# Patient Record
Sex: FEMALE | Race: WHITE | NOT HISPANIC OR LATINO | Employment: OTHER | URBAN - NONMETROPOLITAN AREA
[De-identification: names, ages, dates, MRNs, and addresses within clinical notes are randomized per-mention and may not be internally consistent; named-entity substitution may affect disease eponyms.]

---

## 2018-01-26 ENCOUNTER — TRANSCRIBE ORDERS (OUTPATIENT)
Dept: ADMINISTRATIVE | Facility: HOSPITAL | Age: 83
End: 2018-01-26

## 2018-01-26 DIAGNOSIS — Z78.0 POSTMENOPAUSAL STATE: Primary | ICD-10-CM

## 2018-01-26 DIAGNOSIS — Z12.31 ENCOUNTER FOR SCREENING MAMMOGRAM FOR MALIGNANT NEOPLASM OF BREAST: ICD-10-CM

## 2018-02-19 ENCOUNTER — APPOINTMENT (OUTPATIENT)
Dept: LAB | Facility: HOSPITAL | Age: 83
End: 2018-02-19
Attending: INTERNAL MEDICINE

## 2018-02-19 ENCOUNTER — TRANSCRIBE ORDERS (OUTPATIENT)
Dept: ADMINISTRATIVE | Facility: HOSPITAL | Age: 83
End: 2018-02-19

## 2018-02-19 DIAGNOSIS — R50.9 FEVER, UNSPECIFIED FEVER CAUSE: Primary | ICD-10-CM

## 2018-02-19 LAB
FLUAV AG NPH QL: NEGATIVE
FLUBV AG NPH QL IA: POSITIVE

## 2018-02-19 PROCEDURE — 87804 INFLUENZA ASSAY W/OPTIC: CPT | Performed by: INTERNAL MEDICINE

## 2018-02-23 ENCOUNTER — HOSPITAL ENCOUNTER (OUTPATIENT)
Dept: BONE DENSITY | Facility: HOSPITAL | Age: 83
End: 2018-02-23
Attending: INTERNAL MEDICINE

## 2018-02-23 ENCOUNTER — APPOINTMENT (OUTPATIENT)
Dept: MAMMOGRAPHY | Facility: HOSPITAL | Age: 83
End: 2018-02-23
Attending: INTERNAL MEDICINE

## 2018-03-21 ENCOUNTER — HOSPITAL ENCOUNTER (OUTPATIENT)
Dept: MAMMOGRAPHY | Facility: HOSPITAL | Age: 83
Discharge: HOME | End: 2018-03-21
Attending: INTERNAL MEDICINE | Admitting: INTERNAL MEDICINE

## 2018-03-21 ENCOUNTER — HOSPITAL ENCOUNTER (OUTPATIENT)
Dept: BONE DENSITY | Facility: HOSPITAL | Age: 83
Discharge: HOME | End: 2018-03-21
Attending: INTERNAL MEDICINE

## 2018-03-21 DIAGNOSIS — Z78.0 POSTMENOPAUSAL STATE: ICD-10-CM

## 2018-03-21 DIAGNOSIS — Z12.31 ENCOUNTER FOR SCREENING MAMMOGRAM FOR MALIGNANT NEOPLASM OF BREAST: ICD-10-CM

## 2018-03-21 PROCEDURE — 77080 DXA BONE DENSITY AXIAL: CPT

## 2018-03-21 PROCEDURE — 77067 SCR MAMMO BI INCL CAD: CPT

## 2018-03-21 PROCEDURE — 77063 BREAST TOMOSYNTHESIS BI: CPT

## 2019-01-31 ENCOUNTER — TRANSCRIBE ORDERS (OUTPATIENT)
Dept: ADMINISTRATIVE | Facility: HOSPITAL | Age: 84
End: 2019-01-31

## 2019-01-31 DIAGNOSIS — Z12.39 SCREENING BREAST EXAMINATION: Primary | ICD-10-CM

## 2019-03-13 ENCOUNTER — OFFICE VISIT (OUTPATIENT)
Dept: OTOLARYNGOLOGY | Facility: CLINIC | Age: 84
End: 2019-03-13

## 2019-03-13 ENCOUNTER — NURSE TRIAGE (OUTPATIENT)
Dept: CALL CENTER | Facility: HOSPITAL | Age: 84
End: 2019-03-13

## 2019-03-13 VITALS
HEIGHT: 64 IN | WEIGHT: 135 LBS | TEMPERATURE: 98.6 F | DIASTOLIC BLOOD PRESSURE: 78 MMHG | BODY MASS INDEX: 23.05 KG/M2 | SYSTOLIC BLOOD PRESSURE: 146 MMHG | HEART RATE: 70 BPM

## 2019-03-13 DIAGNOSIS — H65.112 ACUTE MUCOID OTITIS MEDIA OF LEFT EAR: Primary | ICD-10-CM

## 2019-03-13 DIAGNOSIS — H72.92 PERFORATION OF LEFT TYMPANIC MEMBRANE: ICD-10-CM

## 2019-03-13 DIAGNOSIS — H92.12 OTORRHEA OF LEFT EAR: ICD-10-CM

## 2019-03-13 PROCEDURE — 99203 OFFICE O/P NEW LOW 30 MIN: CPT | Performed by: NURSE PRACTITIONER

## 2019-03-13 RX ORDER — NEOMYCIN SULFATE, POLYMYXIN B SULFATE, HYDROCORTISONE 3.5; 10000; 1 MG/ML; [USP'U]/ML; MG/ML
SOLUTION/ DROPS AURICULAR (OTIC)
COMMUNITY
Start: 2019-03-12 | End: 2019-03-13 | Stop reason: SDUPTHER

## 2019-03-13 RX ORDER — FLUTICASONE PROPIONATE 50 MCG
2 SPRAY, SUSPENSION (ML) NASAL DAILY
Qty: 1 BOTTLE | Refills: 5 | Status: SHIPPED | OUTPATIENT
Start: 2019-03-13 | End: 2019-03-13 | Stop reason: SDUPTHER

## 2019-03-13 RX ORDER — FLUTICASONE PROPIONATE 50 MCG
2 SPRAY, SUSPENSION (ML) NASAL DAILY
Qty: 1 BOTTLE | Refills: 5 | Status: SHIPPED | OUTPATIENT
Start: 2019-03-13 | End: 2019-04-24

## 2019-03-13 RX ORDER — AMOXICILLIN 500 MG/1
500 CAPSULE ORAL 3 TIMES DAILY
Qty: 30 CAPSULE | Refills: 0 | Status: SHIPPED | OUTPATIENT
Start: 2019-03-13 | End: 2019-03-13 | Stop reason: SDUPTHER

## 2019-03-13 RX ORDER — AMOXICILLIN 500 MG/1
500 CAPSULE ORAL 3 TIMES DAILY
Qty: 30 CAPSULE | Refills: 0 | Status: SHIPPED | OUTPATIENT
Start: 2019-03-13 | End: 2019-03-27

## 2019-03-13 RX ORDER — NEOMYCIN SULFATE, POLYMYXIN B SULFATE, HYDROCORTISONE 3.5; 10000; 1 MG/ML; [USP'U]/ML; MG/ML
3 SOLUTION/ DROPS AURICULAR (OTIC) 2 TIMES DAILY
Qty: 1 BOTTLE | Refills: 1 | Status: SHIPPED | OUTPATIENT
Start: 2019-03-13 | End: 2019-04-24

## 2019-03-13 NOTE — TELEPHONE ENCOUNTER
Reason for Disposition  • Message left on unidentified answering machine.  Answering service notified.    Additional Information  • Negative: Caller is angry or rude (e.g., hangs up, verbally abusive, yelling)  • Negative: Caller hangs up  • Negative: Caller has already spoken with the PCP and has no further questions.  • Negative: Caller has already spoken with another triager and has no further questions.  • Negative: Caller has already spoken with another triager or PCP AND has further questions AND triager able to answer questions.  • Negative: Busy signal.  First attempt to contact caller.  Follow-up call scheduled within 15 minutes.  • Negative: No answer.  First attempt to contact caller.  Follow-up call scheduled within 15 minutes.  • Negative: Message left on identified answering machine.    Protocols used: NO CONTACT OR DUPLICATE CONTACT CALL-[facility]

## 2019-03-13 NOTE — TELEPHONE ENCOUNTER
"Pressure in my head and left side since Monday night. I hurt really bad in my ear and liquid and blood ran out. She saw Landry yesterday and he gave me some drops, Neomycin  he said I had scratched the outer canal.  But I'm still bleeding from my ear. It is spots but I think it is more serious than just scratches my ear canal.  I need to know what to do. She says her head is stopped up. In January she had her annual physical and was seen by Dr Weaver and everything was fine. Care advise per guideline. She will call MD office when they return from lunch.    Reason for Disposition  • Ear pain    Additional Information  • Negative: [1] Bloody discharge AND [2] it followed ear trauma  • Negative: [1] Followed head or face injury AND [2] clear or bloody fluid  • Negative: [1] Unexplained bleeding AND [2] lasts > 10 minutes or large amount (Exception: if a few drops of blood, continue with triage)  • Negative: Fever > 103 F (39.4 C)  • Negative: Patient sounds very sick or weak to the triager  • Negative: Diabetes mellitus or weak immune system (e.g., HIV positive, cancer chemo, splenectomy, organ transplant, chronic steroids)    Answer Assessment - Initial Assessment Questions  1. LOCATION: \"Which ear is involved?\"     left  2. COLOR: \"What is the color of the discharge?\"       Bleeding just a few spots on tissue  3. CONSISTENCY: \"How runny is the discharge? Could it be water?\"         4. ONSET: \"When did you first notice the discharge?\"      yesterday  5. PAIN: \"Is there any earache?\" \"How bad is it?\"  (Scale 1-10; or mild, moderate, severe)      Yes 5/10  6. OBJECTS: \"Any use of q-tips or have you inserted anything else in your ear?\"      finger  7. OTHER SYMPTOMS: \"Do you have any other symptoms?\" (e.g., headache, fever, dizziness, vomiting, runny nose)      Head feels stopped up  8. PREGNANCY: \"Is there any chance you are pregnant?\" \"When was your last menstrual period?\"      no    Protocols used: EAR - " DISCHARGE-ADULT-AH

## 2019-03-13 NOTE — PROGRESS NOTES
YOB: 1935  Location: [facility]  Location Address: [address] [place] [address], [facility], [address] [place] [address]  Location Phone: [phone]    Chief Complaint   Patient presents with   • Ear Problem       History of Present Illness  Linda Spear is a 83 y.o. female.  Linda Spear is here for evaluation of ENT complaints. The patient has had problems with otalgia and otorrhea  The symptoms are localized to the left side. The patient has had moderate symptoms. The symptoms have been present for the last several days The symptoms are aggravated by  no identifiable factors. The symptoms are improved by no identifiable factors.  She began having bloody otorrhea a few days ago when her ear popped.  She was given cortisporin drops.  She reports left aural fullness.       Past Medical History:   Diagnosis Date   • Disease of thyroid gland        Past Surgical History:   Procedure Laterality Date   • CARDIAC CATHETERIZATION         Outpatient Medications Marked as Taking for the 3/13/19 encounter (Office Visit) with Carlene Ambrosio APRN   Medication Sig Dispense Refill   • levothyroxine (SYNTHROID, LEVOTHROID) 125 MCG tablet Take 125 mcg by mouth Daily.     • Loratadine (CLARITIN PO) Take  by mouth.     • Multiple Vitamins-Minerals (CENTRUM SILVER PO) Take  by mouth.     • neomycin-polymyxin-hydrocortisone (CORTISPORIN) 1 % solution otic solution Administer 3 drops into the left ear 2 (Two) Times a Day. 1 bottle 1   • predniSONE (DELTASONE) 5 MG tablet Take 5 mg by mouth Daily.     • [DISCONTINUED] neomycin-polymyxin-hydrocortisone (CORTISPORIN) 1 % solution otic solution          Patient has no known allergies.    Family History   Problem Relation Age of Onset   • Breast cancer Neg Hx        Social History     Socioeconomic History   • Marital status: Single     Spouse name: Not on file   • Number of children: Not on file   • Years of education: Not on file   • Highest education level: Not on file   Social Needs   •  Financial resource strain: Not on file   • Food insecurity - worry: Not on file   • Food insecurity - inability: Not on file   • Transportation needs - medical: Not on file   • Transportation needs - non-medical: Not on file   Occupational History   • Not on file   Tobacco Use   • Smoking status: Never Smoker   • Smokeless tobacco: Never Used   Substance and Sexual Activity   • Alcohol use: No     Frequency: Never   • Drug use: Defer   • Sexual activity: Not on file   Other Topics Concern   • Not on file   Social History Narrative   • Not on file       Review of Systems   Constitutional: Negative.    HENT:        SEE HPI   Eyes: Negative.    Respiratory: Negative.    Cardiovascular: Negative.    Gastrointestinal: Negative.    Endocrine: Negative.    Genitourinary: Negative.    Musculoskeletal: Negative.    Skin: Negative.    Allergic/Immunologic: Negative.    Neurological: Negative.    Hematological: Negative.    Psychiatric/Behavioral: Negative.        Vitals:    03/13/19 1608   BP: 146/78   Pulse: 70   Temp: 98.6 °F (37 °C)       Body mass index is 23.17 kg/m².    Objective     Physical Exam  CONSTITUTIONAL: well nourished, alert, oriented, in no acute distress     COMMUNICATION AND VOICE: able to communicate normally, normal voice quality    HEAD: normocephalic, no lesions, atraumatic, no tenderness, no masses     FACE: appearance normal, no lesions, no tenderness, no deformities, facial motion symmetric    SALIVARY GLANDS: parotid glands with no tenderness, no swelling, no masses, submandibular glands with normal size, nontender    EYES: ocular motility normal, eyelids normal, orbits normal, no proptosis, conjunctiva normal , pupils equal, round     EARS:  Hearing: response to conversational voice normal bilaterally   External Ears: auricles without lesions  Otoscopic: right TM/eac normal, left TM with effusion with blood clot to TM partially removed, Type B normal volume tympanogram left  side.    NOSE:  External Nose: structure normal, no tenderness on palpation, no nasal discharge, no lesions, no evidence of trauma, nostrils patent   Intranasal Exam: nasal mucosa normal, vestibule within normal limits, inferior turbinate normal, nasal septum midline     ORAL:  Lips: upper and lower lips without lesion   Teeth: dentition within normal limits for age   Gums: gingivae healthy   Oral Mucosa: oral mucosa normal, no mucosal lesions   Floor of Mouth: Warthin’s duct patent, mucosa normal  Tongue: lingual mucosa normal without lesions, normal tongue mobility   Palate: soft and hard palates with normal mucosa and structure  Oropharynx: oropharyngeal mucosa normal    NECK: neck appearance normal, no mass,  noted without erythema or tenderness    LYMPH NODES: no lymphadenopathy    CHEST/RESPIRATORY: respiratory effort normal    CARDIOVASCULAR: extremities without cyanosis or edema      NEUROLOGIC/PSYCHIATRIC: oriented to time, place and person, mood normal, affect appropriate, CN II-XII intact grossly    Assessment/Plan   Linda was seen today for ear problem.    Diagnoses and all orders for this visit:    Acute mucoid otitis media of left ear    Perforation of left tympanic membrane possible    Otorrhea of left ear    Other orders  -     Discontinue: amoxicillin (AMOXIL) 500 MG capsule; Take 1 capsule by mouth 3 (Three) Times a Day.  -     Discontinue: fluticasone (FLONASE) 50 MCG/ACT nasal spray; 2 sprays into the nostril(s) as directed by provider Daily. Administer 2 sprays in each nostril for each dose.  -     neomycin-polymyxin-hydrocortisone (CORTISPORIN) 1 % solution otic solution; Administer 3 drops into the left ear 2 (Two) Times a Day.  -     fluticasone (FLONASE) 50 MCG/ACT nasal spray; 2 sprays into the nostril(s) as directed by provider Daily. Administer 2 sprays in each nostril for each dose.  -     amoxicillin (AMOXIL) 500 MG capsule; Take 1 capsule by mouth 3 (Three) Times a Day.      * Surgery  not found *  No orders of the defined types were placed in this encounter.    Return in about 2 weeks (around 3/27/2019).       Patient Instructions   Dry ear precautions    Add oral abx, continue ear drops, add Flonase

## 2019-03-25 ENCOUNTER — HOSPITAL ENCOUNTER (OUTPATIENT)
Dept: MAMMOGRAPHY | Facility: HOSPITAL | Age: 84
Discharge: HOME | End: 2019-03-25
Attending: INTERNAL MEDICINE | Admitting: INTERNAL MEDICINE

## 2019-03-25 DIAGNOSIS — Z12.39 SCREENING BREAST EXAMINATION: ICD-10-CM

## 2019-03-25 PROCEDURE — 77067 SCR MAMMO BI INCL CAD: CPT

## 2019-03-25 PROCEDURE — 77063 BREAST TOMOSYNTHESIS BI: CPT

## 2019-03-27 ENCOUNTER — OFFICE VISIT (OUTPATIENT)
Dept: OTOLARYNGOLOGY | Facility: CLINIC | Age: 84
End: 2019-03-27

## 2019-03-27 VITALS
BODY MASS INDEX: 23.06 KG/M2 | DIASTOLIC BLOOD PRESSURE: 81 MMHG | HEART RATE: 74 BPM | HEIGHT: 65 IN | WEIGHT: 138.38 LBS | TEMPERATURE: 97.1 F | SYSTOLIC BLOOD PRESSURE: 135 MMHG

## 2019-03-27 DIAGNOSIS — H69.82 DYSFUNCTION OF LEFT EUSTACHIAN TUBE: ICD-10-CM

## 2019-03-27 DIAGNOSIS — H65.112 ACUTE MUCOID OTITIS MEDIA OF LEFT EAR: ICD-10-CM

## 2019-03-27 DIAGNOSIS — H90.11 CONDUCTIVE HEARING LOSS OF RIGHT EAR, UNSPECIFIED HEARING STATUS ON CONTRALATERAL SIDE: Primary | ICD-10-CM

## 2019-03-27 PROCEDURE — 99213 OFFICE O/P EST LOW 20 MIN: CPT | Performed by: NURSE PRACTITIONER

## 2019-03-27 RX ORDER — AZELASTINE 1 MG/ML
2 SPRAY, METERED NASAL NIGHTLY
Qty: 30 ML | Refills: 5 | Status: SHIPPED | OUTPATIENT
Start: 2019-03-27 | End: 2019-04-24

## 2019-03-27 NOTE — PROGRESS NOTES
YOB: 1935  Location: [facility]  Location Address: [address] [place] [address], [facility] [address], [address] [place] [address]  Location Phone: [phone]    Chief Complaint   Patient presents with   • Ear Problem       History of Present Illness  Linda Spear is a 83 y.o. female.  Linda Spear is here for follow up of ENT complaints. The patient has had problems with a current ear infection  The symptoms are localized to the left side. The patient has had improving symptoms. The symptoms have been present for the last several weeks The symptoms are aggravated by  no identifiable factors. The symptoms are improved by no identifiable factors.       Past Medical History:   Diagnosis Date   • Disease of thyroid gland    • Perforation of left tympanic membrane        Past Surgical History:   Procedure Laterality Date   • CARDIAC CATHETERIZATION         Outpatient Medications Marked as Taking for the 3/27/19 encounter (Office Visit) with Carlene Ambrosio APRN   Medication Sig Dispense Refill   • fluticasone (FLONASE) 50 MCG/ACT nasal spray 2 sprays into the nostril(s) as directed by provider Daily. Administer 2 sprays in each nostril for each dose. 1 bottle 5   • levothyroxine (SYNTHROID, LEVOTHROID) 125 MCG tablet Take 125 mcg by mouth Daily.     • Loratadine (CLARITIN PO) Take  by mouth.     • Multiple Vitamins-Minerals (CENTRUM SILVER PO) Take  by mouth.     • neomycin-polymyxin-hydrocortisone (CORTISPORIN) 1 % solution otic solution Administer 3 drops into the left ear 2 (Two) Times a Day. 1 bottle 1   • predniSONE (DELTASONE) 5 MG tablet Take 5 mg by mouth Daily.         Patient has no known allergies.    Family History   Problem Relation Age of Onset   • Breast cancer Neg Hx        Social History     Socioeconomic History   • Marital status: Single     Spouse name: Not on file   • Number of children: Not on file   • Years of education: Not on file   • Highest education level: Not on file   Tobacco Use   • Smoking status: Never  Smoker   • Smokeless tobacco: Never Used   Substance and Sexual Activity   • Alcohol use: No     Frequency: Never   • Drug use: Defer       Review of Systems   Constitutional: Negative.    HENT:        SEE HPI   Eyes: Negative.    Respiratory: Negative.    Cardiovascular: Negative.    Gastrointestinal: Negative.    Endocrine: Negative.    Genitourinary: Negative.    Musculoskeletal: Negative.    Skin: Negative.    Allergic/Immunologic: Negative.    Neurological: Negative.    Hematological: Negative.    Psychiatric/Behavioral: Negative.        Vitals:    03/27/19 0931   BP: 135/81   Pulse: 74   Temp: 97.1 °F (36.2 °C)       Body mass index is 23.03 kg/m².    Objective     Physical Exam  CONSTITUTIONAL: well nourished, alert, oriented, in no acute distress     COMMUNICATION AND VOICE: able to communicate normally, normal voice quality    HEAD: normocephalic, no lesions, atraumatic, no tenderness, no masses     FACE: appearance normal, no lesions, no tenderness, no deformities, facial motion symmetric    SALIVARY GLANDS: parotid glands with no tenderness, no swelling, no masses, submandibular glands with normal size, nontender    EYES: ocular motility normal, eyelids normal, orbits normal, no proptosis, conjunctiva normal , pupils equal, round     EARS:  Hearing: response to conversational voice normal bilaterally   External Ears: auricles without lesions  Otoscopic: right TM normal, left TM dull fluid/hemotympanum inferiorly, left TM with scab removed easily, Type B small volume tympanograms left side  Type A right.    NOSE:  External Nose: structure normal, no tenderness on palpation, no nasal discharge, no lesions, no evidence of trauma, nostrils patent   Intranasal Exam: nasal mucosa normal, vestibule within normal limits, inferior turbinate normal, nasal septum midline     ORAL:  Lips: upper and lower lips without lesion   Teeth: dentition within normal limits for age   Gums: gingivae healthy   Oral Mucosa: oral  mucosa normal, no mucosal lesions   Floor of Mouth: Warthin’s duct patent, mucosa normal  Tongue: lingual mucosa normal without lesions, normal tongue mobility   Palate: soft and hard palates with normal mucosa and structure  Oropharynx: oropharyngeal mucosa normal    NECK: neck appearance normal, no mass,  noted without erythema or tenderness    LYMPH NODES: no lymphadenopathy    CHEST/RESPIRATORY: respiratory effort normal    CARDIOVASCULAR: extremities without cyanosis or edema      NEUROLOGIC/PSYCHIATRIC: oriented to time, place and person, mood normal, affect appropriate, CN II-XII intact grossly    Assessment/Plan   Linda was seen today for ear problem.    Diagnoses and all orders for this visit:    Conductive hearing loss of right ear, unspecified hearing status on contralateral side  -     Comprehensive Hearing Test; Future    Dysfunction of left eustachian tube    Acute mucoid otitis media of left ear    Other orders  -     azelastine (ASTELIN) 0.1 % nasal spray; 2 sprays into the nostril(s) as directed by provider Every Night. Use in each nostril as directed      * Surgery not found *  Orders Placed This Encounter   Procedures   • Comprehensive Hearing Test     Standing Status:   Future     Standing Expiration Date:   3/26/2020     Order Specific Question:   Laterality     Answer:   Bilateral     Return in about 4 weeks (around 4/24/2019).       Patient Instructions   Audio on followup    Call for problems or worsening symptoms

## 2019-04-13 ENCOUNTER — HOSPITAL ENCOUNTER (EMERGENCY)
Facility: HOSPITAL | Age: 84
Discharge: HOME | End: 2019-04-13
Admitting: EMERGENCY MEDICINE

## 2019-04-13 VITALS
OXYGEN SATURATION: 98 % | SYSTOLIC BLOOD PRESSURE: 158 MMHG | RESPIRATION RATE: 18 BRPM | BODY MASS INDEX: 25.52 KG/M2 | WEIGHT: 130 LBS | TEMPERATURE: 97.8 F | HEART RATE: 89 BPM | HEIGHT: 60 IN | DIASTOLIC BLOOD PRESSURE: 85 MMHG

## 2019-04-13 DIAGNOSIS — S81.811A LACERATION OF RIGHT LOWER EXTREMITY, INITIAL ENCOUNTER: Primary | ICD-10-CM

## 2019-04-13 PROCEDURE — 99283 EMERGENCY DEPT VISIT LOW MDM: CPT

## 2019-04-13 RX ORDER — CEPHALEXIN 500 MG/1
500 CAPSULE ORAL ONCE
Status: COMPLETED | OUTPATIENT
Start: 2019-04-13 | End: 2019-04-13

## 2019-04-13 RX ORDER — LIDOCAINE HYDROCHLORIDE 10 MG/ML
10 INJECTION, SOLUTION EPIDURAL; INFILTRATION; INTRACAUDAL; PERINEURAL ONCE
Status: COMPLETED | OUTPATIENT
Start: 2019-04-13 | End: 2019-04-13

## 2019-04-13 RX ORDER — CEPHALEXIN 500 MG/1
500 CAPSULE ORAL 3 TIMES DAILY
Qty: 30 CAPSULE | Refills: 0 | Status: SHIPPED | OUTPATIENT
Start: 2019-04-13 | End: 2019-04-24

## 2019-04-13 RX ORDER — IBUPROFEN 200 MG
CAPSULE ORAL ONCE
Status: COMPLETED | OUTPATIENT
Start: 2019-04-13 | End: 2019-04-13

## 2019-04-13 RX ADMIN — LIDOCAINE HYDROCHLORIDE 10 ML: 10 INJECTION, SOLUTION EPIDURAL; INFILTRATION; INTRACAUDAL; PERINEURAL at 17:36

## 2019-04-13 RX ADMIN — CEPHALEXIN 500 MG: 500 CAPSULE ORAL at 19:02

## 2019-04-13 RX ADMIN — BACITRACIN ZINC, NEOMYCIN, POLYMYXIN B: 400; 3.5; 5 OINTMENT TOPICAL at 18:50

## 2019-04-24 ENCOUNTER — OFFICE VISIT (OUTPATIENT)
Dept: OTOLARYNGOLOGY | Facility: CLINIC | Age: 84
End: 2019-04-24

## 2019-04-24 ENCOUNTER — PROCEDURE VISIT (OUTPATIENT)
Dept: OTOLARYNGOLOGY | Facility: CLINIC | Age: 84
End: 2019-04-24

## 2019-04-24 VITALS
HEIGHT: 64 IN | SYSTOLIC BLOOD PRESSURE: 138 MMHG | DIASTOLIC BLOOD PRESSURE: 70 MMHG | WEIGHT: 137.5 LBS | BODY MASS INDEX: 23.47 KG/M2 | TEMPERATURE: 98 F | HEART RATE: 78 BPM

## 2019-04-24 DIAGNOSIS — H69.82 DYSFUNCTION OF LEFT EUSTACHIAN TUBE: Primary | ICD-10-CM

## 2019-04-24 DIAGNOSIS — H90.3 SENSORINEURAL HEARING LOSS (SNHL) OF BOTH EARS: Primary | ICD-10-CM

## 2019-04-24 DIAGNOSIS — H69.82 DYSFUNCTION OF LEFT EUSTACHIAN TUBE: ICD-10-CM

## 2019-04-24 DIAGNOSIS — H65.112 ACUTE MUCOID OTITIS MEDIA OF LEFT EAR: ICD-10-CM

## 2019-04-24 PROCEDURE — 99213 OFFICE O/P EST LOW 20 MIN: CPT | Performed by: NURSE PRACTITIONER

## 2019-04-24 NOTE — PROGRESS NOTES
YOB: 1935  Location: [facility]  Location Address: [address] [place] [address], [facility] [address], [address] [place] [address]  Location Phone: [phone]    Chief Complaint   Patient presents with   • Ear Problem       History of Present Illness  Linda Spear is a 83 y.o. female.  Linda Spear is here for follow up of ENT complaints. The patient has had problems with popping and cracking of the ear and fluid on the ear  The symptoms are localized to the left side. The patient has had improving symptoms. The symptoms have been present for the last several weeks The symptoms are aggravated by  no identifiable factors. The symptoms are improved by nasal sprays.      Procedure visit     2019  [facility]   Armida Ho AUD   Audiology   Sensorineural hearing loss (SNHL) of both ears +1 more   Dx    Progress Notes                     Past Medical History:   Diagnosis Date   • Conductive hearing loss    • Disease of thyroid gland    • Perforation of left tympanic membrane        Past Surgical History:   Procedure Laterality Date   • CARDIAC CATHETERIZATION         Outpatient Medications Marked as Taking for the 19 encounter (Office Visit) with Carlene Ambrosio APRN   Medication Sig Dispense Refill   • levothyroxine (SYNTHROID, LEVOTHROID) 125 MCG tablet Take 125 mcg by mouth Daily.     • Multiple Vitamins-Minerals (CENTRUM SILVER PO) Take  by mouth.     • predniSONE (DELTASONE) 5 MG tablet Take 5 mg by mouth Daily.         Patient has no known allergies.    Family History   Problem Relation Age of Onset   • Breast cancer Neg Hx        Social History     Socioeconomic History   • Marital status:      Spouse name: Not on file   • Number of children: Not on file   • Years of education: Not on file   • Highest education level: Not on file   Tobacco Use   • Smoking status: Never Smoker   • Smokeless tobacco: Never Used   Substance and Sexual Activity   • Alcohol use: No     Frequency:  Never   • Drug use: Defer       Review of Systems   Constitutional: Negative.    HENT:        SEE HPI   Eyes: Negative.    Respiratory: Negative.    Cardiovascular: Negative.    Gastrointestinal: Negative.    Endocrine: Negative.    Genitourinary: Negative.    Musculoskeletal: Negative.    Skin: Negative.    Allergic/Immunologic: Negative.    Neurological: Negative.    Hematological: Negative.    Psychiatric/Behavioral: Negative.        Vitals:    04/24/19 0914   BP: 138/70   Pulse: 78   Temp: 98 °F (36.7 °C)       Body mass index is 23.6 kg/m².    Objective     Physical Exam  CONSTITUTIONAL: well nourished, alert, oriented, in no acute distress     COMMUNICATION AND VOICE: able to communicate normally, normal voice quality    HEAD: normocephalic, no lesions, atraumatic, no tenderness, no masses     FACE: appearance normal, no lesions, no tenderness, no deformities, facial motion symmetric    SALIVARY GLANDS: parotid glands with no tenderness, no swelling, no masses, submandibular glands with normal size, nontender    EYES: ocular motility normal, eyelids normal, orbits normal, no proptosis, conjunctiva normal , pupils equal, round     EARS:  Hearing: response to conversational voice normal bilaterally   External Ears: auricles without lesions  Otoscopic: right TM normal, left TM dull, right eac with scant cerumen removed with curette, left EAC normal    NOSE:  External Nose: structure normal, no tenderness on palpation, no nasal discharge, no lesions, no evidence of trauma, nostrils patent   Intranasal Exam: nasal mucosa normal, vestibule within normal limits, inferior turbinate normal, nasal septum midline     ORAL:  Lips: upper and lower lips without lesion   Teeth: dentition within normal limits for age   Gums: gingivae healthy   Oral Mucosa: oral mucosa normal, no mucosal lesions   Floor of Mouth: Warthin’s duct patent, mucosa normal  Tongue: lingual mucosa normal without lesions, normal tongue mobility    Palate: soft and hard palates with normal mucosa and structure  Oropharynx: oropharyngeal mucosa normal      NECK: neck appearance normal, no mass,  noted without erythema or tenderness    LYMPH NODES: no lymphadenopathy    CHEST/RESPIRATORY: respiratory effort normal     CARDIOVASCULAR:  extremities without cyanosis or edema      NEUROLOGIC/PSYCHIATRIC: oriented to time, place and person, mood normal, affect appropriate, CN II-XII intact grossly    Assessment/Plan   Linda was seen today for ear problem.    Diagnoses and all orders for this visit:    Dysfunction of left eustachian tube    Acute mucoid otitis media of left ear      * Surgery not found *  No orders of the defined types were placed in this encounter.    Return if symptoms worsen or fail to improve.       Patient Instructions   Call for problems or worsening symptoms

## 2019-06-01 ENCOUNTER — OFFICE VISIT (OUTPATIENT)
Dept: RETAIL CLINIC | Facility: CLINIC | Age: 84
End: 2019-06-01

## 2019-06-01 VITALS
HEART RATE: 78 BPM | WEIGHT: 132 LBS | SYSTOLIC BLOOD PRESSURE: 118 MMHG | RESPIRATION RATE: 16 BRPM | TEMPERATURE: 97.8 F | DIASTOLIC BLOOD PRESSURE: 64 MMHG | OXYGEN SATURATION: 97 % | BODY MASS INDEX: 22.53 KG/M2 | HEIGHT: 64 IN

## 2019-06-01 DIAGNOSIS — H10.022 PINK EYE DISEASE OF LEFT EYE: Primary | ICD-10-CM

## 2019-06-01 PROCEDURE — 99213 OFFICE O/P EST LOW 20 MIN: CPT | Performed by: NURSE PRACTITIONER

## 2019-06-01 RX ORDER — POLYMYXIN B SULFATE AND TRIMETHOPRIM 1; 10000 MG/ML; [USP'U]/ML
1 SOLUTION OPHTHALMIC
Qty: 1 EACH | Refills: 0
Start: 2019-06-01 | End: 2019-06-08

## 2019-06-01 NOTE — PROGRESS NOTES
Chief Complaint   Patient presents with   • Conjunctivitis     Subjective   Linda Spear is a 83 y.o. female who presents to the clinic today.  She states she woke up this morning with left eye redness and a small amount of white drainage in the eye.  She was around someone with a red eye yesterday and is concerned about possible pink eye.  She states this morning it was itching/slightly irritated, but isn't now, and she denies any eye pain.     Conjunctivitis    The current episode started today. The onset was sudden. The problem occurs continuously. The problem has been gradually improving. The problem is mild. Nothing relieves the symptoms. Nothing aggravates the symptoms. Associated symptoms include eye itching (slightly when she woke up, improving ), eye discharge (small amount of white drainage when she woke up ) and eye redness. Pertinent negatives include no orthopnea, no fever, no decreased vision, no double vision, no photophobia, no abdominal pain, no constipation, no diarrhea, no nausea, no vomiting, no congestion, no ear discharge, no ear pain, no headaches, no hearing loss, no mouth sores, no rhinorrhea, no sore throat, no stridor, no swollen glands, no muscle aches, no neck pain, no neck stiffness, no cough, no URI, no wheezing, no rash and no eye pain. Severity: no pain. The left eye is affected. The eye pain is not associated with movement. The eyelid exhibits no abnormality.         Current Outpatient Medications:   •  levothyroxine (SYNTHROID, LEVOTHROID) 125 MCG tablet, Take 125 mcg by mouth Daily., Disp: , Rfl:   •  Multiple Vitamins-Minerals (CENTRUM SILVER PO), Take  by mouth., Disp: , Rfl:   •  predniSONE (DELTASONE) 5 MG tablet, Take 5 mg by mouth Daily., Disp: , Rfl:       Allergies:  Patient has no known allergies.    Past Medical History:   Diagnosis Date   • Chronic shoulder pain    • Conductive hearing loss    • Disease of thyroid gland    • Perforation of left tympanic membrane   "    Past Surgical History:   Procedure Laterality Date   • CARDIAC CATHETERIZATION       Family History   Problem Relation Age of Onset   • Breast cancer Neg Hx      Social History     Tobacco Use   • Smoking status: Never Smoker   • Smokeless tobacco: Never Used   Substance Use Topics   • Alcohol use: No     Frequency: Never   • Drug use: Defer       Review of Systems  Review of Systems   Constitutional: Negative for appetite change, chills, diaphoresis, fatigue and fever.   HENT: Negative for congestion, ear discharge, ear pain, hearing loss, mouth sores, rhinorrhea and sore throat.    Eyes: Positive for discharge (small amount of white drainage when she woke up ), redness and itching (slightly when she woke up, improving ). Negative for double vision, photophobia, pain and visual disturbance.   Respiratory: Negative for cough, wheezing and stridor.    Cardiovascular: Negative for chest pain, palpitations and orthopnea.   Gastrointestinal: Negative for abdominal pain, constipation, diarrhea, nausea and vomiting.   Musculoskeletal: Negative for myalgias and neck pain.   Skin: Negative for rash.   Allergic/Immunologic: Positive for environmental allergies. Negative for food allergies and immunocompromised state.   Neurological: Negative for headaches.       Objective   /64   Pulse 78   Temp 97.8 °F (36.6 °C) (Oral)   Resp 16   Ht 162.6 cm (64\")   Wt 59.9 kg (132 lb)   LMP  (LMP Unknown)   SpO2 97%   Breastfeeding? No   BMI 22.66 kg/m²       Physical Exam   Constitutional: She is oriented to person, place, and time. She appears well-developed and well-nourished. She is active and cooperative.  Non-toxic appearance. She does not have a sickly appearance. She does not appear ill. No distress.   HENT:   Head: Normocephalic and atraumatic.   Right Ear: Hearing, tympanic membrane, external ear and ear canal normal.   Left Ear: Hearing, external ear and ear canal normal. Tympanic membrane is not injected, " not scarred, not perforated, not erythematous, not retracted and not bulging. A middle ear effusion (mild, clearish yellow ) is present. No hemotympanum.   Nose: Nose normal. Right sinus exhibits no maxillary sinus tenderness and no frontal sinus tenderness. Left sinus exhibits no maxillary sinus tenderness and no frontal sinus tenderness.   Mouth/Throat: Uvula is midline and mucous membranes are normal. Oropharyngeal exudate (clearish white PND ) and posterior oropharyngeal erythema present. No posterior oropharyngeal edema or tonsillar abscesses. Tonsils are 1+ on the right. Tonsils are 1+ on the left. No tonsillar exudate.   Eyes: EOM and lids are normal. Pupils are equal, round, and reactive to light. Right eye exhibits no chemosis, no discharge, no exudate and no hordeolum. No foreign body present in the right eye. Left eye exhibits no chemosis, no discharge, no exudate and no hordeolum. No foreign body present in the left eye. Right conjunctiva is not injected. Right conjunctiva has no hemorrhage. Left conjunctiva is injected. Left conjunctiva has no hemorrhage. No scleral icterus.   Fundoscopic exam:       The right eye shows red reflex.        The left eye shows red reflex.   Due to pt having implants in both eyes (left eye implant for near reading, right eye implant for distance), unable to get thorough Snellen chart exam.  Both eyes 20/25, Right 20/20, unable to get left eye due to implant.  Pt denies any vision problems.     Cardiovascular: Normal rate, regular rhythm, S1 normal, S2 normal and normal heart sounds.   Pulmonary/Chest: Effort normal and breath sounds normal.   Neurological: She is alert and oriented to person, place, and time.   Skin: Skin is warm, dry and intact. No rash noted. She is not diaphoretic. No pallor.   Psychiatric: She has a normal mood and affect. Her speech is normal and behavior is normal. Thought content normal.   Vitals reviewed.      Assessment/Plan     Linda was seen today  for conjunctivitis.    Diagnoses and all orders for this visit:    Pink eye disease of left eye    Other orders  -     trimethoprim-polymyxin b (POLYTRIM) 90519-0.1 UNIT/ML-% ophthalmic solution; Administer 1 drop into the left eye Every 4 (Four) Hours While Awake for 7 days.    Pt declines AVS today.    Take full course of antibiotic drops.  Change pillowcase daily.  Avoid contact lenses and eye makeup until better.  Avoid touching eyes and use good hand hygiene.  You can apply cool compresses as needed for discomfort.  If no better in 2-3 days, please follow up with your optometrist, sooner if worse.  If any severe symptoms occur, including: vision changes, severe eye pain, severe swelling/redness around eye, fever, malaise, etc. Get medical attention immediately.     Pt voiced understanding of all instructions and in agreement with plan.

## 2019-06-10 ENCOUNTER — OFFICE VISIT (OUTPATIENT)
Dept: RETAIL CLINIC | Facility: CLINIC | Age: 84
End: 2019-06-10

## 2019-06-10 VITALS
HEIGHT: 64 IN | BODY MASS INDEX: 22.53 KG/M2 | HEART RATE: 73 BPM | WEIGHT: 132 LBS | RESPIRATION RATE: 16 BRPM | OXYGEN SATURATION: 98 % | SYSTOLIC BLOOD PRESSURE: 122 MMHG | TEMPERATURE: 97.9 F | DIASTOLIC BLOOD PRESSURE: 58 MMHG

## 2019-06-10 DIAGNOSIS — S81.801D OPEN WOUND OF RIGHT LOWER LEG, SUBSEQUENT ENCOUNTER: ICD-10-CM

## 2019-06-10 DIAGNOSIS — T14.8XXD WOUND HEALING, DELAYED: ICD-10-CM

## 2019-06-10 DIAGNOSIS — H57.89 REDNESS OF LEFT EYE: Primary | ICD-10-CM

## 2019-06-10 PROCEDURE — 99213 OFFICE O/P EST LOW 20 MIN: CPT | Performed by: NURSE PRACTITIONER

## 2019-06-10 NOTE — PROGRESS NOTES
"  Chief Complaint   Patient presents with   • Conjunctivitis   • Wound Check     Subjective   Linda Spear is a 83 y.o. female who presents to the clinic today.  She was seen last week for left eye redness and was prescribed a 7 day course of Polytrim drops.  She has returned to clinic due to the redness not improving.  She finished the full course of Polytrim drops.  Her only complaint regarding the eye is redness, she denies any pain, itching, irritation (except when she applied the eye drops), or purulent discharge.  She does states it is \"watery\" when she wakes up in the morning, but no colored discharge.  She also denies vision changes.  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  She states she has been able to read normally.  She has also used artificial tears drops occasionally.  She is established with Dr. Vazquez (ophthalmogist) and states she saw him in May and was told she does have lid drooping of the left eye; she states she was referred to a specialist in [place] for possible surgery, but has not had the surgery yet.      She also presents for slow wound healing of her right lower leg wound.  In April, she lacerated her right lower leg and did go to the ER for stitches.  She had them removed 1 week later at her PCP's office and states the wound has never really healed.  She states over the past few weeks, it has been draining so she has been covering it with gauze and coban at night.  She denies any pain of the area, numbness/tingling, weakness, fever, malaise, streaking, swelling, etc.            Conjunctivitis    Episode onset: 1 week ago  The onset was gradual. The problem occurs continuously. The problem has been unchanged. The problem is moderate. Nothing relieves the symptoms. Nothing aggravates the symptoms. Associated symptoms include eye discharge (\"watery\" ) and eye redness. Pertinent negatives include no orthopnea, no fever, no " decreased vision, no double vision, no eye itching, no photophobia, no abdominal pain, no constipation, no diarrhea, no nausea, no vomiting, no congestion, no ear discharge, no ear pain, no headaches, no hearing loss, no mouth sores, no rhinorrhea, no sore throat, no stridor, no swollen glands, no muscle aches, no neck pain, no neck stiffness, no cough, no URI, no wheezing, no rash and no eye pain. Severity: no pain. The left eye is affected. The eye pain is not associated with movement. The eyelid exhibits no abnormality.   Wound Check   She was originally treated more than 14 days ago (about 2 months ago ). Previous treatment included laceration repair and wound cleansing or irrigation. Her temperature was unmeasured prior to arrival. Wound drainage status: clearish red  The redness has not changed. The swelling has improved. There is no pain present. She has no difficulty moving the affected extremity or digit.         Current Outpatient Medications:   •  levothyroxine (SYNTHROID, LEVOTHROID) 125 MCG tablet, Take 125 mcg by mouth Daily., Disp: , Rfl:   •  Multiple Vitamins-Minerals (CENTRUM SILVER PO), Take  by mouth., Disp: , Rfl:   •  predniSONE (DELTASONE) 5 MG tablet, Take 5 mg by mouth Daily., Disp: , Rfl:     Allergies:  Patient has no known allergies.    Past Medical History:   Diagnosis Date   • Chronic shoulder pain    • Conductive hearing loss    • Disease of thyroid gland    • Perforation of left tympanic membrane      Past Surgical History:   Procedure Laterality Date   • CARDIAC CATHETERIZATION       Family History   Problem Relation Age of Onset   • Breast cancer Neg Hx      Social History     Tobacco Use   • Smoking status: Never Smoker   • Smokeless tobacco: Never Used   Substance Use Topics   • Alcohol use: No     Frequency: Never   • Drug use: Defer       Review of Systems  Review of Systems   Constitutional: Negative for appetite change, chills, diaphoresis, fatigue and fever.   HENT: Negative  "for congestion, ear discharge, ear pain, facial swelling, hearing loss, mouth sores, rhinorrhea, sinus pressure, sinus pain, sneezing and sore throat.    Eyes: Positive for discharge (\"watery\" ) and redness. Negative for double vision, photophobia, pain, itching and visual disturbance.   Respiratory: Negative for cough, wheezing and stridor.    Cardiovascular: Negative for chest pain, palpitations and orthopnea.   Gastrointestinal: Negative for abdominal pain, constipation, diarrhea, nausea and vomiting.   Musculoskeletal: Negative for myalgias, neck pain and neck stiffness.   Skin: Positive for wound. Negative for color change, pallor and rash.   Allergic/Immunologic: Negative for environmental allergies, food allergies and immunocompromised state.   Neurological: Negative for dizziness, syncope, weakness, light-headedness and headaches.   Hematological: Negative for adenopathy.       Objective   /58   Pulse 73   Temp 97.9 °F (36.6 °C) (Oral)   Resp 16   Ht 162.6 cm (64\")   Wt 59.9 kg (132 lb)   LMP  (LMP Unknown)   SpO2 98%   BMI 22.66 kg/m²       Physical Exam   Constitutional: She is oriented to person, place, and time. She appears well-developed and well-nourished. She is active and cooperative.  Non-toxic appearance. She does not have a sickly appearance. She does not appear ill. No distress.   HENT:   Head: Normocephalic and atraumatic.   Right Ear: Hearing, tympanic membrane, external ear and ear canal normal.   Left Ear: Hearing, external ear and ear canal normal. Tympanic membrane is not injected, not scarred, not perforated, not erythematous, not retracted and not bulging. A middle ear effusion (clearish yellow ) is present. No hemotympanum.   Nose: Nose normal. Right sinus exhibits no maxillary sinus tenderness and no frontal sinus tenderness. Left sinus exhibits no maxillary sinus tenderness and no frontal sinus tenderness.   Mouth/Throat: Uvula is midline, oropharynx is clear and moist " and mucous membranes are normal. Tonsils are 1+ on the right. Tonsils are 1+ on the left. No tonsillar exudate.   Eyes: EOM are normal. Pupils are equal, round, and reactive to light. Right eye exhibits no chemosis, no discharge, no exudate and no hordeolum. No foreign body present in the right eye. Left eye exhibits no chemosis, no discharge, no exudate and no hordeolum. No foreign body present in the left eye. Right conjunctiva is not injected. Right conjunctiva has no hemorrhage. Left conjunctiva is injected. Left conjunctiva has no hemorrhage. No scleral icterus.   Fundoscopic exam:       The right eye shows red reflex.        The left eye shows red reflex.   Neck: Trachea normal, normal range of motion and phonation normal. Neck supple.   Cardiovascular: Normal rate, regular rhythm, S1 normal, S2 normal and normal heart sounds.   Pulses:       Dorsalis pedis pulses are 2+ on the right side.        Posterior tibial pulses are 2+ on the right side.   Pulmonary/Chest: Effort normal and breath sounds normal.   Lymphadenopathy:        Head (right side): No submental, no submandibular, no tonsillar, no preauricular, no posterior auricular and no occipital adenopathy present.        Head (left side): No submental, no submandibular, no tonsillar, no preauricular, no posterior auricular and no occipital adenopathy present.     She has no cervical adenopathy.   Neurological: She is alert and oriented to person, place, and time. She has normal strength.   Skin: Skin is warm and dry. Lesion noted. No abrasion, no bruising, no burn, no ecchymosis, no petechiae and no rash noted. She is not diaphoretic. There is erythema. No pallor.        Psychiatric: She has a normal mood and affect. Her speech is normal and behavior is normal. Thought content normal.   Vitals reviewed.      Assessment/Plan     Linda was seen today for conjunctivitis and wound check.    Diagnoses and all orders for this visit:    Redness of left eye    Wound  healing, delayed    Open wound of right lower leg, subsequent encounter    Due to the continued eye redness after finishing a course of Polytrim drops and the limited resources at this clinic, we have made an appointment for you at your established opthalmology group to be further evaluated today.     Appointment made with Dr. Islas today at 1:40 p.m.    Right lower leg wound gently cleaned with Restore dermal wound cleanser (lot [id number], exp 08/2020) and patted dry.  4 x 4 gauze and coban applied to wound.  Due to the delayed healing of this wound, we are referring you to [facility].      Appointment made with [facility] for tomorrow, 06/11/19, at 1:00 p.m.  Phone number [phone].  Address [address] [place] [address], [place] [address].     Pt voiced understanding of all instructions and in agreement with plan.

## 2019-06-10 NOTE — PATIENT INSTRUCTIONS
Wound Check  If you have a wound, it may take some time to heal. Eventually, a scar will form. The scar will also fade with time. It is important to take care of your wound while it is healing. This helps to protect your wound from infection.  How should I take care of my wound at home?  · Some wounds are allowed to close on their own or are repaired at a later date. There are many different ways to close and cover a wound, including stitches (sutures), skin glue, and adhesive strips. Follow your health care provider's instructions about:  ? Wound care.  ? Bandage (dressing) changes and removal.  ? Wound closure removal.  · Take medicines only as directed by your health care provider.  · Keep all follow-up visits as directed by your health care provider. This is important.  · Do not take baths, swim, or use a hot tub until your health care provider approves. You may shower as directed by your health care provider.  · Keep your wound clean and dry.  What affects scar formation?  Scars affect each person differently. How your body scars depends on:  · The location and size of your wound.  · Traits that you inherited from your parents (genetic predisposition).  · How you take care of your wound. Irritation and inflammation increase the amount of scar formation.  · Sun exposure. This can darken a scar.    When should I call or see my health care provider?  Call or see your health care provider if:  · You have redness, swelling, or pain at your wound site.  · You have fluid, blood, or pus coming from your wound.  · You have muscle aches, chills, or a general ill feeling.  · You notice a bad smell coming from the wound.  · Your wound separates after the sutures, staples, or skin adhesive strips have been removed.  · You have persistent nausea or vomiting.  · You have a fever.  · You are dizzy.    When should I call 911 or go to the emergency room?  Call 911 or go to the emergency room if:  · You faint.  · You have  difficulty breathing.    This information is not intended to replace advice given to you by your health care provider. Make sure you discuss any questions you have with your health care provider.  Document Released: 09/23/2005 Document Revised: 05/31/2017 Document Reviewed: 09/29/2015  [employer]" Interactive Patient Education © 2019 [employer]    Appointment made for eye redness with Dr. Islas today at 1:40 p.m.    Appointment made with [facility] for delayed wound healing of right lower leg wound for tomorrow, 06/11/19, at 1:00 p.m.  Phone number [phone].  Address [address] [place] [address], [address] [place] [address].

## 2019-06-11 ENCOUNTER — OFFICE VISIT (OUTPATIENT)
Dept: WOUND CARE | Facility: HOSPITAL | Age: 84
End: 2019-06-11

## 2019-06-11 PROCEDURE — G0463 HOSPITAL OUTPT CLINIC VISIT: HCPCS

## 2019-06-19 ENCOUNTER — OFFICE VISIT (OUTPATIENT)
Dept: WOUND CARE | Facility: HOSPITAL | Age: 84
End: 2019-06-19

## 2019-06-26 ENCOUNTER — OFFICE VISIT (OUTPATIENT)
Dept: WOUND CARE | Facility: HOSPITAL | Age: 84
End: 2019-06-26

## 2019-07-03 ENCOUNTER — OFFICE VISIT (OUTPATIENT)
Dept: WOUND CARE | Facility: HOSPITAL | Age: 84
End: 2019-07-03

## 2019-07-17 ENCOUNTER — OFFICE VISIT (OUTPATIENT)
Dept: WOUND CARE | Facility: HOSPITAL | Age: 84
End: 2019-07-17

## 2019-07-24 ENCOUNTER — OFFICE VISIT (OUTPATIENT)
Dept: WOUND CARE | Facility: HOSPITAL | Age: 84
End: 2019-07-24

## 2019-07-24 PROCEDURE — G0463 HOSPITAL OUTPT CLINIC VISIT: HCPCS

## 2019-08-19 ENCOUNTER — OFFICE VISIT (OUTPATIENT)
Dept: FAMILY MEDICINE CLINIC | Age: 84
End: 2019-08-19
Payer: MEDICARE

## 2019-08-19 VITALS
RESPIRATION RATE: 20 BRPM | OXYGEN SATURATION: 99 % | HEIGHT: 65 IN | DIASTOLIC BLOOD PRESSURE: 62 MMHG | BODY MASS INDEX: 22.33 KG/M2 | TEMPERATURE: 97.8 F | SYSTOLIC BLOOD PRESSURE: 120 MMHG | WEIGHT: 134 LBS | HEART RATE: 83 BPM

## 2019-08-19 DIAGNOSIS — S81.801A WOUND OF RIGHT LOWER EXTREMITY, INITIAL ENCOUNTER: Primary | ICD-10-CM

## 2019-08-19 PROCEDURE — 1123F ACP DISCUSS/DSCN MKR DOCD: CPT | Performed by: NURSE PRACTITIONER

## 2019-08-19 PROCEDURE — G8400 PT W/DXA NO RESULTS DOC: HCPCS | Performed by: NURSE PRACTITIONER

## 2019-08-19 PROCEDURE — G8427 DOCREV CUR MEDS BY ELIG CLIN: HCPCS | Performed by: NURSE PRACTITIONER

## 2019-08-19 PROCEDURE — 4040F PNEUMOC VAC/ADMIN/RCVD: CPT | Performed by: NURSE PRACTITIONER

## 2019-08-19 PROCEDURE — 1090F PRES/ABSN URINE INCON ASSESS: CPT | Performed by: NURSE PRACTITIONER

## 2019-08-19 PROCEDURE — 99203 OFFICE O/P NEW LOW 30 MIN: CPT | Performed by: NURSE PRACTITIONER

## 2019-08-19 PROCEDURE — G8420 CALC BMI NORM PARAMETERS: HCPCS | Performed by: NURSE PRACTITIONER

## 2019-08-19 PROCEDURE — 1036F TOBACCO NON-USER: CPT | Performed by: NURSE PRACTITIONER

## 2019-08-19 RX ORDER — LEVOTHYROXINE SODIUM 0.12 MG/1
125 TABLET ORAL
COMMUNITY

## 2019-08-19 RX ORDER — CEPHALEXIN 500 MG/1
500 CAPSULE ORAL 3 TIMES DAILY
Qty: 21 CAPSULE | Refills: 0 | Status: SHIPPED | OUTPATIENT
Start: 2019-08-19 | End: 2019-08-26

## 2019-08-19 ASSESSMENT — PATIENT HEALTH QUESTIONNAIRE - PHQ9
SUM OF ALL RESPONSES TO PHQ QUESTIONS 1-9: 0
1. LITTLE INTEREST OR PLEASURE IN DOING THINGS: 0
SUM OF ALL RESPONSES TO PHQ QUESTIONS 1-9: 0
SUM OF ALL RESPONSES TO PHQ9 QUESTIONS 1 & 2: 0
2. FEELING DOWN, DEPRESSED OR HOPELESS: 0

## 2019-08-19 ASSESSMENT — ENCOUNTER SYMPTOMS
COUGH: 0
COLOR CHANGE: 1

## 2019-08-20 ENCOUNTER — OFFICE VISIT (OUTPATIENT)
Dept: WOUND CARE | Facility: HOSPITAL | Age: 84
End: 2019-08-20

## 2019-08-20 PROCEDURE — G0463 HOSPITAL OUTPT CLINIC VISIT: HCPCS

## 2019-08-30 ENCOUNTER — OFFICE VISIT (OUTPATIENT)
Dept: WOUND CARE | Facility: HOSPITAL | Age: 84
End: 2019-08-30

## 2019-09-04 ENCOUNTER — OFFICE VISIT (OUTPATIENT)
Dept: WOUND CARE | Facility: HOSPITAL | Age: 84
End: 2019-09-04

## 2019-09-13 ENCOUNTER — OFFICE VISIT (OUTPATIENT)
Dept: WOUND CARE | Facility: HOSPITAL | Age: 84
End: 2019-09-13

## 2019-09-18 ENCOUNTER — OFFICE VISIT (OUTPATIENT)
Dept: WOUND CARE | Facility: HOSPITAL | Age: 84
End: 2019-09-18

## 2019-09-25 ENCOUNTER — OFFICE VISIT (OUTPATIENT)
Dept: WOUND CARE | Facility: HOSPITAL | Age: 84
End: 2019-09-25

## 2019-10-02 ENCOUNTER — OFFICE VISIT (OUTPATIENT)
Dept: WOUND CARE | Facility: HOSPITAL | Age: 84
End: 2019-10-02

## 2019-10-09 ENCOUNTER — OFFICE VISIT (OUTPATIENT)
Dept: WOUND CARE | Facility: HOSPITAL | Age: 84
End: 2019-10-09

## 2019-10-09 PROCEDURE — G0463 HOSPITAL OUTPT CLINIC VISIT: HCPCS

## 2020-02-03 ENCOUNTER — TRANSCRIBE ORDERS (OUTPATIENT)
Dept: ADMINISTRATIVE | Facility: HOSPITAL | Age: 85
End: 2020-02-03

## 2020-02-03 DIAGNOSIS — Z12.31 ENCOUNTER FOR SCREENING MAMMOGRAM FOR MALIGNANT NEOPLASM OF BREAST: Primary | ICD-10-CM

## 2020-05-18 ENCOUNTER — OFFICE VISIT (OUTPATIENT)
Dept: OTOLARYNGOLOGY | Facility: CLINIC | Age: 85
End: 2020-05-18

## 2020-05-18 ENCOUNTER — PROCEDURE VISIT (OUTPATIENT)
Dept: OTOLARYNGOLOGY | Facility: CLINIC | Age: 85
End: 2020-05-18

## 2020-05-18 VITALS
TEMPERATURE: 97.6 F | WEIGHT: 138.6 LBS | HEART RATE: 61 BPM | DIASTOLIC BLOOD PRESSURE: 74 MMHG | HEIGHT: 64 IN | BODY MASS INDEX: 23.66 KG/M2 | SYSTOLIC BLOOD PRESSURE: 144 MMHG

## 2020-05-18 DIAGNOSIS — H69.82 DYSFUNCTION OF LEFT EUSTACHIAN TUBE: ICD-10-CM

## 2020-05-18 DIAGNOSIS — H90.3 SENSORINEURAL HEARING LOSS (SNHL) OF BOTH EARS: Primary | ICD-10-CM

## 2020-05-18 PROCEDURE — 92567 TYMPANOMETRY: CPT | Performed by: AUDIOLOGIST-HEARING AID FITTER

## 2020-05-18 PROCEDURE — 99213 OFFICE O/P EST LOW 20 MIN: CPT | Performed by: NURSE PRACTITIONER

## 2020-05-18 PROCEDURE — 92557 COMPREHENSIVE HEARING TEST: CPT | Performed by: AUDIOLOGIST-HEARING AID FITTER

## 2020-05-18 NOTE — PROGRESS NOTES
PRIMARY CARE PROVIDER: Richard Weaver MD  REFERRING PROVIDER: No ref. provider found    Chief Complaint   Patient presents with   • Ear Problem     hearing better, ears popping, painful       Subjective   History of Present Illness:  Linda Spear is a 84 y.o. female who is here for follow up. She has had a recent flair up of otalgia, ear fullness, popping and cracking of the ear, fluid on the ear, decreased hearing and muffled hearing. The symptoms are localized to the left ear. The symptoms severity was described as: mild to moderate. The symptoms have been: present for the last several weeks.  However, she feels her symptoms have resolved.  There have been no identified factors that aggravate the symptoms. The symptoms are improved by popping the ears and blowing the nose. She denies otorrhea.    Review of Systems:  See patient intake note    Past History:  Past Medical History:   Diagnosis Date   • Chronic shoulder pain    • Conductive hearing loss    • Disease of thyroid gland    • Perforation of left tympanic membrane      Past Surgical History:   Procedure Laterality Date   • CARDIAC CATHETERIZATION       Family History   Problem Relation Age of Onset   • Breast cancer Neg Hx      Social History     Tobacco Use   • Smoking status: Never Smoker   • Smokeless tobacco: Never Used   Substance Use Topics   • Alcohol use: No     Frequency: Never   • Drug use: Defer     Allergies:  Patient has no known allergies.    Current Outpatient Medications:   •  levothyroxine (SYNTHROID, LEVOTHROID) 125 MCG tablet, Take 125 mcg by mouth Daily., Disp: , Rfl:   •  Multiple Vitamins-Minerals (CENTRUM SILVER PO), Take  by mouth., Disp: , Rfl:   •  predniSONE (DELTASONE) 5 MG tablet, Take 5 mg by mouth Daily., Disp: , Rfl:       Objective     Vital Signs:  Temp:  [97.6 °F (36.4 °C)] 97.6 °F (36.4 °C)  Heart Rate:  [61] 61  BP: (144)/(74) 144/74    Physical Exam:  Physical Exam  CONSTITUTIONAL: well nourished, well-developed,  alert, oriented, in no acute distress   COMMUNICATION AND VOICE: able to communicate normally, normal voice quality  HEAD: normocephalic, no lesions, atraumatic, no tenderness, no masses   FACE: appearance normal, no lesions, no tenderness, no deformities, facial motion symmetric  SALIVARY GLANDS: parotid glands with no tenderness, no swelling, no masses, submandibular glands with normal size, nontender  EYES: ocular motility normal, eyelids normal, orbits normal, no proptosis, conjunctiva normal , pupils equal, round   EARS:  Hearing: response to conversational voice normal bilaterally   External Ears: auricles without lesions  Otoscopic: tympanic membrane appearance normal, no lesions, no perforation, normal mobility, no fluid, mild cerumen bilaterally  NOSE:  External Nose: structure normal, no tenderness on palpation, no nasal discharge, no lesions, no evidence of trauma, nostrils patent   Intranasal Exam: nasal mucosa with mild inflammation, vestibule within normal limits, inferior turbinate normal, nasal septum without overt deviation  ORAL:  Lips: upper and lower lips without lesion   NECK: neck appearance normal, no masses or tenderness  LYMPH NODES: no lymphadenopathy  CHEST/RESPIRATORY: respiratory effort normal, normal breath sounds   CARDIOVASCULAR: rate and rhythm normal, extremities without cyanosis or edema    NEUROLOGIC/PSYCHIATRIC: oriented to time, place and person, mood normal, affect appropriate, CN II-XII intact grossly    Results Review:       Assessment   Assessment:  1. Sensorineural hearing loss (SNHL) of both ears    2. Dysfunction of left eustachian tube        Plan   Plan:  Use hearing protection in loud noise situations.  Obtain a yearly audiogram to follow hearing.  Restart Flonase.  For the best results, use nasal sprays every day. It may take a week to build up in the nasal lining and a few more weeks to improve the eustachian tube function.   Call for any problems worsening  symptoms.    There are no Patient Instructions on file for this visit.  Return in about 6 months (around 11/18/2020), or if symptoms worsen or fail to improve, for Recheck.    My findings and recommendations were discussed and questions were answered.     Thais Lopez, APRN  05/18/20  12:14

## 2020-05-18 NOTE — PROGRESS NOTES
Farhana Ernandez LPN   Patient Intake Note    Review of Systems  Review of Systems   Constitutional: Negative for chills, fatigue and fever.   HENT:        See HPI   Respiratory: Negative for cough, choking and shortness of breath.    Gastrointestinal: Negative for diarrhea, nausea and vomiting.   Skin: Negative for rash.   Neurological: Negative for dizziness, light-headedness and headaches.   Psychiatric/Behavioral: Negative for sleep disturbance.       Tobacco Use: Screening and Cessation Intervention  Social History    Tobacco Use      Smoking status: Never Smoker      Smokeless tobacco: Never Used        Farhana Ernandez LPN  5/18/2020  11:30

## 2020-05-19 RX ORDER — FLUTICASONE PROPIONATE 50 MCG
2 SPRAY, SUSPENSION (ML) NASAL DAILY
Qty: 16 G | Refills: 11 | Status: SHIPPED | OUTPATIENT
Start: 2020-05-19 | End: 2022-03-21

## 2020-07-07 ENCOUNTER — HOSPITAL ENCOUNTER (EMERGENCY)
Facility: HOSPITAL | Age: 85
Discharge: HOME | End: 2020-07-07
Admitting: EMERGENCY MEDICINE

## 2020-07-07 VITALS
BODY MASS INDEX: 21.66 KG/M2 | WEIGHT: 130 LBS | RESPIRATION RATE: 17 BRPM | SYSTOLIC BLOOD PRESSURE: 158 MMHG | HEIGHT: 65 IN | HEART RATE: 87 BPM | DIASTOLIC BLOOD PRESSURE: 84 MMHG | TEMPERATURE: 98 F | OXYGEN SATURATION: 98 %

## 2020-07-07 DIAGNOSIS — S81.812A SKIN TEAR OF LEFT LOWER LEG WITHOUT COMPLICATION, INITIAL ENCOUNTER: Primary | ICD-10-CM

## 2020-07-07 PROCEDURE — 99283 EMERGENCY DEPT VISIT LOW MDM: CPT

## 2020-07-08 NOTE — DISCHARGE INSTRUCTIONS
Nonsutured Laceration Care  A laceration is a cut that may go through all layers of the skin and extend into the tissue that is right under the skin. This type of cut is usually stitched up (sutured) or closed with tape (adhesive strips) or skin glue shortly after the injury happens.  However, if the wound is dirty or if several hours pass before medical treatment is provided, it is likely that germs (bacteria) will enter the wound. Closing a laceration after bacteria have entered it increases the risk of infection. In these cases, your health care provider may leave the laceration open (nonsutured) and cover it with a bandage. This type of treatment helps prevent infection and allows the wound to heal from the deepest layer of tissue damage up to the surface.  An open fracture is a type of injury that may involve nonsutured lacerations. An open fracture is a break in a bone that happens along with lacerations through the skin at the fracture site.  What are the risks?  Caring for a nonsutured laceration is safe. However, problems may occur, including a higher risk for:  · Scarring.  · Infection.  · Slow healing.  Supplies needed:  · Soap.  · Hand .  · Sterile water or irrigation solution.  · Bandages (dressings).  · Clean towel.  · Antibiotic ointment.  How to care for your nonsutured laceration  Follow instructions from your health care provider about how to take care of your wound.  · Keep the wound clean and dry.  · Change any dressings as told by your health care provider. This includes changing the dressing when it starts to smell, or when it gets wet or dirty.  · Clean the wound one time each day, or as often as told by your health care provider. To clean your wound:  ? Wash your hands with soap and water. If soap and water are not available, use hand .  ? Remove any dressing as told by your health care provider.  ? Clean the wound with sterile water or irrigation solution as told by your  health care provider.  ? Pat the wound dry with a clean towel. Do not rub the wound.  ? Apply a thin layer of antibiotic ointment to the wound as told by your health care provider. This will prevent infection and keep the dressing from sticking to the wound.  ? Apply a new dressing as told by your health care provider.  · Check your wound every day for signs of infection. Watch for:  ? Redness, swelling, or pain.  ? Fluid, blood, or pus.  ? Bad smell on the wound or dressing.  ? Warmth.  · Do not take baths, swim, or do anything that puts your wound underwater until your health care provider approves.  · Do not scratch or pick at the wound.  Follow these instructions at home:  · Take or apply over-the-counter and prescription medicines only as told by your health care provider.  · If you were prescribed an antibiotic medicine, take or apply it as told by your health care provider. Do not stop using the antibiotic even if your condition improves.  · Do not inject anything into the wound unless directed by your health care provider.  · Raise (elevate) the injured area above the level of your heart while you are sitting or lying down, if possible.  · If directed, put ice on the affected area:  ? Put ice in a plastic bag.  ? Place a towel between your skin and the bag.  ? Leave the ice on for 20 minutes, 2-3 times a day.  · Keep all follow-up visits as told by your health care provider. This is important.  Contact a health care provider if:  · You received a tetanus shot and you have swelling, severe pain, redness, or bleeding at the injection site.  · You have a fever.  · Your pain is not controlled with medicine.  · You have increased redness, swelling, or pain at the site of your wound.  · You have fluid, blood, or pus coming from your wound.  · You notice a bad smell coming from your wound or your dressing.  · You notice something coming out of the wound, such as wood or glass.  · You notice a change in the color of  your skin near your wound.  · You develop a new rash.  · You need to change the dressing frequently due to fluid, blood, or pus draining from the wound.  · You develop numbness around your wound.  Get help right away if:  · Your pain suddenly increases and is severe.  · You develop severe swelling around the wound.  · The wound is on your hand or foot and you cannot properly move a finger or toe.  · The wound is on your hand or foot, and you notice that your fingers or toes look pale or bluish.  · You have a red streak going away from your wound.  Summary  · A laceration is a cut that may go through all layers of the skin and extend into the tissue that is right under the skin. It is usually closed with stitches, tape, or skin glue shortly after the injury happens.  · If a wound is dirty or if several hours pass before medical treatment is provided, the laceration may be kept open (nonsutured) and covered with a bandage.  · This type of treatment helps prevent infection and allows the wound to heal from the deepest layer of tissue damage up to the surface.  · Follow instructions from your health care provider about how to take care of your wound.  This information is not intended to replace advice given to you by your health care provider. Make sure you discuss any questions you have with your health care provider.  Document Released: 11/15/2007 Document Revised: 04/10/2020 Document Reviewed: 01/07/2019  [employer] Patient Education © 2020 [employer]

## 2020-07-08 NOTE — ED PROVIDER NOTES
Subjective   History of Present Illness    Patient is an 84-year-old female chief complaint of skin laceration to her left lower leg.  The patient describes stepping down to the captain's quarters on her boat when she tripped and impacted her left lower leg.  This caused a laceration.  She denies any bony abnormality.  She denies any other injury.  She is up-to-date with her tetanus vaccination.  She denies any loss of sensation or movement.    Review of Systems   All other systems reviewed and are negative.      Past Medical History:   Diagnosis Date   • Chronic shoulder pain    • Conductive hearing loss    • Disease of thyroid gland    • Perforation of left tympanic membrane        No Known Allergies    Past Surgical History:   Procedure Laterality Date   • CARDIAC CATHETERIZATION         Family History   Problem Relation Age of Onset   • Breast cancer Neg Hx        Social History     Socioeconomic History   • Marital status:      Spouse name: Not on file   • Number of children: Not on file   • Years of education: Not on file   • Highest education level: Not on file   Tobacco Use   • Smoking status: Never Smoker   • Smokeless tobacco: Never Used   Substance and Sexual Activity   • Alcohol use: No     Frequency: Never   • Drug use: Defer   • Sexual activity: Defer       Prior to Admission medications    Medication Sig Start Date End Date Taking? Authorizing Provider   fluticasone (FLONASE) 50 MCG/ACT nasal spray 2 sprays into the nostril(s) as directed by provider Daily. 5/19/20   Thais Lopez APRN   levothyroxine (SYNTHROID, LEVOTHROID) 125 MCG tablet Take 125 mcg by mouth Daily.    Idania Arevalo MD   Multiple Vitamins-Minerals (CENTRUM SILVER PO) Take  by mouth.    Idania Arevalo MD   predniSONE (DELTASONE) 5 MG tablet Take 5 mg by mouth Daily.    Idania Arevalo MD       Medications - No data to display    /84   Pulse 87   Temp 98 °F (36.7 °C)   Resp 17   Ht 165.1 cm  "(65\")   Wt 59 kg (130 lb)   LMP  (LMP Unknown)   SpO2 98%   BMI 21.63 kg/m²       Objective   Physical Exam   Constitutional: She appears well-developed and well-nourished.   Pulmonary/Chest: Effort normal.   Musculoskeletal: She exhibits no tenderness or deformity.        Left lower leg: She exhibits laceration. She exhibits no tenderness, no bony tenderness and no swelling.        Legs:  Patient has a wide superficial skin tear to her left mid tib-fib area.  Her skin is incredibly thin and has withdrawn back on the more proximal area leaving a wound that is about 2 cm in width.     Neurological: She exhibits normal muscle tone. Coordination normal.       Procedures         Lab Results (last 24 hours)     ** No results found for the last 24 hours. **          No results found.    ED Course  ED Course as of Jul 07 2044 Tue Jul 07, 2020 1938 Patient has been educated that she presents with a skin tear.  Her skin is too thin for suture repair and it will not hold.  Will complete irrigation with antibiotic cream and Tegaderm.  This will take several weeks for it to heal and she will scab over with a scar.    [TK]   2044 After irrigation, we used further steristrips and tegaderm.     [TK]      ED Course User Index  [TK] Tara Parkinson PA          MDM    Final diagnoses:   Skin tear of left lower leg without complication, initial encounter          Tara Parkinson PA  07/07/20 1939       Tara Parkinson PA  07/07/20 2044    "

## 2020-07-21 ENCOUNTER — OFFICE VISIT (OUTPATIENT)
Dept: WOUND CARE | Facility: HOSPITAL | Age: 85
End: 2020-07-21

## 2020-07-21 PROCEDURE — G0463 HOSPITAL OUTPT CLINIC VISIT: HCPCS

## 2020-07-21 PROCEDURE — 99214 OFFICE O/P EST MOD 30 MIN: CPT | Performed by: NURSE PRACTITIONER

## 2020-07-21 PROCEDURE — 97597 DBRDMT OPN WND 1ST 20 CM/<: CPT | Performed by: NURSE PRACTITIONER

## 2020-07-29 ENCOUNTER — OFFICE VISIT (OUTPATIENT)
Dept: WOUND CARE | Facility: HOSPITAL | Age: 85
End: 2020-07-29

## 2020-07-29 PROCEDURE — 11042 DBRDMT SUBQ TIS 1ST 20SQCM/<: CPT | Performed by: NURSE PRACTITIONER

## 2020-08-05 ENCOUNTER — OFFICE VISIT (OUTPATIENT)
Dept: WOUND CARE | Facility: HOSPITAL | Age: 85
End: 2020-08-05

## 2020-08-05 PROCEDURE — 97597 DBRDMT OPN WND 1ST 20 CM/<: CPT | Performed by: NURSE PRACTITIONER

## 2020-08-12 ENCOUNTER — OFFICE VISIT (OUTPATIENT)
Dept: WOUND CARE | Facility: HOSPITAL | Age: 85
End: 2020-08-12

## 2020-08-12 PROCEDURE — 97597 DBRDMT OPN WND 1ST 20 CM/<: CPT | Performed by: NURSE PRACTITIONER

## 2020-08-19 ENCOUNTER — OFFICE VISIT (OUTPATIENT)
Dept: WOUND CARE | Facility: HOSPITAL | Age: 85
End: 2020-08-19

## 2020-08-19 PROCEDURE — 97597 DBRDMT OPN WND 1ST 20 CM/<: CPT | Performed by: NURSE PRACTITIONER

## 2020-08-26 ENCOUNTER — OFFICE VISIT (OUTPATIENT)
Dept: WOUND CARE | Facility: HOSPITAL | Age: 85
End: 2020-08-26

## 2020-08-26 PROCEDURE — 11042 DBRDMT SUBQ TIS 1ST 20SQCM/<: CPT | Performed by: NURSE PRACTITIONER

## 2020-09-02 ENCOUNTER — OFFICE VISIT (OUTPATIENT)
Dept: WOUND CARE | Facility: HOSPITAL | Age: 85
End: 2020-09-02

## 2020-09-02 PROCEDURE — G0463 HOSPITAL OUTPT CLINIC VISIT: HCPCS

## 2020-09-02 PROCEDURE — 99213 OFFICE O/P EST LOW 20 MIN: CPT | Performed by: NURSE PRACTITIONER

## 2020-09-21 ENCOUNTER — HOSPITAL ENCOUNTER (EMERGENCY)
Facility: HOSPITAL | Age: 85
Discharge: HOME | End: 2020-09-21
Attending: EMERGENCY MEDICINE | Admitting: EMERGENCY MEDICINE

## 2020-09-21 VITALS
SYSTOLIC BLOOD PRESSURE: 158 MMHG | TEMPERATURE: 98.2 F | HEIGHT: 64 IN | WEIGHT: 138 LBS | RESPIRATION RATE: 16 BRPM | DIASTOLIC BLOOD PRESSURE: 76 MMHG | BODY MASS INDEX: 23.56 KG/M2 | OXYGEN SATURATION: 97 % | HEART RATE: 97 BPM

## 2020-09-21 DIAGNOSIS — S81.811A LACERATION OF RIGHT LOWER EXTREMITY, INITIAL ENCOUNTER: Primary | ICD-10-CM

## 2020-09-21 PROCEDURE — 25010000002 FENTANYL CITRATE (PF) 100 MCG/2ML SOLUTION: Performed by: EMERGENCY MEDICINE

## 2020-09-21 PROCEDURE — 96375 TX/PRO/DX INJ NEW DRUG ADDON: CPT

## 2020-09-21 PROCEDURE — 25010000002 ONDANSETRON PER 1 MG: Performed by: EMERGENCY MEDICINE

## 2020-09-21 PROCEDURE — 25010000003 LIDOCAINE 1 % SOLUTION: Performed by: EMERGENCY MEDICINE

## 2020-09-21 PROCEDURE — 99283 EMERGENCY DEPT VISIT LOW MDM: CPT

## 2020-09-21 PROCEDURE — 25010000002 LORAZEPAM PER 2 MG: Performed by: EMERGENCY MEDICINE

## 2020-09-21 PROCEDURE — 96374 THER/PROPH/DIAG INJ IV PUSH: CPT

## 2020-09-21 RX ORDER — CEPHALEXIN 500 MG/1
500 CAPSULE ORAL 3 TIMES DAILY
Qty: 15 CAPSULE | Refills: 0 | Status: SHIPPED | OUTPATIENT
Start: 2020-09-21 | End: 2020-09-26

## 2020-09-21 RX ORDER — LIDOCAINE HYDROCHLORIDE 10 MG/ML
10 INJECTION, SOLUTION INFILTRATION; PERINEURAL ONCE
Status: COMPLETED | OUTPATIENT
Start: 2020-09-21 | End: 2020-09-21

## 2020-09-21 RX ORDER — LORAZEPAM 2 MG/ML
1 INJECTION INTRAMUSCULAR ONCE
Status: COMPLETED | OUTPATIENT
Start: 2020-09-21 | End: 2020-09-21

## 2020-09-21 RX ORDER — FENTANYL CITRATE 50 UG/ML
25 INJECTION, SOLUTION INTRAMUSCULAR; INTRAVENOUS ONCE
Status: COMPLETED | OUTPATIENT
Start: 2020-09-21 | End: 2020-09-21

## 2020-09-21 RX ORDER — ONDANSETRON 2 MG/ML
4 INJECTION INTRAMUSCULAR; INTRAVENOUS ONCE
Status: COMPLETED | OUTPATIENT
Start: 2020-09-21 | End: 2020-09-21

## 2020-09-21 RX ADMIN — FENTANYL CITRATE 25 MCG: 50 INJECTION, SOLUTION INTRAMUSCULAR; INTRAVENOUS at 15:16

## 2020-09-21 RX ADMIN — ONDANSETRON HYDROCHLORIDE 4 MG: 2 SOLUTION INTRAMUSCULAR; INTRAVENOUS at 15:16

## 2020-09-21 RX ADMIN — LORAZEPAM 1 MG: 2 INJECTION INTRAMUSCULAR; INTRAVENOUS at 14:11

## 2020-09-21 RX ADMIN — LIDOCAINE HYDROCHLORIDE 10 ML: 10 INJECTION, SOLUTION INFILTRATION; PERINEURAL at 16:00

## 2020-09-21 NOTE — ED PROVIDER NOTES
Subjective   Patient 85 years old who was cleaning her yard and sustained a laceration on piece of glass/plastic      Extremity Laceration  Location:  Right lower extremity  Severity:  Moderate  Onset quality:  Sudden  Chronicity:  New  Associated symptoms: no abdominal pain, no congestion, no cough, no diarrhea, no headaches, no loss of consciousness, no myalgias, no shortness of breath, no sore throat and no vomiting        Review of Systems   Constitutional: Negative.    HENT: Negative.  Negative for congestion and sore throat.    Eyes: Negative.    Respiratory: Negative.  Negative for cough and shortness of breath.    Cardiovascular: Negative.    Gastrointestinal: Negative.  Negative for abdominal pain, diarrhea and vomiting.   Musculoskeletal: Negative.  Negative for back pain, myalgias and neck pain.   Skin: Negative.    Neurological: Negative.  Negative for loss of consciousness and headaches.   All other systems reviewed and are negative.      Past Medical History:   Diagnosis Date   • Chronic shoulder pain    • Conductive hearing loss    • Disease of thyroid gland    • Perforation of left tympanic membrane        No Known Allergies    Past Surgical History:   Procedure Laterality Date   • CARDIAC CATHETERIZATION         Family History   Problem Relation Age of Onset   • Breast cancer Neg Hx        Social History     Socioeconomic History   • Marital status:      Spouse name: Not on file   • Number of children: Not on file   • Years of education: Not on file   • Highest education level: Not on file   Tobacco Use   • Smoking status: Never Smoker   • Smokeless tobacco: Never Used   Substance and Sexual Activity   • Alcohol use: No     Frequency: Never   • Drug use: Defer   • Sexual activity: Defer           Objective   Physical Exam  Vitals signs and nursing note reviewed. Exam conducted with a chaperone present.   Constitutional:       General: She is awake.      Appearance: Normal appearance. She is  well-developed. She is not ill-appearing.   HENT:      Head: Normocephalic and atraumatic.   Eyes:      General: Lids are normal.      Conjunctiva/sclera: Conjunctivae normal.      Pupils: Pupils are equal, round, and reactive to light.   Neck:      Musculoskeletal: Full passive range of motion without pain, normal range of motion and neck supple.   Cardiovascular:      Rate and Rhythm: Normal rate and regular rhythm.      Chest Wall: PMI is not displaced.      Pulses: Normal pulses.      Heart sounds: Normal heart sounds.   Pulmonary:      Effort: Pulmonary effort is normal.      Breath sounds: Normal breath sounds. No decreased breath sounds.   Abdominal:      General: Abdomen is flat. Bowel sounds are normal.      Palpations: Abdomen is soft.      Tenderness: There is no abdominal tenderness.   Musculoskeletal: Normal range of motion.      Comments: Right leg above the tibia large laceration does not extend into the bone tendon examination unremarkable dorsiflexion of the foot and the toes are intact.  It is a macerated stellate laceration without to 3 different limbs with overlying skin heavily bruised   Skin:     General: Skin is warm and dry.      Capillary Refill: Capillary refill takes less than 2 seconds.   Neurological:      General: No focal deficit present.      Mental Status: She is alert and oriented to person, place, and time.      Cranial Nerves: Cranial nerves are intact.      Motor: Motor function is intact.      Deep Tendon Reflexes: Reflexes are normal and symmetric.   Psychiatric:         Behavior: Behavior is cooperative.         Laceration Repair    Date/Time: 9/21/2020 4:02 PM  Performed by: Sarabjit Bowen MD  Authorized by: Sarabjit Bowen MD     Consent:     Consent obtained:  Written    Consent given by:  Patient    Risks discussed:  Infection, need for additional repair, nerve damage, poor wound healing, poor cosmetic result, pain, retained foreign body, tendon damage and vascular damage     Alternatives discussed:  Delayed treatment  Anesthesia (see MAR for exact dosages):     Anesthesia method:  Local infiltration    Local anesthetic:  Lidocaine 1% w/o epi  Laceration details:     Location:  Leg    Leg location:  R lower leg    Length (cm):  31  Repair type:     Repair type:  Complex  Pre-procedure details:     Preparation:  Patient was prepped and draped in usual sterile fashion  Exploration:     Limited defect created (wound extended): no      Hemostasis achieved with:  Direct pressure    Wound exploration: wound explored through full range of motion and entire depth of wound probed and visualized      Wound extent: no fascia violation noted, no muscle damage noted, no nerve damage noted, no tendon damage noted and no underlying fracture noted      Contaminated: no    Treatment:     Area cleansed with:  Hibiclens    Amount of cleaning:  Standard    Irrigation solution:  Sterile saline    Irrigation method:  Syringe    Debridement:  None    Undermining:  Minimal    Scar revision: no    Subcutaneous repair:     Suture size:  4-0    Suture material:  Fast-absorbing gut    Suture technique:  Simple interrupted  Skin repair:     Repair method:  Sutures    Suture size:  4-0    Suture material:  Nylon    Suture technique:  Running and horizontal mattress  Approximation:     Approximation:  Loose  Post-procedure details:     Dressing:  Bulky dressing    Patient tolerance of procedure:  Tolerated well, no immediate complications               ED Course  ED Course as of Sep 21 1605   Mon Sep 21, 2020   1600 Patient had a mechanical slip and caught her leg against a sharp object there was no injury to the hip knee head neck or any other axial skeleton is able to ambulate except for this laceration    [TS]      ED Course User Index  [TS] Sarabjit Bowen MD                                           [facility]    Final diagnoses:   Laceration of right lower extremity, initial encounter            Sarabjit Bowen,  MD  09/21/20 [id number]

## 2020-09-24 NOTE — ED NOTES
"ED Call Back Questions    1. How are you doing since leaving the Emergency Department?  Doing very well, there is no swelling or infection    2. Do you have any questions about your discharge instructions? No     3. Have you filled your new prescriptions yet? Yes   a. Do you have any questions about those medications? No     4. Were you able to make a follow-up appointment with the physician? Yes     5. Do you have a primary care physician? Yes   a. If No, would you like for me to set you up with one? No   i. If Yes, “I will have our ED  give you a call right back at this number to work with you on the best time for an appointment.”    6. We are always looking to get better at what we do. Do you have any suggestions for what we can do to be even better? N/A  a. If Yes, \"Thank you for sharing your concerns. I apologize. I will follow up with our manager and patient . Would you like someone to call you back?\" N/A    7. Is there anything else I can do for you? N/A visit was very good, \" DR. Bowen was just wonderful, everyone over there is fantastic. I appreciate the concern and the phone call. \"       Deandre Burch  09/24/20 1330    "

## 2020-10-01 PROCEDURE — U0003 INFECTIOUS AGENT DETECTION BY NUCLEIC ACID (DNA OR RNA); SEVERE ACUTE RESPIRATORY SYNDROME CORONAVIRUS 2 (SARS-COV-2) (CORONAVIRUS DISEASE [COVID-19]), AMPLIFIED PROBE TECHNIQUE, MAKING USE OF HIGH THROUGHPUT TECHNOLOGIES AS DESCRIBED BY CMS-2020-01-R: HCPCS | Performed by: PHYSICIAN ASSISTANT

## 2020-10-03 ENCOUNTER — HOSPITAL ENCOUNTER (EMERGENCY)
Facility: HOSPITAL | Age: 85
Discharge: HOME | End: 2020-10-03
Admitting: EMERGENCY MEDICINE

## 2020-10-03 VITALS
BODY MASS INDEX: 22.2 KG/M2 | RESPIRATION RATE: 16 BRPM | SYSTOLIC BLOOD PRESSURE: 121 MMHG | OXYGEN SATURATION: 97 % | HEIGHT: 64 IN | WEIGHT: 130 LBS | DIASTOLIC BLOOD PRESSURE: 76 MMHG | HEART RATE: 93 BPM | TEMPERATURE: 98 F

## 2020-10-03 DIAGNOSIS — Z51.89 VISIT FOR WOUND CHECK: Primary | ICD-10-CM

## 2020-10-03 PROCEDURE — 99282 EMERGENCY DEPT VISIT SF MDM: CPT

## 2020-10-03 NOTE — DISCHARGE INSTRUCTIONS
Drink plenty of fluid.  Continue wound care as previously instructed. Return to ED Monday for suture removal, sooner if needed. Follow up with Pcp next week.

## 2020-10-03 NOTE — ED PROVIDER NOTES
Subjective   85 yof presents for wound recheck.  She states she fell on her boat Monday 9/21/2020 causing a large laceration to the right lower leg.  She states she was seen here by Dr Bowen and had multiple sutures placed.  She states she was told to return today for a recheck and possible suture removal.  She states the wound is healing but she would prefer to leave the sutures a couple more days.            Review of Systems   Constitutional: Negative for activity change, appetite change, fatigue and fever.   HENT: Negative for congestion, ear pain, facial swelling and sore throat.    Eyes: Negative for discharge and visual disturbance.   Respiratory: Negative for apnea, chest tightness, shortness of breath, wheezing and stridor.    Cardiovascular: Negative for chest pain and palpitations.   Gastrointestinal: Negative for abdominal distention, abdominal pain, diarrhea, nausea and vomiting.   Genitourinary: Negative for difficulty urinating and dysuria.   Musculoskeletal: Negative for arthralgias and myalgias.   Skin: Positive for wound. Negative for rash.   Neurological: Negative for dizziness and seizures.   Psychiatric/Behavioral: Negative for agitation and confusion.       Past Medical History:   Diagnosis Date   • Chronic shoulder pain    • Conductive hearing loss    • Disease of thyroid gland    • Perforation of left tympanic membrane        No Known Allergies    Past Surgical History:   Procedure Laterality Date   • CARDIAC CATHETERIZATION         Family History   Problem Relation Age of Onset   • Breast cancer Neg Hx        Social History     Socioeconomic History   • Marital status:      Spouse name: Not on file   • Number of children: Not on file   • Years of education: Not on file   • Highest education level: Not on file   Tobacco Use   • Smoking status: Never Smoker   • Smokeless tobacco: Never Used   Substance and Sexual Activity   • Alcohol use: No     Frequency: Never   • Drug use: Defer   •  Sexual activity: Defer           Objective   Physical Exam  Vitals signs and nursing note reviewed.   Constitutional:       Appearance: She is well-developed.   HENT:      Head: Normocephalic.   Eyes:      Pupils: Pupils are equal, round, and reactive to light.   Neck:      Musculoskeletal: Normal range of motion and neck supple.   Cardiovascular:      Rate and Rhythm: Normal rate and regular rhythm.      Heart sounds: No murmur.   Pulmonary:      Effort: Pulmonary effort is normal.      Breath sounds: Normal breath sounds.   Abdominal:      General: Bowel sounds are normal.      Palpations: Abdomen is soft.   Musculoskeletal:        Legs:       Comments: Large sutured laceration present to the right lower leg.  The laceration is present from the anterior lower leg around the lateral aspect and to the posterior lower leg.  There is no redness to the wound.  The surrounding tissue is not red.  There is no drainage.  The area is well approximated.   Skin:     General: Skin is warm and dry.   Neurological:      Mental Status: She is alert and oriented to person, place, and time.         Procedures           ED Course                                           MDM  Number of Diagnoses or Management Options  Visit for wound check: minor  Risk of Complications, Morbidity, and/or Mortality  Presenting problems: minimal  Diagnostic procedures: minimal  Management options: minimal    Patient Progress  Patient progress: stable      Final diagnoses:   Visit for wound check            Cooper Llamas, MARGARETTE  10/03/20 [id number]

## 2020-10-04 ENCOUNTER — NURSE TRIAGE (OUTPATIENT)
Dept: CALL CENTER | Facility: HOSPITAL | Age: 85
End: 2020-10-04

## 2020-10-04 NOTE — TELEPHONE ENCOUNTER
Caller was informed she had + covid 10/2 was exposed on 09/30, has no symptoms. Concerned about sutures in leg which need to be removed on Monday 10/4, called the ED ,spoke with CN. Patient will come and go with the precaution. Informed the patient to call before coming in to the ED. Concerned about her wound care appointment, informed to call and let them know about her COVID test. Patient has no symptoms. Will discuss further with     Reason for Disposition  • [1] COVID-19 diagnosed by positive lab test AND [2] mild symptoms (e.g., cough, fever, others) AND [3] no complications or SOB    Additional Information  • Negative: SEVERE difficulty breathing (e.g., struggling for each breath, speaks in single words)  • Negative: Difficult to awaken or acting confused (e.g., disoriented, slurred speech)  • Negative: Bluish (or gray) lips or face now  • Negative: Shock suspected (e.g., cold/pale/clammy skin, too weak to stand, low BP, rapid pulse)  • Negative: Sounds like a life-threatening emergency to the triager  • Negative: [1] COVID-19 exposure AND [2] no symptoms  • Negative: COVID-19 and Breastfeeding, questions about  • Negative: [1] Adult with possible COVID-19 symptoms AND [2] triager concerned about severity of symptoms or other causes  • Negative: SEVERE or constant chest pain or pressure (Exception: mild central chest pain, present only when coughing)  • Negative: MODERATE difficulty breathing (e.g., speaks in phrases, SOB even at rest, pulse 100-120)  • Negative: Patient sounds very sick or weak to the triager  • Negative: MILD difficulty breathing (e.g., minimal/no SOB at rest, SOB with walking, pulse <100)  • Negative: Chest pain or pressure  • Negative: Fever > 103 F (39.4 C)  • Negative: [1] Fever > 101 F (38.3 C) AND [2] age > 60  • Negative: [1] Fever > 100.0 F (37.8 C) AND [2] bedridden (e.g., nursing home patient, CVA, chronic illness, recovering from surgery)  • Negative: HIGH RISK patient  "(e.g., age > 64 years, diabetes, heart or lung disease, weak immune system)  • Negative: Fever present > 3 days (72 hours)  • Negative: [1] Fever returns after gone for over 24 hours AND [2] symptoms worse or not improved  • Negative: [1] Continuous (nonstop) coughing interferes with work or school AND [2] no improvement using cough treatment per protocol  • Negative: [1] COVID-19 infection suspected by caller or triager AND [2] mild symptoms (cough, fever, or others) AND [3] no complications or SOB  • Negative: Cough present > 3 weeks    Answer Assessment - Initial Assessment Questions  1. COVID-19 DIAGNOSIS: \"Who made your Coronavirus (COVID-19) diagnosis?\" \"Was it confirmed by a positive lab test?\" If not diagnosed by a HCP, ask \"Are there lots of cases (community spread) where you live?\" (See public health department website, if unsure)      moderate  2. ONSET: \"When did the COVID-19 symptoms start?\"       none  3. WORST SYMPTOM: \"What is your worst symptom?\" (e.g., cough, fever, shortness of breath, muscle aches)     none  4. COUGH: \"Do you have a cough?\" If so, ask: \"How bad is the cough?\"        none  5. FEVER: \"Do you have a fever?\" If so, ask: \"What is your temperature, how was it measured, and when did it start?\"      none  6. RESPIRATORY STATUS: \"Describe your breathing?\" (e.g., shortness of breath, wheezing, unable to speak)       none  7. BETTER-SAME-WORSE: \"Are you getting better, staying the same or getting worse compared to yesterday?\"  If getting worse, ask, \"In what way?\"      na  8. HIGH RISK DISEASE: \"Do you have any chronic medical problems?\" (e.g., asthma, heart or lung disease, weak immune system, etc.)  unsure  9. PREGNANCY: \"Is there any chance you are pregnant?\" \"When was your last menstrual period?\"      na  10. OTHER SYMPTOMS: \"Do you have any other symptoms?\"  (e.g., chills, fatigue, headache, loss of smell or taste, muscle pain, sore throat)        none    Protocols used: CORONAVIRUS " (COVID-19) DIAGNOSED OR SUSPECTED-ADULT-AH

## 2020-10-05 ENCOUNTER — HOSPITAL ENCOUNTER (EMERGENCY)
Facility: HOSPITAL | Age: 85
Discharge: HOME | End: 2020-10-05
Admitting: EMERGENCY MEDICINE

## 2020-10-05 ENCOUNTER — TELEPHONE (OUTPATIENT)
Dept: URGENT CARE | Facility: CLINIC | Age: 85
End: 2020-10-05

## 2020-10-05 VITALS
SYSTOLIC BLOOD PRESSURE: 141 MMHG | TEMPERATURE: 98.2 F | HEIGHT: 64 IN | BODY MASS INDEX: 22.2 KG/M2 | OXYGEN SATURATION: 98 % | WEIGHT: 130 LBS | DIASTOLIC BLOOD PRESSURE: 67 MMHG | RESPIRATION RATE: 16 BRPM | HEART RATE: 66 BPM

## 2020-10-05 DIAGNOSIS — Z48.02 VISIT FOR SUTURE REMOVAL: Primary | ICD-10-CM

## 2020-10-05 PROCEDURE — 99201: CPT

## 2020-10-05 NOTE — ED PROVIDER NOTES
Subjective   Patient is a 85-year-old white female presents the emergency department for suture removal to the right lower leg.  She had laceration to her right leg repaired about 2 weeks ago.  She is going to be starting wound care next Monday.  She is here for suture removal also patient has tested positive for COVID as well.  She does not have any complications from the COVID.  She is just here to get her sutures removed.  Denies any fever or chills.  No nausea or vomiting.  No other complaints.      History provided by:  Patient   used: No        Review of Systems   Constitutional: Negative.    HENT: Negative.    Eyes: Negative.    Respiratory: Negative.    Cardiovascular: Negative.    Gastrointestinal: Negative.    Endocrine: Negative.    Genitourinary: Negative.    Musculoskeletal: Negative.    Skin:        Patient is a 85-year-old white female presents the emergency department for suture removal to the right lower leg.  She had laceration to her right leg repaired about 2 weeks ago.  She is going to be starting wound care next Monday.  She is here for suture removal also patient has tested positive for COVID as well.  She does not have any complications from the COVID.  She is just here to get her sutures removed.  Denies any fever or chills.  No nausea or vomiting.  No other complaints.     Allergic/Immunologic: Negative.    Neurological: Negative.    Hematological: Negative.    Psychiatric/Behavioral: Negative.    All other systems reviewed and are negative.      Past Medical History:   Diagnosis Date   • Chronic shoulder pain    • Conductive hearing loss    • Disease of thyroid gland    • Perforation of left tympanic membrane        No Known Allergies    Past Surgical History:   Procedure Laterality Date   • CARDIAC CATHETERIZATION         Family History   Problem Relation Age of Onset   • Breast cancer Neg Hx        Social History     Socioeconomic History   • Marital status:   "    Spouse name: Not on file   • Number of children: Not on file   • Years of education: Not on file   • Highest education level: Not on file   Tobacco Use   • Smoking status: Never Smoker   • Smokeless tobacco: Never Used   Substance and Sexual Activity   • Alcohol use: No     Frequency: Never   • Drug use: Defer   • Sexual activity: Defer       Prior to Admission medications    Medication Sig Start Date End Date Taking? Authorizing Provider   fluticasone (FLONASE) 50 MCG/ACT nasal spray 2 sprays into the nostril(s) as directed by provider Daily. 5/19/20   Thais Lopez APRN   levothyroxine (SYNTHROID, LEVOTHROID) 125 MCG tablet Take 125 mcg by mouth Daily.    ProviderIdania MD   Multiple Vitamins-Minerals (CENTRUM SILVER PO) Take  by mouth.    Idania Arevalo MD   predniSONE (DELTASONE) 5 MG tablet Take 5 mg by mouth Daily.    Idania Arevalo MD       /67 (BP Location: Left arm, Patient Position: Sitting)   Pulse 66   Temp 98.2 °F (36.8 °C) (Oral)   Resp 16   Ht 162.6 cm (64\")   Wt 59 kg (130 lb)   LMP  (LMP Unknown)   SpO2 98%   BMI 22.31 kg/m²     Objective   Physical Exam  Vitals signs and nursing note reviewed.   Constitutional:       Appearance: She is well-developed.   HENT:      Head: Normocephalic and atraumatic.   Eyes:      Conjunctiva/sclera: Conjunctivae normal.      Pupils: Pupils are equal, round, and reactive to light.   Neck:      Musculoskeletal: Normal range of motion and neck supple.      Thyroid: No thyromegaly.      Trachea: No tracheal deviation.   Cardiovascular:      Rate and Rhythm: Normal rate and regular rhythm.      Heart sounds: Normal heart sounds.   Pulmonary:      Effort: Pulmonary effort is normal. No respiratory distress.      Breath sounds: Normal breath sounds. No wheezing or rales.   Chest:      Chest wall: No tenderness.   Abdominal:      General: Bowel sounds are normal.      Palpations: Abdomen is soft.   Musculoskeletal: Normal range of " motion.   Skin:     General: Skin is warm and dry.      Comments: Right lower extremity: there is a healing wound noted to right lower leg. There are no signs of infection noted. Sutures removed without diffic   Neurological:      Mental Status: She is alert and oriented to person, place, and time.      Cranial Nerves: No cranial nerve deficit.      Deep Tendon Reflexes: Reflexes are normal and symmetric.   Psychiatric:         Behavior: Behavior normal.         Thought Content: Thought content normal.         Judgment: Judgment normal.         Procedures         Lab Results (last 24 hours)     ** No results found for the last 24 hours. **          No orders to display       ED Course  ED Course as of Oct 05 0855   Mon Oct 05, 2020   0836 Sutures are removed without difficulty.  Dressing is replaced.  Patient starts wound care next week.  PPE was worn throughout her stay here in the emergency department.  Patient be discharged home shortly in stable condition.    [CW]      ED Course User Index  [CW] Fátima Aleman APRN          [facility]  Number of Diagnoses or Management Options  Visit for suture removal: minor  Patient Progress  Patient progress: stable      Final diagnoses:   Visit for suture removal            Fátima Aleman APRN  10/05/20 0866

## 2020-10-06 NOTE — ED NOTES
"ED Call Back Questions    1. How are you doing since leaving the Emergency Department?  So far so good, it looks good    2. Do you have any questions about your discharge instructions? No     3. Have you filled your new prescriptions yet? No   a. Do you have any questions about those medications? N/A    4. Were you able to make a follow-up appointment with the physician? Yes     5. Do you have a primary care physician? Yes   a. If No, would you like for me to set you up with one? N/A  i. If Yes, “I will have our ED  give you a call right back at this number to work with you on the best time for an appointment.”    6. We are always looking to get better at what we do. Do you have any suggestions for what we can do to be even better? N/A  a. If Yes, \"Thank you for sharing your concerns. I apologize. I will follow up with our manager and patient . Would you like someone to call you back?\" N/A    7. Is there anything else I can do for you? No visit was very good, please tell Dr. Bowen I said thank you and I am so proud of the job he did.       Deandre Burch  10/06/20 [id number]    "

## 2020-10-07 ENCOUNTER — APPOINTMENT (OUTPATIENT)
Dept: MAMMOGRAPHY | Facility: HOSPITAL | Age: 85
End: 2020-10-07

## 2020-10-08 ENCOUNTER — NURSE TRIAGE (OUTPATIENT)
Dept: CALL CENTER | Facility: HOSPITAL | Age: 85
End: 2020-10-08

## 2020-10-08 NOTE — TELEPHONE ENCOUNTER
"Caller states had stitches removed from leg wound in ER on 10/05/20. Caller asking if can leave dressing off of wound while sitting at home with leg elevated.States wound feels warm, but believes is due to dressing.  Instructed per AVS. Instructed to call PCP  when office opens after lunch today for evaluation of warmth/instructions. Voiced understanding.    Reason for Disposition  • [1] Caller requesting NON-URGENT health information AND [2] PCP's office is the best resource    Additional Information  • Negative: [1] Caller is not with the adult (patient) AND [2] reporting urgent symptoms  • Negative: Lab result questions  • Negative: Medication questions  • Negative: Caller can't be reached by phone  • Negative: Caller has already spoken to PCP or another triager  • Negative: RN needs further essential information from caller in order to complete triage  • Negative: Requesting regular office appointment    Answer Assessment - Initial Assessment Questions  1. REASON FOR CALL or QUESTION: \"What is your reason for calling today?\" or \"How can I best help you?\" or \"What question do you have that I can help answer?\"      Caller asking if can leave dressing off of leg wound while sitting with leg elevated at home.    Protocols used: INFORMATION ONLY CALL - NO TRIAGE-ADULT-      "

## 2020-10-13 ENCOUNTER — OFFICE VISIT (OUTPATIENT)
Dept: WOUND CARE | Facility: HOSPITAL | Age: 85
End: 2020-10-13

## 2020-10-13 PROCEDURE — 99213 OFFICE O/P EST LOW 20 MIN: CPT | Performed by: NURSE PRACTITIONER

## 2020-10-13 PROCEDURE — 97598 DBRDMT OPN WND ADDL 20CM/<: CPT | Performed by: NURSE PRACTITIONER

## 2020-10-13 PROCEDURE — 97597 DBRDMT OPN WND 1ST 20 CM/<: CPT | Performed by: NURSE PRACTITIONER

## 2020-10-13 PROCEDURE — G0463 HOSPITAL OUTPT CLINIC VISIT: HCPCS

## 2020-10-21 ENCOUNTER — OFFICE VISIT (OUTPATIENT)
Dept: WOUND CARE | Facility: HOSPITAL | Age: 85
End: 2020-10-21

## 2020-10-21 PROCEDURE — 11042 DBRDMT SUBQ TIS 1ST 20SQCM/<: CPT | Performed by: NURSE PRACTITIONER

## 2020-10-21 PROCEDURE — 11045 DBRDMT SUBQ TISS EACH ADDL: CPT | Performed by: NURSE PRACTITIONER

## 2020-10-28 ENCOUNTER — OFFICE VISIT (OUTPATIENT)
Dept: WOUND CARE | Facility: HOSPITAL | Age: 85
End: 2020-10-28

## 2020-10-28 PROCEDURE — 11042 DBRDMT SUBQ TIS 1ST 20SQCM/<: CPT | Performed by: NURSE PRACTITIONER

## 2020-11-04 ENCOUNTER — OFFICE VISIT (OUTPATIENT)
Dept: WOUND CARE | Facility: HOSPITAL | Age: 85
End: 2020-11-04

## 2020-11-04 PROCEDURE — 11042 DBRDMT SUBQ TIS 1ST 20SQCM/<: CPT | Performed by: NURSE PRACTITIONER

## 2020-11-10 ENCOUNTER — HOSPITAL ENCOUNTER (OUTPATIENT)
Dept: MAMMOGRAPHY | Facility: HOSPITAL | Age: 85
Discharge: HOME | End: 2020-11-10
Admitting: INTERNAL MEDICINE

## 2020-11-10 DIAGNOSIS — Z12.31 ENCOUNTER FOR SCREENING MAMMOGRAM FOR MALIGNANT NEOPLASM OF BREAST: ICD-10-CM

## 2020-11-10 PROCEDURE — 77063 BREAST TOMOSYNTHESIS BI: CPT

## 2020-11-10 PROCEDURE — 77067 SCR MAMMO BI INCL CAD: CPT

## 2020-11-12 ENCOUNTER — OFFICE VISIT (OUTPATIENT)
Dept: WOUND CARE | Facility: HOSPITAL | Age: 85
End: 2020-11-12

## 2020-11-12 PROCEDURE — 11042 DBRDMT SUBQ TIS 1ST 20SQCM/<: CPT | Performed by: NURSE PRACTITIONER

## 2020-11-18 ENCOUNTER — OFFICE VISIT (OUTPATIENT)
Dept: WOUND CARE | Facility: HOSPITAL | Age: 85
End: 2020-11-18

## 2020-11-18 PROCEDURE — 11042 DBRDMT SUBQ TIS 1ST 20SQCM/<: CPT | Performed by: NURSE PRACTITIONER

## 2020-11-25 ENCOUNTER — OFFICE VISIT (OUTPATIENT)
Dept: WOUND CARE | Facility: HOSPITAL | Age: 85
End: 2020-11-25

## 2020-11-25 PROCEDURE — 11042 DBRDMT SUBQ TIS 1ST 20SQCM/<: CPT | Performed by: NURSE PRACTITIONER

## 2020-12-02 ENCOUNTER — OFFICE VISIT (OUTPATIENT)
Dept: WOUND CARE | Facility: HOSPITAL | Age: 85
End: 2020-12-02

## 2020-12-02 PROCEDURE — 99212 OFFICE O/P EST SF 10 MIN: CPT | Performed by: NURSE PRACTITIONER

## 2020-12-02 PROCEDURE — G0463 HOSPITAL OUTPT CLINIC VISIT: HCPCS

## 2021-02-08 ENCOUNTER — TRANSCRIBE ORDERS (OUTPATIENT)
Dept: ADMINISTRATIVE | Facility: HOSPITAL | Age: 86
End: 2021-02-08

## 2021-02-08 DIAGNOSIS — Z12.31 ENCOUNTER FOR SCREENING MAMMOGRAM FOR MALIGNANT NEOPLASM OF BREAST: Primary | ICD-10-CM

## 2021-02-08 DIAGNOSIS — Z78.0 POSTMENOPAUSAL STATUS: ICD-10-CM

## 2021-03-10 ENCOUNTER — TELEPHONE (OUTPATIENT)
Dept: GASTROENTEROLOGY | Facility: CLINIC | Age: 86
End: 2021-03-10

## 2021-03-23 ENCOUNTER — IMMUNIZATION (OUTPATIENT)
Dept: PRIMARY CARE CLINIC | Age: 86
End: 2021-03-23
Payer: MEDICARE

## 2021-03-23 PROCEDURE — 91303 COVID-19, J&J VACCINE, PF, 0.5 ML DOSE: CPT | Performed by: FAMILY MEDICINE

## 2021-11-17 ENCOUNTER — HOSPITAL ENCOUNTER (OUTPATIENT)
Dept: BONE DENSITY | Facility: HOSPITAL | Age: 86
Discharge: HOME | End: 2021-11-17

## 2021-11-17 ENCOUNTER — HOSPITAL ENCOUNTER (OUTPATIENT)
Dept: MAMMOGRAPHY | Facility: HOSPITAL | Age: 86
Discharge: HOME | End: 2021-11-17

## 2021-11-17 DIAGNOSIS — Z12.31 ENCOUNTER FOR SCREENING MAMMOGRAM FOR MALIGNANT NEOPLASM OF BREAST: ICD-10-CM

## 2021-11-17 DIAGNOSIS — Z78.0 POSTMENOPAUSAL STATUS: ICD-10-CM

## 2021-11-17 PROCEDURE — 77063 BREAST TOMOSYNTHESIS BI: CPT

## 2021-11-17 PROCEDURE — 77067 SCR MAMMO BI INCL CAD: CPT

## 2021-11-17 PROCEDURE — 77080 DXA BONE DENSITY AXIAL: CPT

## 2022-03-21 ENCOUNTER — OFFICE VISIT (OUTPATIENT)
Dept: OTOLARYNGOLOGY | Facility: CLINIC | Age: 87
End: 2022-03-21

## 2022-03-21 VITALS — SYSTOLIC BLOOD PRESSURE: 153 MMHG | HEART RATE: 64 BPM | TEMPERATURE: 97.4 F | DIASTOLIC BLOOD PRESSURE: 69 MMHG

## 2022-03-21 DIAGNOSIS — H90.3 SENSORINEURAL HEARING LOSS (SNHL) OF BOTH EARS: ICD-10-CM

## 2022-03-21 DIAGNOSIS — H69.82 DYSFUNCTION OF LEFT EUSTACHIAN TUBE: Primary | ICD-10-CM

## 2022-03-21 PROCEDURE — 99213 OFFICE O/P EST LOW 20 MIN: CPT | Performed by: NURSE PRACTITIONER

## 2022-03-21 RX ORDER — FLUTICASONE PROPIONATE 50 MCG
2 SPRAY, SUSPENSION (ML) NASAL DAILY
Qty: 16 G | Refills: 11 | Status: SHIPPED | OUTPATIENT
Start: 2022-03-21 | End: 2022-04-20

## 2022-03-21 RX ORDER — MULTIPLE VITAMINS W/ MINERALS TAB 9MG-400MCG
1 TAB ORAL DAILY
COMMUNITY

## 2022-03-21 RX ORDER — LEVOTHYROXINE SODIUM 0.1 MG/1
TABLET ORAL
COMMUNITY
Start: 2022-02-10

## 2022-03-21 RX ORDER — METOPROLOL SUCCINATE 50 MG/1
50 TABLET, EXTENDED RELEASE ORAL DAILY
COMMUNITY
Start: 2022-03-11

## 2022-03-21 NOTE — PROGRESS NOTES
PRIMARY CARE PROVIDER: Richard Weaver MD  REFERRING PROVIDER: No ref. provider found    Chief Complaint   Patient presents with   • Ear Fullness     Follow up     • Earache     Follow up           Subjective   History of Present Illness:  Linda Spear is a 86 y.o. female who is here for follow up. She has been seen in the past for left eustachian tube dysfunction.  Her last flareup was 2 years ago.  Today, she complains of another recent flair up of otalgia, ear fullness, ear pressure, fluid on the ear, decreased hearing and muffled hearing. The symptoms are localized to the left ear.  Her symptoms have been present for the last 2 weeks.  Her symptoms are temporarily improved with Valsalva maneuver.  She has tried Astelin but was unable to tolerate this because it was too drying.  She denies otorrhea.      Past History:  Past Medical History:   Diagnosis Date   • Chronic shoulder pain    • Conductive hearing loss    • Disease of thyroid gland    • Perforation of left tympanic membrane      Past Surgical History:   Procedure Laterality Date   • CARDIAC CATHETERIZATION       Family History   Problem Relation Age of Onset   • Breast cancer Neg Hx      Social History     Tobacco Use   • Smoking status: Never Smoker   • Smokeless tobacco: Never Used   Substance Use Topics   • Alcohol use: No   • Drug use: Defer     Allergies:  Patient has no known allergies.    Current Outpatient Medications:   •  levothyroxine (SYNTHROID, LEVOTHROID) 100 MCG tablet, , Disp: , Rfl:   •  metoprolol succinate XL (TOPROL-XL) 50 MG 24 hr tablet, Take 50 mg by mouth Daily., Disp: , Rfl:   •  Multiple Vitamins-Minerals (CENTRUM SILVER PO), Take  by mouth., Disp: , Rfl:   •  multivitamin with minerals (CENTRUM ADULTS PO), Take 1 tablet by mouth Daily., Disp: , Rfl:   •  TURMERIC PO, Take  by mouth., Disp: , Rfl:   •  fluticasone (FLONASE) 50 MCG/ACT nasal spray, 2 sprays into the nostril(s) as directed by provider Daily for 30 days., Disp:  16 g, Rfl: 11      Objective     Vital Signs:  Temp:  [97.4 °F (36.3 °C)] 97.4 °F (36.3 °C)  Heart Rate:  [64] 64  BP: (153)/(69) 153/69    Physical Exam:  ENT Physical Exam  Constitutional  Appearance: patient appears well-developed, well-nourished and well-groomed,  Communication/Voice: communication appropriate for developmental age; vocal quality normal;  Head and Face  Appearance: head appears normal and face appears atraumatic;  Ear  Auricles: right auricle normal; left auricle normal;  External Mastoids: right external mastoid normal; left external mastoid normal;  Ear Canals: right ear canal normal; left ear canal normal;  Tympanic Membranes: left tympanic membrane abnormal and with effusion; dull;  Nose  External Nose: nares patent bilaterally; external nose normal;  Internal Nose: nasal mucosa normal;  Oral Cavity/Oropharynx  Lips: normal;  Neck  Neck: neck normal;  Neurovestibular  Mental Status: alert and oriented;  Psychiatric: mood normal; affect is appropriate;        Results Review:       Assessment   Assessment:  1. Dysfunction of left eustachian tube    2. Sensorineural hearing loss (SNHL) of both ears        Plan   Plan:  New Medications Ordered This Visit   Medications   • fluticasone (FLONASE) 50 MCG/ACT nasal spray     Si sprays into the nostril(s) as directed by provider Daily for 30 days.     Dispense:  16 g     Refill:  11     Start Flonase.  If no improvement in symptoms on follow-up, consider myringotomy tube insertion and nasal endoscopy.  We will follow-up with audiogram in 4 to 6 weeks.  She was instructed to call or return should any problems arise prior to next office visit.    There are no Patient Instructions on file for this visit.  Return in about 6 weeks (around 2022) for Recheck, With Audio.    My findings and recommendations were discussed and questions were answered.     Thais Lopez, MARGARETTE  22  10:26 CDT

## 2022-05-20 NOTE — PROGRESS NOTES
FOLLOW-UP AUDIOMETRIC EVALUATION      Name:  Linda Spear  :  1935  Age:  86 y.o.  Date of Evaluation:  2022       History:  Reason for visit:  Ms. Spear is seen today at the request of MARGARETTE Rees for a follow-up hearing evaluation. Patient has a history of bilateral sensorineural hearing loss. Previous audio was on 2020. Patient thinks her hearing in her left ear has declined. She states she has fluid on her left ear that she cannot get rid of. She states she has to constantly pop her ears. Patient does not wear hearing aids, but she has gotten a sound system for her TV to help with understanding. Patient reports left otalgia and left aural fullness. Patient denies tinnitus and dizziness.      EVALUATION:         RESULTS:    Otoscopic Evaluation:  Right: partially occluding cerumen, tympanic membrane visualized  Left: clear canal, tympanic membrane visualized         Tympanometry (226 Hz):  Bilateral: Type A- normal    Pure Tone Audiometry:    Bilateral: mild sloping to severe mixed hearing loss          Speech Audiometry:   Right: Speech Reception Threshold (SRT) was obtained at 45 dBHL  Word Recognition scores- excellent or within normal limits (90 - 100%) using NU-6 List 4A, 25 words  Left: Speech Reception Threshold (SRT) was obtained at 40 dBHL  Word Recognition scores- excellent or within normal limits (90 - 100%) using NU-6 List 4A, 25 words      IMPRESSIONS:  Tympanometry showed normal middle ear pressure and static compliance, for both ears. Pure tone thresholds for both ears show a mild sloping to severe mixed hearing loss, suggesting abnormal outer/middle ear function and abnormal cochlear/retrocochlear function. Slight decrease in lower frequencies, bilaterally. Patient was counseled with regard to the findings.    Amplification needs:  Patient could benefit from hearing aids. Patient's word recognition scores were excellent    Diagnosis:   1. Mixed conductive and sensorineural  hearing loss of both ears    2. Left ear pain        RECOMMENDATIONS/PLAN:  Follow-up recommendations per MARGARETTE Rees    Audiologic follow-up in 1 year  Return for audiologic testing if noticing changes in hearing or concerns arise  Hearing aid evaluation and counseling upon medical clearance and patient motivation  Use communication strategies  Use hearing protection around loud noises     EDUCATION:  Discussed results and recommendations with patient. Questions were addressed and the patient was encouraged to contact our department should concerns arise.      RANDAL Hoffman  Licensed Audiologist

## 2022-05-23 ENCOUNTER — PROCEDURE VISIT (OUTPATIENT)
Dept: OTOLARYNGOLOGY | Facility: CLINIC | Age: 87
End: 2022-05-23

## 2022-05-23 ENCOUNTER — OFFICE VISIT (OUTPATIENT)
Dept: OTOLARYNGOLOGY | Facility: CLINIC | Age: 87
End: 2022-05-23

## 2022-05-23 VITALS — DIASTOLIC BLOOD PRESSURE: 69 MMHG | SYSTOLIC BLOOD PRESSURE: 146 MMHG | TEMPERATURE: 98.1 F | HEART RATE: 69 BPM

## 2022-05-23 DIAGNOSIS — H90.6 MIXED CONDUCTIVE AND SENSORINEURAL HEARING LOSS OF BOTH EARS: Primary | ICD-10-CM

## 2022-05-23 DIAGNOSIS — H92.02 LEFT EAR PAIN: ICD-10-CM

## 2022-05-23 DIAGNOSIS — H69.82 DYSFUNCTION OF LEFT EUSTACHIAN TUBE: ICD-10-CM

## 2022-05-23 PROCEDURE — 92557 COMPREHENSIVE HEARING TEST: CPT

## 2022-05-23 PROCEDURE — 92567 TYMPANOMETRY: CPT

## 2022-05-23 PROCEDURE — 99213 OFFICE O/P EST LOW 20 MIN: CPT | Performed by: NURSE PRACTITIONER

## 2022-05-23 RX ORDER — PREDNISONE 1 MG/1
5 TABLET ORAL DAILY
COMMUNITY

## 2022-05-23 NOTE — PROGRESS NOTES
PRIMARY CARE PROVIDER: Richard Weaver MD  REFERRING PROVIDER: No ref. provider found    Chief Complaint   Patient presents with   • Hearing Loss     Follow up with audio       Subjective   History of Present Illness:  Linda Spear is a 86 y.o. female who is here for follow up. She has been seen in the past for left eustachian tube dysfunction.  Her last flareup was 2 years ago.  She has had complains of another recent flair up of otalgia, ear fullness, ear pressure, fluid on the ear, decreased hearing and muffled hearing. The symptoms are localized to the left ear.  Her symptoms have been present for the last 2-3 months.  Her symptoms are temporarily improved with Valsalva maneuver.  She has tried Flonase and Astelin but was unable to tolerate these medications because they were too drying despite using nasal saline and intranasal ointment.  She reports her symptoms have somewhat improved since her last visit but are still present.  She denies otorrhea.      Past History:  Past Medical History:   Diagnosis Date   • Chronic shoulder pain    • Conductive hearing loss    • Disease of thyroid gland    • Perforation of left tympanic membrane      Past Surgical History:   Procedure Laterality Date   • CARDIAC CATHETERIZATION       Family History   Problem Relation Age of Onset   • Breast cancer Neg Hx      Social History     Tobacco Use   • Smoking status: Never Smoker   • Smokeless tobacco: Never Used   Substance Use Topics   • Alcohol use: No   • Drug use: Defer     Allergies:  Patient has no known allergies.    Current Outpatient Medications:   •  folic acid-vit B6-vit B12 (FOLBEE) 2.5-25-1 MG tablet tablet, Take  by mouth Daily., Disp: , Rfl:   •  levothyroxine (SYNTHROID, LEVOTHROID) 100 MCG tablet, , Disp: , Rfl:   •  metoprolol succinate XL (TOPROL-XL) 50 MG 24 hr tablet, Take 50 mg by mouth Daily., Disp: , Rfl:   •  Multiple Vitamins-Minerals (CENTRUM SILVER PO), Take  by mouth., Disp: , Rfl:   •  multivitamin  with minerals tablet tablet, Take 1 tablet by mouth Daily., Disp: , Rfl:   •  predniSONE (DELTASONE) 5 MG tablet, Take 5 mg by mouth Daily., Disp: , Rfl:   •  TURMERIC PO, Take  by mouth., Disp: , Rfl:   •  fluticasone (FLONASE) 50 MCG/ACT nasal spray, 2 sprays into the nostril(s) as directed by provider Daily for 30 days., Disp: 16 g, Rfl: 11      Objective     Vital Signs:  Temp:  [98.1 °F (36.7 °C)] 98.1 °F (36.7 °C)  Heart Rate:  [69] 69  BP: (146)/(69) 146/69 /69   Pulse 69   Temp 98.1 °F (36.7 °C) (Temporal)   LMP  (LMP Unknown)     Physical Exam:  ENT Physical Exam  Constitutional  Appearance: patient appears well-developed, well-nourished and well-groomed,  Communication/Voice: communication appropriate for developmental age; vocal quality normal;  Head and Face  Appearance: head appears normal and face appears atraumatic;  Ear  Auricles: right auricle normal; left auricle normal;  External Mastoids: right external mastoid normal; left external mastoid normal;  Ear Canals: right ear canal normal; left ear canal normal;  Tympanic Membranes: left tympanic membrane abnormal and with effusion; dull;  Ear comments: Right EAC with mild nonobstructing cerumen  Nose  External Nose: nares patent bilaterally; external nose normal;  Internal Nose: nasal mucosa normal;  Oral Cavity/Oropharynx  Lips: normal;  Neck  Neck: neck normal;  Neurovestibular  Mental Status: alert and oriented;  Psychiatric: mood normal; affect is appropriate;        Results Review:     Name:  Linda Spear  :  1935  Age:  86 y.o.  Date of Evaluation:  2022         History:  Reason for visit:  Ms. Spear is seen today at the request of MARGARETTE Rees for a follow-up hearing evaluation. Patient has a history of bilateral sensorineural hearing loss. Previous audio was on 2020. Patient thinks her hearing in her left ear has declined. She states she has fluid on her left ear that she cannot get rid of. She states she has to  constantly pop her ears. Patient does not wear hearing aids, but she has gotten a sound system for her TV to help with understanding. Patient reports left otalgia and left aural fullness. Patient denies tinnitus and dizziness.        EVALUATION:           RESULTS:     Otoscopic Evaluation:  Right: partially occluding cerumen, tympanic membrane visualized  Left: clear canal, tympanic membrane visualized         Tympanometry (226 Hz):  Bilateral: Type A- normal     Pure Tone Audiometry:    Bilateral: mild sloping to severe mixed hearing loss          Speech Audiometry:   Right: Speech Reception Threshold (SRT) was obtained at 45 dBHL  Word Recognition scores- excellent or within normal limits (90 - 100%) using NU-6 List 4A, 25 words  Left: Speech Reception Threshold (SRT) was obtained at 40 dBHL  Word Recognition scores- excellent or within normal limits (90 - 100%) using NU-6 List 4A, 25 words        IMPRESSIONS:  Tympanometry showed normal middle ear pressure and static compliance, for both ears. Pure tone thresholds for both ears show a mild sloping to severe mixed hearing loss, suggesting abnormal outer/middle ear function and abnormal cochlear/retrocochlear function. Slight decrease in lower frequencies, bilaterally. Patient was counseled with regard to the findings.     Amplification needs:  Patient could benefit from hearing aids. Patient's word recognition scores were excellent     Diagnosis:   1. Mixed conductive and sensorineural hearing loss of both ears    2. Left ear pain          RECOMMENDATIONS/PLAN:  Follow-up recommendations per MARGARETTE Rees    Audiologic follow-up in 1 year  Return for audiologic testing if noticing changes in hearing or concerns arise  Hearing aid evaluation and counseling upon medical clearance and patient motivation  Use communication strategies  Use hearing protection around loud noises      EDUCATION:  Discussed results and recommendations with patient. Questions were  addressed and the patient was encouraged to contact our department should concerns arise.        ARNDAL Hoffman  Licensed Audiologist             Assessment   Assessment:  1. Mixed conductive and sensorineural hearing loss of both ears    2. Dysfunction of left eustachian tube        Plan   Plan:  No orders of the defined types were placed in this encounter.    She has been unable to tolerate nasal sprays.  Audiogram was reviewed and discussed with the patient.  Medical versus surgical options were discussed.  The patient has elected to continue with conservative management for now.  We will continue to monitor.  May try Allegra.  Continue with Valsalva maneuver.  Use hearing protection in loud noise situations.  Consider hearing aid amplification.  Obtain a yearly audiogram to follow hearing.    She was instructed to call or return should any problems arise prior to next office visit.    There are no Patient Instructions on file for this visit.  Return in about 3 months (around 8/23/2022) for Recheck.    My findings and recommendations were discussed and questions were answered.     Thais Lopez, MARGARETTE  05/23/22  18:02 CDT

## 2022-07-06 ENCOUNTER — HOSPITAL ENCOUNTER (EMERGENCY)
Facility: HOSPITAL | Age: 87
Discharge: LEFT BEFORE DISCHARGE | End: 2022-07-06

## 2022-07-06 PROCEDURE — 99211 OFF/OP EST MAY X REQ PHY/QHP: CPT

## 2022-10-10 ENCOUNTER — NURSE TRIAGE (OUTPATIENT)
Dept: CALL CENTER | Facility: HOSPITAL | Age: 87
End: 2022-10-10

## 2022-10-10 NOTE — TELEPHONE ENCOUNTER
"    Reason for Disposition  • Information only question and nurse able to answer    Additional Information  • Negative: Nursing judgment  • Negative: Nursing judgment  • Negative: Nursing judgment  • Negative: Nursing judgment    Answer Assessment - Initial Assessment Questions  1. REASON FOR CALL or QUESTION: \"What is your reason for calling today?\" or \"How can I best help you?\" or \"What question do you have that I can help answer?\"      Needs to know what the letter sent to her portal says    Protocols used: INFORMATION ONLY CALL - NO TRIAGE-ADULT-[place]      "

## 2023-02-16 ENCOUNTER — TRANSCRIBE ORDERS (OUTPATIENT)
Dept: ADMINISTRATIVE | Facility: HOSPITAL | Age: 88
End: 2023-02-16
Payer: MEDICARE

## 2023-02-16 DIAGNOSIS — R06.00 DYSPNEA, UNSPECIFIED TYPE: ICD-10-CM

## 2023-02-16 DIAGNOSIS — R01.1 CARDIAC MURMUR: Primary | ICD-10-CM

## 2023-02-20 ENCOUNTER — TRANSCRIBE ORDERS (OUTPATIENT)
Dept: ADMINISTRATIVE | Facility: HOSPITAL | Age: 88
End: 2023-02-20
Payer: MEDICARE

## 2023-02-20 DIAGNOSIS — R01.1 CARDIAC MURMUR: Primary | ICD-10-CM

## 2023-03-23 ENCOUNTER — HOSPITAL ENCOUNTER (OUTPATIENT)
Dept: CARDIOLOGY | Facility: HOSPITAL | Age: 88
Discharge: HOME | End: 2023-03-23
Admitting: INTERNAL MEDICINE
Payer: MEDICARE

## 2023-03-23 VITALS
HEIGHT: 64 IN | WEIGHT: 130 LBS | SYSTOLIC BLOOD PRESSURE: 153 MMHG | DIASTOLIC BLOOD PRESSURE: 58 MMHG | BODY MASS INDEX: 22.2 KG/M2

## 2023-03-23 DIAGNOSIS — R06.00 DYSPNEA, UNSPECIFIED TYPE: ICD-10-CM

## 2023-03-23 DIAGNOSIS — R01.1 CARDIAC MURMUR: ICD-10-CM

## 2023-03-23 PROCEDURE — 25510000001 PERFLUTREN 6.52 MG/ML SUSPENSION: Performed by: HOSPITALIST

## 2023-03-23 PROCEDURE — 93306 TTE W/DOPPLER COMPLETE: CPT | Performed by: HOSPITALIST

## 2023-03-23 PROCEDURE — 93306 TTE W/DOPPLER COMPLETE: CPT

## 2023-03-23 RX ADMIN — PERFLUTREN 13.04 MG: 6.52 INJECTION, SUSPENSION INTRAVENOUS at 15:55

## 2023-03-24 LAB
BH CV ECHO MEAS - AO MAX PG: 17.1 MMHG
BH CV ECHO MEAS - AO MEAN PG: 8 MMHG
BH CV ECHO MEAS - AO ROOT DIAM: 3.3 CM
BH CV ECHO MEAS - AO V2 MAX: 207 CM/SEC
BH CV ECHO MEAS - AO V2 VTI: 38.2 CM
BH CV ECHO MEAS - AVA(I,D): 2.19 CM2
BH CV ECHO MEAS - EDV(CUBED): 35.7 ML
BH CV ECHO MEAS - EDV(MOD-SP2): 75.1 ML
BH CV ECHO MEAS - EDV(MOD-SP4): 86 ML
BH CV ECHO MEAS - EF(MOD-BP): 59.2 %
BH CV ECHO MEAS - EF(MOD-SP2): 66.8 %
BH CV ECHO MEAS - EF(MOD-SP4): 57.6 %
BH CV ECHO MEAS - ESV(CUBED): 12.2 ML
BH CV ECHO MEAS - ESV(MOD-SP2): 24.9 ML
BH CV ECHO MEAS - ESV(MOD-SP4): 36.5 ML
BH CV ECHO MEAS - FS: 30.2 %
BH CV ECHO MEAS - IVS/LVPW: 0.93 CM
BH CV ECHO MEAS - IVSD: 1.1 CM
BH CV ECHO MEAS - LA DIMENSION: 3.3 CM
BH CV ECHO MEAS - LAT PEAK E' VEL: 8.5 CM/SEC
BH CV ECHO MEAS - LV DIASTOLIC VOL/BSA (35-75): 52.8 CM2
BH CV ECHO MEAS - LV MASS(C)D: 115 GRAMS
BH CV ECHO MEAS - LV MAX PG: 5.1 MMHG
BH CV ECHO MEAS - LV MEAN PG: 3 MMHG
BH CV ECHO MEAS - LV SYSTOLIC VOL/BSA (12-30): 22.4 CM2
BH CV ECHO MEAS - LV V1 MAX: 112.5 CM/SEC
BH CV ECHO MEAS - LV V1 VTI: 24.2 CM
BH CV ECHO MEAS - LVIDD: 3.3 CM
BH CV ECHO MEAS - LVIDS: 2.3 CM
BH CV ECHO MEAS - LVOT AREA: 3.5 CM2
BH CV ECHO MEAS - LVOT DIAM: 2.1 CM
BH CV ECHO MEAS - LVPWD: 1.19 CM
BH CV ECHO MEAS - MED PEAK E' VEL: 7 CM/SEC
BH CV ECHO MEAS - MR MAX PG: 36.1 MMHG
BH CV ECHO MEAS - MR MAX VEL: 288.5 CM/SEC
BH CV ECHO MEAS - MV A MAX VEL: 75 CM/SEC
BH CV ECHO MEAS - MV DEC SLOPE: 336 CM/SEC2
BH CV ECHO MEAS - MV DEC TIME: 0.23 MSEC
BH CV ECHO MEAS - MV E MAX VEL: 84 CM/SEC
BH CV ECHO MEAS - MV E/A: 1.12
BH CV ECHO MEAS - MV P1/2T: 81.6 MSEC
BH CV ECHO MEAS - MVA(P1/2T): 2.7 CM2
BH CV ECHO MEAS - PA V2 MAX: 126.5 CM/SEC
BH CV ECHO MEAS - RAP SYSTOLE: 5 MMHG
BH CV ECHO MEAS - SI(MOD-SP2): 30.8 ML/M2
BH CV ECHO MEAS - SI(MOD-SP4): 30.4 ML/M2
BH CV ECHO MEAS - SV(LVOT): 83.8 ML
BH CV ECHO MEAS - SV(MOD-SP2): 50.2 ML
BH CV ECHO MEAS - SV(MOD-SP4): 49.5 ML
BH CV ECHO MEAS - TAPSE (>1.6): 2.26 CM
BH CV ECHO MEASUREMENTS AVERAGE E/E' RATIO: 10.84
BH CV VAS BP LEFT ARM: NORMAL MMHG
LEFT ATRIUM VOLUME INDEX: 21.2 ML/M2
LEFT ATRIUM VOLUME: 40.4 ML
MAXIMAL PREDICTED HEART RATE: 134 BPM
STRESS TARGET HR: 114 BPM

## 2023-11-08 ENCOUNTER — HOSPITAL ENCOUNTER (OUTPATIENT)
Dept: GENERAL RADIOLOGY | Facility: HOSPITAL | Age: 88
Discharge: HOME | End: 2023-11-08
Admitting: PHYSICIAN ASSISTANT
Payer: MEDICARE

## 2023-11-08 ENCOUNTER — TRANSCRIBE ORDERS (OUTPATIENT)
Dept: GENERAL RADIOLOGY | Facility: HOSPITAL | Age: 88
End: 2023-11-08
Payer: MEDICARE

## 2023-11-08 DIAGNOSIS — R06.09 DYSPNEA ON EXERTION: Primary | ICD-10-CM

## 2023-11-08 DIAGNOSIS — R06.09 DYSPNEA ON EXERTION: ICD-10-CM

## 2023-11-08 PROCEDURE — 71046 X-RAY EXAM CHEST 2 VIEWS: CPT

## 2023-11-13 ENCOUNTER — TRANSCRIBE ORDERS (OUTPATIENT)
Dept: ADMINISTRATIVE | Facility: HOSPITAL | Age: 88
End: 2023-11-13
Payer: MEDICARE

## 2023-11-13 ENCOUNTER — HOSPITAL ENCOUNTER (OUTPATIENT)
Dept: CT IMAGING | Facility: HOSPITAL | Age: 88
Discharge: HOME | End: 2023-11-13
Admitting: PHYSICIAN ASSISTANT
Payer: MEDICARE

## 2023-11-13 DIAGNOSIS — R93.89 ABNORMAL FINDINGS ON DIAGNOSTIC IMAGING OF OTHER SPECIFIED BODY STRUCTURES: ICD-10-CM

## 2023-11-13 DIAGNOSIS — R06.09 DYSPNEA ON EXERTION: ICD-10-CM

## 2023-11-13 PROCEDURE — 71250 CT THORAX DX C-: CPT

## 2023-11-14 ENCOUNTER — TRANSCRIBE ORDERS (OUTPATIENT)
Dept: ADMINISTRATIVE | Facility: HOSPITAL | Age: 88
End: 2023-11-14
Payer: MEDICARE

## 2023-11-14 DIAGNOSIS — R06.09 DYSPNEA ON EXERTION: ICD-10-CM

## 2023-11-14 DIAGNOSIS — R06.00 DYSPNEA, UNSPECIFIED TYPE: Primary | ICD-10-CM

## 2023-11-15 ENCOUNTER — TRANSCRIBE ORDERS (OUTPATIENT)
Dept: ADMINISTRATIVE | Facility: HOSPITAL | Age: 88
End: 2023-11-15
Payer: MEDICARE

## 2023-11-15 ENCOUNTER — HOSPITAL ENCOUNTER (OUTPATIENT)
Dept: CARDIOLOGY | Facility: HOSPITAL | Age: 88
Discharge: HOME | End: 2023-11-15
Admitting: PHYSICIAN ASSISTANT
Payer: MEDICARE

## 2023-11-15 VITALS
DIASTOLIC BLOOD PRESSURE: 62 MMHG | HEART RATE: 74 BPM | BODY MASS INDEX: 23.51 KG/M2 | WEIGHT: 141.09 LBS | SYSTOLIC BLOOD PRESSURE: 153 MMHG | HEIGHT: 65 IN

## 2023-11-15 DIAGNOSIS — R06.09 DYSPNEA ON EXERTION: ICD-10-CM

## 2023-11-15 DIAGNOSIS — R06.09 DYSPNEA ON EXERTION: Primary | ICD-10-CM

## 2023-11-15 LAB
BH CV ECHO MEAS - AO MAX PG: 20.6 MMHG
BH CV ECHO MEAS - AO MEAN PG: 12 MMHG
BH CV ECHO MEAS - AO V2 MAX: 227 CM/SEC
BH CV ECHO MEAS - AO V2 VTI: 56.8 CM
BH CV ECHO MEAS - EDV(MOD-SP4): 75.8 ML
BH CV ECHO MEAS - EF(MOD-SP4): 61.1 %
BH CV ECHO MEAS - ESV(MOD-SP4): 29.5 ML
BH CV ECHO MEAS - SV(MOD-SP4): 46.4 ML
BH CV STRESS BP STAGE 1: NORMAL
BH CV STRESS DURATION MIN STAGE 1: 3
BH CV STRESS DURATION SEC STAGE 1: 0
BH CV STRESS GRADE STAGE 1: 0
BH CV STRESS HR STAGE 1: 121
BH CV STRESS METS STAGE 1: 2.3
BH CV STRESS PROTOCOL 1: NORMAL
BH CV STRESS RECOVERY BP: NORMAL MMHG
BH CV STRESS RECOVERY HR: 70 BPM
BH CV STRESS SPEED STAGE 1: 1.7
BH CV STRESS STAGE 1: 1
MAXIMAL PREDICTED HEART RATE: 133 BPM
PERCENT MAX PREDICTED HR: 90.98 %
STRESS BASELINE BP: NORMAL MMHG
STRESS BASELINE HR: 77 BPM
STRESS PERCENT HR: 107 %
STRESS POST ESTIMATED WORKLOAD: 2.3 METS
STRESS POST EXERCISE DUR MIN: 3 MIN
STRESS POST EXERCISE DUR SEC: 0 SEC
STRESS POST PEAK BP: NORMAL MMHG
STRESS POST PEAK HR: 121 BPM
STRESS TARGET HR: 113 BPM

## 2023-11-15 PROCEDURE — 93017 CV STRESS TEST TRACING ONLY: CPT

## 2023-11-15 PROCEDURE — 93350 STRESS TTE ONLY: CPT

## 2023-11-15 PROCEDURE — 25510000001 PERFLUTREN 6.52 MG/ML SUSPENSION: Performed by: HOSPITALIST

## 2023-11-15 RX ADMIN — PERFLUTREN 8.48 MG: 6.52 INJECTION, SUSPENSION INTRAVENOUS at 11:30

## 2023-11-30 ENCOUNTER — TRANSCRIBE ORDERS (OUTPATIENT)
Dept: ADMINISTRATIVE | Facility: HOSPITAL | Age: 88
End: 2023-11-30
Payer: MEDICARE

## 2023-11-30 DIAGNOSIS — R91.1 SOLITARY LUNG NODULE: Primary | ICD-10-CM

## 2023-12-15 ENCOUNTER — OFFICE VISIT (OUTPATIENT)
Dept: CARDIOLOGY | Facility: CLINIC | Age: 88
End: 2023-12-15
Payer: MEDICARE

## 2023-12-15 VITALS
DIASTOLIC BLOOD PRESSURE: 78 MMHG | HEIGHT: 64 IN | SYSTOLIC BLOOD PRESSURE: 145 MMHG | HEART RATE: 63 BPM | BODY MASS INDEX: 25.61 KG/M2 | WEIGHT: 150 LBS

## 2023-12-15 DIAGNOSIS — H90.3 SENSORINEURAL HEARING LOSS (SNHL) OF BOTH EARS: ICD-10-CM

## 2023-12-15 DIAGNOSIS — R94.39 ABNORMAL STRESS ECHO: Primary | ICD-10-CM

## 2023-12-15 DIAGNOSIS — R07.9 CHEST PAIN IN ADULT: ICD-10-CM

## 2023-12-15 DIAGNOSIS — I10 PRIMARY HYPERTENSION: ICD-10-CM

## 2023-12-15 DIAGNOSIS — R06.09 DOE (DYSPNEA ON EXERTION): ICD-10-CM

## 2023-12-15 DIAGNOSIS — R01.1 HEART MURMUR: ICD-10-CM

## 2023-12-15 DIAGNOSIS — R09.89 LEFT CAROTID BRUIT: ICD-10-CM

## 2023-12-15 PROCEDURE — 99204 OFFICE O/P NEW MOD 45 MIN: CPT | Performed by: INTERNAL MEDICINE

## 2023-12-15 PROCEDURE — 1160F RVW MEDS BY RX/DR IN RCRD: CPT | Performed by: INTERNAL MEDICINE

## 2023-12-15 PROCEDURE — 93000 ELECTROCARDIOGRAM COMPLETE: CPT | Performed by: INTERNAL MEDICINE

## 2023-12-15 PROCEDURE — 1159F MED LIST DOCD IN RCRD: CPT | Performed by: INTERNAL MEDICINE

## 2023-12-15 RX ORDER — AMLODIPINE BESYLATE 5 MG/1
5 TABLET ORAL DAILY
Qty: 90 TABLET | Refills: 3 | Status: SHIPPED | OUTPATIENT
Start: 2023-12-15

## 2023-12-15 RX ORDER — ISOSORBIDE MONONITRATE 30 MG/1
30 TABLET, EXTENDED RELEASE ORAL DAILY
Qty: 90 TABLET | Refills: 3 | Status: SHIPPED | OUTPATIENT
Start: 2023-12-15

## 2023-12-15 RX ORDER — PREDNISONE 5 MG/1
TABLET ORAL 2 TIMES DAILY
COMMUNITY
Start: 2023-11-08

## 2023-12-15 RX ORDER — NITROGLYCERIN 0.4 MG/1
TABLET SUBLINGUAL
Qty: 25 TABLET | Refills: 3 | Status: SHIPPED | OUTPATIENT
Start: 2023-12-15

## 2023-12-15 RX ORDER — LEVOTHYROXINE SODIUM 0.1 MG/1
100 TABLET ORAL DAILY
COMMUNITY

## 2023-12-15 RX ORDER — LANOLIN ALCOHOL/MO/W.PET/CERES
1000 CREAM (GRAM) TOPICAL DAILY
COMMUNITY

## 2023-12-15 NOTE — PROGRESS NOTES
Linda Spear  [id number]  1935  88 y.o.  female    Referring Provider: Richard Weaver MD    Reason for  Visit:  Initial visit       Subjective     Chest pain both with exertion as well as at rest.  Feels episodes of chest pain occur more often with exertion  Moderate substernal,   Pressure like   Chest pain non pleuritic  Chest pain non positional and non gustatory   Lasts less than 5 minutes    Started more than 3 months ago  Occurs once or twice a week on the average but can be variable in frequency  No associated diaphoresis    No associated nausea  No radiation    Relieved with rest or spontaneously  Not positional    No change with intake of food or antacids  No change with breathing  Mild to moderate associated dyspnea    No similar chest pain episodes in the past     Easy fatiguability and increasing tired  Feels energy levels running low      Abnormal cardiac stress test raising suspicion for underlying hemodynamically significant obstructive coronary artery disease .   easy bruisability     History of present illness:  Linda Spear is a 88 y.o. yo female with Acquired hypothyroidism    who presents today for   Chief Complaint   Patient presents with    [facility]     Patient did have her flu shot in sept 2023    Chest Pain     Patient stated that when she has this she just relaxes and it will go away    .    History  Past Medical History:   Diagnosis Date    Chronic shoulder pain     Clotting disorder     Conductive hearing loss     Disease of thyroid gland     Perforation of left tympanic membrane     Primary hypertension 12/15/2023   ,   Past Surgical History:   Procedure Laterality Date    CARDIAC CATHETERIZATION     ,   Family History   Problem Relation Age of Onset    Clotting disorder Father     Heart attack Sister     Breast cancer Neg Hx    ,   Social History     Tobacco Use    Smoking status: Never    Smokeless tobacco: Never   Substance Use Topics    Alcohol use: No    Drug use: Never  "  ,     Medications  Current Outpatient Medications   Medication Sig Dispense Refill    Calcium Carb-Cholecalciferol (CALTRATE 600+D3 PO) Take  by mouth.      levothyroxine (SYNTHROID, LEVOTHROID) 100 MCG tablet Take 1 tablet by mouth Daily.      metoprolol succinate XL (TOPROL-XL) 50 MG 24 hr tablet Take 1 tablet by mouth Daily.      Multiple Vitamins-Minerals (CENTRUM SILVER PO) Take  by mouth.      predniSONE (DELTASONE) 5 MG tablet 2 (Two) Times a Day.      TURMERIC PO Take  by mouth.      vitamin B-12 (CYANOCOBALAMIN) 1000 MCG tablet Take 1 tablet by mouth Daily.      amLODIPine (NORVASC) 5 MG tablet Take 1 tablet by mouth Daily. 90 tablet 3    isosorbide mononitrate (IMDUR) 30 MG 24 hr tablet Take 1 tablet by mouth Daily. 90 tablet 3    nitroglycerin (NITROSTAT) 0.4 MG SL tablet 1 under the tongue as needed for angina, may repeat q5mins for up three doses 25 tablet 3     No current facility-administered medications for this visit.       Allergies:  Codeine, Ibuprofen, and Other    Review of Systems  Review of Systems   Constitutional: Positive for malaise/fatigue.   HENT: Negative.     Eyes: Negative.    Cardiovascular:  Positive for chest pain and dyspnea on exertion. Negative for claudication, cyanosis, irregular heartbeat, leg swelling, near-syncope, orthopnea, palpitations, paroxysmal nocturnal dyspnea and syncope.   Respiratory: Negative.     Endocrine: Negative.    Hematologic/Lymphatic: Negative.    Skin: Negative.    Musculoskeletal:  Positive for back pain and joint pain.   Gastrointestinal:  Negative for anorexia.   Genitourinary: Negative.    Neurological: Negative.    Psychiatric/Behavioral: Negative.         Objective     Physical Exam:  /78   Pulse 63   Ht 162.6 cm (64\")   Wt 68 kg (150 lb)   LMP  (LMP Unknown)   BMI 25.75 kg/m²     Physical Exam  Constitutional:       Appearance: She is well-developed.   HENT:      Head: Normocephalic.   Neck:      Vascular: Normal carotid pulses. " Carotid bruit present. No JVD.      Trachea: No tracheal tenderness or tracheal deviation.   Cardiovascular:      Rate and Rhythm: Regular rhythm.      Pulses: Normal pulses.      Heart sounds: Normal heart sounds.       with a grade of 3/6.   Pulmonary:      Effort: Pulmonary effort is normal.      Breath sounds: No stridor.   Abdominal:      Palpations: Abdomen is soft.   Musculoskeletal:      Cervical back: No edema.   Skin:     General: Skin is warm.   Neurological:      Mental Status: She is alert.      Cranial Nerves: No cranial nerve deficit.      Sensory: No sensory deficit.   Psychiatric:         Speech: Speech normal.         Behavior: Behavior normal.         Results Review:        Results for orders placed during the hospital encounter of 11/15/23    Adult Stress Echo W/ Cont or Stress Agent if Necessary Per Protocol    Interpretation Summary    Intermediate risk study for stress-induced ischemia.    Sensitivity and specificity of the results is reduced by the short exercise/stress duration.    There is lack of appropriate augmentation of the basal and mid lateral segments consistent with possible ischemia. In some views, there is subtle hypokinesis of the apex favored to represent benign diverticulum (normal variant) although subtle ischemia cannot be excluded.    Overall, the patient's exercise capacity was severely impaired, BP demonstrated a hypertensive response to stress, and HR demonstrated an exaggerated response to stress.    Left ventricular systolic function is normal. Left ventricular ejection fraction appears to be 61 - 65%.    Calcified aortic valve with borderline mild stenosis.      Complete Transthoracic Echocardiogram with Complete Doppler, Color Flow and Contrast    Accession Number: [id number]   Date of Study: 3/23/23   Ordering Provider: Richard Weaver MD   Clinical Indications: Murmur or Click, Dyspnea        Interpreting Physicians  Performing Staff   Paul Lieberman MD Tech:  "Lucinda Gomez, [facility]        Patient Hx Of Height, Weight, and Vitals    Height Weight BSA (Calculated - sq m) BMI (Calculated) Retired BMI (kg/m2) Pulse BP   162.6 cm (64\") 59 kg (130 lb) 1.63 sq meters 22.3   153/58     Blood Pressure at Time of Exam     Left Arm   Blood Pressure 153/58 mmHg            [facility] PACS Images     Show images for Adult Transthoracic Echo Complete W/ Cont if Necessary Per Protocol   Clinical Indication    Murmur or Click; Dyspnea   Dx: Cardiac murmur [R01.1 (ICD-10-CM)]; Dyspnea, unspecified type [R06.00 (ICD-10-CM)]     Interpretation Summary         Left ventricular systolic function is normal. Left ventricular ejection fraction appears to be 66 - 70%.    Left ventricular diastolic function was indeterminate.    Normal right ventricular cavity size and systolic function noted.    The left atrial cavity is mildly dilated.    There is moderate calcification of the aortic valve without significant stenosis or regurgitation.     ____________________________________________________________________________________________________________________________________________  Health maintenance and recommendations    Low salt/ HTN/ Heart healthy carbohydrate restricted cardiac diet   The patient is advised to reduce or avoid caffeine or other cardiac stimulants.   Minimize or avoid  NSAID-type medications      Monitor for any signs of bleeding including red or dark stools. Fall precautions.   Advised staying uptodate with immunizations per established standard guidelines.    Offered to give patient  a copy of my notes     Questions were encouraged, asked and answered to the patient's  understanding and satisfaction. Questions if any regarding current medications and side effects, need for refills and importance of compliance to medications stressed.    Reviewed available prior notes, consults, prior visits, laboratory findings, radiology and cardiology relevant reports. Updated chart as applicable. I " have reviewed the patient's medical history in detail and updated the computerized patient record as relevant.      Updated patient regarding any new or relevant abnormalities on review of records or any new findings on physical exam. Mentioned to patient about purpose of visit and desirable health short and long term goals and objectives.    Primary to monitor CBC CMP Lipid panel and TSH as applicable    ___________________________________________________________________________________________________________________________________________     ECG 12 Lead    Date/Time: 12/15/2023 8:29 AM  Performed by: Jose Cotton MD    Authorized by: Jose Cotton MD  Comparison: not compared with previous ECG   Rhythm: sinus rhythm  Rate: normal  Conduction: conduction normal  Q waves: V1 and V2    QRS axis: normal    Clinical impression: abnormal EKG          Assessment & Plan   Diagnoses and all orders for this visit:    1. Abnormal stress echo (Primary)    2. Primary hypertension    3. SALAS (dyspnea on exertion)  -     ECG 12 Lead    4. Chest pain in adult  -     Stress Test With Myocardial Perfusion (1 Day); Future    5. Left carotid bruit  -     US Carotid Bilateral; Future    6. Sensorineural hearing loss (SNHL) of both ears    7. Heart murmur    Other orders  -     amLODIPine (NORVASC) 5 MG tablet; Take 1 tablet by mouth Daily.  Dispense: 90 tablet; Refill: 3  -     nitroglycerin (NITROSTAT) 0.4 MG SL tablet; 1 under the tongue as needed for angina, may repeat q5mins for up three doses  Dispense: 25 tablet; Refill: 3  -     isosorbide mononitrate (IMDUR) 30 MG 24 hr tablet; Take 1 tablet by mouth Daily.  Dispense: 90 tablet; Refill: 3          Plan    Patient expressed understanding  Encouraged and answered all questions   Discussed with the patient and all questioned fully answered. She will call me if any problems arise.   Discussed results of prior testing with patient : echo and stress echo   as well  electrocardiogram from today       Orders Placed This Encounter   Procedures    US Carotid Bilateral     Standing Status:   Future     Standing Expiration Date:   2024     Order Specific Question:   Reason for Exam:     Answer:   carotid bruit     Order Specific Question:   Release to patient     Answer:   Routine Release []    Stress Test With Myocardial Perfusion (1 Day)     Standing Status:   Future     Standing Expiration Date:   2024     Order Specific Question:   What stress agent will be used?     Answer:   Regadenoson (Lexiscan)     Order Specific Question:   Difficulty walking criteria?     Answer:   Musculoskeletal (hips, knees, feet, back, amputee)     Order Specific Question:   Reason for exam?     Answer:   Chest Pain     Order Specific Question:   Release to patient     Answer:   Routine Release []    ECG 12 Lead     This order was created via procedure documentation     Order Specific Question:   Release to patient     Answer:   Routine Release []      She says cannot take any blood thinners including Aspirin as has hemorrhages   Will not do a heart cath but treat her medically     Requested Prescriptions     Signed Prescriptions Disp Refills    amLODIPine (NORVASC) 5 MG tablet 90 tablet 3     Sig: Take 1 tablet by mouth Daily.    nitroglycerin (NITROSTAT) 0.4 MG SL tablet 25 tablet 3     Si under the tongue as needed for angina, may repeat q5mins for up three doses    isosorbide mononitrate (IMDUR) 30 MG 24 hr tablet 90 tablet 3     Sig: Take 1 tablet by mouth Daily.          Check BP and heart rates twice daily initially till blood pressures and heart rates under good control and then at least 3x / week,   If blood pressures continue to be well-controlled then can check week a month  at home and bring a recording for review next visit  If BP >130/85 or < 100/60 persistently over 3 reading 30 mins apart or if heart rates persistently above 100 bpm or less  than 55 bpm call sooner for evaluation and advise       S/L NTG PRN for chest pain, call me or go to ER if has to use S/L nitroglycerin               Return in about 6 weeks (around 1/26/2024).

## 2023-12-18 ENCOUNTER — TELEPHONE (OUTPATIENT)
Dept: CARDIOLOGY | Facility: CLINIC | Age: 88
End: 2023-12-18
Payer: MEDICARE

## 2023-12-18 NOTE — TELEPHONE ENCOUNTER
Patient called and stated that she has problems starting new medications because some symptoms she has taking them.    Since she had 2 new ones she is supposed to take she wanted to know how to start them giving her problems in the past I told her she can start them one at a time.    She is going to start the Imdur 30 mg first taking her blood pressure before she takes it and she is going to take it 30 min's - 1 hours after breakfast to take her med's.      After 3 - 4 days she is going to add the Norvasc and is going to keep a log of her blood pressures.

## 2023-12-23 ENCOUNTER — NURSE TRIAGE (OUTPATIENT)
Dept: CALL CENTER | Facility: HOSPITAL | Age: 88
End: 2023-12-23
Payer: MEDICARE

## 2023-12-23 NOTE — TELEPHONE ENCOUNTER
Recently started isosorbide. Elevated HR and severe headache after taking. HR 83 at rest,  after talking about issue. O2 97%. Denies chest pain or difficulty breathing. Only took one dose of isosorbide. Nuclear stress test pending per Dr. Cotton. Patient feeling anxious. Takes thyroid medication. Reviewed protocol with patient. Advised to call Dr. Weaver's office Tuesday morning when open to schedule appointment. Advised to go to ER if experiences chest pain, shortness of breath, or HR > 140. Patient verbalizes understanding and agreement with plan.    Reason for Disposition   History of hyperthyroidism or taking thyroid medication    Additional Information   Negative: Passed out (i.e., lost consciousness, collapsed and was not responding)   Negative: Shock suspected (e.g., cold/pale/clammy skin, too weak to stand, low BP, rapid pulse)   Negative: Difficult to awaken or acting confused (e.g., disoriented, slurred speech)   Negative: Visible sweat on face or sweat dripping down face   Negative: Unable to walk, or can only walk with assistance (e.g., requires support)   Negative: [1] Received SHOCK from implantable cardiac defibrillator AND [2] persisting symptoms (i.e., palpitations, lightheadedness)   Negative: [1] Dizziness, lightheadedness, or weakness AND [2] heart beating very rapidly (e.g., > 140 / minute)   Negative: [1] Dizziness, lightheadedness, or weakness AND [2] heart beating very slowly (e.g., < 50 / minute)   Negative: Sounds like a life-threatening emergency to the triager   Negative: Chest pain   Negative: Implantable Cardiac Defibrillator (ICD) or a pacemaker symptoms or questions   Negative: Difficulty breathing   Negative: Dizziness, lightheadedness, or weakness   Negative: [1] Heart beating very rapidly (e.g., > 140 / minute) AND [2] present now  (Exception: During exercise.)   Negative: Heart beating very slowly (e.g., < 50 / minute)  (Exception: Athlete and heart rate normal for caller.)    "Negative: New or worsened shortness of breath with activity (dyspnea on exertion)   Negative: Patient sounds very sick or weak to the triager   Negative: [1] Heart beating very rapidly (e.g., > 140 / minute) AND [2] not present now  (Exception: During exercise.)   Negative: [1] Skipped or extra beat(s) AND [2] increases with exercise or exertion   Negative: [1] Skipped or extra beat(s) AND [2] occurs 4 or more times per minute   Negative: New or worsened ankle swelling   Negative: History of heart disease (i.e., heart attack, bypass surgery, angina, angioplasty, CHF)  (Exception: Brief heartbeat symptoms that went away and now feels well.)   Negative: Age > 60 years  (Exception: Brief heartbeat symptoms that went away and now feels well.)   Negative: Taking water pill (i.e., diuretic) or heart medication (e.g., digoxin)   Negative: Wearing a \"Holter monitor\" or \"cardiac event monitor\"   Negative: [1] Received SHOCK from implantable cardiac defibrillator AND [2] now feels well   Negative: Heart rhythm alert (e.g., \"you have irregular heartbeat\") from personal wearable device (e.g., Apple Watch)    Answer Assessment - Initial Assessment Questions  1. DESCRIPTION: \"Please describe your heart rate or heartbeat that you are having\" (e.g., fast/slow, regular/irregular, skipped or extra beats, \"palpitations\")      Fast   2. ONSET: \"When did it start?\" (Minutes, hours or days)       Days   3. DURATION: \"How long does it last\" (e.g., seconds, minutes, hours)      Days   4. PATTERN \"Does it come and go, or has it been constant since it started?\"  \"Does it get worse with exertion?\"   \"Are you feeling it now?\"      Constant   5. TAP: \"Using your hand, can you tap out what you are feeling on a chair or table in front of you, so that I can hear?\" (Note: not all patients can do this)        NA  6. HEART RATE: \"Can you tell me your heart rate?\" \"How many beats in 15 seconds?\"  (Note: not all patients can do this)        120  7. " "RECURRENT SYMPTOM: \"Have you ever had this before?\" If Yes, ask: \"When was the last time?\" and \"What happened that time?\"       No   8. CAUSE: \"What do you think is causing the palpitations?\"      Isosorbide  9. CARDIAC HISTORY: \"Do you have any history of heart disease?\" (e.g., heart attack, angina, bypass surgery, angioplasty, arrhythmia)       Yes   10. OTHER SYMPTOMS: \"Do you have any other symptoms?\" (e.g., dizziness, chest pain, sweating, difficulty breathing)        No   11. PREGNANCY: \"Is there any chance you are pregnant?\" \"When was your last menstrual period?\"        No    Protocols used: Heart Rate and Heartbeat Questions-ADULT-    "

## 2023-12-26 ENCOUNTER — NURSE TRIAGE (OUTPATIENT)
Dept: CALL CENTER | Facility: HOSPITAL | Age: 88
End: 2023-12-26
Payer: MEDICARE

## 2023-12-26 NOTE — TELEPHONE ENCOUNTER
"Reason for Disposition  • Requesting regular office appointment    Additional Information  • Negative: [1] Caller is not with the adult (patient) AND [2] reporting urgent symptoms  • Negative: Lab result questions  • Negative: Medication questions  • Negative: Caller can't be reached by phone  • Negative: Caller has already spoken to PCP or another triager  • Negative: RN needs further essential information from caller in order to complete triage    Answer Assessment - Initial Assessment Questions  1. REASON FOR CALL or QUESTION: \"What is your reason for calling today?\" or \"How can I best help you?\" or \"What question do you have that I can help answer?\"      appt    Protocols used: Information Only Call-ADULT-    "

## 2024-01-24 ENCOUNTER — HOSPITAL ENCOUNTER (OUTPATIENT)
Dept: ULTRASOUND IMAGING | Facility: HOSPITAL | Age: 89
Discharge: HOME | End: 2024-01-24
Admitting: INTERNAL MEDICINE
Payer: MEDICARE

## 2024-01-24 ENCOUNTER — HOSPITAL ENCOUNTER (OUTPATIENT)
Dept: CARDIOLOGY | Facility: HOSPITAL | Age: 89
Discharge: HOME | End: 2024-01-24
Payer: MEDICARE

## 2024-01-24 DIAGNOSIS — R07.9 CHEST PAIN IN ADULT: ICD-10-CM

## 2024-01-24 DIAGNOSIS — R09.89 LEFT CAROTID BRUIT: ICD-10-CM

## 2024-01-24 LAB
BH CV STRESS BP STAGE 1: NORMAL
BH CV STRESS COMMENTS STAGE 1: NORMAL
BH CV STRESS DOSE REGADENOSON STAGE 1: 0.4
BH CV STRESS DURATION MIN STAGE 1: 0
BH CV STRESS DURATION SEC STAGE 1: 10
BH CV STRESS HR STAGE 1: 107
BH CV STRESS PROTOCOL 1: NORMAL
BH CV STRESS RECOVERY BP: NORMAL MMHG
BH CV STRESS RECOVERY HR: 63 BPM
BH CV STRESS STAGE 1: 1
LV EF NUC BP: 65 %
MAXIMAL PREDICTED HEART RATE: 133 BPM
PERCENT MAX PREDICTED HR: 80.45 %
STRESS BASELINE BP: NORMAL MMHG
STRESS BASELINE HR: 68 BPM
STRESS PERCENT HR: 95 %
STRESS POST PEAK BP: NORMAL MMHG
STRESS POST PEAK HR: 107 BPM
STRESS TARGET HR: 113 BPM

## 2024-01-24 PROCEDURE — 25010000002 REGADENOSON 0.4 MG/5ML SOLUTION: Performed by: INTERNAL MEDICINE

## 2024-01-24 PROCEDURE — 93880 EXTRACRANIAL BILAT STUDY: CPT

## 2024-01-24 PROCEDURE — 78452 HT MUSCLE IMAGE SPECT MULT: CPT

## 2024-01-24 PROCEDURE — 0 TECHNETIUM TETROFOSMIN KIT: Performed by: INTERNAL MEDICINE

## 2024-01-24 PROCEDURE — A9502 TC99M TETROFOSMIN: HCPCS | Performed by: INTERNAL MEDICINE

## 2024-01-24 PROCEDURE — 93017 CV STRESS TEST TRACING ONLY: CPT

## 2024-01-24 RX ORDER — REGADENOSON 0.08 MG/ML
0.4 INJECTION, SOLUTION INTRAVENOUS ONCE
Status: COMPLETED | OUTPATIENT
Start: 2024-01-24 | End: 2024-01-24

## 2024-01-24 RX ADMIN — TETROFOSMIN 1 DOSE: 1.38 INJECTION, POWDER, LYOPHILIZED, FOR SOLUTION INTRAVENOUS at 09:00

## 2024-01-24 RX ADMIN — REGADENOSON 0.4 MG: 0.08 INJECTION, SOLUTION INTRAVENOUS at 10:02

## 2024-01-24 RX ADMIN — TETROFOSMIN 1 DOSE: 1.38 INJECTION, POWDER, LYOPHILIZED, FOR SOLUTION INTRAVENOUS at 11:29

## 2024-01-26 ENCOUNTER — OFFICE VISIT (OUTPATIENT)
Dept: CARDIOLOGY | Facility: CLINIC | Age: 89
End: 2024-01-26
Payer: MEDICARE

## 2024-01-26 VITALS
BODY MASS INDEX: 25.61 KG/M2 | HEART RATE: 64 BPM | SYSTOLIC BLOOD PRESSURE: 139 MMHG | WEIGHT: 150 LBS | HEIGHT: 64 IN | DIASTOLIC BLOOD PRESSURE: 84 MMHG

## 2024-01-26 DIAGNOSIS — R07.9 CHEST PAIN IN ADULT: ICD-10-CM

## 2024-01-26 DIAGNOSIS — I10 PRIMARY HYPERTENSION: Primary | ICD-10-CM

## 2024-01-26 DIAGNOSIS — R09.89 LEFT CAROTID BRUIT: ICD-10-CM

## 2024-01-26 DIAGNOSIS — R01.1 HEART MURMUR: ICD-10-CM

## 2024-01-26 DIAGNOSIS — R94.39 ABNORMAL STRESS ECHO: ICD-10-CM

## 2024-01-26 DIAGNOSIS — H90.3 SENSORINEURAL HEARING LOSS (SNHL) OF BOTH EARS: ICD-10-CM

## 2024-01-26 DIAGNOSIS — R06.09 DOE (DYSPNEA ON EXERTION): ICD-10-CM

## 2024-01-26 PROCEDURE — 1159F MED LIST DOCD IN RCRD: CPT | Performed by: INTERNAL MEDICINE

## 2024-01-26 PROCEDURE — 99214 OFFICE O/P EST MOD 30 MIN: CPT | Performed by: INTERNAL MEDICINE

## 2024-01-26 PROCEDURE — 1160F RVW MEDS BY RX/DR IN RCRD: CPT | Performed by: INTERNAL MEDICINE

## 2024-01-26 RX ORDER — HYDRALAZINE HYDROCHLORIDE 25 MG/1
25 TABLET, FILM COATED ORAL 3 TIMES DAILY
Qty: 90 TABLET | Refills: 11 | Status: SHIPPED | OUTPATIENT
Start: 2024-01-26

## 2024-01-26 RX ORDER — HYDRALAZINE HYDROCHLORIDE 25 MG/1
25 TABLET, FILM COATED ORAL 3 TIMES DAILY
Qty: 90 TABLET | Refills: 11 | Status: SHIPPED | OUTPATIENT
Start: 2024-01-26 | End: 2024-01-26

## 2024-01-26 NOTE — PROGRESS NOTES
Linda Spear  [id number]  1935  88 y.o.  female    Referring Provider: Richard Weaver MD    Reason for  Visit: Here for routine follow up     Subjective     Chest pain at random and not necessarily related to exertion  Overall feels better and chest pain now improved   Now says happens 4/ year less than 3 mins     Chest pain non pleuritic  Chest pain non positional and non gustatory   Cardiac workup test results as below: echo, stress echo and myocardial perfusion scan    No associated diaphoresis    No associated nausea  No radiation    Relieved with rest or spontaneously  Not positional    No change with intake of food or antacids  No change with breathing  Mild to moderate associated dyspnea    No similar chest pain episodes in the past     Easy fatiguability and increasing tired  Feels energy levels running low      BP elevated at home   BP in clinic as below        History of present illness:  Linda Spear is a 88 y.o. yo female with Acquired hypothyroidism    who presents today for   Chief Complaint   Patient presents with    Follow-up     6 week follow up - results patient hs not been taking the Norvasc     Shortness of Breath    Dizziness     She does state that she does get light headed and very fatigue all she would like to do is lay in her chair and that is usual for her    .    History  Past Medical History:   Diagnosis Date    Chronic shoulder pain     Clotting disorder     Conductive hearing loss     Disease of thyroid gland     Perforation of left tympanic membrane     Primary hypertension 12/15/2023   ,   Past Surgical History:   Procedure Laterality Date    CARDIAC CATHETERIZATION     ,   Family History   Problem Relation Age of Onset    Clotting disorder Father     Heart attack Sister     Breast cancer Neg Hx    ,   Social History     Tobacco Use    Smoking status: Never    Smokeless tobacco: Never   Substance Use Topics    Alcohol use: No    Drug use: Never   ,     Medications  Current  Outpatient Medications   Medication Sig Dispense Refill    Calcium Carb-Cholecalciferol (CALTRATE 600+D3 PO) Take  by mouth.      isosorbide mononitrate (IMDUR) 30 MG 24 hr tablet Take 1 tablet by mouth Daily. 90 tablet 3    levothyroxine (SYNTHROID, LEVOTHROID) 100 MCG tablet Take 1 tablet by mouth Daily.      metoprolol succinate XL (TOPROL-XL) 50 MG 24 hr tablet Take 1 tablet by mouth Daily.      Multiple Vitamins-Minerals (CENTRUM SILVER PO) Take  by mouth.      nitroglycerin (NITROSTAT) 0.4 MG SL tablet 1 under the tongue as needed for angina, may repeat q5mins for up three doses 25 tablet 3    predniSONE (DELTASONE) 5 MG tablet 2 (Two) Times a Day.      TURMERIC PO Take  by mouth.      vitamin B-12 (CYANOCOBALAMIN) 1000 MCG tablet Take 1 tablet by mouth Daily.      amLODIPine (NORVASC) 5 MG tablet Take 1 tablet by mouth Daily. (Patient not taking: Reported on 1/26/2024) 90 tablet 3    hydrALAZINE (APRESOLINE) 25 MG tablet Take 1 tablet by mouth 3 (Three) Times a Day. 90 tablet 11     No current facility-administered medications for this visit.       Allergies:  Amlodipine, Codeine, Ibuprofen, Lisinopril, and Other    Review of Systems  Review of Systems   Constitutional: Positive for malaise/fatigue.   HENT: Negative.     Eyes: Negative.    Cardiovascular:  Positive for chest pain and dyspnea on exertion. Negative for claudication, cyanosis, irregular heartbeat, leg swelling, near-syncope, orthopnea, palpitations, paroxysmal nocturnal dyspnea and syncope.   Respiratory: Negative.     Endocrine: Negative.    Hematologic/Lymphatic: Negative.    Skin: Negative.    Musculoskeletal:  Positive for back pain and joint pain.   Gastrointestinal:  Negative for anorexia.   Genitourinary: Negative.    Neurological: Negative.    Psychiatric/Behavioral: Negative.         Objective     Physical Exam:    Vitals:    01/26/24 0917 01/26/24 0923   BP: 173/83 139/84   Pulse: 73 64   Weight: 68 kg (150 lb)    Height: 162.6 cm  "(64\")      /84   Pulse 64   Ht 162.6 cm (64\")   Wt 68 kg (150 lb)   LMP  (LMP Unknown)   BMI 25.75 kg/m²     Physical Exam  Constitutional:       Appearance: She is well-developed.   HENT:      Head: Normocephalic.   Neck:      Vascular: Normal carotid pulses. Carotid bruit present. No JVD.      Trachea: No tracheal tenderness or tracheal deviation.   Cardiovascular:      Rate and Rhythm: Regular rhythm.      Pulses: Normal pulses.      Heart sounds: Normal heart sounds.       with a grade of 3/6.   Pulmonary:      Effort: Pulmonary effort is normal.      Breath sounds: No stridor.   Abdominal:      Palpations: Abdomen is soft.   Musculoskeletal:      Cervical back: No edema.   Skin:     General: Skin is warm.   Neurological:      Mental Status: She is alert.      Cranial Nerves: No cranial nerve deficit.      Sensory: No sensory deficit.   Psychiatric:         Speech: Speech normal.         Behavior: Behavior normal.         Results Review:    IMPRESSION:  1. Less than 50% stenosis of the right ICA.  2. Less than 50% stenosis of the left ICA.  3. Antegrade flow is demonstrated within the vertebral arteries  bilaterally.     This report was signed and finalized on 1/24/2024 12:46 PM by Dr. Nba Richards MD.      Clinical Indication    Murmur or Click; Dyspnea   Dx: Cardiac murmur [R01.1 (ICD-10-CM)]; Dyspnea, unspecified type [R06.00 (ICD-10-CM)]     Interpretation Summary         Left ventricular systolic function is normal. Left ventricular ejection fraction appears to be 66 - 70%.    Left ventricular diastolic function was indeterminate.    Normal right ventricular cavity size and systolic function noted.    The left atrial cavity is mildly dilated.    There is moderate calcification of the aortic valve without significant stenosis or regurgitation.       Regadenoson Stress Test with Myocardial Perfusion SPECT (Multi Study)    Accession Number: [id number]   Date of Study: 1/24/24   Ordering " "Provider: Jose Cotton MD   Clinical Indications: Chest Pain        Interpreting Physicians  Performing Staff   Jose Cotton MD Tech: Alison Mathur   Support Staff: Fani López RN        Interpretation Summary         Myocardial perfusion imaging indicates a normal myocardial perfusion study with no evidence of ischemia. Impressions are consistent with a low risk study.    Left ventricular ejection fraction is normal (Calculated EF = 65%).    Breast attenuation artifact is present.             Results for orders placed during the hospital encounter of 11/15/23    Adult Stress Echo W/ Cont or Stress Agent if Necessary Per Protocol    Interpretation Summary    Intermediate risk study for stress-induced ischemia.    Sensitivity and specificity of the results is reduced by the short exercise/stress duration.    There is lack of appropriate augmentation of the basal and mid lateral segments consistent with possible ischemia. In some views, there is subtle hypokinesis of the apex favored to represent benign diverticulum (normal variant) although subtle ischemia cannot be excluded.    Overall, the patient's exercise capacity was severely impaired, BP demonstrated a hypertensive response to stress, and HR demonstrated an exaggerated response to stress.    Left ventricular systolic function is normal. Left ventricular ejection fraction appears to be 61 - 65%.    Calcified aortic valve with borderline mild stenosis.      Complete Transthoracic Echocardiogram with Complete Doppler, Color Flow and Contrast    Accession Number: [id number]   Date of Study: 3/23/23   Ordering Provider: Richard Weaver MD   Clinical Indications: Murmur or Click, Dyspnea        Interpreting Physicians  Performing Staff   Paul Lieberman MD Tech: Lucinda Gomez RDCS        Patient Hx Of Height, Weight, and Vitals    Height Weight BSA (Calculated - sq m) BMI (Calculated) Retired BMI (kg/m2) Pulse BP   162.6 cm (64\") 59 kg (130 lb) 1.63 sq " meters 22.3   153/58     Blood Pressure at Time of Exam     Left Arm   Blood Pressure 153/58 mmHg            [facility] PACS Images     Show images for Adult Transthoracic Echo Complete W/ Cont if Necessary Per Protocol   Clinical Indication    Murmur or Click; Dyspnea   Dx: Cardiac murmur [R01.1 (ICD-10-CM)]; Dyspnea, unspecified type [R06.00 (ICD-10-CM)]     Interpretation Summary         Left ventricular systolic function is normal. Left ventricular ejection fraction appears to be 66 - 70%.    Left ventricular diastolic function was indeterminate.    Normal right ventricular cavity size and systolic function noted.    The left atrial cavity is mildly dilated.    There is moderate calcification of the aortic valve without significant stenosis or regurgitation.     ____________________________________________________________________________________________________________________________________________  Health maintenance and recommendations    Low salt/ HTN/ Heart healthy carbohydrate restricted cardiac diet   The patient is advised to reduce or avoid caffeine or other cardiac stimulants.   Minimize or avoid  NSAID-type medications      Monitor for any signs of bleeding including red or dark stools. Fall precautions.   Advised staying uptodate with immunizations per established standard guidelines.    Offered to give patient  a copy of my notes     Questions were encouraged, asked and answered to the patient's  understanding and satisfaction. Questions if any regarding current medications and side effects, need for refills and importance of compliance to medications stressed.    Reviewed available prior notes, consults, prior visits, laboratory findings, radiology and cardiology relevant reports. Updated chart as applicable. I have reviewed the patient's medical history in detail and updated the computerized patient record as relevant.      Updated patient regarding any new or relevant abnormalities on review of  records or any new findings on physical exam. Mentioned to patient about purpose of visit and desirable health short and long term goals and objectives.    Primary to monitor CBC CMP Lipid panel and TSH as applicable    ___________________________________________________________________________________________________________________________________________   Procedures    Assessment & Plan   Diagnoses and all orders for this visit:    1. Primary hypertension (Primary)    2. Heart murmur    3. Left carotid bruit    4. Abnormal stress echo    5. Chest pain in adult    6. SALAS (dyspnea on exertion)    7. Sensorineural hearing loss (SNHL) of both ears    Other orders  -     Discontinue: hydrALAZINE (APRESOLINE) 25 MG tablet; Take 1 tablet by mouth 3 (Three) Times a Day.  Dispense: 90 tablet; Refill: 11  -     hydrALAZINE (APRESOLINE) 25 MG tablet; Take 1 tablet by mouth 3 (Three) Times a Day.  Dispense: 90 tablet; Refill: 11          Plan    Patient expressed understanding  Encouraged and answered all questions   Discussed with the patient and all questioned fully answered. She will call me if any problems arise.   Discussed results of prior testing with patient : echo, stress echo, myocardial perfusion scan  and carotid USG   as well electrocardiogram from 12/15/2023       She says cannot take any blood thinners including Aspirin as has hemorrhages   Will not do a heart cath but treat her medically     Needs better BP control   Will start Hydralazine  She stopped Imdur as heart rates go up      Requested Prescriptions     Signed Prescriptions Disp Refills    hydrALAZINE (APRESOLINE) 25 MG tablet 90 tablet 11     Sig: Take 1 tablet by mouth 3 (Three) Times a Day.        Check BP and heart rates twice daily initially till blood pressures and heart rates under good control and then at least 3x / week,   If blood pressures continue to be well-controlled then can check week a month  at home and bring a recording for review  next visit  If BP >130/85 or < 100/60 persistently over 3 reading 30 mins apart or if heart rates persistently above 100 bpm or less than 55 bpm call sooner for evaluation and advise       S/L NTG PRN for chest pain, call me or go to ER if has to use S/L nitroglycerin               Return in about 6 weeks (around 3/8/2024).

## 2024-01-29 ENCOUNTER — TELEPHONE (OUTPATIENT)
Dept: CARDIOLOGY | Facility: CLINIC | Age: 89
End: 2024-01-29
Payer: MEDICARE

## 2024-01-29 NOTE — TELEPHONE ENCOUNTER
PT CALLED TO REPORT BP LOG SINCE STARTING HYDRALAZINE TID    SATURDAY 1/27    @ 11AM 141/76  HR 78    TOOK 2ND DOSE    @ 5PM 156/70   HR 78    TOOK 3RD DOSE    SUNDAY 1/28    @ 11AM 169/85  HR 67    TOOK 2ND DOSE    @ 4:30 /72  HR 78    TOOK 3RD DOSE    @ 6:30 PM  PT REPORTS FAST PALPITATIONS WITH LIGHT CHEST CRAMPING.   ON PULSE OX    MONDAY 1/29    @ 11AM 115/68  HR 76  PT REPORTS DIZZY / LIGHTHEADEDNESS & STATES THAT SHE HAS DIARRHEA.    SHE WOULD LIKE TO KNOW IF SHE SHOULD TAKE HER 2ND DOSE TODAY / CONTINUE 3 TIMES DAILY

## 2024-01-29 NOTE — TELEPHONE ENCOUNTER
MESSAGE RECEIVED FROM PT STATING THAT SHE IS GOING TO TAKE IT AS PRESCRIBED SINCE SHE HASN'T HEARD BACK.     CALL PLACED TO PT. SHE STATES THAT HER BP BEGAN TO GET HIGH AGAIN, SO SHE WILL TAKE HER EVENING DOSE &  WILL SKIP A DOSE IF BP GETS TOO LOW

## 2024-01-30 NOTE — TELEPHONE ENCOUNTER
Patient called and stated that when she is taking the hydralazine and goes to lay down her feet are on fire. So she had to walk in the cold floor.     She stated that she knows its this medication. Per  she can been seen sooner made her an appointment with Karissa 01/31/24

## 2024-01-31 ENCOUNTER — OFFICE VISIT (OUTPATIENT)
Dept: CARDIOLOGY | Facility: CLINIC | Age: 89
End: 2024-01-31
Payer: MEDICARE

## 2024-01-31 VITALS
HEART RATE: 73 BPM | SYSTOLIC BLOOD PRESSURE: 140 MMHG | DIASTOLIC BLOOD PRESSURE: 60 MMHG | WEIGHT: 150 LBS | OXYGEN SATURATION: 98 % | HEIGHT: 64 IN | BODY MASS INDEX: 25.61 KG/M2

## 2024-01-31 DIAGNOSIS — I10 PRIMARY HYPERTENSION: Primary | ICD-10-CM

## 2024-01-31 DIAGNOSIS — R00.2 PALPITATIONS: ICD-10-CM

## 2024-01-31 DIAGNOSIS — I35.0 NONRHEUMATIC AORTIC VALVE STENOSIS: Chronic | ICD-10-CM

## 2024-01-31 PROBLEM — R09.89 LEFT CAROTID BRUIT: Status: RESOLVED | Noted: 2023-12-15 | Resolved: 2024-01-31

## 2024-01-31 PROBLEM — R06.09 DOE (DYSPNEA ON EXERTION): Status: RESOLVED | Noted: 2023-12-15 | Resolved: 2024-01-31

## 2024-01-31 PROBLEM — E03.9 HYPOTHYROIDISM (ACQUIRED): Status: ACTIVE | Noted: 2024-01-31

## 2024-01-31 PROBLEM — R01.1 HEART MURMUR: Status: RESOLVED | Noted: 2023-12-15 | Resolved: 2024-01-31

## 2024-01-31 PROBLEM — N18.30 CKD (CHRONIC KIDNEY DISEASE) STAGE 3, GFR 30-59 ML/MIN: Chronic | Status: ACTIVE | Noted: 2024-01-31

## 2024-01-31 PROBLEM — E03.9 HYPOTHYROIDISM (ACQUIRED): Chronic | Status: ACTIVE | Noted: 2024-01-31

## 2024-01-31 PROBLEM — N18.30 CKD (CHRONIC KIDNEY DISEASE) STAGE 3, GFR 30-59 ML/MIN: Status: ACTIVE | Noted: 2024-01-31

## 2024-01-31 PROBLEM — R07.9 CHEST PAIN IN ADULT: Status: RESOLVED | Noted: 2023-12-15 | Resolved: 2024-01-31

## 2024-01-31 PROBLEM — R94.39 ABNORMAL STRESS ECHO: Status: RESOLVED | Noted: 2023-12-15 | Resolved: 2024-01-31

## 2024-01-31 PROCEDURE — 1159F MED LIST DOCD IN RCRD: CPT | Performed by: NURSE PRACTITIONER

## 2024-01-31 PROCEDURE — 1160F RVW MEDS BY RX/DR IN RCRD: CPT | Performed by: NURSE PRACTITIONER

## 2024-01-31 PROCEDURE — 99214 OFFICE O/P EST MOD 30 MIN: CPT | Performed by: NURSE PRACTITIONER

## 2024-01-31 RX ORDER — LOSARTAN POTASSIUM 25 MG/1
25 TABLET ORAL DAILY
Qty: 90 TABLET | Refills: 3 | Status: SHIPPED | OUTPATIENT
Start: 2024-01-31

## 2024-01-31 NOTE — PROGRESS NOTES
Subjective:     Encounter Date:01/31/2024      Patient ID: Linda Spear is a 88 y.o. female with known hypertension, aortic valve stenosis, stage III CKD, and hypothyroidism who presents the office today for follow-up after recent intolerance to new medications.    Chief Complaint: Medication intolerance  History of Present Illness    Ms. Spear presents to the office today for follow-up.  She was initially referred to Dr. Cotton due to complaints of chest pain and intermediate risk stress testing.  She was seen and evaluated and started on new medications for improvement of blood pressure control.  In addition a subsequent stress test was ordered and a different modality to further evaluate.  She had a low risk myocardial perfusion study.  Her medications that were added to improve her symptoms and improve her blood pressure she stopped taking as she had intolerance to them.  She reported changes in her heart rate with use of isosorbide as well as headaches.  She reported lower extremity swelling as a result of amlodipine.  She presented back to follow-up with Dr. Cotton who then placed her on hydralazine for blood pressure control in place of the other medications which she reported she could not tolerate.  She has reported a previous intolerance to lisinopril with complaints of diarrhea.    She called our office to ask since that visit with complaints related to hydralazine.  She reports burning in her feet and legs that is severe causing her to take Tylenol for management of that discomfort.    She also reports recently that she has been experiencing elevations in her heart rate.  Even since stopping the isosorbide mononitrate she has had sudden increases in her heart rate.  She was notified yesterday on her smart watch that her heart rates were in the 130s.  She was with friends at the time 1 being a physician.  He sat with her and within a short period of time her heart rate came back down less than 100.    She is  very active.  She is concerned about not having her blood pressure controlled but reports symptoms with multiple medication she has tried recently.  She is asking about the possibility of trial with losartan.    The following portions of the patient's history were reviewed and updated as appropriate: allergies, current medications, past family history, past medical history, past social history, and past surgical history.    Allergies   Allergen Reactions    Amlodipine Swelling     With feet and legs     Imdur [Isosorbide Nitrate] Palpitations     Patient stated that her heart rate got high and stayed like that     Lisinopril Diarrhea    Codeine Other (See Comments)    Ibuprofen Other (See Comments)    Other Other (See Comments)       Current Outpatient Medications:     Calcium Carb-Cholecalciferol (CALTRATE 600+D3 PO), Take  by mouth., Disp: , Rfl:     levothyroxine (SYNTHROID, LEVOTHROID) 100 MCG tablet, Take 1 tablet by mouth Daily., Disp: , Rfl:     metoprolol succinate XL (TOPROL-XL) 50 MG 24 hr tablet, Take 1 tablet by mouth Daily. Patient takes it at bed time, Disp: , Rfl:     Multiple Vitamins-Minerals (CENTRUM SILVER PO), Take  by mouth., Disp: , Rfl:     nitroglycerin (NITROSTAT) 0.4 MG SL tablet, 1 under the tongue as needed for angina, may repeat q5mins for up three doses, Disp: 25 tablet, Rfl: 3    predniSONE (DELTASONE) 5 MG tablet, 2 (Two) Times a Day., Disp: , Rfl:     TURMERIC PO, Take  by mouth., Disp: , Rfl:     vitamin B-12 (CYANOCOBALAMIN) 1000 MCG tablet, Take 1 tablet by mouth Daily., Disp: , Rfl:     losartan (Cozaar) 25 MG tablet, Take 1 tablet by mouth Daily., Disp: 90 tablet, Rfl: 3    Review of Systems   Constitutional: Negative for malaise/fatigue.   Cardiovascular:  Negative for chest pain, dyspnea on exertion, leg swelling, near-syncope, orthopnea, palpitations, paroxysmal nocturnal dyspnea and syncope.   Respiratory:  Negative for cough, shortness of breath and wheezing.   "  Hematologic/Lymphatic: Negative for bleeding problem.   Gastrointestinal:  Negative for bloating, abdominal pain, nausea and vomiting.   Neurological:  Positive for paresthesias. Negative for excessive daytime sleepiness, headaches, light-headedness, loss of balance, numbness and weakness.   Psychiatric/Behavioral:  Negative for altered mental status.        Procedures    /60   Pulse 73   Ht 162.6 cm (64.02\")   Wt 68 kg (150 lb)   LMP  (LMP Unknown)   SpO2 98%   BMI 25.73 kg/m²        Objective:     Vitals reviewed.   Constitutional:       General: Awake.      Appearance: Normal appearance. Well-developed, well-groomed, overweight and not in distress.   HENT:      Head: Normocephalic and atraumatic.   Pulmonary:      Effort: Pulmonary effort is normal.      Breath sounds: Normal breath sounds. No wheezing. No rhonchi. No rales.   Cardiovascular:      Normal rate. Regular rhythm.      Murmurs: There is a systolic murmur.   Edema:     Peripheral edema absent.   Musculoskeletal:      Cervical back: Normal range of motion and neck supple. Skin:     General: Skin is warm and dry.   Neurological:      Mental Status: Alert, oriented to person, place, and time and oriented to person, place and time.   Psychiatric:         Attention and Perception: Attention normal.         Mood and Affect: Mood normal.         Speech: Speech normal.         Behavior: Behavior normal. Behavior is cooperative.         Thought Content: Thought content normal.         Cognition and Memory: Cognition and memory normal.         Judgment: Judgment normal.       Lab Review:   Interpretation Summary  Myocardial Perfusion 01/24/2024       Myocardial perfusion imaging indicates a normal myocardial perfusion study with no evidence of ischemia. Impressions are consistent with a low risk study.    Left ventricular ejection fraction is normal (Calculated EF = 65%).    Breast attenuation artifact is present.       Results for orders placed " during the hospital encounter of 11/15/23    Adult Stress Echo W/ Cont or Stress Agent if Necessary Per Protocol    Interpretation Summary    Intermediate risk study for stress-induced ischemia.    Sensitivity and specificity of the results is reduced by the short exercise/stress duration.    There is lack of appropriate augmentation of the basal and mid lateral segments consistent with possible ischemia. In some views, there is subtle hypokinesis of the apex favored to represent benign diverticulum (normal variant) although subtle ischemia cannot be excluded.    Overall, the patient's exercise capacity was severely impaired, BP demonstrated a hypertensive response to stress, and HR demonstrated an exaggerated response to stress.    Left ventricular systolic function is normal. Left ventricular ejection fraction appears to be 61 - 65%.    Calcified aortic valve with borderline mild stenosis.          Assessment:          Diagnosis Plan   1. Primary hypertension        2. Palpitations  Holter Monitor - 72 Hour Up To 15 Days      3. Nonrheumatic aortic valve stenosis               Plan:       -Hypertension: Recently has been on multiple medications for management.  She has had intolerances to several things.  She is willing to try other medications.  We will discontinue hydralazine as she has had peripheral neuropathy and paresthesias related to this.  Previously intolerant to lisinopril with diarrhea.  Reports rapid heart rate with isosorbide.  Had lower leg swelling with amlodipine.  She has tolerated beta-blocker therapy well.  She continues to take metoprolol 50 mg daily.  She inquired about losartan.  Given her CKD I think this is reasonable.  Will start her on low-dose losartan 25 mg daily.  She is to continue metoprolol therapy.  She can discontinue hydralazine due to reported side effects.  She will monitor blood pressure and if tolerated and remained uncontrolled we will increase dose.    -Palpitations: She  reports recent elevated heart rates noted on her smart watch.  She was not necessarily symptomatic to them as she was notified by an alarm on her watch.  She states this is happened a few different times recently with heart rates up into the 140s.  She has had no prolonged episodes.  Will place Holter monitor for surveillance.    -Aortic stenosis: Most recent echo is referenced above noting moderate aortic valve stenosis.  Her primary office has repeat echocardiogram ordered for ongoing surveillance.  She denies any symptoms, such as shortness of breath, or CHF symptoms, at this time.      Losartan 25 mg daily.  Stop Hydralazine due to side effects  Holter monitor.   Follow-up will be determined by the results of Holter monitor.

## 2024-02-06 ENCOUNTER — TELEPHONE (OUTPATIENT)
Dept: CARDIOLOGY | Facility: CLINIC | Age: 89
End: 2024-02-06
Payer: MEDICARE

## 2024-02-06 NOTE — TELEPHONE ENCOUNTER
PT STATES THAT SINCE STOPPING HYDRALAZINE AT LAST OFFICE VISIT, THE BURNING IN HER FEET HAS NOT GONE AWAY. SHE HAS BEEN WEARING HER SUPPORT HOSE & SAYS THEY PROVIDE MINIMAL RELIEF. SHE ALSO STATES THAT SHE HAS BEEN ICING HER FEET AT NIGHT SO SHE CAN SLEEP.    SINCE STARTING LOSARTAN, SHE SAYS SHE IS TOLERATING IT WELL EXCEPT FOR LOOSE STOOL IN THE MORNING.     SHE HAS BEEN RECORDING HER BP & HR:    2/3 - 155/93  HR 64  2/4 - 131/68  HR 76  2/5 - 150/78  HR 69           156/80 HR 61      PLEASE ADVISE

## 2024-02-13 ENCOUNTER — TRANSCRIBE ORDERS (OUTPATIENT)
Dept: ADMINISTRATIVE | Facility: HOSPITAL | Age: 89
End: 2024-02-13
Payer: MEDICARE

## 2024-02-13 DIAGNOSIS — N18.32 STAGE 3B CHRONIC KIDNEY DISEASE: Primary | ICD-10-CM

## 2024-02-21 ENCOUNTER — HOSPITAL ENCOUNTER (OUTPATIENT)
Dept: ULTRASOUND IMAGING | Facility: HOSPITAL | Age: 89
Discharge: HOME | End: 2024-02-21
Admitting: INTERNAL MEDICINE
Payer: MEDICARE

## 2024-02-21 DIAGNOSIS — N18.32 STAGE 3B CHRONIC KIDNEY DISEASE: ICD-10-CM

## 2024-02-21 PROCEDURE — 76775 US EXAM ABDO BACK WALL LIM: CPT

## 2024-02-26 ENCOUNTER — TELEPHONE (OUTPATIENT)
Dept: CARDIOLOGY | Facility: CLINIC | Age: 89
End: 2024-02-26
Payer: MEDICARE

## 2024-02-26 NOTE — TELEPHONE ENCOUNTER
----- Message from MARGARETTE Marvin sent at 2/26/2024 12:45 PM CST -----  Farhana may have already addressed this, but her holter did not show any significant rhythm issues. It looks like her follow up was cancelled. She needs at least a one year follow up.

## 2024-02-28 ENCOUNTER — NURSE TRIAGE (OUTPATIENT)
Dept: CALL CENTER | Facility: HOSPITAL | Age: 89
End: 2024-02-28
Payer: MEDICARE

## 2024-02-29 NOTE — TELEPHONE ENCOUNTER
"Believes her Metropolol is causing her diarrhea. Last 6 meds have done the same. Immodium AD picked up. Will take as directed. Scheduled with her MD at GI office. Will have her first loose stool at 0830 AM tomorrow, will take the Imodium, and hopes to be able to attend her bridge game if it is effective     Reason for Disposition   Health Information question, no triage required and triager able to answer question    Additional Information   Negative: [1] Caller is not with the adult (patient) AND [2] reporting urgent symptoms   Negative: Lab result questions   Negative: Medication questions   Negative: Caller can't be reached by phone   Negative: Caller has already spoken to PCP or another triager   Negative: RN needs further essential information from caller in order to complete triage   Negative: Requesting regular office appointment   Negative: [1] Caller requesting NON-URGENT health information AND [2] PCP's office is the best resource    Answer Assessment - Initial Assessment Questions  1. REASON FOR CALL or QUESTION: \"What is your reason for calling today?\" or \"How can I best help you?\" or \"What question do you have that I can help answer?\"      Believes her Metropolol is causing her diarrhea. Last 6 meds have done the same. Immodium AD picked up. Will take as directed. Scheduled with her MD at GI office.    Protocols used: Information Only Call - No Triage-ADULT-    "

## 2024-03-13 ENCOUNTER — OFFICE VISIT (OUTPATIENT)
Dept: GASTROENTEROLOGY | Facility: CLINIC | Age: 89
End: 2024-03-13
Payer: MEDICARE

## 2024-03-13 VITALS
HEART RATE: 87 BPM | SYSTOLIC BLOOD PRESSURE: 144 MMHG | OXYGEN SATURATION: 98 % | WEIGHT: 144.6 LBS | TEMPERATURE: 96.6 F | DIASTOLIC BLOOD PRESSURE: 70 MMHG | HEIGHT: 64 IN | BODY MASS INDEX: 24.69 KG/M2

## 2024-03-13 DIAGNOSIS — Z78.9 NONSMOKER: ICD-10-CM

## 2024-03-13 DIAGNOSIS — R19.7 DIARRHEA, UNSPECIFIED TYPE: Primary | ICD-10-CM

## 2024-03-13 DIAGNOSIS — I35.0 NONRHEUMATIC AORTIC VALVE STENOSIS: Chronic | ICD-10-CM

## 2024-03-13 NOTE — PROGRESS NOTES
Linda Spear  1935    3/13/2024  Chief Complaint   Patient presents with    Diarrhea     Subjective   HPI  Linda Spear is a 88 y.o. female who presents with a complaint of diarrhea. She says that she was put on Lisinopril and developed severe diarrhea 3-4 years ago. She was then put on Metoprolol and this was better. She has a murmur and was put on Amlodipine again and did not start due to the diarrhea she also had with this before. She has had multiple medication related reactions she says. Her diarrhea is persistent ongoing for months. It is intermittent but on most days. No brbpr. No melena. No wt los. No fever chills or sweats.     Per Cardiology note on 1/31/24:  -Hypertension: Recently has been on multiple medications for management.  She has had intolerances to several things.  She is willing to try other medications.  We will discontinue hydralazine as she has had peripheral neuropathy and paresthesias related to this.  Previously intolerant to lisinopril with diarrhea.  Reports rapid heart rate with isosorbide.  Had lower leg swelling with amlodipine.  She has tolerated beta-blocker therapy well.  She continues to take metoprolol 50 mg daily.  She inquired about losartan.  Given her CKD I think this is reasonable.  Will start her on low-dose losartan 25 mg daily.  She is to continue metoprolol therapy.  She can discontinue hydralazine due to reported side effects.  She will monitor blood pressure and if tolerated and remained uncontrolled we will increase dose.     -Palpitations: She reports recent elevated heart rates noted on her smart watch.  She was not necessarily symptomatic to them as she was notified by an alarm on her watch.  She states this is happened a few different times recently with heart rates up into the 140s.  She has had no prolonged episodes.  Will place Holter monitor for surveillance.     -Aortic stenosis: Most recent echo is referenced above noting moderate aortic valve stenosis.   Her primary office has repeat echocardiogram ordered for ongoing surveillance.  She denies any symptoms, such as shortness of breath, or CHF symptoms, at this time.        Past Medical History:   Diagnosis Date    Chronic shoulder pain     Clotting disorder     Conductive hearing loss     Disease of thyroid gland     Perforation of left tympanic membrane     Primary hypertension 12/15/2023     Past Surgical History:   Procedure Laterality Date    CARDIAC CATHETERIZATION      COLONOSCOPY  02/03/2011    diverticulosis in the sigmoid colon    COLONOSCOPY  01/04/2005    normal exam       Outpatient Medications Marked as Taking for the 3/13/24 encounter (Office Visit) with Jazmine Hoang APRN   Medication Sig Dispense Refill    Calcium Carb-Cholecalciferol (CALTRATE 600+D3 PO) Take  by mouth.      levothyroxine sodium (TIROSINT) 112 MCG capsule Take 1 capsule by mouth Daily.      loperamide (IMODIUM) 1 MG/5ML solution Take 5 mL by mouth 4 (Four) Times a Day As Needed for Diarrhea. prn      metoprolol succinate XL (TOPROL-XL) 50 MG 24 hr tablet Take 1 tablet by mouth Daily. Patient takes it at bed time  and 1/2 in the morning      Multiple Vitamins-Minerals (CENTRUM SILVER PO) Take  by mouth.      nitroglycerin (NITROSTAT) 0.4 MG SL tablet 1 under the tongue as needed for angina, may repeat q5mins for up three doses 25 tablet 3    predniSONE (DELTASONE) 5 MG tablet 2 (Two) Times a Day.      TURMERIC PO Take  by mouth.      vitamin B-12 (CYANOCOBALAMIN) 1000 MCG tablet Take 1 tablet by mouth Daily.       Allergies   Allergen Reactions    Amlodipine Swelling     With feet and legs     Imdur [Isosorbide Nitrate] Palpitations     Patient stated that her heart rate got high and stayed like that     Lisinopril Diarrhea    Codeine Other (See Comments)    Ibuprofen Other (See Comments)    Other Other (See Comments)     Social History     Socioeconomic History    Marital status:    Tobacco Use    Smoking status:  Never     Passive exposure: Never    Smokeless tobacco: Never   Vaping Use    Vaping status: Never Used   Substance and Sexual Activity    Alcohol use: Yes     Comment: occ    Drug use: Never    Sexual activity: Not Currently     Partners: Male     Birth control/protection: Tubal ligation     Family History   Problem Relation Age of Onset    Clotting disorder Father     Heart attack Sister     Breast cancer Neg Hx     Cirrhosis Neg Hx     Colon cancer Neg Hx      Health Maintenance   Topic Date Due    RSV Vaccine - Adults (1 - 1-dose 60+ series) Never done    Pneumococcal Vaccine 65+ (1 of 1 - PCV) Never done    ANNUAL WELLNESS VISIT  02/03/2021    COVID-19 Vaccine (2 - 2023-24 season) 09/01/2023    DXA SCAN  11/17/2023    TDAP/TD VACCINES (2 - Td or Tdap) 02/14/2028    INFLUENZA VACCINE  Completed    ZOSTER VACCINE  Completed     Review of Systems   Constitutional:  Negative for activity change, appetite change, chills, diaphoresis, fatigue, fever and unexpected weight change.   HENT:  Negative for ear pain, hearing loss, mouth sores, sore throat, trouble swallowing and voice change.    Eyes: Negative.    Respiratory:  Negative for cough, choking, shortness of breath and wheezing.    Cardiovascular:  Negative for chest pain and palpitations.   Gastrointestinal:  Negative for abdominal pain, blood in stool, constipation, diarrhea, nausea and vomiting.   Endocrine: Negative for cold intolerance and heat intolerance.   Genitourinary:  Negative for decreased urine volume, dysuria, frequency, hematuria and urgency.   Musculoskeletal:  Negative for back pain, gait problem and myalgias.   Skin:  Negative for color change, pallor and rash.   Allergic/Immunologic: Negative for food allergies and immunocompromised state.   Neurological:  Negative for dizziness, tremors, seizures, syncope, weakness, light-headedness, numbness and headaches.   Hematological:  Negative for adenopathy. Does not bruise/bleed easily.  "  Psychiatric/Behavioral:  Negative for agitation and confusion. The patient is not nervous/anxious.    All other systems reviewed and are negative.    Objective   Vitals:    03/13/24 1430   BP: 144/70   BP Location: Left arm   Pulse: 87   Temp: 96.6 °F (35.9 °C)   TempSrc: Temporal   SpO2: 98%   Weight: 65.6 kg (144 lb 9.6 oz)   Height: 162.6 cm (64.02\")     Body mass index is 24.81 kg/m².  Physical Exam  Constitutional:       Appearance: She is well-developed.   HENT:      Head: Normocephalic and atraumatic.   Eyes:      Pupils: Pupils are equal, round, and reactive to light.   Neck:      Trachea: No tracheal deviation.   Cardiovascular:      Rate and Rhythm: Normal rate and regular rhythm.      Heart sounds: Normal heart sounds. No murmur heard.     No friction rub. No gallop.   Pulmonary:      Effort: Pulmonary effort is normal. No respiratory distress.      Breath sounds: Normal breath sounds. No wheezing or rales.   Chest:      Chest wall: No tenderness.   Abdominal:      General: Bowel sounds are normal. There is no distension.      Palpations: Abdomen is soft. Abdomen is not rigid.      Tenderness: There is no abdominal tenderness. There is no guarding or rebound.   Musculoskeletal:         General: No tenderness or deformity. Normal range of motion.      Cervical back: Normal range of motion and neck supple.   Skin:     General: Skin is warm and dry.      Coloration: Skin is not pale.      Findings: No rash.   Neurological:      Mental Status: She is alert and oriented to person, place, and time.      Deep Tendon Reflexes: Reflexes are normal and symmetric.   Psychiatric:         Behavior: Behavior normal.         Thought Content: Thought content normal.         Judgment: Judgment normal.       Assessment & Plan   Diagnoses and all orders for this visit:    1. Diarrhea, unspecified type (Primary)  -     Clostridioides difficile Toxin, PCR - Stool, Per Rectum  -     Fecal Lactoferrin Qual. - Stool, Per " Rectum; Future  -     Stool Culture (Reference Lab) - Stool, Per Rectum; Future    2. Nonsmoker    3. Nonrheumatic aortic valve stenosis    Long discussion today regarding her symptoms. I suggested we do stool cultures to rule out infectious before we take Imodium on a regular basis. She agrees. Otherwise it could be medication related.     Part of this note may be an electronic transcription/translation of spoken language to printed text using the Dragon Dictation System.  Body mass index is 24.81 kg/m².  No follow-ups on file.    BMI is within normal parameters. No other follow-up for BMI required.      All risks, benefits, alternatives, and indications of colonoscopy and/or Endoscopy procedure have been discussed with the patient. Risks to include perforation of the colon requiring possible surgery or colostomy, risk of bleeding from biopsies or removal of colon tissue, possibility of missing a colon polyp or cancer, or adverse drug reaction.  Benefits to include the diagnosis and management of disease of the colon and rectum. Alternatives to include barium enema, radiographic evaluation, lab testing or no intervention. Pt verbalizes understanding and agrees.     Jazmine Hoang, APRN  3/13/2024  15:11 CDT          If you smoke or use tobacco, 4 minutes reading provided  Steps to Quit Smoking  Smoking tobacco can be harmful to your health and can affect almost every organ in your body. Smoking puts you, and those around you, at risk for developing many serious chronic diseases. Quitting smoking is difficult, but it is one of the best things that you can do for your health. It is never too late to quit.  What are the benefits of quitting smoking?  When you quit smoking, you lower your risk of developing serious diseases and conditions, such as:  Lung cancer or lung disease, such as COPD.  Heart disease.  Stroke.  Heart attack.  Infertility.  Osteoporosis and bone fractures.  Additionally, symptoms such as  coughing, wheezing, and shortness of breath may get better when you quit. You may also find that you get sick less often because your body is stronger at fighting off colds and infections. If you are pregnant, quitting smoking can help to reduce your chances of having a baby of low birth weight.  How do I get ready to quit?  When you decide to quit smoking, create a plan to make sure that you are successful. Before you quit:  Pick a date to quit. Set a date within the next two weeks to give you time to prepare.  Write down the reasons why you are quitting. Keep this list in places where you will see it often, such as on your bathroom mirror or in your car or wallet.  Identify the people, places, things, and activities that make you want to smoke (triggers) and avoid them. Make sure to take these actions:  Throw away all cigarettes at home, at work, and in your car.  Throw away smoking accessories, such as ashtrays and lighters.  Clean your car and make sure to empty the ashtray.  Clean your home, including curtains and carpets.  Tell your family, friends, and coworkers that you are quitting. Support from your loved ones can make quitting easier.  Talk with your health care provider about your options for quitting smoking.  Find out what treatment options are covered by your health insurance.  What strategies can I use to quit smoking?  Talk with your healthcare provider about different strategies to quit smoking. Some strategies include:  Quitting smoking altogether instead of gradually lessening how much you smoke over a period of time. Research shows that quitting “cold turkey” is more successful than gradually quitting.  Attending in-person counseling to help you build problem-solving skills. You are more likely to have success in quitting if you attend several counseling sessions. Even short sessions of 10 minutes can be effective.  Finding resources and support systems that can help you to quit smoking and  remain smoke-free after you quit. These resources are most helpful when you use them often. They can include:  Online chats with a counselor.  Telephone quitlines.  Printed self-help materials.  Support groups or group counseling.  Text messaging programs.  Mobile phone applications.  Taking medicines to help you quit smoking. (If you are pregnant or breastfeeding, talk with your health care provider first.) Some medicines contain nicotine and some do not. Both types of medicines help with cravings, but the medicines that include nicotine help to relieve withdrawal symptoms. Your health care provider may recommend:  Nicotine patches, gum, or lozenges.  Nicotine inhalers or sprays.  Non-nicotine medicine that is taken by mouth.  Talk with your health care provider about combining strategies, such as taking medicines while you are also receiving in-person counseling. Using these two strategies together makes you more likely to succeed in quitting than if you used either strategy on its own.  If you are pregnant or breastfeeding, talk with your health care provider about finding counseling or other support strategies to quit smoking. Do not take medicine to help you quit smoking unless told to do so by your health care provider.  What things can I do to make it easier to quit?  Quitting smoking might feel overwhelming at first, but there is a lot that you can do to make it easier. Take these important actions:  Reach out to your family and friends and ask that they support and encourage you during this time. Call telephone quitlines, reach out to support groups, or work with a counselor for support.  Ask people who smoke to avoid smoking around you.  Avoid places that trigger you to smoke, such as bars, parties, or smoke-break areas at work.  Spend time around people who do not smoke.  Lessen stress in your life, because stress can be a smoking trigger for some people. To lessen stress, try:  Exercising  regularly.  Deep-breathing exercises.  Yoga.  Meditating.  Performing a body scan. This involves closing your eyes, scanning your body from head to toe, and noticing which parts of your body are particularly tense. Purposefully relax the muscles in those areas.  Download or purchase mobile phone or tablet apps (applications) that can help you stick to your quit plan by providing reminders, tips, and encouragement. There are many free apps, such as QuitGuide from the CDC (Centers for Disease Control and Prevention). You can find other support for quitting smoking (smoking cessation) through smokefree.gov and other websites.  How will I feel when I quit smoking?  Within the first 24 hours of quitting smoking, you may start to feel some withdrawal symptoms. These symptoms are usually most noticeable 2-3 days after quitting, but they usually do not last beyond 2-3 weeks. Changes or symptoms that you might experience include:  Mood swings.  Restlessness, anxiety, or irritation.  Difficulty concentrating.  Dizziness.  Strong cravings for sugary foods in addition to nicotine.  Mild weight gain.  Constipation.  Nausea.  Coughing or a sore throat.  Changes in how your medicines work in your body.  A depressed mood.  Difficulty sleeping (insomnia).  After the first 2-3 weeks of quitting, you may start to notice more positive results, such as:  Improved sense of smell and taste.  Decreased coughing and sore throat.  Slower heart rate.  Lower blood pressure.  Clearer skin.  The ability to breathe more easily.  Fewer sick days.  Quitting smoking is very challenging for most people. Do not get discouraged if you are not successful the first time. Some people need to make many attempts to quit before they achieve long-term success. Do your best to stick to your quit plan, and talk with your health care provider if you have any questions or concerns.  This information is not intended to replace advice given to you by your health  care provider. Make sure you discuss any questions you have with your health care provider.  Document Released: 12/12/2002 Document Revised: 08/15/2017 Document Reviewed: 05/03/2016  [employer] Interactive Patient Education © 2017 [employer]

## 2024-03-15 ENCOUNTER — LAB (OUTPATIENT)
Dept: LAB | Facility: HOSPITAL | Age: 89
End: 2024-03-15
Payer: MEDICARE

## 2024-03-15 DIAGNOSIS — R19.7 DIARRHEA, UNSPECIFIED TYPE: ICD-10-CM

## 2024-03-15 LAB
C DIFF TOX GENS STL QL NAA+PROBE: NEGATIVE
LACTOFERRIN STL QL LA: NEGATIVE

## 2024-03-15 PROCEDURE — 87427 SHIGA-LIKE TOXIN AG IA: CPT

## 2024-03-15 PROCEDURE — 87493 C DIFF AMPLIFIED PROBE: CPT | Performed by: CLINICAL NURSE SPECIALIST

## 2024-03-15 PROCEDURE — 87045 FECES CULTURE AEROBIC BACT: CPT

## 2024-03-15 PROCEDURE — 87046 STOOL CULTR AEROBIC BACT EA: CPT

## 2024-03-15 PROCEDURE — 83630 LACTOFERRIN FECAL (QUAL): CPT

## 2024-03-19 LAB
BACTERIA SPEC CULT: NORMAL
BACTERIA SPEC CULT: NORMAL
CAMPYLOBACTER STL CULT: NORMAL
E COLI SXT STL QL IA: NEGATIVE
SALM + SHIG STL CULT: NORMAL

## 2024-03-27 ENCOUNTER — OFFICE VISIT (OUTPATIENT)
Dept: GASTROENTEROLOGY | Facility: CLINIC | Age: 89
End: 2024-03-27
Payer: MEDICARE

## 2024-03-27 VITALS
BODY MASS INDEX: 24.24 KG/M2 | HEART RATE: 72 BPM | TEMPERATURE: 97.8 F | DIASTOLIC BLOOD PRESSURE: 78 MMHG | OXYGEN SATURATION: 97 % | HEIGHT: 64 IN | SYSTOLIC BLOOD PRESSURE: 126 MMHG | WEIGHT: 142 LBS

## 2024-03-27 DIAGNOSIS — Z78.9 NONSMOKER: ICD-10-CM

## 2024-03-27 DIAGNOSIS — I35.0 NONRHEUMATIC AORTIC VALVE STENOSIS: ICD-10-CM

## 2024-03-27 DIAGNOSIS — R19.7 DIARRHEA, UNSPECIFIED TYPE: Primary | ICD-10-CM

## 2024-03-27 PROCEDURE — 99213 OFFICE O/P EST LOW 20 MIN: CPT | Performed by: CLINICAL NURSE SPECIALIST

## 2024-03-27 NOTE — PROGRESS NOTES
Linda Spear  1935      3/27/2024  Chief Complaint   Patient presents with    GI Problem     2 week fu-diarrhea         HPI    Linda Spear is a  88 y.o. female here for a follow up visit for diarrhea felt possibly medication related but I ordered stool cultures to rule out infectious. These were negative. Today she says that her diarrhea is better with fiber. It seems to have bulked her stools.     Note from 3/13/24 Linda Spear is a 88 y.o. female who presents with a complaint of diarrhea. She says that she was put on Lisinopril and developed severe diarrhea 3-4 years ago. She was then put on Metoprolol and this was better. She has a murmur and was put on Amlodipine again and did not start due to the diarrhea she also had with this before. She has had multiple medication related reactions she says. Her diarrhea is persistent ongoing for months. It is intermittent but on most days. No brbpr. No melena. No wt los. No fever chills or sweats.   Past Medical History:   Diagnosis Date    Chronic shoulder pain     Clotting disorder     Conductive hearing loss     Disease of thyroid gland     Perforation of left tympanic membrane     Primary hypertension 12/15/2023     Past Surgical History:   Procedure Laterality Date    CARDIAC CATHETERIZATION      COLONOSCOPY  02/03/2011    diverticulosis in the sigmoid colon    COLONOSCOPY  01/04/2005    normal exam       Outpatient Medications Marked as Taking for the 3/27/24 encounter (Office Visit) with Jazmine Hoagn APRN   Medication Sig Dispense Refill    Calcium Carb-Cholecalciferol (CALTRATE 600+D3 PO) Take  by mouth.      levothyroxine sodium (TIROSINT) 112 MCG capsule Take 1 capsule by mouth Daily.      loperamide (IMODIUM) 1 MG/5ML solution Take 5 mL by mouth 4 (Four) Times a Day As Needed for Diarrhea. As needed      metoprolol succinate XL (TOPROL-XL) 50 MG 24 hr tablet Take 1 tablet by mouth Daily. Patient takes it at bed time  and 1/2 in the morning      Multiple  Vitamins-Minerals (CENTRUM SILVER PO) Take  by mouth.      nitroglycerin (NITROSTAT) 0.4 MG SL tablet 1 under the tongue as needed for angina, may repeat q5mins for up three doses 25 tablet 3    predniSONE (DELTASONE) 5 MG tablet 2 (Two) Times a Day.      TURMERIC PO Take  by mouth.      vitamin B-12 (CYANOCOBALAMIN) 1000 MCG tablet Take 1 tablet by mouth Daily.      [DISCONTINUED] loperamide (IMODIUM) 1 MG/5ML solution Take 5 mL by mouth 4 (Four) Times a Day As Needed for Diarrhea. prn         Allergies   Allergen Reactions    Amlodipine Swelling     With feet and legs     Imdur [Isosorbide Nitrate] Palpitations     Patient stated that her heart rate got high and stayed like that     Lisinopril Diarrhea    Codeine Other (See Comments)    Ibuprofen Other (See Comments)    Other Other (See Comments)       Social History     Socioeconomic History    Marital status:    Tobacco Use    Smoking status: Never     Passive exposure: Never    Smokeless tobacco: Never   Vaping Use    Vaping status: Never Used   Substance and Sexual Activity    Alcohol use: Yes     Comment: occ    Drug use: Never    Sexual activity: Not Currently     Partners: Male     Birth control/protection: Tubal ligation       Family History   Problem Relation Age of Onset    Clotting disorder Father     Heart attack Sister     Breast cancer Neg Hx     Cirrhosis Neg Hx     Colon cancer Neg Hx        Review of Systems   Constitutional:  Negative for activity change, appetite change, chills, diaphoresis, fatigue, fever and unexpected weight change.   HENT:  Negative for ear pain, hearing loss, mouth sores, sore throat, trouble swallowing and voice change.    Eyes: Negative.    Respiratory:  Negative for cough, choking, shortness of breath and wheezing.    Cardiovascular:  Negative for chest pain and palpitations.   Gastrointestinal:  Negative for abdominal pain, blood in stool, constipation, diarrhea, nausea and vomiting.   Endocrine: Negative for  "cold intolerance and heat intolerance.   Genitourinary:  Negative for decreased urine volume, dysuria, frequency, hematuria and urgency.   Musculoskeletal:  Negative for back pain, gait problem and myalgias.   Skin:  Negative for color change, pallor and rash.   Allergic/Immunologic: Negative for food allergies and immunocompromised state.   Neurological:  Negative for dizziness, tremors, seizures, syncope, weakness, light-headedness, numbness and headaches.   Hematological:  Negative for adenopathy. Does not bruise/bleed easily.   Psychiatric/Behavioral:  Negative for agitation and confusion. The patient is not nervous/anxious.    All other systems reviewed and are negative.      /78 (BP Location: Left arm)   Pulse 72   Temp 97.8 °F (36.6 °C) (Temporal)   Ht 162.6 cm (64.02\")   Wt 64.4 kg (142 lb)   LMP  (LMP Unknown)   SpO2 97%   Breastfeeding No   BMI 24.36 kg/m²   Body mass index is 24.36 kg/m².    Physical Exam  Constitutional:       Appearance: She is well-developed.   HENT:      Head: Normocephalic and atraumatic.   Eyes:      Pupils: Pupils are equal, round, and reactive to light.   Neck:      Trachea: No tracheal deviation.   Cardiovascular:      Rate and Rhythm: Normal rate and regular rhythm.      Heart sounds: Normal heart sounds. No murmur heard.     No friction rub. No gallop.   Pulmonary:      Effort: Pulmonary effort is normal. No respiratory distress.      Breath sounds: Normal breath sounds. No wheezing or rales.   Chest:      Chest wall: No tenderness.   Abdominal:      General: Bowel sounds are normal. There is no distension.      Palpations: Abdomen is soft. Abdomen is not rigid.      Tenderness: There is no abdominal tenderness. There is no guarding or rebound.   Musculoskeletal:         General: No tenderness or deformity. Normal range of motion.      Cervical back: Normal range of motion and neck supple.   Skin:     General: Skin is warm and dry.      Coloration: Skin is not " pale.      Findings: No rash.   Neurological:      Mental Status: She is alert and oriented to person, place, and time.      Deep Tendon Reflexes: Reflexes are normal and symmetric.   Psychiatric:         Behavior: Behavior normal.         Thought Content: Thought content normal.         Judgment: Judgment normal.         ASSESSMENT AND PLAN    BMI is within normal parameters. No other follow-up for BMI required.    Diagnoses and all orders for this visit:    1. Diarrhea, unspecified type (Primary)    2. Nonsmoker    3. Nonrheumatic aortic valve stenosis    Improved with fiber and I suggested that she continue this   Follow up as needed.      There are no Patient Instructions on file for this visit.  Jazmine Jessi Hoang, APRN  12:44 CDT  3/27/2024    Obesity, Adult  Obesity is the condition of having too much total body fat. Being overweight or obese means that your weight is greater than what is considered healthy for your body size. Obesity is determined by a measurement called BMI. BMI is an estimate of body fat and is calculated from height and weight. For adults, a BMI of 30 or higher is considered obese.  Obesity can eventually lead to other health concerns and major illnesses, including:  Stroke.  Coronary artery disease (CAD).  Type 2 diabetes.  Some types of cancer, including cancers of the colon, breast, uterus, and gallbladder.  Osteoarthritis.  High blood pressure (hypertension).  High cholesterol.  Sleep apnea.  Gallbladder stones.  Infertility problems.  What are the causes?  The main cause of obesity is taking in (consuming) more calories than your body uses for energy. Other factors that contribute to this condition may include:  Being born with genes that make you more likely to become obese.  Having a medical condition that causes obesity. These conditions include:  Hypothyroidism.  Polycystic ovarian syndrome (PCOS).  Binge-eating disorder.  Cushing syndrome.  Taking certain medicines, such as  steroids, antidepressants, and seizure medicines.  Not being physically active (sedentary lifestyle).  Living where there are limited places to exercise safely or buy healthy foods.  Not getting enough sleep.  What increases the risk?  The following factors may increase your risk of this condition:  Having a family history of obesity.  Being a woman of -American descent.  Being a man of  descent.  What are the signs or symptoms?  Having excessive body fat is the main symptom of this condition.  How is this diagnosed?  This condition may be diagnosed based on:  Your symptoms.  Your medical history.  A physical exam. Your health care provider may measure:  Your BMI. If you are an adult with a BMI between 25 and less than 30, you are considered overweight. If you are an adult with a BMI of 30 or higher, you are considered obese.  The distances around your hips and your waist (circumferences). These may be compared to each other to help diagnose your condition.  Your skinfold thickness. Your health care provider may gently pinch a fold of your skin and measure it.  How is this treated?  Treatment for this condition often includes changing your lifestyle. Treatment may include some or all of the following:  Dietary changes. Work with your health care provider and a dietitian to set a weight-loss goal that is healthy and reasonable for you. Dietary changes may include eating:  Smaller portions. A portion size is the amount of a particular food that is healthy for you to eat at one time. This varies from person to person.  Low-calorie or low-fat options.  More whole grains, fruits, and vegetables.  Regular physical activity. This may include aerobic activity (cardio) and strength training.  Medicine to help you lose weight. Your health care provider may prescribe medicine if you are unable to lose 1 pound a week after 6 weeks of eating more healthily and doing more physical activity.  Surgery. Surgical  options may include gastric banding and gastric bypass. Surgery may be done if:  Other treatments have not helped to improve your condition.  You have a BMI of 40 or higher.  You have life-threatening health problems related to obesity.  Follow these instructions at home:     Eating and drinking     Follow recommendations from your health care provider about what you eat and drink. Your health care provider may advise you to:  Limit fast foods, sweets, and processed snack foods.  Choose low-fat options, such as low-fat milk instead of whole milk.  Eat 5 or more servings of fruits or vegetables every day.  Eat at home more often. This gives you more control over what you eat.  Choose healthy foods when you eat out.  Learn what a healthy portion size is.  Keep low-fat snacks on hand.  Avoid sugary drinks, such as soda, fruit juice, iced tea sweetened with sugar, and flavored milk.  Eat a healthy breakfast.  Drink enough water to keep your urine clear or pale yellow.  Do not go without eating for long periods of time (do not fast) or follow a fad diet. Fasting and fad diets can be unhealthy and even dangerous.  Physical Activity   Exercise regularly, as told by your health care provider. Ask your health care provider what types of exercise are safe for you and how often you should exercise.  Warm up and stretch before being active.  Cool down and stretch after being active.  Rest between periods of activity.  Lifestyle   Limit the time that you spend in front of your TV, computer, or video game system.  Find ways to reward yourself that do not involve food.  Limit alcohol intake to no more than 1 drink a day for nonpregnant women and 2 drinks a day for men. One drink equals 12 oz of beer, 5 oz of wine, or 1½ oz of hard liquor.  General instructions   Keep a weight loss journal to keep track of the food you eat and how much you exercise you get.  Take over-the-counter and prescription medicines only as told by your  health care provider.  Take vitamins and supplements only as told by your health care provider.  Consider joining a support group. Your health care provider may be able to recommend a support group.  Keep all follow-up visits as told by your health care provider. This is important.  Contact a health care provider if:  You are unable to meet your weight loss goal after 6 weeks of dietary and lifestyle changes.  This information is not intended to replace advice given to you by your health care provider. Make sure you discuss any questions you have with your health care provider.  Document Released: 01/25/2006 Document Revised: 05/22/2017 Document Reviewed: 10/05/2016  [employer] Interactive Patient Education © 2017 [employer]      IF YOU SMOKE OR USE TOBACCO PLEASE READ THE FOLLOWING:    Why is smoking bad for me?  Smoking increases the risk of heart disease, lung disease, vascular disease, stroke, and cancer.     If you smoke, STOP!    If you would like more information on quitting smoking, please visit the [employer] website: [email or web address]   This link will provide additional resources including the QUIT line and the [employer] support groups.     For more information:    Quit Now [place]  1-800-QUIT-NOW  [email or web address]

## 2024-03-30 ENCOUNTER — HOSPITAL ENCOUNTER (EMERGENCY)
Facility: HOSPITAL | Age: 89
Discharge: HOME | End: 2024-03-30
Attending: EMERGENCY MEDICINE
Payer: MEDICARE

## 2024-03-30 VITALS
HEIGHT: 64 IN | TEMPERATURE: 97.8 F | RESPIRATION RATE: 18 BRPM | DIASTOLIC BLOOD PRESSURE: 85 MMHG | SYSTOLIC BLOOD PRESSURE: 177 MMHG | OXYGEN SATURATION: 97 % | BODY MASS INDEX: 24.07 KG/M2 | WEIGHT: 141 LBS | HEART RATE: 97 BPM

## 2024-03-30 DIAGNOSIS — S81.811A SKIN TEAR OF RIGHT LOWER LEG WITHOUT COMPLICATION, INITIAL ENCOUNTER: Primary | ICD-10-CM

## 2024-03-30 PROCEDURE — 99283 EMERGENCY DEPT VISIT LOW MDM: CPT

## 2024-03-30 NOTE — ED PROVIDER NOTES
Subjective   History of Present Illness  Patient is a 8-year-old female presents emerged department complaining of a skin tear to the right leg.  Patient reports that she had a leaf band that fell over hitting her on the back of her right leg, tearing the skin open.  Patient reports that she attempted to clean this with wet wipes and then laid the skin back down as best she could.  Patient reports she had a tetanus shot 2 years ago.  Patient declines any suture repair stating that the skin is too thin and she thinks it was just tear if there is an attempted suture repair and therefore request alternative repair.  Patient denies other injury.        Review of Systems   Constitutional:  Negative for appetite change, chills and fever.   HENT:  Negative for congestion, ear pain and sore throat.    Eyes:  Negative for discharge and redness.   Respiratory:  Negative for cough and shortness of breath.    Cardiovascular:  Negative for chest pain.   Gastrointestinal:  Negative for abdominal pain, diarrhea, nausea and vomiting.   Genitourinary:  Negative for difficulty urinating, dysuria, flank pain and urgency.   Musculoskeletal:  Negative for arthralgias, myalgias and neck stiffness.   Skin:  Negative for rash.        Skin tear right leg immediately inferior to mid calf   Neurological:  Negative for dizziness, weakness and headaches.   Psychiatric/Behavioral:  Negative for behavioral problems.        Past Medical History:   Diagnosis Date    Chronic shoulder pain     Clotting disorder     Conductive hearing loss     Disease of thyroid gland     Perforation of left tympanic membrane     Primary hypertension 12/15/2023       Allergies   Allergen Reactions    Amlodipine Swelling     With feet and legs     Imdur [Isosorbide Nitrate] Palpitations     Patient stated that her heart rate got high and stayed like that     Lisinopril Diarrhea    Codeine Other (See Comments)    Ibuprofen Other (See Comments)    Other Other (See  Comments)       Past Surgical History:   Procedure Laterality Date    CARDIAC CATHETERIZATION      COLONOSCOPY  02/03/2011    diverticulosis in the sigmoid colon    COLONOSCOPY  01/04/2005    normal exam       Family History   Problem Relation Age of Onset    Clotting disorder Father     Heart attack Sister     Breast cancer Neg Hx     Cirrhosis Neg Hx     Colon cancer Neg Hx        Social History     Socioeconomic History    Marital status:    Tobacco Use    Smoking status: Never     Passive exposure: Never    Smokeless tobacco: Never   Vaping Use    Vaping status: Never Used   Substance and Sexual Activity    Alcohol use: Yes     Comment: occ    Drug use: Never    Sexual activity: Not Currently     Partners: Male     Birth control/protection: Tubal ligation           Objective   Physical Exam  Vitals and nursing note reviewed.   Constitutional:       General: She is not in acute distress.     Appearance: She is well-developed and normal weight. She is not ill-appearing, toxic-appearing or diaphoretic.   HENT:      Head: Normocephalic and atraumatic.      Right Ear: External ear normal.      Nose: No congestion or rhinorrhea.      Mouth/Throat:      Mouth: Mucous membranes are moist.      Pharynx: Oropharynx is clear. No pharyngeal swelling, oropharyngeal exudate or posterior oropharyngeal erythema.   Eyes:      General: No scleral icterus.        Right eye: No discharge.         Left eye: No discharge.      Extraocular Movements: Extraocular movements intact.      Pupils: Pupils are equal, round, and reactive to light.   Neck:      Thyroid: No thyromegaly.      Vascular: No hepatojugular reflux or JVD.   Cardiovascular:      Rate and Rhythm: Normal rate and regular rhythm.      Pulses: No decreased pulses.      Heart sounds: No murmur heard.     No friction rub. No gallop.   Pulmonary:      Effort: Pulmonary effort is normal. No tachypnea, accessory muscle usage or respiratory distress.      Breath sounds:  "Normal breath sounds. No decreased breath sounds, wheezing, rhonchi or rales.   Chest:      Chest wall: No deformity or tenderness.   Abdominal:      General: Bowel sounds are normal.      Palpations: Abdomen is soft. There is no hepatomegaly, splenomegaly or mass.      Tenderness: There is no abdominal tenderness. There is no guarding or rebound.   Musculoskeletal:         General: Normal range of motion.      Cervical back: Normal range of motion and neck supple.      Right lower leg: No tenderness. No edema.      Left lower leg: No tenderness. No edema.   Skin:     General: Skin is warm and dry.      Capillary Refill: Capillary refill takes less than 2 seconds.      Coloration: Skin is not cyanotic or pale.      Findings: No erythema or rash.      Comments: Superficial skin tear with \"V\" shape measuring a total of 10 cm in length on the posterior aspect of the right leg immediately distal to mid calf with no active bleeding.  Achilles tendon intact and nerves vessels and tendons intact distal to area of injury.   Neurological:      General: No focal deficit present.      Mental Status: She is alert and oriented to person, place, and time.      Cranial Nerves: No cranial nerve deficit.      Motor: No weakness.         Procedures           ED Course                                             Medical Decision Making  Patient is a 8-year-old female who presents to the emergency department complaining of a skin tear to the right leg that was secondary to a plastic leaf been falling over and striking the back of her right leg in the calf area.  This occurred immediately prior to arrival.  Patient reports last tetanus was 2 years ago.  Patient declined any suture repair stating that the skin is too thin in order to apply suture closure and request alternative closure.  Physical exam significant for skin tear measuring approximately 10 cm immediately distal to the right mid calf region on the posterior aspect of the " right leg with right lower extremity being neurovascularly intact distal to area of injury.  Steri-Strips applied to close skin tear and dressing applied over this.  Patient discharged to home with instructions for what to do with the Steri-Strips.    Problems Addressed:  Skin tear of right lower leg without complication, initial encounter: acute illness or injury        Final diagnoses:   None       ED Disposition  ED Disposition       None            No follow-up provider specified.       Medication List      No changes were made to your prescriptions during this visit.

## 2024-03-30 NOTE — DISCHARGE INSTRUCTIONS
Follow-up with primary care in 48 to 72 hours and return immediately to emergency department for worsening symptoms or for any other reason.

## 2024-04-18 ENCOUNTER — NURSE TRIAGE (OUTPATIENT)
Dept: CALL CENTER | Facility: HOSPITAL | Age: 89
End: 2024-04-18
Payer: MEDICARE

## 2024-04-18 NOTE — TELEPHONE ENCOUNTER
"  Caller states that she is having constant rib area bilateral pain of 6/10. Does not matter if she is moving or not. States came on suddenly while she was playing bridge today. Instructed to go to the ED, will not go unless it gets worse. Will call PCP in the am for appt.  Reason for Disposition  • [1] Chest pain lasts > 5 minutes AND [2] occurred in past 3 days (72 hours) (Exception: Feels exactly the same as previously diagnosed heartburn and has accompanying sour taste in mouth.)    Additional Information  • Negative: SEVERE difficulty breathing (e.g., struggling for each breath, speaks in single words)  • Negative: Difficult to awaken or acting confused (e.g., disoriented, slurred speech)  • Negative: Shock suspected (e.g., cold/pale/clammy skin, too weak to stand, low BP, rapid pulse)  • Negative: Passed out (i.e., lost consciousness, collapsed and was not responding)  • Negative: [1] Chest pain lasts > 5 minutes AND [2] age > 44  • Negative: [1] Chest pain lasts > 5 minutes AND [2] age > 30 AND [3] one or more cardiac risk factors (e.g., diabetes, high blood pressure, high cholesterol, smoker, or strong family history of heart disease)  • Negative: [1] Chest pain lasts > 5 minutes AND [2] history of heart disease (i.e., angina, heart attack, heart failure, bypass surgery, takes nitroglycerin)  • Negative: [1] Chest pain lasts > 5 minutes AND [2] described as crushing, pressure-like, or heavy  • Negative: Heart beating < 50 beats per minute OR > 140 beats per minute  • Negative: Visible sweat on face or sweat dripping down face  • Negative: Sounds like a life-threatening emergency to the triager  • Negative: Followed a chest injury  • Negative: SEVERE chest pain  • Negative: [1] Chest pain (or \"angina\") comes and goes AND [2] is happening more often (increasing in frequency) or getting worse (increasing in severity)  (Exception: Chest pains that last only a few seconds.)  • Negative: Pain also in shoulder(s) " "or arm(s) or jaw  (Exception: Pain is clearly made worse by movement.)  • Negative: Difficulty breathing  • Negative: Dizziness or lightheadedness  • Negative: Coughing up blood  • Negative: Cocaine use within last 3 days  • Negative: Major surgery in past month  • Negative: Hip or leg fracture (broken bone) in past month (or had cast on leg or ankle in past month)  • Negative: Illness requiring prolonged bedrest in past month (e.g., immobilization, long hospital stay)  • Negative: Long-distance travel in past month (e.g., car, bus, train, plane; with trip lasting 6 or more hours)  • Negative: History of prior \"blood clot\" in leg or lungs (i.e., deep vein thrombosis, pulmonary embolism)  • Negative: History of inherited increased risk of blood clots (e.g., Factor 5 Leiden, Anti-thrombin 3, Protein C or Protein S deficiency, Prothrombin mutation)  • Negative: Cancer treatment in past six months (or has cancer now)    Answer Assessment - Initial Assessment Questions  1. LOCATION: \"Where does it hurt?\"        Around lower ribs all the way around  2. RADIATION: \"Does the pain go anywhere else?\" (e.g., into neck, jaw, arms, back)      no  3. ONSET: \"When did the chest pain begin?\" (Minutes, hours or days)       Suddenly this afternoon  4. PATTERN: \"Does the pain come and go, or has it been constant since it started?\"  \"Does it get worse with exertion?\"       constant  5. DURATION: \"How long does it last\" (e.g., seconds, minutes, hours)      constant  6. SEVERITY: \"How bad is the pain?\"  (e.g., Scale 1-10; mild, moderate, or severe)     - MILD (1-3): doesn't interfere with normal activities      - MODERATE (4-7): interferes with normal activities or awakens from sleep     - SEVERE (8-10): excruciating pain, unable to do any normal activities        mod  7. CARDIAC RISK FACTORS: \"Do you have any history of heart problems or risk factors for heart disease?\" (e.g., angina, prior heart attack; diabetes, high blood pressure, " "high cholesterol, smoker, or strong family history of heart disease)      yes  8. PULMONARY RISK FACTORS: \"Do you have any history of lung disease?\"  (e.g., blood clots in lung, asthma, emphysema, birth control pills)      no  9. CAUSE: \"What do you think is causing the chest pain?\"      Not sure  10. OTHER SYMPTOMS: \"Do you have any other symptoms?\" (e.g., dizziness, nausea, vomiting, sweating, fever, difficulty breathing, cough)        no  11. PREGNANCY: \"Is there any chance you are pregnant?\" \"When was your last menstrual period?\"        na    Protocols used: Chest Pain-ADULT-AH    "

## 2024-04-19 ENCOUNTER — LAB REQUISITION (OUTPATIENT)
Dept: LAB | Facility: HOSPITAL | Age: 89
End: 2024-04-19
Payer: MEDICARE

## 2024-04-19 ENCOUNTER — OFFICE VISIT (OUTPATIENT)
Dept: WOUND CARE | Facility: HOSPITAL | Age: 89
End: 2024-04-19
Payer: MEDICARE

## 2024-04-19 DIAGNOSIS — L97.812 NON-PRESSURE CHRONIC ULCER OF OTHER PART OF RIGHT LOWER LEG WITH FAT LAYER EXPOSED: ICD-10-CM

## 2024-04-19 PROCEDURE — 87176 TISSUE HOMOGENIZATION CULTR: CPT

## 2024-04-19 PROCEDURE — 87077 CULTURE AEROBIC IDENTIFY: CPT

## 2024-04-19 PROCEDURE — 87205 SMEAR GRAM STAIN: CPT

## 2024-04-19 PROCEDURE — 87070 CULTURE OTHR SPECIMN AEROBIC: CPT

## 2024-04-19 PROCEDURE — 87075 CULTR BACTERIA EXCEPT BLOOD: CPT

## 2024-04-19 PROCEDURE — G0463 HOSPITAL OUTPT CLINIC VISIT: HCPCS

## 2024-04-19 PROCEDURE — 87186 SC STD MICRODIL/AGAR DIL: CPT

## 2024-04-22 LAB
BACTERIA SPEC AEROBE CULT: ABNORMAL
GRAM STN SPEC: ABNORMAL
GRAM STN SPEC: ABNORMAL

## 2024-04-24 LAB — BACTERIA SPEC ANAEROBE CULT: NORMAL

## 2024-04-29 ENCOUNTER — OFFICE VISIT (OUTPATIENT)
Dept: WOUND CARE | Facility: HOSPITAL | Age: 89
End: 2024-04-29
Payer: MEDICARE

## 2024-05-06 ENCOUNTER — OFFICE VISIT (OUTPATIENT)
Dept: WOUND CARE | Facility: HOSPITAL | Age: 89
End: 2024-05-06
Payer: MEDICARE

## 2024-05-13 ENCOUNTER — OFFICE VISIT (OUTPATIENT)
Dept: WOUND CARE | Facility: HOSPITAL | Age: 89
End: 2024-05-13
Payer: MEDICARE

## 2024-05-13 DIAGNOSIS — L97.812 NON-PRESSURE CHRONIC ULCER OF OTHER PART OF RIGHT LOWER LEG WITH FAT LAYER EXPOSED: Primary | ICD-10-CM

## 2024-05-13 DIAGNOSIS — M79.661 PAIN IN RIGHT LOWER LEG: ICD-10-CM

## 2024-05-20 ENCOUNTER — OFFICE VISIT (OUTPATIENT)
Dept: WOUND CARE | Facility: HOSPITAL | Age: 89
End: 2024-05-20
Payer: MEDICARE

## 2024-05-20 DIAGNOSIS — L97.812 NON-PRESSURE CHRONIC ULCER OF OTHER PART OF RIGHT LOWER LEG WITH FAT LAYER EXPOSED: Primary | ICD-10-CM

## 2024-05-20 DIAGNOSIS — M79.661 PAIN IN RIGHT LOWER LEG: ICD-10-CM

## 2024-05-20 DIAGNOSIS — T36.8X5D: ICD-10-CM

## 2024-05-20 PROCEDURE — G0463 HOSPITAL OUTPT CLINIC VISIT: HCPCS

## 2024-06-03 ENCOUNTER — HOSPITAL ENCOUNTER (OUTPATIENT)
Dept: CT IMAGING | Facility: HOSPITAL | Age: 89
Discharge: HOME | End: 2024-06-03
Admitting: PHYSICIAN ASSISTANT
Payer: MEDICARE

## 2024-06-03 DIAGNOSIS — R91.1 SOLITARY LUNG NODULE: ICD-10-CM

## 2024-06-03 PROCEDURE — 71250 CT THORAX DX C-: CPT

## 2024-07-27 ENCOUNTER — NURSE TRIAGE (OUTPATIENT)
Dept: CALL CENTER | Facility: HOSPITAL | Age: 89
End: 2024-07-27
Payer: MEDICARE

## 2024-07-27 NOTE — TELEPHONE ENCOUNTER
Patient called reporting new rash. Red welts on inside of arms, wrists, waist, abdomen, chest. Reports itching and burning. Denies any other symptoms, has not started any new medications. Does state she moved bags of mulch day before yesterday. Advised on home care. Patient states if symptoms do not improve, she will go to [facility] later today.           Reason for Disposition   Mild widespread rash  (Exception: Heat rash lasting 3 days or less.)    Additional Information   Negative: [1] Life-threatening reaction (anaphylaxis) in the past to similar substance (e.g., food, insect bite/sting, chemical, etc.) AND [2] < 2 hours since exposure   Negative: [1] Sudden onset of rash (within last 2 hours) AND [2] difficulty breathing or swallowing   Negative: Shock suspected (e.g., cold/pale/clammy skin, too weak to stand, low BP, rapid pulse)   Negative: Difficult to awaken or acting confused (e.g., disoriented, slurred speech)   Negative: [1] Purple or blood-colored spots or dots AND [2] fever   Negative: Sounds like a life-threatening emergency to the triager   Negative: [1] Drug rash suspected AND [2] started taking new medicine within last 2 weeks  (Exception: Antihistamine, eye drops, ear drops, decongestant or other OTC cough/cold medicines.)   Negative: [1] Chickenpox suspected AND [2] known exposure to chickenpox in past 3 weeks   Negative: Hives suspected   Negative: Insect bites suspected   Negative: [1] Measles suspected AND [2] known exposure to measles in past 3 weeks   Negative: [1] Mpox suspected (e.g., direct skin contact such as sex, recent travel to [place] or [place]) AND [2] symptoms of Mpox (e.g., rash, fever, muscle aches, or swollen lymph nodes)   Negative: [1] At risk for Mpox (men-who-have-sex-with-men) AND [2] possible exposure (e.g., multiple sex partners in past 21 days) AND [3] symptoms of Mpox (e.g., rash, fever, muscle aches, or swollen lymph nodes)   Negative: Swimmer's Itch suspected    "Negative: Sunburn suspected   Negative: [1] Bright red, sunburn-like rash AND [2] current tampon use or nasal packing   Negative: [1] Bright red, sunburn-like rash AND [3] wound infection or recent surgery   Negative: [1] Bright red skin AND [2] peels off in sheets   Negative: Stiff neck (can't touch chin to chest)   Negative: Fever   Negative: Joint pain or swelling   Negative: Patient sounds very sick or weak to the triager   Negative: [1] Purple or blood-colored rash (spots or dots) AND [2] no fever AND [3] sounds well to triager   Negative: Large or small blisters on skin (i.e., fluid filled bubbles or sacs)   Negative: Bloody crusts on lips or sores in mouth   Negative: Face becomes swollen   Negative: [1] Headache AND [2] no fever   Negative: SEVERE itching (i.e., interferes with sleep, normal activities or school)   Negative: Sore throat   Negative: Ring-like appearance of rash (or ask: does it look like a  \"target\" or \"bulls- eye\")   Negative: Pregnant    Answer Assessment - Initial Assessment Questions  1. APPEARANCE of RASH: \"Describe the rash.\" (e.g., spots, blisters, raised areas, skin peeling, scaly)      Raised welts  2. SIZE: \"How big are the spots?\" (e.g., tip of pen, eraser, coin; inches, centimeters)      As large as a silver dollar   3. LOCATION: \"Where is the rash located?\"      Elbow, wrist, left arm , right arm wrist,   4. COLOR: \"What color is the rash?\" (Note: It is difficult to assess rash color in people with darker-colored skin. When this situation occurs, simply ask the caller to describe what they see.)      Dark red   5. ONSET: \"When did the rash begin?\"      Today   6. FEVER: \"Do you have a fever?\" If Yes, ask: \"What is your temperature, how was it measured, and when did it start?\"      Denies   7. ITCHING: \"Does the rash itch?\" If Yes, ask: \"How bad is the itch?\" (Scale 1-10; or mild, moderate, severe)      severe  8. CAUSE: \"What do you think is causing the rash?\"      Unknown - was " "moving [facility] earlier today   9. MEDICINE FACTORS: \"Have you started any new medicines within the last 2 weeks?\" (e.g., antibiotics)       no  10. OTHER SYMPTOMS: \"Do you have any other symptoms?\" (e.g., dizziness, headache, sore throat, joint pain)        No   11. PREGNANCY: \"Is there any chance you are pregnant?\" \"When was your last menstrual period?\"        N/a    Protocols used: Rash or Redness - Widespread-ADULT-AH    "

## 2024-08-07 ENCOUNTER — APPOINTMENT (OUTPATIENT)
Dept: GENERAL RADIOLOGY | Facility: HOSPITAL | Age: 89
End: 2024-08-07
Payer: MEDICARE

## 2024-08-07 PROCEDURE — 73080 X-RAY EXAM OF ELBOW: CPT

## 2025-02-10 ENCOUNTER — OFFICE VISIT (OUTPATIENT)
Dept: OTOLARYNGOLOGY | Facility: CLINIC | Age: 89
End: 2025-02-10
Payer: MEDICARE

## 2025-02-10 VITALS
RESPIRATION RATE: 18 BRPM | HEIGHT: 63 IN | DIASTOLIC BLOOD PRESSURE: 72 MMHG | WEIGHT: 148 LBS | BODY MASS INDEX: 26.22 KG/M2 | HEART RATE: 85 BPM | SYSTOLIC BLOOD PRESSURE: 142 MMHG | TEMPERATURE: 96.9 F

## 2025-02-10 DIAGNOSIS — H90.3 SENSORINEURAL HEARING LOSS (SNHL), BILATERAL: Primary | ICD-10-CM

## 2025-02-10 PROCEDURE — 1160F RVW MEDS BY RX/DR IN RCRD: CPT | Performed by: EMERGENCY MEDICINE

## 2025-02-10 PROCEDURE — 1159F MED LIST DOCD IN RCRD: CPT | Performed by: EMERGENCY MEDICINE

## 2025-02-10 PROCEDURE — 92567 TYMPANOMETRY: CPT | Performed by: EMERGENCY MEDICINE

## 2025-02-10 PROCEDURE — 99213 OFFICE O/P EST LOW 20 MIN: CPT | Performed by: EMERGENCY MEDICINE

## 2025-02-10 NOTE — PROGRESS NOTES
MARGARETTE Dutta ENT [facility] EAR NOSE & THROAT  [address], [address]  [place] [address]  Fax [phone]  Phone [phone]      Visit Type: FOLLOW UP   Chief Complaint   Patient presents with    Follow-up     Patient presents today with having hearing loss. Pt. States she has fluid in both ears and worse in Right ear.            HPI      History of Present Illness  The patient is an 89-year-old female who presents for follow-up with ear complaints.    She has undergone a series of hearing tests, which revealed the presence of fluid in her ears. She was informed that a potential treatment option could involve the insertion of tubes into her eardrums, a procedure she finds unappealing. She experiences a clicking sound in her ears when lying down at night and turning to one side. Her hearing is compromised when she lies on her right side, making it difficult for her to hear the television. She also struggles to hear conversations at the bridge table. She has never utilized hearing aids. She occasionally attempts to alleviate the pressure in her ears by holding her nose and trying to pop her ears. While she can sometimes pop her left ear, the right ear remains unresponsive due to a sensation of fullness. On rare occasions, both ears will pop, causing her discomfort. She has expressed a willingness to consider the use of hearing aids in both ears but has specific preferences regarding their design. She does not want anything that can be adjusted on iPhone or anything that hooks over the ear.    She attempted to manage her symptoms with Flonase, but this resulted in nosebleeds and dryness in her eyes, necessitating the application of tears. Consequently, she has discontinued the use of Flonase.    MEDICATIONS  Discontinued: Flonase    Results  Testing  Hearing test shows a significant slope in 2020 compared to 2019, indicating difficulty in  hearing high frequencies. Further decline in hearing was observed in 2022.    Past Medical History:   Diagnosis Date    Chronic shoulder pain     Clotting disorder     Conductive hearing loss     Disease of thyroid gland     History of tonsillectomy 1965    Perforation of left tympanic membrane     Primary hypertension 12/15/2023       Past Surgical History:   Procedure Laterality Date    CARDIAC CATHETERIZATION      COLONOSCOPY  02/03/2011    diverticulosis in the sigmoid colon    COLONOSCOPY  01/04/2005    normal exam       Family History: Her family history includes Clotting disorder in her father; Heart attack in her sister.     Social History: She  reports that she has never smoked. She has never been exposed to tobacco smoke. She has never used smokeless tobacco. She reports that she does not currently use alcohol. She reports that she does not use drugs.    Home Medications:  Calcium Citrate-Vitamin D3, Diclofenac Sodium, Turmeric, levothyroxine sodium, metoprolol succinate XL, multivitamin with minerals, nitroglycerin, predniSONE, and vitamin B-12    Allergies:  She is allergic to amlodipine, imdur [isosorbide nitrate], lisinopril, aleve [naproxen], codeine, hydralazine, ibuprofen, levofloxacin, losartan, and other.       Vital Signs:   Temp:  [96.9 °F (36.1 °C)] 96.9 °F (36.1 °C)  Heart Rate:  [85] 85  Resp:  [18] 18  BP: (142)/(72) 142/72  ENT Physical Exam  Ear  Hearing: impaired to conversational voice;  Auricles: right auricle normal; left auricle normal;  External Mastoids: right external mastoid normal; left external mastoid normal;  Ear Canals: right ear canal normal; left ear canal normal;  Tympanic Membranes: right tympanic membrane normal; left tympanic membrane normal;       Physical Exam      Tympanometry    Date/Time: 2/10/2025 2:22 PM    Performed by: Jazmine Post APRN  Authorized by: Jazmine Post APRN    CPT code: 30354 Tympanometry (impedance testing)      LEFT:  Shape:  Type A (normal)      RIGHT:  Shape: Type A (normal)                Assessment & Plan  Sensorineural hearing loss (SNHL), bilateral       Assessment & Plan  1. Hearing impairment.  Her auditory function is within the normal range for an adult, with a slight deviation on the right side. The initial audiometric evaluation conducted in 2019 revealed normal hearing, however, a subsequent test in 2020 indicated a significant decline in her ability to perceive high-frequency sounds, which could potentially hinder her communication in crowded environments. A further decrease in her hearing capacity was observed in 2022. Despite these challenges, her comprehension scores remain commendable, suggesting that the use of hearing aids could significantly enhance her auditory experience. It was explained that our bodies sometimes perceive hearing loss as pressure or fluid in the ears. She was advised against the use of tubes due to the potential risk of exacerbating her hearing loss. She was cautioned against exerting excessive pressure on her ears to avoid inducing swelling. A list of recommended hearing aid providers was provided for her reference.    2. Nosebleeds and dry eyes.  She reported experiencing nosebleeds and dry eyes while using Flonase, leading to discontinuation of the medication.            Consider hearing aid amplification.  No follow-ups on file.        Electronically signed by MARGARETTE Dutta 02/10/25 2:22 PM CST.     Patient or patient representative verbalized consent for the use of Ambient Listening during the visit with  MARGARETTE Dutta for chart documentation. 2/10/2025  14:22 CST

## 2025-04-13 ENCOUNTER — APPOINTMENT (OUTPATIENT)
Dept: CT IMAGING | Facility: HOSPITAL | Age: 89
End: 2025-04-13
Payer: MEDICARE

## 2025-04-13 ENCOUNTER — NURSE TRIAGE (OUTPATIENT)
Dept: CALL CENTER | Facility: HOSPITAL | Age: 89
End: 2025-04-13
Payer: MEDICARE

## 2025-04-13 ENCOUNTER — HOSPITAL ENCOUNTER (EMERGENCY)
Facility: HOSPITAL | Age: 89
Discharge: HOME | End: 2025-04-14
Admitting: EMERGENCY MEDICINE
Payer: MEDICARE

## 2025-04-13 DIAGNOSIS — M54.42 ACUTE LEFT-SIDED LOW BACK PAIN WITH LEFT-SIDED SCIATICA: Primary | ICD-10-CM

## 2025-04-13 DIAGNOSIS — M51.369 DEGENERATION OF INTERVERTEBRAL DISC OF LUMBAR REGION, UNSPECIFIED WHETHER PAIN PRESENT: ICD-10-CM

## 2025-04-13 PROCEDURE — 99284 EMERGENCY DEPT VISIT MOD MDM: CPT

## 2025-04-14 ENCOUNTER — APPOINTMENT (OUTPATIENT)
Dept: CT IMAGING | Facility: HOSPITAL | Age: 89
End: 2025-04-14
Payer: MEDICARE

## 2025-04-14 VITALS
SYSTOLIC BLOOD PRESSURE: 151 MMHG | TEMPERATURE: 97.6 F | RESPIRATION RATE: 18 BRPM | WEIGHT: 152 LBS | DIASTOLIC BLOOD PRESSURE: 66 MMHG | HEIGHT: 63 IN | OXYGEN SATURATION: 96 % | BODY MASS INDEX: 26.93 KG/M2 | HEART RATE: 75 BPM

## 2025-04-14 PROCEDURE — 72131 CT LUMBAR SPINE W/O DYE: CPT

## 2025-04-14 RX ORDER — LIDOCAINE 50 MG/G
1 PATCH TOPICAL EVERY 24 HOURS
Qty: 30 EACH | Refills: 0 | Status: SHIPPED | OUTPATIENT
Start: 2025-04-14

## 2025-04-14 NOTE — TELEPHONE ENCOUNTER
C/o back injury, unable to bear weight on left leg. Injury occurred 2 days ago while lifting something heavy. Has been treating with ice. Reviewed protocol with patient. Advised to go to ER. Patient states she may go to the ER or may call Dr. Solitario's office in the morning.    Reason for Disposition   Weakness of a leg or foot (e.g., unable to bear weight, dragging foot)    Additional Information   Negative: Dangerous mechanism of injury (e.g., MVA, contact sports, trampoline, diving, fall > 10 feet or 3 meters)  (Exception: Back pain began > 1 hour after injury.)   Negative: [1] Weakness (i.e., paralysis, loss of muscle strength) of the leg(s) or foot AND [2] sudden onset after back injury   Negative: [1] Numbness (i.e., loss of sensation) of the leg(s) or foot AND [2] sudden onset after back injury   Negative: [1] Major bleeding (e.g., actively dripping or spurting) AND [2] can't be stopped   Negative: Bullet wound, knife wound, or other penetrating object   Negative: Shock suspected (e.g., cold/pale/clammy skin, too weak to stand, low BP, rapid pulse)   Negative: Sounds like a life-threatening emergency to the triager   Negative: [1] Injuries at more than 1 site AND [2] unsure which guideline to use   Negative: Injury to the neck   Negative: Injury to the tailbone   Negative: Back pain from overuse (work, exercise, gardening) OR from twisting, lifting, or bending injury   Negative: Back pain not from an injury   Negative: [1] SEVERE PAIN in kidney area (flank) AND [2] follows direct blow to that site   Negative: Blood in urine (red, pink, or tea-colored)   Negative: [1] Unable to urinate (or only a few drops) > 4 hours AND [2] bladder feels very full (e.g., palpable bladder or strong urge to urinate)   Negative: [1] Loss of bladder or bowel control (urine or bowel incontinence; wetting self, leaking stool) AND [2] new-onset   Negative: Numbness (loss of sensation) in groin or rectal area   Negative: Skin is split  "open or gaping  (or length > 1/2 inch or 12 mm)   Negative: Puncture wound of back   Negative: [1] Bleeding AND [2] won't stop after 10 minutes of direct pressure (using correct technique)   Negative: Sounds like a serious injury to the triager    Answer Assessment - Initial Assessment Questions  1. MECHANISM: \"How did the injury happen?\" (Consider the possibility of domestic violence or elder abuse)      Picked up something too heavy  2. ONSET: \"When did the injury happen?\" (Minutes or hours ago)      2 nights ago  3. LOCATION: \"What part of the back is injured?\"      Lower back  4. SEVERITY: \"Can you move the back normally?\"      Can't put weight on left leg  5. PAIN: \"Is there any pain?\" If Yes, ask: \"How bad is the pain?\"   (Scale 1-10; or mild, moderate, severe)      7-8/10  6. CORD SYMPTOMS: Any weakness or numbness of the arms or legs?\"      Unable to bear weight on left leg  7. SIZE: For cuts, bruises, or swelling, ask: \"How large is it?\" (e.g., inches or centimeters)      None   8. TETANUS: For any breaks in the skin, ask: \"When was the last tetanus booster?\"      N/A  9. OTHER SYMPTOMS: \"Do you have any other symptoms?\" (e.g., abdomen pain, blood in urine)      No   10. PREGNANCY: \"Is there any chance you are pregnant?\" \"When was your last menstrual period?\"        N/A    Protocols used: Back Injury-ADULT-    "

## 2025-04-14 NOTE — ED PROVIDER NOTES
"Subjective   History of Present Illness    Patient is an 89-year-old female presenting to ED with lower back pain.  PMH significant for history of lumbar bulging disc, history lumbar fusion, hypertension, CKD, hypothyroidism.  Patient states as a young child she had a history of bulging disks for which ultimately in her 70s she had surgical repair and since that time has been doing well with no complications.  Patient states over the past week they have been cleaning out third digit as well as other outdoor work for which 5 days ago she had her most physically intense today.  2 days ago patient was lifting an approximately 18 pound case of protein shakes when she picked it up and turned towards the right feeling a sudden pain in her left lower lumbar back radiating towards her left hip.  Patient states that she feels some weakness localized to that area describing \"it is like I want to make my leg moved but it will not go forward.\"  Patient denies any numbness including saddle anesthesia, any problems with the right hip or right lower extremity.  Patient denies any radiation of the pain into her thoracic spine, upper extremity abnormalities, fevers, saddle anesthesia, incontinence of bowel or bladder.  Patient states that she has had no falls.  Yesterday patient sat for prolonged period of time playing bridge and thought maybe this had worsened it however today when the pain persisted she became concerned I did not feel she could wait to see her neurosurgeon, Dr. Solitaroi, tomorrow in the clinic and presents at this time for further evaluation.  Patient states that she has not had to use any Tylenol today however has used it intermittently over the past week.    Records reviewed show patient was last seen in the outpatient setting at the PCP office on 2/26/2025 for polymyalgia rheumatica, osteoarthritis, hypertensive renal disease, lumbar radiculopathy, CKD, hypothyroidism.    Patient last seen in the ED on 3/30/2024 " for skin tear of right lower leg    Review of Systems   Constitutional:  Negative for fever.   HENT: Negative.     Eyes: Negative.    Respiratory: Negative.     Cardiovascular: Negative.    Gastrointestinal: Negative.    Genitourinary: Negative.  Negative for difficulty urinating and flank pain.   Musculoskeletal:  Positive for back pain (left lower). Negative for gait problem and neck pain.   Skin: Negative.  Negative for rash and wound.   Neurological:  Positive for weakness (Left hip). Negative for numbness.        Denies saddle anesthesia  Denies incontinence of bowel or bladder   Psychiatric/Behavioral: Negative.     All other systems reviewed and are negative.      Past Medical History:   Diagnosis Date    Chronic shoulder pain     Clotting disorder     Conductive hearing loss     Disease of thyroid gland     History of tonsillectomy 1965    Perforation of left tympanic membrane     Primary hypertension 12/15/2023       Allergies   Allergen Reactions    Amlodipine Swelling     With feet and legs     Imdur [Isosorbide Nitrate] Palpitations     Patient stated that her heart rate got high and stayed like that     Lisinopril Diarrhea    Aleve [Naproxen] Itching    Codeine Other (See Comments)    Hydralazine Other (See Comments)     Feet burn    Ibuprofen Itching    Levofloxacin Tinnitus    Losartan Itching    Other Other (See Comments)       Past Surgical History:   Procedure Laterality Date    CARDIAC CATHETERIZATION      COLONOSCOPY  02/03/2011    diverticulosis in the sigmoid colon    COLONOSCOPY  01/04/2005    normal exam       Family History   Problem Relation Age of Onset    Clotting disorder Father     Heart attack Sister     Breast cancer Neg Hx     Cirrhosis Neg Hx     Colon cancer Neg Hx        Social History     Socioeconomic History    Marital status:    Tobacco Use    Smoking status: Never     Passive exposure: Never    Smokeless tobacco: Never   Vaping Use    Vaping status: Never Used    Substance and Sexual Activity    Alcohol use: Yes     Comment: occ    Drug use: Never    Sexual activity: Not Currently     Partners: Male     Birth control/protection: Tubal ligation           Objective   Physical Exam  Vitals and nursing note reviewed.   Constitutional:       General: She is not in acute distress.     Appearance: Normal appearance. She is obese. She is not ill-appearing, toxic-appearing or diaphoretic.   HENT:      Head: Normocephalic.      Mouth/Throat:      Mouth: Mucous membranes are moist.      Pharynx: Oropharynx is clear.   Eyes:      Conjunctiva/sclera: Conjunctivae normal.      Pupils: Pupils are equal, round, and reactive to light.   Cardiovascular:      Rate and Rhythm: Normal rate.      Pulses: Normal pulses.      Comments: No calf tenderness bilaterally  Pulmonary:      Effort: Pulmonary effort is normal.      Breath sounds: Normal breath sounds.   Abdominal:      Tenderness: There is no right CVA tenderness or left CVA tenderness.   Musculoskeletal:         General: Tenderness present. Normal range of motion.      Cervical back: Neck supple.      Right lower leg: No edema.      Left lower leg: No edema.      Comments: Tenderness to left lower lumbar paraspinal muscular region extending towards the left buttocks with no midline or right-sided abnormalities.  Previously well-healed surgical scar to the lumbar region with no dehiscence or overlying evidence of infection.  Normal inspection of the thoracic and cervical region.  All 4 extremities are neurovascular intact distally with no acute abnormalities.   Skin:     General: Skin is warm and dry.      Findings: No signs of injury, rash or wound.   Neurological:      Mental Status: She is alert and oriented to person, place, and time.      Sensory: No sensory deficit.      Motor: No weakness (5/5 symmetric strneght to bilateral LE).   Psychiatric:         Mood and Affect: Mood normal.         Behavior: Behavior normal.          Procedures           ED Course                                                       Medical Decision Making  Amount and/or Complexity of Data Reviewed  External Data Reviewed: labs, radiology and notes.  Radiology: ordered. Decision-making details documented in ED Course.      Patient is an 89-year-old female presenting to ED with lower back pain.  PMH significant for history of lumbar bulging disc, history lumbar fusion, hypertension, CKD, hypothyroidism.  Upon initial evaluation patient resting comfortably in the bed in no acute distress.  Nontoxic-appearing, non-ill-appearing, nondiaphoretic.  Patient with hypertensive systolic pressures in the 200s and otherwise unremarkable vital signs.  Examination finds tenderness to left lower lumbar paraspinal muscular region extending towards the left buttocks with no midline or right-sided abnormalities.  Previously well-healed surgical scar to the lumbar region with no dehiscence or overlying evidence of infection.  Normal inspection of the thoracic and cervical region.  All 4 extremities are neurovascular intact distally with no acute abnormalities.  No dermatological abnormalities including rashes, wounds, evidence of trauma.  No further examination findings.  Discussed with patient need for CT imaging.  Patient declines need for medications for pain or discomfort and is otherwise amenable to treatment plan with no further questions, concerns, or needs at this time.    Differential diagnosis: Sciatica, lumbar radiculopathy, vertebral fracture, bulging/herniated disc, other    CT of the lumbar spine showed: No fracture or subluxation, mild multilevel degenerative changes.  Throughout evaluation patient declined any medications for pain or discomfort.  Patient was able to stand and ambulate at her baseline.  Patient was able to urinate without difficulty.  Discussed symptomatic treatment to include lidocaine patches, topical Biofreeze or anti-inflammatories.   Discussed heat followed by gentle range of motion stretching as well as need for oral anti-inflammatories.  Discussed need for follow-up with her neurosurgeon or primary care provider within the next 48 hours for close reevaluation however should she develop any new or worsening symptoms need for immediate return to the ED.  Patient remained hemodynamically stable throughout evaluation and was appreciative with no further questions, concerns, needs at this time and is stable for discharge.    Final diagnoses:   Acute left-sided low back pain with left-sided sciatica   Degeneration of intervertebral disc of lumbar region, unspecified whether pain present       ED Disposition  ED Disposition       ED Disposition   Discharge    Condition   Stable    Comment   --               Richard Weaver MD  [address] [facility] [address]  [place] [address]  [phone]    Schedule an appointment as soon as possible for a visit in 2 days      [facility] EMERGENCY DEPARTMENT  [address] [place] [address] [phone]    As needed    Viktor Solitario MD  [address] [facility] [address]  [place] [id number]  [phone]    Schedule an appointment as soon as possible for a visit in 2 days           Medication List        New Prescriptions      lidocaine 5 %  Commonly known as: LIDODERM  Place 1 patch on the skin as directed by provider Daily. Remove & Discard patch within 12 hours or as directed by MD            Changed      * Voltaren 1 % gel gel  Generic drug: Diclofenac Sodium  What changed: Another medication with the same name was added. Make sure you understand how and when to take each.     * Diclofenac Sodium 1 % gel gel  Commonly known as: VOLTAREN  Apply 4 g topically to the appropriate area as directed 4 (Four) Times a Day As Needed (pain).  What changed: You were already taking a medication with the same name, and this prescription was added. Make sure you understand how and when to take each.            * This list has 2 medication(s) that are the same as other medications prescribed for you. Read the directions carefully, and ask your doctor or other care provider to review them with you.                   Where to Get Your Medications        These medications were sent to [facility] - [place] [facility] - [address] - [phone]  - [phone]   [address] [place] [address]      Phone: [phone]   Diclofenac Sodium 1 % gel gel  lidocaine 5 %            Vivek Matos PA-C  04/14/25 0025

## 2025-04-14 NOTE — DISCHARGE INSTRUCTIONS
Please call your primary care provider or neurosurgeon to follow-up within the next 48 hours.  In the meantime you may use topically lidocaine patches, Biofreeze, or the Voltaren gel.  Please continue to use Tylenol as needed orally.  Please apply heat to your back and hip followed by gentle range of motion stretching.  Should you develop any new or worsening symptoms return to the ER for further evaluation.

## 2025-04-18 ENCOUNTER — OFFICE VISIT (OUTPATIENT)
Dept: NEUROSURGERY | Facility: CLINIC | Age: 89
End: 2025-04-18
Payer: MEDICARE

## 2025-04-18 ENCOUNTER — HOSPITAL ENCOUNTER (OUTPATIENT)
Dept: GENERAL RADIOLOGY | Facility: HOSPITAL | Age: 89
Discharge: HOME | End: 2025-04-18
Payer: MEDICARE

## 2025-04-18 ENCOUNTER — TELEPHONE (OUTPATIENT)
Dept: NEUROSURGERY | Facility: CLINIC | Age: 89
End: 2025-04-18

## 2025-04-18 VITALS — HEIGHT: 63 IN | BODY MASS INDEX: 26.58 KG/M2 | WEIGHT: 150 LBS

## 2025-04-18 DIAGNOSIS — M51.372 DEGENERATION OF INTERVERTEBRAL DISC OF LUMBOSACRAL REGION WITH DISCOGENIC BACK PAIN AND LOWER EXTREMITY PAIN: ICD-10-CM

## 2025-04-18 DIAGNOSIS — M54.42 ACUTE BILATERAL LOW BACK PAIN WITH LEFT-SIDED SCIATICA: ICD-10-CM

## 2025-04-18 DIAGNOSIS — M41.55 OTHER SECONDARY SCOLIOSIS, THORACOLUMBAR REGION: ICD-10-CM

## 2025-04-18 DIAGNOSIS — E66.3 OVERWEIGHT WITH BODY MASS INDEX (BMI) OF 26 TO 26.9 IN ADULT: ICD-10-CM

## 2025-04-18 DIAGNOSIS — M51.372 DEGENERATION OF INTERVERTEBRAL DISC OF LUMBOSACRAL REGION WITH DISCOGENIC BACK PAIN AND LOWER EXTREMITY PAIN: Primary | ICD-10-CM

## 2025-04-18 PROCEDURE — 72082 X-RAY EXAM ENTIRE SPI 2/3 VW: CPT

## 2025-04-18 PROCEDURE — 72114 X-RAY EXAM L-S SPINE BENDING: CPT

## 2025-04-18 RX ORDER — SULFACETAMIDE SODIUM 100 MG/ML
SOLUTION/ DROPS OPHTHALMIC
COMMUNITY

## 2025-04-18 RX ORDER — TERBINAFINE HYDROCHLORIDE 250 MG/1
250 TABLET ORAL NIGHTLY
COMMUNITY
Start: 2025-03-31

## 2025-04-18 NOTE — PATIENT INSTRUCTIONS
"DASH Eating Plan  DASH stands for Dietary Approaches to Stop Hypertension. The DASH eating plan is a healthy eating plan that has been shown to:  Lower high blood pressure (hypertension).  Reduce your risk for type 2 diabetes, heart disease, and stroke.  Help with weight loss.  What are tips for following this plan?  Reading food labels  Check food labels for the amount of salt (sodium) per serving. Choose foods with less than 5 percent of the Daily Value (DV) of sodium. In general, foods with less than 300 milligrams (mg) of sodium per serving fit into this eating plan.  To find whole grains, look for the word \"whole\" as the first word in the ingredient list.  Shopping  Buy products labeled as \"low-sodium\" or \"no salt added.\"  Buy fresh foods. Avoid canned foods and pre-made or frozen meals.  Cooking  Try not to add salt when you cook. Use salt-free seasonings or herbs instead of table salt or sea salt. Check with your health care provider or pharmacist before using salt substitutes.  Do not coelho foods. Cook foods in healthy ways, such as baking, boiling, grilling, roasting, or broiling.  Cook using oils that are good for your heart. These include olive, canola, avocado, soybean, and sunflower oil.  Meal planning    Eat a balanced diet. This should include:  4 or more servings of fruits and 4 or more servings of vegetables each day. Try to fill half of your plate with fruits and vegetables.  6-8 servings of whole grains each day.  6 or less servings of lean meat, poultry, or fish each day. 1 oz is 1 serving. A 3 oz (85 g) serving of meat is about the same size as the palm of your hand. One egg is 1 oz (28 g).  2-3 servings of low-fat dairy each day. One serving is 1 cup (237 mL).  1 serving of nuts, seeds, or beans 5 times each week.  2-3 servings of heart-healthy fats. Healthy fats called omega-3 fatty acids are found in foods such as walnuts, flaxseeds, fortified milks, and eggs. These fats are also found in " cold-water fish, such as sardines, salmon, and mackerel.  Limit how much you eat of:  Canned or prepackaged foods.  Food that is high in trans fat, such as fried foods.  Food that is high in saturated fat, such as fatty meat.  Desserts and other sweets, sugary drinks, and other foods with added sugar.  Full-fat dairy products.  Do not salt foods before eating.  Do not eat more than 4 egg yolks a week.  Try to eat at least 2 vegetarian meals a week.  Eat more home-cooked food and less restaurant, buffet, and fast food.  Lifestyle  When eating at a restaurant, ask if your food can be made with less salt or no salt.  If you drink alcohol:  Limit how much you have to:  0-1 drink a day if you are female.  0-2 drinks a day if you are male.  Know how much alcohol is in your drink. In the [place] one drink is one 12 oz bottle of beer (355 mL), one 5 oz glass of wine (148 mL), or one 1½ oz glass of hard liquor (44 mL).  General information  Avoid eating more than 2,300 mg of salt a day. If you have hypertension, you may need to reduce your sodium intake to 1,500 mg a day.  Work with your provider to stay at a healthy body weight or lose weight. Ask what the best weight range is for you.  On most days of the week, get at least 30 minutes of exercise that causes your heart to beat faster. This may include walking, swimming, or biking.  Work with your provider or dietitian to adjust your eating plan to meet your specific calorie needs.  What foods should I eat?  Fruits  All fresh, dried, or frozen fruit. Canned fruits that are in their natural juice and do not have sugar added to them.  Vegetables  Fresh or frozen vegetables that are raw, steamed, roasted, or grilled. Low-sodium or reduced-sodium tomato and vegetable juice. Low-sodium or reduced-sodium tomato sauce and tomato paste. Low-sodium or reduced-sodium canned vegetables.  Grains  Whole-grain or whole-wheat bread. Whole-grain or whole-wheat pasta. Brown rice. Oatmeal.  Quinoa. Bulgur. Whole-grain and low-sodium cereals. Skylar bread. Low-fat, low-sodium crackers. Whole-wheat flour tortillas.  Meats and other proteins  Skinless chicken or turkey. Ground chicken or turkey. Pork with fat trimmed off. Fish and seafood. Egg whites. Dried beans, peas, or lentils. Unsalted nuts, nut butters, and seeds. Unsalted canned beans. Lean cuts of beef with fat trimmed off. Low-sodium, lean precooked or cured meat, such as sausages or meat loaves.  Dairy  Low-fat (1%) or fat-free (skim) milk. Reduced-fat, low-fat, or fat-free cheeses. Nonfat, low-sodium ricotta or cottage cheese. Low-fat or nonfat yogurt. Low-fat, low-sodium cheese.  Fats and oils  Soft margarine without trans fats. Vegetable oil. Reduced-fat, low-fat, or light mayonnaise and salad dressings (reduced-sodium). Canola, safflower, olive, avocado, soybean, and sunflower oils. Avocado.  Seasonings and condiments  Herbs. Spices. Seasoning mixes without salt.  Other foods  Unsalted popcorn and pretzels. Fat-free sweets.  The items listed above may not be all the foods and drinks you can have. Talk to a dietitian to learn more.  What foods should I avoid?  Fruits  Canned fruit in a light or heavy syrup. Fried fruit. Fruit in cream or butter sauce.  Vegetables  Creamed or fried vegetables. Vegetables in a cheese sauce. Regular canned vegetables that are not marked as low-sodium or reduced-sodium. Regular canned tomato sauce and paste that are not marked as low-sodium or reduced-sodium. Regular tomato and vegetable juices that are not marked as low-sodium or reduced-sodium. Pickles. Olives.  Grains  Baked goods made with fat, such as croissants, muffins, or some breads. Dry pasta or rice meal packs.  Meats and other proteins  Fatty cuts of meat. Ribs. Fried meat. [place]galde. Bologna, salami, and other precooked or cured meats, such as sausages or meat loaves, that are not lean and low in sodium. Fat from the back of a pig (fatback). [place].  Salted nuts and seeds. Canned beans with added salt. Canned or smoked fish. Whole eggs or egg yolks. Chicken or turkey with skin.  Dairy  Whole or 2% milk, cream, and half-and-half. Whole or full-fat cream cheese. Whole-fat or sweetened yogurt. Full-fat cheese. Nondairy creamers. Whipped toppings. Processed cheese and cheese spreads.  Fats and oils  Butter. Stick margarine. Lard. Shortening. Ghee. [place]galde fat. Tropical oils, such as coconut, palm kernel, or palm oil.  Seasonings and condiments  Onion salt, garlic salt, seasoned salt, table salt, and sea salt. Worcestershire sauce. Tartar sauce. Barbecue sauce. Teriyaki sauce. Soy sauce, including reduced-sodium soy sauce. Steak sauce. Canned and packaged gravies. Fish sauce. Oyster sauce. Cocktail sauce. Store-bought horseradish. Ketchup. Mustard. Meat flavorings and tenderizers. Bouillon cubes. Hot sauces. Pre-made or packaged marinades. Pre-made or packaged taco seasonings. Relishes. Regular salad dressings.  Other foods  Salted popcorn and pretzels.  The items listed above may not be all the foods and drinks you should avoid. Talk to a dietitian to learn more.  Where to find more information  National Heart, Lung, and Blood [place] (NHLBI): nhlbi.nih.gov  American Heart Association (AHA): heart.org  Academy of Nutrition and Dietetics: eatright.org  National Kidney Foundation (NKF): kidney.org  This information is not intended to replace advice given to you by your health care provider. Make sure you discuss any questions you have with your health care provider.  Document Revised: 01/04/2024 Document Reviewed: 01/04/2024  Elsevier Patient Education © 2024 [employer]BMI for Adults  Body mass index (BMI) is a number found using a person's weight and height. BMI can help tell how much of a person's weight is made up of fat. BMI does not measure body fat directly. It is used instead of tests that directly measure body fat, which can be difficult and  "expensive.  What are BMI measurements used for?  BMI is useful to:  Find out if your weight puts you at higher risk for medical problems.  Help recommend changes, such as in diet and exercise. This can help you reach a healthy weight. BMI screening can be done again to see if these changes are working.  How is BMI calculated?  Your height and weight are measured. The BMI is found from those numbers. This can be done with U[place] or metric measurements. Note that charts and online BMI calculators are available to help you find your BMI quickly and easily without doing these calculations.  To calculate your BMI in U[place] measurements:  Measure your weight in pounds (lb).  Multiply the number of pounds by 703.  So, for an adult who weighs 150 lb, multiply that number by 703: 150 x 703, which equals 105,450.  Measure your height in inches. Then multiply that number by itself to get a measurement called \"inches squared.\"  So, for an adult who is 70 inches tall, the \"inches squared\" measurement is 70 inches x 70 inches, which equals 4,900 inches squared.  Divide the total from step 2 (number of lb x 703) by the total from step 3 (inches squared): 105,450 ÷ 4,900 = 21.5. This is your BMI.  To calculate your BMI in metric measurements:    Measure your weight in kilograms (kg).  For this example, the weight is 70 kg.  Measure your height in meters (m). Then multiply that number by itself to get a measurement called \"meters squared.\"  So, for an adult who is 1.75 m tall, the \"meters squared\" measurement is 1.75 m x 1.75 m, which equals 3.1 meters squared.  Divide the number of kilograms (your weight) by the meters squared number. In this example: 70 ÷ 3.1 = 22.6. This is your BMI.  What do the results mean?  BMI charts are used to see if you are underweight, normal weight, overweight, or obese. The following guidelines will be used:  Underweight: BMI less than 18.5.  Normal weight: BMI between 18.5 and 24.9.  Overweight: BMI " between 25 and 29.9.  Obese: BMI of 30 or above.  BMI is a tool and cannot diagnose a condition. Talk with your health care provider about what your BMI means for you. Keep these notes in mind:  Weight includes fat and muscle. Someone with a muscular build, such as an athlete, may have a BMI that is higher than 24.9. In cases like these, BMI is not a correct measure of body fat.  If you have a BMI of 25 or higher, your provider may need to do more testing to find out if excess body fat is the cause.  BMI is measured the same way for males and females. Females usually have more body fat than males of the same height and weight.  Where to find more information  For more information about BMI, including tools to quickly find your BMI, go to:  Centers for Disease Control and Prevention: cdc.gov  American Heart Association: heart.org  National Heart, Lung, and Blood [place]: nhlbi.nih.gov  This information is not intended to replace advice given to you by your health care provider. Make sure you discuss any questions you have with your health care provider.  Document Revised: 09/07/2023 Document Reviewed: 08/31/2023  Elsevier Patient Education © 2024 Elsevier Inc.

## 2025-04-18 NOTE — TELEPHONE ENCOUNTER
Caller: VANE    Relationship: SELF    Best call back number: [phone]      What orders are you requesting (i.e. lab or imaging): RADHA Price  Walker Folding with Wheels (04/18/2025 11:32)       Where will you receive your lab/imaging services:  THE PATIENT WOULD LIKE THIS ORDER SENT TO:  [facility] -

## 2025-04-18 NOTE — PROGRESS NOTES
Chief complaint:   Chief Complaint   Patient presents with    Back Pain     Pt is here for constant lbp with bilateral leg numbness and tingling. Pt states she has not had any physical therapy, pain mgmt or seen chiropractor. Pt is in wheel chair today.        Subjective     HPI: This is a 89-year-old female patient who was referred to us by [facility] emergency room.  The patient states that on April 9, 2025 that she was lifting a case of protein shakes that she got from [facility] and started having onset of back pain.  The patient says that she did have a back surgery by Dr. Abdi in 2003 for back pain and right leg pain and did well from that surgery.  She said that she was doing well up until this lifting episode.  Currently pain in her back is constant but is worse with walking and standing and 80% better with laying or sitting down.  She has pain that radiates into her left buttocks that is constant as well with the same modifying factors.  She has not done any recent physical therapy or chiropractic care pain management injections.  She is right-hand dominant.  She is retired.  She is .  Denies any tobacco, alcohol, or illicit drug use.    Review of Systems   Constitutional:  Positive for activity change and appetite change.   HENT:  Positive for hearing loss.    Cardiovascular:  Positive for chest pain.   Musculoskeletal:  Positive for back pain.   Allergic/Immunologic: Positive for immunocompromised state.   Hematological:  Bruises/bleeds easily.   All other systems reviewed and are negative.       Past Medical History:   Diagnosis Date    Chronic shoulder pain     Clotting disorder     Conductive hearing loss     Disease of thyroid gland 1986    History of tonsillectomy 1965    Low back pain     Perforation of left tympanic membrane     Primary hypertension 12/15/2023     Past Surgical History:   Procedure Laterality Date    ADENOIDECTOMY  1965    CARDIAC CATHETERIZATION      COLONOSCOPY   "2011    diverticulosis in the sigmoid colon    COLONOSCOPY  2005    normal exam    TONSILLECTOMY  1965     Family History   Problem Relation Age of Onset    Clotting disorder Father     Heart attack Sister     Cancer Sister              Breast cancer Neg Hx     Cirrhosis Neg Hx     Colon cancer Neg Hx      Social History     Tobacco Use    Smoking status: Never     Passive exposure: Never    Smokeless tobacco: Never   Vaping Use    Vaping status: Never Used   Substance Use Topics    Alcohol use: Yes     Comment: occ    Drug use: Never     (Not in a hospital admission)    Allergies:  Amlodipine, Imdur [isosorbide nitrate], Lisinopril, Aleve [naproxen], Codeine, Hydralazine, Ibuprofen, Levofloxacin, Losartan, and Other    Objective      Vital Signs  Ht 160 cm (62.99\")   Wt 68 kg (150 lb)   LMP  (LMP Unknown)   BMI 26.58 kg/m²     Physical Exam  Constitutional:       General: She is awake.      Appearance: Normal appearance. She is well-developed.   HENT:      Head: Normocephalic.   Eyes:      General: Lids are normal.      Extraocular Movements: Extraocular movements intact.      Conjunctiva/sclera: Conjunctivae normal.      Pupils: Pupils are equal, round, and reactive to light.   Pulmonary:      Effort: Pulmonary effort is normal.      Breath sounds: Normal breath sounds.   Musculoskeletal:         General: Normal range of motion.      Cervical back: Normal range of motion.   Skin:     General: Skin is warm.   Neurological:      Mental Status: She is alert and oriented to person, place, and time.      GCS: GCS eye subscore is 4. GCS verbal subscore is 5. GCS motor subscore is 6.      Cranial Nerves: No cranial nerve deficit.      Sensory: No sensory deficit.      Motor: Motor strength is normal.     Deep Tendon Reflexes: Reflexes are normal and symmetric. Reflexes normal.   Psychiatric:         Speech: Speech normal.         Behavior: Behavior normal.         Thought Content: Thought content " normal.         Neurological Exam  Mental Status  Awake and alert. Oriented to person, place and time. Oriented to person, place, and time. Speech is normal. Language is fluent with no aphasia. Attention and concentration are normal.    Cranial Nerves  CN I: Sense of smell is normal.  CN II: Right normal visual field. Left normal visual field.  CN III, IV, VI: Extraocular movements intact bilaterally. Normal lids and orbits bilaterally. Pupils equal round and reactive to light bilaterally.  CN V: Facial sensation is normal.  CN VII:  Right: There is no facial weakness.  Left: There is no facial weakness.  CN XI: Shoulder shrug strength is normal.  CN XII: Tongue midline without atrophy or fasciculations.    Motor  Normal muscle bulk throughout. Normal muscle tone. Strength is 5/5 throughout all four extremities.    Sensory  Sensation is intact to light touch, pinprick, vibration and proprioception in all four extremities.    Reflexes  Deep tendon reflexes are 2+ and symmetric in all four extremities.    Gait  Normal casual, toe, heel and tandem gait.      Imaging review: CT scan of the lumbar spine that was done on April 14, 2025 shows degenerative scoliosis.  Severe disc degeneration is noted of L4-5.  Spinal stenosis is noted of L2-3 3-4 L4-5.  There is also a previous defect on the right at L4-5.        Assessment/Plan: The patient is complaining of acute onset of back pain in the lower back that radiates over to her left buttocks after doing lifting.  I am going to have her go for x-rays of the lumbar spine include standing flexion extension and scoliosis x-rays.  I will also set her up to do an MRI of the lumbar spine.  I will give her an order for walker to help with ambulation and to help her with her ADLs.  We will see her back after imaging is completed and make further recommendation at that time.      Patient is a nonsmoker  The patient's Body mass index is 26.58 kg/m².. BMI is above normal parameters.  Recommendations include: educational material and nutrition counseling  Advance Care Planning   ACP discussion was held with the patient during this visit. Patient has an advance directive in EMR which is still valid.   STEADI Fall Risk Assessment was completed, and patient is at MODERATE risk for falls. Assessment completed on:4/18/2025       Diagnoses and all orders for this visit:    1. 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 Other secondary scoliosis, thoracolumbar region  -     XR Spine Lumbar Complete With Flex & Ext  -     XR Spine Scoliosis 2 or 3 Views; Future  -     MRI Lumbar Spine Without Contrast; Future  -     Walker  Walker Folding with Wheels    3. Acute bilateral low back pain with left-sided sciatica  -     XR Spine Lumbar Complete With Flex & Ext  -     XR Spine Scoliosis 2 or 3 Views; Future  -     MRI Lumbar Spine Without Contrast; Future  -     Walker  Walker Folding with Wheels    4. Overweight with body mass index (BMI) of 26 to 26.9 in adult          I discussed the patients findings and my recommendations with patient    Kendall Pena, APRN  04/18/25  11:34 CDT

## 2025-04-21 ENCOUNTER — HOSPITAL ENCOUNTER (OUTPATIENT)
Dept: MRI IMAGING | Facility: HOSPITAL | Age: 89
Discharge: HOME | End: 2025-04-21
Admitting: NURSE PRACTITIONER
Payer: MEDICARE

## 2025-04-21 DIAGNOSIS — M41.55 OTHER SECONDARY SCOLIOSIS, THORACOLUMBAR REGION: ICD-10-CM

## 2025-04-21 DIAGNOSIS — M54.42 ACUTE BILATERAL LOW BACK PAIN WITH LEFT-SIDED SCIATICA: ICD-10-CM

## 2025-04-21 DIAGNOSIS — M51.372 DEGENERATION OF INTERVERTEBRAL DISC OF LUMBOSACRAL REGION WITH DISCOGENIC BACK PAIN AND LOWER EXTREMITY PAIN: ICD-10-CM

## 2025-04-21 PROCEDURE — 72148 MRI LUMBAR SPINE W/O DYE: CPT

## 2025-04-23 ENCOUNTER — TELEPHONE (OUTPATIENT)
Dept: NEUROSURGERY | Facility: CLINIC | Age: 89
End: 2025-04-23

## 2025-04-23 NOTE — TELEPHONE ENCOUNTER
Caller: Linda Spear    Relationship: Self    Best call back number: [phone]    What is the best time to reach you: ANYTIME    Who are you requesting to speak with (clinical staff, provider,  specific staff member): CLINICAL     Do you know the name of the person who called: NA    What was the call regarding: PATIENT CALLED AND STATES THAT SHE HAS COMPLETED ALL OF HER IMAGING-PATIENT ADVISED THE IMAGING IS AVAILABLE FOR [facility]-PATIENT IS WANTING TO SEE ABOUT COMING IN SOONER DUE TO HER PAIN-PATIENT STATES SHE NEEDS AFTERNOON APPTS. PATIENT ADVISED THE GEL AND PATCHES ARE NOT HELPING HER PAIN-PATIENT ASKING FOR A CALL BACK TO [facility] THANK YOU SENDING TO OFFICE TO [facility] OF CALL     Is it okay if the provider responds through MyChart:

## 2025-04-29 ENCOUNTER — PREP FOR SURGERY (OUTPATIENT)
Dept: OTHER | Facility: HOSPITAL | Age: 89
End: 2025-04-29
Payer: MEDICARE

## 2025-04-29 ENCOUNTER — ANESTHESIA EVENT (OUTPATIENT)
Dept: PERIOP | Facility: HOSPITAL | Age: 89
End: 2025-04-29
Payer: MEDICARE

## 2025-04-29 ENCOUNTER — OFFICE VISIT (OUTPATIENT)
Dept: NEUROSURGERY | Facility: CLINIC | Age: 89
End: 2025-04-29
Payer: MEDICARE

## 2025-04-29 ENCOUNTER — HOSPITAL ENCOUNTER (INPATIENT)
Facility: HOSPITAL | Age: 89
LOS: 2 days | Discharge: HOME | End: 2025-05-01
Attending: NEUROLOGICAL SURGERY | Admitting: NEUROLOGICAL SURGERY
Payer: MEDICARE

## 2025-04-29 VITALS — BODY MASS INDEX: 26.58 KG/M2 | WEIGHT: 150 LBS | HEIGHT: 63 IN

## 2025-04-29 DIAGNOSIS — S32.10XA CLOSED FRACTURE OF SACRUM, UNSPECIFIED PORTION OF SACRUM, INITIAL ENCOUNTER: Primary | ICD-10-CM

## 2025-04-29 DIAGNOSIS — E66.3 OVERWEIGHT WITH BODY MASS INDEX (BMI) OF 26 TO 26.9 IN ADULT: ICD-10-CM

## 2025-04-29 DIAGNOSIS — M54.42 ACUTE BILATERAL LOW BACK PAIN WITH LEFT-SIDED SCIATICA: ICD-10-CM

## 2025-04-29 DIAGNOSIS — M51.372 DEGENERATION OF INTERVERTEBRAL DISC OF LUMBOSACRAL REGION WITH DISCOGENIC BACK PAIN AND LOWER EXTREMITY PAIN: ICD-10-CM

## 2025-04-29 DIAGNOSIS — M41.55 OTHER SECONDARY SCOLIOSIS, THORACOLUMBAR REGION: ICD-10-CM

## 2025-04-29 DIAGNOSIS — S32.10XD CLOSED FRACTURE OF SACRUM WITH ROUTINE HEALING, UNSPECIFIED PORTION OF SACRUM, SUBSEQUENT ENCOUNTER: ICD-10-CM

## 2025-04-29 LAB
ALBUMIN SERPL-MCNC: 3.8 G/DL (ref 3.5–5.2)
ALBUMIN/GLOB SERPL: 1.6 G/DL
ALP SERPL-CCNC: 98 U/L (ref 39–117)
ALT SERPL W P-5'-P-CCNC: 14 U/L (ref 1–33)
ANION GAP SERPL CALCULATED.3IONS-SCNC: 13 MMOL/L (ref 5–15)
APTT PPP: 24.7 SECONDS (ref 24.5–36)
AST SERPL-CCNC: 17 U/L (ref 1–32)
BASOPHILS # BLD AUTO: 0.09 10*3/MM3 (ref 0–0.2)
BASOPHILS NFR BLD AUTO: 0.8 % (ref 0–1.5)
BILIRUB SERPL-MCNC: 0.3 MG/DL (ref 0–1.2)
BILIRUB UR QL STRIP: NEGATIVE
BUN SERPL-MCNC: 10 MG/DL (ref 8–23)
BUN/CREAT SERPL: 12 (ref 7–25)
CALCIUM SPEC-SCNC: 8.9 MG/DL (ref 8.6–10.5)
CHLORIDE SERPL-SCNC: 94 MMOL/L (ref 98–107)
CLARITY UR: CLEAR
CO2 SERPL-SCNC: 26 MMOL/L (ref 22–29)
COLOR UR: YELLOW
CREAT SERPL-MCNC: 0.83 MG/DL (ref 0.57–1)
DEPRECATED RDW RBC AUTO: 45.9 FL (ref 37–54)
EGFRCR SERPLBLD CKD-EPI 2021: 67.9 ML/MIN/1.73
EOSINOPHIL # BLD AUTO: 0.25 10*3/MM3 (ref 0–0.4)
EOSINOPHIL NFR BLD AUTO: 2.2 % (ref 0.3–6.2)
ERYTHROCYTE [DISTWIDTH] IN BLOOD BY AUTOMATED COUNT: 15.1 % (ref 12.3–15.4)
GLOBULIN UR ELPH-MCNC: 2.4 GM/DL
GLUCOSE SERPL-MCNC: 119 MG/DL (ref 65–99)
GLUCOSE UR STRIP-MCNC: NEGATIVE MG/DL
HCT VFR BLD AUTO: 41.5 % (ref 34–46.6)
HGB BLD-MCNC: 13.1 G/DL (ref 12–15.9)
HGB UR QL STRIP.AUTO: NEGATIVE
IMM GRANULOCYTES # BLD AUTO: 0.12 10*3/MM3 (ref 0–0.05)
IMM GRANULOCYTES NFR BLD AUTO: 1 % (ref 0–0.5)
INR PPP: 0.99 (ref 0.91–1.09)
KETONES UR QL STRIP: NEGATIVE
LEUKOCYTE ESTERASE UR QL STRIP.AUTO: NEGATIVE
LYMPHOCYTES # BLD AUTO: 2.49 10*3/MM3 (ref 0.7–3.1)
LYMPHOCYTES NFR BLD AUTO: 21.5 % (ref 19.6–45.3)
MCH RBC QN AUTO: 26.6 PG (ref 26.6–33)
MCHC RBC AUTO-ENTMCNC: 31.6 G/DL (ref 31.5–35.7)
MCV RBC AUTO: 84.2 FL (ref 79–97)
MONOCYTES # BLD AUTO: 1.31 10*3/MM3 (ref 0.1–0.9)
MONOCYTES NFR BLD AUTO: 11.3 % (ref 5–12)
NEUTROPHILS NFR BLD AUTO: 63.2 % (ref 42.7–76)
NEUTROPHILS NFR BLD AUTO: 7.34 10*3/MM3 (ref 1.7–7)
NITRITE UR QL STRIP: NEGATIVE
NRBC BLD AUTO-RTO: 0 /100 WBC (ref 0–0.2)
PH UR STRIP.AUTO: 7 [PH] (ref 5–8)
PLATELET # BLD AUTO: 296 10*3/MM3 (ref 140–450)
PMV BLD AUTO: 9 FL (ref 6–12)
POTASSIUM SERPL-SCNC: 3.7 MMOL/L (ref 3.5–5.2)
PROT SERPL-MCNC: 6.2 G/DL (ref 6–8.5)
PROT UR QL STRIP: NEGATIVE
PROTHROMBIN TIME: 13.5 SECONDS (ref 11.8–14.8)
RBC # BLD AUTO: 4.93 10*6/MM3 (ref 3.77–5.28)
SODIUM SERPL-SCNC: 133 MMOL/L (ref 136–145)
SP GR UR STRIP: <=1.005 (ref 1–1.03)
UROBILINOGEN UR QL STRIP: NORMAL
WBC NRBC COR # BLD AUTO: 11.6 10*3/MM3 (ref 3.4–10.8)

## 2025-04-29 PROCEDURE — 85610 PROTHROMBIN TIME: CPT | Performed by: NURSE PRACTITIONER

## 2025-04-29 PROCEDURE — 99222 1ST HOSP IP/OBS MODERATE 55: CPT | Performed by: NURSE PRACTITIONER

## 2025-04-29 PROCEDURE — 81003 URINALYSIS AUTO W/O SCOPE: CPT | Performed by: NURSE PRACTITIONER

## 2025-04-29 PROCEDURE — 25810000003 SODIUM CHLORIDE 0.9 % SOLUTION: Performed by: NURSE PRACTITIONER

## 2025-04-29 PROCEDURE — 85730 THROMBOPLASTIN TIME PARTIAL: CPT | Performed by: NURSE PRACTITIONER

## 2025-04-29 PROCEDURE — 80053 COMPREHEN METABOLIC PANEL: CPT | Performed by: NURSE PRACTITIONER

## 2025-04-29 PROCEDURE — 85025 COMPLETE CBC W/AUTO DIFF WBC: CPT | Performed by: NURSE PRACTITIONER

## 2025-04-29 RX ORDER — BISACODYL 5 MG/1
5 TABLET, DELAYED RELEASE ORAL DAILY PRN
Status: DISCONTINUED | OUTPATIENT
Start: 2025-04-29 | End: 2025-04-30

## 2025-04-29 RX ORDER — POLYETHYLENE GLYCOL 3350 17 G/17G
17 POWDER, FOR SOLUTION ORAL DAILY PRN
Status: DISCONTINUED | OUTPATIENT
Start: 2025-04-29 | End: 2025-04-30

## 2025-04-29 RX ORDER — LEVOTHYROXINE SODIUM 112 UG/1
112 TABLET ORAL
COMMUNITY

## 2025-04-29 RX ORDER — SODIUM CHLORIDE 9 MG/ML
50 INJECTION, SOLUTION INTRAVENOUS CONTINUOUS
Status: DISCONTINUED | OUTPATIENT
Start: 2025-04-29 | End: 2025-04-30

## 2025-04-29 RX ORDER — METOPROLOL SUCCINATE 25 MG/1
25 TABLET, EXTENDED RELEASE ORAL
Status: DISCONTINUED | OUTPATIENT
Start: 2025-04-29 | End: 2025-04-30

## 2025-04-29 RX ORDER — BISACODYL 10 MG
10 SUPPOSITORY, RECTAL RECTAL DAILY PRN
Status: DISCONTINUED | OUTPATIENT
Start: 2025-04-29 | End: 2025-04-30

## 2025-04-29 RX ORDER — NITROGLYCERIN 0.4 MG/1
0.4 TABLET SUBLINGUAL
COMMUNITY

## 2025-04-29 RX ORDER — ONDANSETRON 2 MG/ML
4 INJECTION INTRAMUSCULAR; INTRAVENOUS EVERY 6 HOURS PRN
Status: DISCONTINUED | OUTPATIENT
Start: 2025-04-29 | End: 2025-04-30

## 2025-04-29 RX ORDER — ACETAMINOPHEN 650 MG/1
650 SUPPOSITORY RECTAL EVERY 4 HOURS PRN
Status: DISCONTINUED | OUTPATIENT
Start: 2025-04-29 | End: 2025-04-30

## 2025-04-29 RX ORDER — ACETAMINOPHEN 160 MG/5ML
650 SOLUTION ORAL EVERY 4 HOURS PRN
Status: DISCONTINUED | OUTPATIENT
Start: 2025-04-29 | End: 2025-04-30

## 2025-04-29 RX ORDER — METOPROLOL SUCCINATE 25 MG/1
50 TABLET, EXTENDED RELEASE ORAL EVERY EVENING
COMMUNITY

## 2025-04-29 RX ORDER — LEVOTHYROXINE SODIUM 112 UG/1
112 TABLET ORAL
Status: DISCONTINUED | OUTPATIENT
Start: 2025-04-30 | End: 2025-04-30

## 2025-04-29 RX ORDER — ACETAMINOPHEN 325 MG/1
650 TABLET ORAL EVERY 4 HOURS PRN
Status: DISCONTINUED | OUTPATIENT
Start: 2025-04-29 | End: 2025-04-30

## 2025-04-29 RX ORDER — AMOXICILLIN 250 MG
2 CAPSULE ORAL 2 TIMES DAILY PRN
Status: DISCONTINUED | OUTPATIENT
Start: 2025-04-29 | End: 2025-04-30

## 2025-04-29 RX ORDER — METOPROLOL SUCCINATE 25 MG/1
25 TABLET, EXTENDED RELEASE ORAL EVERY MORNING
COMMUNITY

## 2025-04-29 RX ORDER — TRAMADOL HYDROCHLORIDE 50 MG/1
50 TABLET ORAL EVERY 6 HOURS PRN
Status: DISCONTINUED | OUTPATIENT
Start: 2025-04-29 | End: 2025-04-30

## 2025-04-29 RX ORDER — SODIUM CHLORIDE 0.9 % (FLUSH) 0.9 %
10 SYRINGE (ML) INJECTION AS NEEDED
Status: DISCONTINUED | OUTPATIENT
Start: 2025-04-29 | End: 2025-04-30

## 2025-04-29 RX ORDER — METOPROLOL SUCCINATE 50 MG/1
50 TABLET, EXTENDED RELEASE ORAL NIGHTLY
Status: DISCONTINUED | OUTPATIENT
Start: 2025-04-29 | End: 2025-04-30

## 2025-04-29 RX ORDER — SODIUM CHLORIDE 0.9 % (FLUSH) 0.9 %
10 SYRINGE (ML) INJECTION EVERY 12 HOURS SCHEDULED
Status: DISCONTINUED | OUTPATIENT
Start: 2025-04-29 | End: 2025-04-30

## 2025-04-29 RX ORDER — SODIUM CHLORIDE 9 MG/ML
40 INJECTION, SOLUTION INTRAVENOUS AS NEEDED
Status: DISCONTINUED | OUTPATIENT
Start: 2025-04-29 | End: 2025-04-30

## 2025-04-29 RX ORDER — CALCIUM CARBONATE 500 MG/1
2 TABLET, CHEWABLE ORAL 2 TIMES DAILY PRN
Status: DISCONTINUED | OUTPATIENT
Start: 2025-04-29 | End: 2025-04-30

## 2025-04-29 RX ADMIN — SODIUM CHLORIDE 50 ML/HR: 9 INJECTION, SOLUTION INTRAVENOUS at 14:51

## 2025-04-29 RX ADMIN — ACETAMINOPHEN 650 MG: 325 TABLET, FILM COATED ORAL at 19:14

## 2025-04-29 RX ADMIN — Medication 10 ML: at 21:08

## 2025-04-29 RX ADMIN — METOPROLOL SUCCINATE 50 MG: 50 TABLET, FILM COATED, EXTENDED RELEASE ORAL at 21:07

## 2025-04-29 NOTE — PROGRESS NOTES
Chief complaint:   Chief Complaint   Patient presents with    Back Pain     Pt is here for f/u back and left buttocks states her pain is terrible       Subjective     HPI: This is a 89-year-old female patient who was referred to us by [facility] emergency room.  The patient states that on April 9, 2025 that she was lifting a case of protein shakes that she got from [facility] and started having onset of back pain.  The patient says that she did have a back surgery by Dr. Abdi in 2003 for back pain and right leg pain and did well from that surgery.  She said that she was doing well up until this lifting episode.  Currently pain in her back is constant but is worse with walking and standing and 80% better with laying or sitting down.  She has pain that radiates into her left buttocks that is constant as well with the same modifying factors.  She has not done any recent physical therapy or chiropractic care pain management injections.  She is right-hand dominant.  She is retired.  She is .  Denies any tobacco, alcohol, or illicit drug use.  Rates her pain on a scale 0-10 out of 10.    We did send the patient for x-rays of the lumbar spine as well as an MRI of the lumbar spine.  She is here in follow-up today.  The visit since she was here last she has not made any improvement.  She continues to complain of pain in her left buttocks region.  She can get some improvement with over-the-counter Tylenol.    Review of Systems   Constitutional:  Positive for activity change and appetite change.   HENT:  Positive for hearing loss.    Cardiovascular:  Positive for chest pain.   Musculoskeletal:  Positive for back pain.   Allergic/Immunologic: Positive for immunocompromised state.   Hematological:  Bruises/bleeds easily.   All other systems reviewed and are negative.       Past Medical History:   Diagnosis Date    Chronic shoulder pain     Clotting disorder     Conductive hearing loss     Disease of thyroid gland 1986  "   History of tonsillectomy 1965    Low back pain     Perforation of left tympanic membrane     Primary hypertension 12/15/2023     Past Surgical History:   Procedure Laterality Date    ADENOIDECTOMY  1965    CARDIAC CATHETERIZATION      COLONOSCOPY  2011    diverticulosis in the sigmoid colon    COLONOSCOPY  2005    normal exam    TONSILLECTOMY  1965     Family History   Problem Relation Age of Onset    Clotting disorder Father     Heart attack Sister     Cancer Sister              Breast cancer Neg Hx     Cirrhosis Neg Hx     Colon cancer Neg Hx      Social History     Tobacco Use    Smoking status: Never     Passive exposure: Never    Smokeless tobacco: Never   Vaping Use    Vaping status: Never Used   Substance Use Topics    Alcohol use: Yes     Comment: occ    Drug use: Never     (Not in a hospital admission)    Allergies:  Amlodipine, Imdur [isosorbide nitrate], Lisinopril, Aleve [naproxen], Codeine, Hydralazine, Ibuprofen, Levofloxacin, Losartan, and Other    Objective      Vital Signs  Ht 160 cm (62.99\")   Wt 68 kg (150 lb)   LMP  (LMP Unknown)   BMI 26.58 kg/m²     Physical Exam  Constitutional:       General: She is awake.      Appearance: Normal appearance. She is well-developed.   HENT:      Head: Normocephalic.   Eyes:      General: Lids are normal.      Extraocular Movements: Extraocular movements intact.      Conjunctiva/sclera: Conjunctivae normal.      Pupils: Pupils are equal, round, and reactive to light.   Pulmonary:      Effort: Pulmonary effort is normal.      Breath sounds: Normal breath sounds.   Musculoskeletal:         General: Normal range of motion.      Cervical back: Normal range of motion.   Skin:     General: Skin is warm.   Neurological:      Mental Status: She is alert and oriented to person, place, and time.      GCS: GCS eye subscore is 4. GCS verbal subscore is 5. GCS motor subscore is 6.      Cranial Nerves: No cranial nerve deficit.      Sensory: No " sensory deficit.      Motor: Motor strength is normal.     Deep Tendon Reflexes: Reflexes are normal and symmetric. Reflexes normal.   Psychiatric:         Speech: Speech normal.         Behavior: Behavior normal.         Thought Content: Thought content normal.         Neurological Exam  Mental Status  Awake and alert. Oriented to person, place and time. Oriented to person, place, and time. Speech is normal. Language is fluent with no aphasia. Attention and concentration are normal.    Cranial Nerves  CN I: Sense of smell is normal.  CN II: Right normal visual field. Left normal visual field.  CN III, IV, VI: Extraocular movements intact bilaterally. Normal lids and orbits bilaterally. Pupils equal round and reactive to light bilaterally.  CN V: Facial sensation is normal.  CN VII:  Right: There is no facial weakness.  Left: There is no facial weakness.  CN XI: Shoulder shrug strength is normal.  CN XII: Tongue midline without atrophy or fasciculations.    Motor  Normal muscle bulk throughout. Normal muscle tone. Strength is 5/5 throughout all four extremities.    Sensory  Sensation is intact to light touch, pinprick, vibration and proprioception in all four extremities.    Reflexes  Deep tendon reflexes are 2+ and symmetric in all four extremities.    Gait  Normal casual, toe, heel and tandem gait.      Imaging review: X-ray of the lumbar spine does show disc degeneration and a degenerative scoliosis deformity.  No acute fractures noted in the lumbar spine.        MRI of the lumbar spine that was done on April 21, 2025 shows of the left sacral insufficiency fracture.  At L5-S1 moderate bilateral foraminal narrowing is noted.  Disc degeneration is noted of L4-5.  At L3-4 moderate lumbar stenosis with right sided foraminal narrowing.  At L2-3 left-sided foraminal narrowing.  Scoliosis deformity is noted.        DEXA scan that was done in February 2021 shows osteopenia    Assessment/Plan: Dr. Solitario has reviewed the  imaging and did come in and discussed with the patient.  The patient does have a left sacral insufficiency fracture.  This is responsible for the pain that she is experiencing.  The patient is in excruciating amount of pain.  I did discuss this patient with Dr. Solitario and he did come in and discussed this with the patient.  This felt the patient would need to be directly admitted to the hospital for oral and IV pain medication to help good control of her pain issues while we continue to work on trying to mobilize the patient.  Her questions and concerns were addressed.  Patient is a nonsmoker  The patient's Body mass index is 26.58 kg/m².. BMI is above normal parameters. Recommendations include: educational material and nutrition counseling  Advance Care Planning   ACP discussion was held with the patient during this visit. Patient has an advance directive in EMR which is still valid.   STEADI Fall Risk Assessment was completed, and patient is at MODERATE risk for falls. Assessment completed on:4/29/2025       Diagnoses and all orders for this visit:    1. Closed fracture of sacrum, unspecified portion of sacrum, initial encounter (Primary)    2. 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 Other secondary scoliosis, thoracolumbar region    4. Acute bilateral low back pain with left-sided sciatica    5. Overweight with body mass index (BMI) of 26 to 26.9 in adult          I discussed the patients findings and my recommendations with patient    Kendall Pena, MARGARETTE  04/29/25  12:21 CDT

## 2025-04-29 NOTE — PLAN OF CARE
Goal Outcome Evaluation: Patient is alert and oriented, VICTOR, c/o Left buttock, SCD placed, On RA VSS. Admitted from Dr. Gonzales office. NPO after MN for planned surgery.                                             no

## 2025-04-29 NOTE — H&P
Chief complaint:        Chief Complaint   Patient presents with    Back Pain       Pt is here for f/u back and left buttocks states her pain is terrible         Subjective  HPI: This is a 89-year-old female patient who was referred to us by [facility] emergency room.  The patient states that on April 9, 2025 that she was lifting a case of protein shakes that she got from [facility] and started having onset of back pain.  The patient says that she did have a back surgery by Dr. Abdi in 2003 for back pain and right leg pain and did well from that surgery.  She said that she was doing well up until this lifting episode.  Currently pain in her back is constant but is worse with walking and standing and 80% better with laying or sitting down.  She has pain that radiates into her left buttocks that is constant as well with the same modifying factors.  She has not done any recent physical therapy or chiropractic care pain management injections.  She is right-hand dominant.  She is retired.  She is .  Denies any tobacco, alcohol, or illicit drug use.  Rates her pain on a scale 0-10 out of 10.     We did send the patient for x-rays of the lumbar spine as well as an MRI of the lumbar spine.  She is here in follow-up today.  The visit since she was here last she has not made any improvement.  She continues to complain of pain in her left buttocks region.  She can get some improvement with over-the-counter Tylenol.     Review of Systems   Constitutional:  Positive for activity change and appetite change.   HENT:  Positive for hearing loss.    Cardiovascular:  Positive for chest pain.   Musculoskeletal:  Positive for back pain.   Allergic/Immunologic: Positive for immunocompromised state.   Hematological:  Bruises/bleeds easily.   All other systems reviewed and are negative.        Medical History        Past Medical History:   Diagnosis Date    Chronic shoulder pain      Clotting disorder      Conductive hearing loss    "   Disease of thyroid gland 1986    History of tonsillectomy 1965    Low back pain      Perforation of left tympanic membrane      Primary hypertension 12/15/2023         Surgical History         Past Surgical History:   Procedure Laterality Date    ADENOIDECTOMY   1965    CARDIAC CATHETERIZATION        COLONOSCOPY   2011     diverticulosis in the sigmoid colon    COLONOSCOPY   2005     normal exam    TONSILLECTOMY   1965               Family History   Problem Relation Age of Onset    Clotting disorder Father      Heart attack Sister      Cancer Sister                Breast cancer Neg Hx      Cirrhosis Neg Hx      Colon cancer Neg Hx        Social History   Social History            Tobacco Use    Smoking status: Never       Passive exposure: Never    Smokeless tobacco: Never   Vaping Use    Vaping status: Never Used   Substance Use Topics    Alcohol use: Yes       Comment: occ    Drug use: Never           Prescriptions Prior to Admission   (Not in a hospital admission)      Allergies:  Amlodipine, Imdur [isosorbide nitrate], Lisinopril, Aleve [naproxen], Codeine, Hydralazine, Ibuprofen, Levofloxacin, Losartan, and Other     Objective  Vital Signs  Ht 160 cm (62.99\")   Wt 68 kg (150 lb)   LMP  (LMP Unknown)   BMI 26.58 kg/m²      Physical Exam  Constitutional:       General: She is awake.      Appearance: Normal appearance. She is well-developed.   HENT:      Head: Normocephalic.   Eyes:      General: Lids are normal.      Extraocular Movements: Extraocular movements intact.      Conjunctiva/sclera: Conjunctivae normal.      Pupils: Pupils are equal, round, and reactive to light.   Pulmonary:      Effort: Pulmonary effort is normal.      Breath sounds: Normal breath sounds.   Musculoskeletal:         General: Normal range of motion.      Cervical back: Normal range of motion.   Skin:     General: Skin is warm.   Neurological:      Mental Status: She is alert and oriented to person, place, and " time.      GCS: GCS eye subscore is 4. GCS verbal subscore is 5. GCS motor subscore is 6.      Cranial Nerves: No cranial nerve deficit.      Sensory: No sensory deficit.      Motor: Motor strength is normal.     Deep Tendon Reflexes: Reflexes are normal and symmetric. Reflexes normal.   Psychiatric:         Speech: Speech normal.         Behavior: Behavior normal.         Thought Content: Thought content normal.            Neurological Exam  Mental Status  Awake and alert. Oriented to person, place and time. Oriented to person, place, and time. Speech is normal. Language is fluent with no aphasia. Attention and concentration are normal.     Cranial Nerves  CN I: Sense of smell is normal.  CN II: Right normal visual field. Left normal visual field.  CN III, IV, VI: Extraocular movements intact bilaterally. Normal lids and orbits bilaterally. Pupils equal round and reactive to light bilaterally.  CN V: Facial sensation is normal.  CN VII:  Right: There is no facial weakness.  Left: There is no facial weakness.  CN XI: Shoulder shrug strength is normal.  CN XII: Tongue midline without atrophy or fasciculations.     Motor  Normal muscle bulk throughout. Normal muscle tone. Strength is 5/5 throughout all four extremities.     Sensory  Sensation is intact to light touch, pinprick, vibration and proprioception in all four extremities.     Reflexes  Deep tendon reflexes are 2+ and symmetric in all four extremities.     Gait  Normal casual, toe, heel and tandem gait.        Imaging review: X-ray of the lumbar spine does show disc degeneration and a degenerative scoliosis deformity.  No acute fractures noted in the lumbar spine.          MRI of the lumbar spine that was done on April 21, 2025 shows of the left sacral insufficiency fracture.  At L5-S1 moderate bilateral foraminal narrowing is noted.  Disc degeneration is noted of L4-5.  At L3-4 moderate lumbar stenosis with right sided foraminal narrowing.  At L2-3 left-sided  foraminal narrowing.  Scoliosis deformity is noted.          DEXA scan that was done in February 2021 shows osteopenia     Assessment/Plan: Dr. Solitario has reviewed the imaging and did come in and discussed with the patient.  The patient does have a left sacral insufficiency fracture.  This is responsible for the pain that she is experiencing.  The patient is in excruciating amount of pain.  I did discuss this patient with Dr. Solitario and he did come in and discussed this with the patient.  This felt the patient would need to be directly admitted to the hospital for oral and IV pain medication to help good control of her pain issues while we continue to work on trying to mobilize the patient.  Will plan to take the patient to the operating room tomorrow for a left sacroplasty to try and help with her pain and get her symptoms under better control.  The patient will be evaluated by physical and Occupational Therapy as well.  Her questions and concerns were addressed.  Dr. Solitario has gone over risk and benefits of the procedure with the patient.  The patient's Body mass index is 26.58 kg/m².. BMI is above normal parameters. Recommendations include: educational material and nutrition counseling  Advance Care Planning  ACP discussion was held with the patient during this visit. Patient has an advance directive in EMR which is still valid.   STEADI Fall Risk Assessment was completed, and patient is at MODERATE risk for falls. Assessment completed on:4/29/2025         Diagnoses and all orders for this visit:     1. Closed fracture of sacrum, unspecified portion of sacrum, initial encounter (Primary)     2. 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 Other secondary scoliosis, thoracolumbar region     4. Acute bilateral low back pain with left-sided sciatica     5. Overweight with body mass index (BMI) of 26 to 26.9 in adult

## 2025-04-29 NOTE — PATIENT INSTRUCTIONS
"DASH Eating Plan  DASH stands for Dietary Approaches to Stop Hypertension. The DASH eating plan is a healthy eating plan that has been shown to:  Lower high blood pressure (hypertension).  Reduce your risk for type 2 diabetes, heart disease, and stroke.  Help with weight loss.  What are tips for following this plan?  Reading food labels  Check food labels for the amount of salt (sodium) per serving. Choose foods with less than 5 percent of the Daily Value (DV) of sodium. In general, foods with less than 300 milligrams (mg) of sodium per serving fit into this eating plan.  To find whole grains, look for the word \"whole\" as the first word in the ingredient list.  Shopping  Buy products labeled as \"low-sodium\" or \"no salt added.\"  Buy fresh foods. Avoid canned foods and pre-made or frozen meals.  Cooking  Try not to add salt when you cook. Use salt-free seasonings or herbs instead of table salt or sea salt. Check with your health care provider or pharmacist before using salt substitutes.  Do not coelho foods. Cook foods in healthy ways, such as baking, boiling, grilling, roasting, or broiling.  Cook using oils that are good for your heart. These include olive, canola, avocado, soybean, and sunflower oil.  Meal planning    Eat a balanced diet. This should include:  4 or more servings of fruits and 4 or more servings of vegetables each day. Try to fill half of your plate with fruits and vegetables.  6-8 servings of whole grains each day.  6 or less servings of lean meat, poultry, or fish each day. 1 oz is 1 serving. A 3 oz (85 g) serving of meat is about the same size as the palm of your hand. One egg is 1 oz (28 g).  2-3 servings of low-fat dairy each day. One serving is 1 cup (237 mL).  1 serving of nuts, seeds, or beans 5 times each week.  2-3 servings of heart-healthy fats. Healthy fats called omega-3 fatty acids are found in foods such as walnuts, flaxseeds, fortified milks, and eggs. These fats are also found in " cold-water fish, such as sardines, salmon, and mackerel.  Limit how much you eat of:  Canned or prepackaged foods.  Food that is high in trans fat, such as fried foods.  Food that is high in saturated fat, such as fatty meat.  Desserts and other sweets, sugary drinks, and other foods with added sugar.  Full-fat dairy products.  Do not salt foods before eating.  Do not eat more than 4 egg yolks a week.  Try to eat at least 2 vegetarian meals a week.  Eat more home-cooked food and less restaurant, buffet, and fast food.  Lifestyle  When eating at a restaurant, ask if your food can be made with less salt or no salt.  If you drink alcohol:  Limit how much you have to:  0-1 drink a day if you are female.  0-2 drinks a day if you are male.  Know how much alcohol is in your drink. In the [place] one drink is one 12 oz bottle of beer (355 mL), one 5 oz glass of wine (148 mL), or one 1½ oz glass of hard liquor (44 mL).  General information  Avoid eating more than 2,300 mg of salt a day. If you have hypertension, you may need to reduce your sodium intake to 1,500 mg a day.  Work with your provider to stay at a healthy body weight or lose weight. Ask what the best weight range is for you.  On most days of the week, get at least 30 minutes of exercise that causes your heart to beat faster. This may include walking, swimming, or biking.  Work with your provider or dietitian to adjust your eating plan to meet your specific calorie needs.  What foods should I eat?  Fruits  All fresh, dried, or frozen fruit. Canned fruits that are in their natural juice and do not have sugar added to them.  Vegetables  Fresh or frozen vegetables that are raw, steamed, roasted, or grilled. Low-sodium or reduced-sodium tomato and vegetable juice. Low-sodium or reduced-sodium tomato sauce and tomato paste. Low-sodium or reduced-sodium canned vegetables.  Grains  Whole-grain or whole-wheat bread. Whole-grain or whole-wheat pasta. Brown rice. Oatmeal.  Quinoa. Bulgur. Whole-grain and low-sodium cereals. Skylar bread. Low-fat, low-sodium crackers. Whole-wheat flour tortillas.  Meats and other proteins  Skinless chicken or turkey. Ground chicken or turkey. Pork with fat trimmed off. Fish and seafood. Egg whites. Dried beans, peas, or lentils. Unsalted nuts, nut butters, and seeds. Unsalted canned beans. Lean cuts of beef with fat trimmed off. Low-sodium, lean precooked or cured meat, such as sausages or meat loaves.  Dairy  Low-fat (1%) or fat-free (skim) milk. Reduced-fat, low-fat, or fat-free cheeses. Nonfat, low-sodium ricotta or cottage cheese. Low-fat or nonfat yogurt. Low-fat, low-sodium cheese.  Fats and oils  Soft margarine without trans fats. Vegetable oil. Reduced-fat, low-fat, or light mayonnaise and salad dressings (reduced-sodium). Canola, safflower, olive, avocado, soybean, and sunflower oils. Avocado.  Seasonings and condiments  Herbs. Spices. Seasoning mixes without salt.  Other foods  Unsalted popcorn and pretzels. Fat-free sweets.  The items listed above may not be all the foods and drinks you can have. Talk to a dietitian to learn more.  What foods should I avoid?  Fruits  Canned fruit in a light or heavy syrup. Fried fruit. Fruit in cream or butter sauce.  Vegetables  Creamed or fried vegetables. Vegetables in a cheese sauce. Regular canned vegetables that are not marked as low-sodium or reduced-sodium. Regular canned tomato sauce and paste that are not marked as low-sodium or reduced-sodium. Regular tomato and vegetable juices that are not marked as low-sodium or reduced-sodium. Pickles. Olives.  Grains  Baked goods made with fat, such as croissants, muffins, or some breads. Dry pasta or rice meal packs.  Meats and other proteins  Fatty cuts of meat. Ribs. Fried meat. [place]galde. Bologna, salami, and other precooked or cured meats, such as sausages or meat loaves, that are not lean and low in sodium. Fat from the back of a pig (fatback). [place].  Salted nuts and seeds. Canned beans with added salt. Canned or smoked fish. Whole eggs or egg yolks. Chicken or turkey with skin.  Dairy  Whole or 2% milk, cream, and half-and-half. Whole or full-fat cream cheese. Whole-fat or sweetened yogurt. Full-fat cheese. Nondairy creamers. Whipped toppings. Processed cheese and cheese spreads.  Fats and oils  Butter. Stick margarine. Lard. Shortening. Ghee. Ugalde fat. Tropical oils, such as coconut, palm kernel, or palm oil.  Seasonings and condiments  Onion salt, garlic salt, seasoned salt, table salt, and sea salt. Worcestershire sauce. Tartar sauce. Barbecue sauce. Teriyaki sauce. Soy sauce, including reduced-sodium soy sauce. Steak sauce. Canned and packaged gravies. Fish sauce. Oyster sauce. Cocktail sauce. Store-bought horseradish. Ketchup. Mustard. Meat flavorings and tenderizers. Bouillon cubes. Hot sauces. Pre-made or packaged marinades. Pre-made or packaged taco seasonings. Relishes. Regular salad dressings.  Other foods  Salted popcorn and pretzels.  The items listed above may not be all the foods and drinks you should avoid. Talk to a dietitian to learn more.  Where to find more information  National Heart, Lung, and Blood [place] (NHLBI): nhlbi.nih.gov  American Heart Association (AHA): heart.org  Academy of Nutrition and Dietetics: eatright.org  National Kidney Foundation (NKF): kidney.org  This information is not intended to replace advice given to you by your health care provider. Make sure you discuss any questions you have with your health care provider.  Document Revised: 01/04/2024 Document Reviewed: 01/04/2024  Elsevier Patient Education © 2024 [employer]BMI for Adults  Body mass index (BMI) is a number found using a person's weight and height. BMI can help tell how much of a person's weight is made up of fat. BMI does not measure body fat directly. It is used instead of tests that directly measure body fat, which can be difficult and  "expensive.  What are BMI measurements used for?  BMI is useful to:  Find out if your weight puts you at higher risk for medical problems.  Help recommend changes, such as in diet and exercise. This can help you reach a healthy weight. BMI screening can be done again to see if these changes are working.  How is BMI calculated?  Your height and weight are measured. The BMI is found from those numbers. This can be done with [place] or metric measurements. Note that charts and online BMI calculators are available to help you find your BMI quickly and easily without doing these calculations.  To calculate your BMI in [place] measurements:  Measure your weight in pounds (lb).  Multiply the number of pounds by 703.  So, for an adult who weighs 150 lb, multiply that number by 703: 150 x 703, which equals 105,450.  Measure your height in inches. Then multiply that number by itself to get a measurement called \"inches squared.\"  So, for an adult who is 70 inches tall, the \"inches squared\" measurement is 70 inches x 70 inches, which equals 4,900 inches squared.  Divide the total from step 2 (number of lb x 703) by the total from step 3 (inches squared): 105,450 ÷ 4,900 = 21.5. This is your BMI.  To calculate your BMI in metric measurements:    Measure your weight in kilograms (kg).  For this example, the weight is 70 kg.  Measure your height in meters (m). Then multiply that number by itself to get a measurement called \"meters squared.\"  So, for an adult who is 1.75 m tall, the \"meters squared\" measurement is 1.75 m x 1.75 m, which equals 3.1 meters squared.  Divide the number of kilograms (your weight) by the meters squared number. In this example: 70 ÷ 3.1 = 22.6. This is your BMI.  What do the results mean?  BMI charts are used to see if you are underweight, normal weight, overweight, or obese. The following guidelines will be used:  Underweight: BMI less than 18.5.  Normal weight: BMI between 18.5 and 24.9.  Overweight: BMI " between 25 and 29.9.  Obese: BMI of 30 or above.  BMI is a tool and cannot diagnose a condition. Talk with your health care provider about what your BMI means for you. Keep these notes in mind:  Weight includes fat and muscle. Someone with a muscular build, such as an athlete, may have a BMI that is higher than 24.9. In cases like these, BMI is not a correct measure of body fat.  If you have a BMI of 25 or higher, your provider may need to do more testing to find out if excess body fat is the cause.  BMI is measured the same way for males and females. Females usually have more body fat than males of the same height and weight.  Where to find more information  For more information about BMI, including tools to quickly find your BMI, go to:  Centers for Disease Control and Prevention: cdc.gov  American Heart Association: heart.org  National Heart, Lung, and Blood [place]: nhlbi.nih.gov  This information is not intended to replace advice given to you by your health care provider. Make sure you discuss any questions you have with your health care provider.  Document Revised: 09/07/2023 Document Reviewed: 08/31/2023  Elsevier Patient Education © 2024 Elsevier Inc.

## 2025-04-30 ENCOUNTER — APPOINTMENT (OUTPATIENT)
Dept: GENERAL RADIOLOGY | Facility: HOSPITAL | Age: 89
End: 2025-04-30
Payer: MEDICARE

## 2025-04-30 ENCOUNTER — ANESTHESIA (OUTPATIENT)
Dept: PERIOP | Facility: HOSPITAL | Age: 89
End: 2025-04-30
Payer: MEDICARE

## 2025-04-30 PROCEDURE — 8E0WXBZ COMPUTER ASSISTED PROCEDURE OF TRUNK REGION: ICD-10-PCS | Performed by: NEUROLOGICAL SURGERY

## 2025-04-30 PROCEDURE — 25510000001 IOPAMIDOL 61 % SOLUTION: Performed by: NEUROLOGICAL SURGERY

## 2025-04-30 PROCEDURE — 25010000002 CEFAZOLIN PER 500 MG: Performed by: NURSE PRACTITIONER

## 2025-04-30 PROCEDURE — 72220 X-RAY EXAM SACRUM TAILBONE: CPT

## 2025-04-30 PROCEDURE — 25010000002 FENTANYL CITRATE (PF) 100 MCG/2ML SOLUTION

## 2025-04-30 PROCEDURE — 25010000002 DEXAMETHASONE PER 1 MG

## 2025-04-30 PROCEDURE — 0QB13ZX EXCISION OF SACRUM, PERCUTANEOUS APPROACH, DIAGNOSTIC: ICD-10-PCS | Performed by: NEUROLOGICAL SURGERY

## 2025-04-30 PROCEDURE — 25010000002 ONDANSETRON PER 1 MG

## 2025-04-30 PROCEDURE — 99024 POSTOP FOLLOW-UP VISIT: CPT | Performed by: NURSE PRACTITIONER

## 2025-04-30 PROCEDURE — 25010000002 LIDOCAINE PF 2% 2 % SOLUTION

## 2025-04-30 PROCEDURE — 0QS13ZZ REPOSITION SACRUM, PERCUTANEOUS APPROACH: ICD-10-PCS | Performed by: NEUROLOGICAL SURGERY

## 2025-04-30 PROCEDURE — 76380 CAT SCAN FOLLOW-UP STUDY: CPT

## 2025-04-30 PROCEDURE — 0QU13JZ SUPPLEMENT SACRUM WITH SYNTHETIC SUBSTITUTE, PERCUTANEOUS APPROACH: ICD-10-PCS | Performed by: NEUROLOGICAL SURGERY

## 2025-04-30 PROCEDURE — 25010000002 PROPOFOL 10 MG/ML EMULSION

## 2025-04-30 PROCEDURE — 25810000003 SODIUM CHLORIDE 0.9 % SOLUTION: Performed by: NURSE PRACTITIONER

## 2025-04-30 PROCEDURE — 76000 FLUOROSCOPY <1 HR PHYS/QHP: CPT

## 2025-04-30 PROCEDURE — 25810000003 LACTATED RINGERS PER 1000 ML: Performed by: ANESTHESIOLOGY

## 2025-04-30 PROCEDURE — C1713 ANCHOR/SCREW BN/BN,TIS/BN: HCPCS | Performed by: NEUROLOGICAL SURGERY

## 2025-04-30 PROCEDURE — 0200T PERQ SACRAL AUGMT UNILAT INJ: CPT | Performed by: NEUROLOGICAL SURGERY

## 2025-04-30 DEVICE — BONE CEMENT C01B HV-R WITH MIXER  US
Type: IMPLANTABLE DEVICE | Site: SACRUM | Status: FUNCTIONAL
Brand: KYPHON® HV-R® BONE CEMENT AND KYPHON® MIXER PACK

## 2025-04-30 RX ORDER — AMOXICILLIN 250 MG
2 CAPSULE ORAL 2 TIMES DAILY
Status: DISCONTINUED | OUTPATIENT
Start: 2025-04-30 | End: 2025-05-01 | Stop reason: HOSPADM

## 2025-04-30 RX ORDER — CYCLOBENZAPRINE HCL 10 MG
10 TABLET ORAL 3 TIMES DAILY PRN
Status: DISCONTINUED | OUTPATIENT
Start: 2025-04-30 | End: 2025-05-01 | Stop reason: HOSPADM

## 2025-04-30 RX ORDER — BUPIVACAINE HYDROCHLORIDE AND EPINEPHRINE 2.5; 5 MG/ML; UG/ML
INJECTION, SOLUTION INFILTRATION; PERINEURAL AS NEEDED
Status: DISCONTINUED | OUTPATIENT
Start: 2025-04-30 | End: 2025-04-30 | Stop reason: HOSPADM

## 2025-04-30 RX ORDER — FENTANYL CITRATE 50 UG/ML
INJECTION, SOLUTION INTRAMUSCULAR; INTRAVENOUS AS NEEDED
Status: DISCONTINUED | OUTPATIENT
Start: 2025-04-30 | End: 2025-04-30 | Stop reason: SURG

## 2025-04-30 RX ORDER — TRAMADOL HYDROCHLORIDE 50 MG/1
50 TABLET ORAL EVERY 4 HOURS PRN
Status: DISCONTINUED | OUTPATIENT
Start: 2025-04-30 | End: 2025-05-01 | Stop reason: HOSPADM

## 2025-04-30 RX ORDER — LABETALOL HYDROCHLORIDE 5 MG/ML
5 INJECTION, SOLUTION INTRAVENOUS
Status: DISCONTINUED | OUTPATIENT
Start: 2025-04-30 | End: 2025-04-30 | Stop reason: HOSPADM

## 2025-04-30 RX ORDER — SODIUM CHLORIDE 9 MG/ML
40 INJECTION, SOLUTION INTRAVENOUS AS NEEDED
Status: DISCONTINUED | OUTPATIENT
Start: 2025-04-30 | End: 2025-04-30 | Stop reason: HOSPADM

## 2025-04-30 RX ORDER — SODIUM CHLORIDE 0.9 % (FLUSH) 0.9 %
3 SYRINGE (ML) INJECTION EVERY 12 HOURS SCHEDULED
Status: DISCONTINUED | OUTPATIENT
Start: 2025-04-30 | End: 2025-04-30 | Stop reason: HOSPADM

## 2025-04-30 RX ORDER — SODIUM CHLORIDE, SODIUM LACTATE, POTASSIUM CHLORIDE, CALCIUM CHLORIDE 600; 310; 30; 20 MG/100ML; MG/100ML; MG/100ML; MG/100ML
100 INJECTION, SOLUTION INTRAVENOUS CONTINUOUS
Status: DISCONTINUED | OUTPATIENT
Start: 2025-04-30 | End: 2025-04-30

## 2025-04-30 RX ORDER — ACETAMINOPHEN 650 MG/1
650 SUPPOSITORY RECTAL EVERY 4 HOURS PRN
Status: DISCONTINUED | OUTPATIENT
Start: 2025-04-30 | End: 2025-05-01 | Stop reason: HOSPADM

## 2025-04-30 RX ORDER — EPHEDRINE SULFATE 50 MG/ML
INJECTION, SOLUTION INTRAVENOUS AS NEEDED
Status: DISCONTINUED | OUTPATIENT
Start: 2025-04-30 | End: 2025-04-30 | Stop reason: SURG

## 2025-04-30 RX ORDER — SODIUM CHLORIDE 9 MG/ML
40 INJECTION, SOLUTION INTRAVENOUS AS NEEDED
Status: DISCONTINUED | OUTPATIENT
Start: 2025-04-30 | End: 2025-05-01 | Stop reason: HOSPADM

## 2025-04-30 RX ORDER — HYDROCODONE BITARTRATE AND ACETAMINOPHEN 5; 325 MG/1; MG/1
1 TABLET ORAL EVERY 4 HOURS PRN
Status: DISCONTINUED | OUTPATIENT
Start: 2025-04-30 | End: 2025-04-30 | Stop reason: HOSPADM

## 2025-04-30 RX ORDER — SODIUM CHLORIDE 0.9 % (FLUSH) 0.9 %
3-10 SYRINGE (ML) INJECTION AS NEEDED
Status: DISCONTINUED | OUTPATIENT
Start: 2025-04-30 | End: 2025-04-30 | Stop reason: HOSPADM

## 2025-04-30 RX ORDER — METOPROLOL SUCCINATE 50 MG/1
50 TABLET, EXTENDED RELEASE ORAL EVERY EVENING
Status: DISCONTINUED | OUTPATIENT
Start: 2025-05-01 | End: 2025-05-01 | Stop reason: HOSPADM

## 2025-04-30 RX ORDER — SODIUM CHLORIDE 9 MG/ML
75 INJECTION, SOLUTION INTRAVENOUS CONTINUOUS
Status: DISCONTINUED | OUTPATIENT
Start: 2025-04-30 | End: 2025-05-01 | Stop reason: HOSPADM

## 2025-04-30 RX ORDER — PROPOFOL 10 MG/ML
VIAL (ML) INTRAVENOUS AS NEEDED
Status: DISCONTINUED | OUTPATIENT
Start: 2025-04-30 | End: 2025-04-30 | Stop reason: SURG

## 2025-04-30 RX ORDER — SODIUM CHLORIDE 0.9 % (FLUSH) 0.9 %
10 SYRINGE (ML) INJECTION AS NEEDED
Status: DISCONTINUED | OUTPATIENT
Start: 2025-04-30 | End: 2025-05-01 | Stop reason: HOSPADM

## 2025-04-30 RX ORDER — METOPROLOL SUCCINATE 25 MG/1
25 TABLET, EXTENDED RELEASE ORAL EVERY MORNING
Status: DISCONTINUED | OUTPATIENT
Start: 2025-05-01 | End: 2025-05-01 | Stop reason: HOSPADM

## 2025-04-30 RX ORDER — ONDANSETRON 2 MG/ML
INJECTION INTRAMUSCULAR; INTRAVENOUS AS NEEDED
Status: DISCONTINUED | OUTPATIENT
Start: 2025-04-30 | End: 2025-04-30 | Stop reason: SURG

## 2025-04-30 RX ORDER — SODIUM CHLORIDE 0.9 % (FLUSH) 0.9 %
3 SYRINGE (ML) INJECTION EVERY 12 HOURS SCHEDULED
Status: DISCONTINUED | OUTPATIENT
Start: 2025-04-30 | End: 2025-05-01 | Stop reason: HOSPADM

## 2025-04-30 RX ORDER — ACETAMINOPHEN 325 MG/1
650 TABLET ORAL EVERY 4 HOURS PRN
Status: DISCONTINUED | OUTPATIENT
Start: 2025-04-30 | End: 2025-05-01 | Stop reason: HOSPADM

## 2025-04-30 RX ORDER — MIDAZOLAM HYDROCHLORIDE 2 MG/2ML
0.5 INJECTION, SOLUTION INTRAMUSCULAR; INTRAVENOUS
Status: DISCONTINUED | OUTPATIENT
Start: 2025-04-30 | End: 2025-04-30 | Stop reason: HOSPADM

## 2025-04-30 RX ORDER — NALOXONE HCL 0.4 MG/ML
0.4 VIAL (ML) INJECTION AS NEEDED
Status: DISCONTINUED | OUTPATIENT
Start: 2025-04-30 | End: 2025-04-30 | Stop reason: HOSPADM

## 2025-04-30 RX ORDER — LEVOTHYROXINE SODIUM 112 UG/1
112 TABLET ORAL
Status: DISCONTINUED | OUTPATIENT
Start: 2025-05-01 | End: 2025-05-01 | Stop reason: HOSPADM

## 2025-04-30 RX ORDER — ONDANSETRON 2 MG/ML
4 INJECTION INTRAMUSCULAR; INTRAVENOUS EVERY 6 HOURS PRN
Status: DISCONTINUED | OUTPATIENT
Start: 2025-04-30 | End: 2025-05-01 | Stop reason: HOSPADM

## 2025-04-30 RX ORDER — BUPIVACAINE HCL/0.9 % NACL/PF 0.125 %
PLASTIC BAG, INJECTION (ML) EPIDURAL AS NEEDED
Status: DISCONTINUED | OUTPATIENT
Start: 2025-04-30 | End: 2025-04-30 | Stop reason: SURG

## 2025-04-30 RX ORDER — IOPAMIDOL 612 MG/ML
INJECTION, SOLUTION INTRATHECAL AS NEEDED
Status: DISCONTINUED | OUTPATIENT
Start: 2025-04-30 | End: 2025-04-30 | Stop reason: HOSPADM

## 2025-04-30 RX ORDER — FENTANYL CITRATE 50 UG/ML
50 INJECTION, SOLUTION INTRAMUSCULAR; INTRAVENOUS
Status: DISCONTINUED | OUTPATIENT
Start: 2025-04-30 | End: 2025-04-30 | Stop reason: HOSPADM

## 2025-04-30 RX ORDER — ACETAMINOPHEN 500 MG
1000 TABLET ORAL ONCE
Status: COMPLETED | OUTPATIENT
Start: 2025-04-30 | End: 2025-04-30

## 2025-04-30 RX ORDER — POLYETHYLENE GLYCOL 3350 17 G/17G
17 POWDER, FOR SOLUTION ORAL DAILY
Status: DISCONTINUED | OUTPATIENT
Start: 2025-04-30 | End: 2025-05-01 | Stop reason: HOSPADM

## 2025-04-30 RX ORDER — BISACODYL 5 MG/1
5 TABLET, DELAYED RELEASE ORAL DAILY PRN
Status: DISCONTINUED | OUTPATIENT
Start: 2025-04-30 | End: 2025-05-01 | Stop reason: HOSPADM

## 2025-04-30 RX ORDER — ROCURONIUM BROMIDE 10 MG/ML
INJECTION, SOLUTION INTRAVENOUS AS NEEDED
Status: DISCONTINUED | OUTPATIENT
Start: 2025-04-30 | End: 2025-04-30 | Stop reason: SURG

## 2025-04-30 RX ORDER — LIDOCAINE HYDROCHLORIDE 20 MG/ML
INJECTION, SOLUTION EPIDURAL; INFILTRATION; INTRACAUDAL; PERINEURAL AS NEEDED
Status: DISCONTINUED | OUTPATIENT
Start: 2025-04-30 | End: 2025-04-30 | Stop reason: SURG

## 2025-04-30 RX ORDER — FLUMAZENIL 0.1 MG/ML
0.2 INJECTION INTRAVENOUS AS NEEDED
Status: DISCONTINUED | OUTPATIENT
Start: 2025-04-30 | End: 2025-04-30 | Stop reason: HOSPADM

## 2025-04-30 RX ORDER — BISACODYL 10 MG
10 SUPPOSITORY, RECTAL RECTAL DAILY PRN
Status: DISCONTINUED | OUTPATIENT
Start: 2025-04-30 | End: 2025-05-01 | Stop reason: HOSPADM

## 2025-04-30 RX ORDER — DEXAMETHASONE SODIUM PHOSPHATE 4 MG/ML
INJECTION, SOLUTION INTRA-ARTICULAR; INTRALESIONAL; INTRAMUSCULAR; INTRAVENOUS; SOFT TISSUE AS NEEDED
Status: DISCONTINUED | OUTPATIENT
Start: 2025-04-30 | End: 2025-04-30 | Stop reason: SURG

## 2025-04-30 RX ORDER — HYDROCODONE BITARTRATE AND ACETAMINOPHEN 10; 325 MG/1; MG/1
1 TABLET ORAL EVERY 4 HOURS PRN
Status: DISCONTINUED | OUTPATIENT
Start: 2025-04-30 | End: 2025-04-30 | Stop reason: HOSPADM

## 2025-04-30 RX ORDER — ONDANSETRON 2 MG/ML
4 INJECTION INTRAMUSCULAR; INTRAVENOUS ONCE AS NEEDED
Status: DISCONTINUED | OUTPATIENT
Start: 2025-04-30 | End: 2025-04-30 | Stop reason: HOSPADM

## 2025-04-30 RX ORDER — ACETAMINOPHEN 160 MG/5ML
650 SOLUTION ORAL EVERY 4 HOURS PRN
Status: DISCONTINUED | OUTPATIENT
Start: 2025-04-30 | End: 2025-05-01 | Stop reason: HOSPADM

## 2025-04-30 RX ADMIN — METOPROLOL SUCCINATE 25 MG: 25 TABLET, EXTENDED RELEASE ORAL at 09:08

## 2025-04-30 RX ADMIN — LIDOCAINE HYDROCHLORIDE 100 MG: 20 INJECTION, SOLUTION EPIDURAL; INFILTRATION; INTRACAUDAL; PERINEURAL at 12:31

## 2025-04-30 RX ADMIN — EPHEDRINE SULFATE 15 MG: 50 INJECTION, SOLUTION INTRAVENOUS at 12:42

## 2025-04-30 RX ADMIN — ONDANSETRON 4 MG: 2 INJECTION INTRAMUSCULAR; INTRAVENOUS at 13:28

## 2025-04-30 RX ADMIN — POLYETHYLENE GLYCOL 3350 17 G: 17 POWDER, FOR SOLUTION ORAL at 16:16

## 2025-04-30 RX ADMIN — CEFAZOLIN 2000 MG: 2 INJECTION, POWDER, FOR SOLUTION INTRAMUSCULAR; INTRAVENOUS at 20:16

## 2025-04-30 RX ADMIN — SODIUM CHLORIDE 75 ML/HR: 9 INJECTION, SOLUTION INTRAVENOUS at 16:15

## 2025-04-30 RX ADMIN — ACETAMINOPHEN 650 MG: 325 TABLET, FILM COATED ORAL at 20:17

## 2025-04-30 RX ADMIN — Medication 100 MCG: at 12:59

## 2025-04-30 RX ADMIN — ACETAMINOPHEN 1000 MG: 500 TABLET, FILM COATED ORAL at 12:18

## 2025-04-30 RX ADMIN — FENTANYL CITRATE 50 MCG: 50 INJECTION, SOLUTION INTRAMUSCULAR; INTRAVENOUS at 12:31

## 2025-04-30 RX ADMIN — PROPOFOL 100 MG: 10 INJECTION, EMULSION INTRAVENOUS at 12:31

## 2025-04-30 RX ADMIN — Medication 3 ML: at 20:18

## 2025-04-30 RX ADMIN — Medication 10 ML: at 09:09

## 2025-04-30 RX ADMIN — SODIUM CHLORIDE, POTASSIUM CHLORIDE, SODIUM LACTATE AND CALCIUM CHLORIDE 100 ML/HR: 600; 310; 30; 20 INJECTION, SOLUTION INTRAVENOUS at 12:07

## 2025-04-30 RX ADMIN — SENNOSIDES, DOCUSATE SODIUM 2 TABLET: 50; 8.6 TABLET, FILM COATED ORAL at 20:17

## 2025-04-30 RX ADMIN — ROCURONIUM BROMIDE 50 MG: 10 INJECTION, SOLUTION INTRAVENOUS at 12:31

## 2025-04-30 RX ADMIN — DEXAMETHASONE SODIUM PHOSPHATE 4 MG: 4 INJECTION, SOLUTION INTRAMUSCULAR; INTRAVENOUS at 12:47

## 2025-04-30 RX ADMIN — FENTANYL CITRATE 50 MCG: 50 INJECTION, SOLUTION INTRAMUSCULAR; INTRAVENOUS at 12:50

## 2025-04-30 RX ADMIN — CEFAZOLIN 2000 MG: 2 INJECTION, POWDER, FOR SOLUTION INTRAMUSCULAR; INTRAVENOUS at 12:38

## 2025-04-30 NOTE — PLAN OF CARE
Goal Outcome Evaluation:                 Pt A&Ox4. Up x1 assist. Purewick. PPP. No n/t noted. VSS. No complaints at this time. Family at bedside. Dressing to spine CDI. Call light within reach, safety maintained.

## 2025-04-30 NOTE — ANESTHESIA PREPROCEDURE EVALUATION
Anesthesia Evaluation     no history of anesthetic complications:   NPO Solid Status: > 8 hours  NPO Liquid Status: > 8 hours           Airway   Mallampati: I  No difficulty expected  Dental      Pulmonary    (-) COPD, sleep apnea  Cardiovascular   Exercise tolerance: good (4-7 METS)    (+) hypertension, valvular problems/murmurs murmur  (-) CAD    ROS comment: Echo 2023   Left ventricular systolic function is normal. Left ventricular ejection fraction appears to be 66 - 70%.  ·  Left ventricular diastolic function was indeterminate.  ·  Normal right ventricular cavity size and systolic function noted.  ·  The left atrial cavity is mildly dilated.  ·  There is moderate calcification of the aortic valve without significant stenosis or regurgitation.     Low risk stress test 2024    Neuro/Psych  (-) seizures, TIA, CVA  GI/Hepatic/Renal/Endo    (+) renal disease- CRI, thyroid problem hypothyroidism  (-) diabetes    Musculoskeletal     Abdominal    Substance History      OB/GYN          Other                          Anesthesia Plan    ASA 2 - emergent     general     intravenous induction     Anesthetic plan, risks, benefits, and alternatives have been provided, discussed and informed consent has been obtained with: patient.        CODE STATUS:    Code Status (Patient has no pulse and is not breathing): CPR (Attempt to Resuscitate)  Medical Interventions (Patient has pulse or is breathing): Full Support  Level Of Support Discussed With: Patient

## 2025-04-30 NOTE — PROGRESS NOTES
"Linda Spear  89 y.o.      Chief complaint:   Sacral fracture    Subjective  No events overnight, pain still prominent with pain medication    Temp:  [97.5 °F (36.4 °C)-98.4 °F (36.9 °C)] 97.5 °F (36.4 °C)  Heart Rate:  [61-76] 76  Resp:  [16] 16  BP: (142-186)/(68-88) 160/88      Objective:  General Appearance:  Comfortable, well-appearing, in no acute distress and in pain.    Vital signs: (most recent): Blood pressure 160/88, pulse 76, temperature 97.5 °F (36.4 °C), temperature source Oral, resp. rate 16, height 160 cm (62.99\"), weight 71.5 kg (157 lb 9.6 oz), SpO2 94%, not currently breastfeeding.  Vital signs are normal.  No fever.    Output: Producing urine.    HEENT: Normal HEENT exam.    Lungs:  Normal effort and normal respiratory rate.  She is not in respiratory distress.    Chest: Symmetric chest wall expansion.   Extremities: Normal range of motion.    Skin:  Warm and dry.    Pulses: Distal pulses are intact.        Neurological Exam  Mental Status  Awake. Oriented to person, place and time. Speech is normal. Language is fluent with no aphasia. Attention and concentration are normal.    Cranial Nerves  CN I: Sense of smell is normal.  CN II: Right normal visual field. Left normal visual field.  CN III, IV, VI: Extraocular movements intact bilaterally. Pupils equal round and reactive to light bilaterally.  CN V: Facial sensation is normal.  CN VII:  Right: There is no facial weakness.  Left: There is no facial weakness.  CN XI: Shoulder shrug strength is normal.  CN XII: Tongue midline without atrophy or fasciculations.    Motor  Normal muscle bulk throughout. Normal muscle tone. Strength is 5/5 throughout all four extremities.    Sensory  Sensation is intact to light touch, pinprick, vibration and proprioception in all four extremities.      Lab Results (last 24 hours)       Procedure Component Value Units Date/Time    Urinalysis With Microscopic If Indicated (No Culture) - Urine, Clean Catch [[id number]]  " (Normal) Collected: 04/29/25 1752    Specimen: Urine, Clean Catch Updated: 04/29/25 1834     Color, UA Yellow     Appearance, UA Clear     pH, UA 7.0     Specific Gravity, UA <=1.005     Glucose, UA Negative     Ketones, UA Negative     Bilirubin, UA Negative     Blood, UA Negative     Protein, UA Negative     Leuk Esterase, UA Negative     Nitrite, UA Negative     Urobilinogen, UA 0.2 E.U./dL    Narrative:      Urine microscopic not indicated.    aPTT [[id number]]  (Normal) Collected: 04/29/25 1426    Specimen: Blood Updated: 04/29/25 1525     PTT 24.7 seconds     Narrative:      PTT = The equivalent PTT values for the therapeutic range of heparin levels at 0.3 to 0.7 U/ml are 77 - 99 seconds.    Protime-INR [[id number]]  (Normal) Collected: 04/29/25 1426    Specimen: Blood Updated: 04/29/25 1525     Protime 13.5 Seconds      INR 0.99    Comprehensive Metabolic Panel [[id number]]  (Abnormal) Collected: 04/29/25 1426    Specimen: Blood Updated: 04/29/25 1507     Glucose 119 mg/dL      BUN 10 mg/dL      Creatinine 0.83 mg/dL      Sodium 133 mmol/L      Potassium 3.7 mmol/L      Chloride 94 mmol/L      CO2 26.0 mmol/L      Calcium 8.9 mg/dL      Total Protein 6.2 g/dL      Albumin 3.8 g/dL      ALT (SGPT) 14 U/L      AST (SGOT) 17 U/L      Alkaline Phosphatase 98 U/L      Total Bilirubin 0.3 mg/dL      Globulin 2.4 gm/dL      A/G Ratio 1.6 g/dL      BUN/Creatinine Ratio 12.0     Anion Gap 13.0 mmol/L      eGFR 67.9 mL/min/1.73     Narrative:      GFR Categories in Chronic Kidney Disease (CKD)      GFR Category          GFR (mL/min/1.73)    Interpretation  G1                     90 or greater         Normal or high (1)  G2                      60-89                Mild decrease (1)  G3a                   45-59                Mild to moderate decrease  G3b                   30-44                Moderate to severe decrease  G4                    15-29                Severe decrease  G5                    14 or less            Kidney failure          (1)In the absence of evidence of kidney disease, neither GFR category G1 or G2 fulfill the criteria for CKD.    eGFR calculation 2021 CKD-EPI creatinine equation, which does not include race as a factor    CBC Auto Differential [[id number]]  (Abnormal) Collected: 04/29/25 1426    Specimen: Blood Updated: 04/29/25 1445     WBC 11.60 10*3/mm3      RBC 4.93 10*6/mm3      Hemoglobin 13.1 g/dL      Hematocrit 41.5 %      MCV 84.2 fL      MCH 26.6 pg      MCHC 31.6 g/dL      RDW 15.1 %      RDW-SD 45.9 fl      MPV 9.0 fL      Platelets 296 10*3/mm3      Neutrophil % 63.2 %      Lymphocyte % 21.5 %      Monocyte % 11.3 %      Eosinophil % 2.2 %      Basophil % 0.8 %      Immature Grans % 1.0 %      Neutrophils, Absolute 7.34 10*3/mm3      Lymphocytes, Absolute 2.49 10*3/mm3      Monocytes, Absolute 1.31 10*3/mm3      Eosinophils, Absolute 0.25 10*3/mm3      Basophils, Absolute 0.09 10*3/mm3      Immature Grans, Absolute 0.12 10*3/mm3      nRBC 0.0 /100 WBC                 Plan:   Patient still with significant pain in left buttocks. Will proceed with left sacralplasty today. Questions and concerns were addressed      Sacral fracture, closed        Kendall Pena, APRN

## 2025-04-30 NOTE — PLAN OF CARE
Goal Outcome Evaluation:  Plan of Care Reviewed With: patient        Progress: improving  Outcome Evaluation: . A&Ox4. RA. Some HTN noted at beginning of shift. Pt stated that she takes BP meds at night. MD notified and home dose started with good results. Pt to go to surgery today. Consent signed and CHG completed. Up SBA with cane to BR. Minimal c/o pain this shift. Call light within reach, safety maintained.

## 2025-04-30 NOTE — H&P (VIEW-ONLY)
"Linda Spear  89 y.o.      Chief complaint:   Sacral fracture    Subjective  No events overnight, pain still prominent with pain medication    Temp:  [97.5 °F (36.4 °C)-98.4 °F (36.9 °C)] 97.5 °F (36.4 °C)  Heart Rate:  [61-76] 76  Resp:  [16] 16  BP: (142-186)/(68-88) 160/88      Objective:  General Appearance:  Comfortable, well-appearing, in no acute distress and in pain.    Vital signs: (most recent): Blood pressure 160/88, pulse 76, temperature 97.5 °F (36.4 °C), temperature source Oral, resp. rate 16, height 160 cm (62.99\"), weight 71.5 kg (157 lb 9.6 oz), SpO2 94%, not currently breastfeeding.  Vital signs are normal.  No fever.    Output: Producing urine.    HEENT: Normal HEENT exam.    Lungs:  Normal effort and normal respiratory rate.  She is not in respiratory distress.    Chest: Symmetric chest wall expansion.   Extremities: Normal range of motion.    Skin:  Warm and dry.    Pulses: Distal pulses are intact.        Neurological Exam  Mental Status  Awake. Oriented to person, place and time. Speech is normal. Language is fluent with no aphasia. Attention and concentration are normal.    Cranial Nerves  CN I: Sense of smell is normal.  CN II: Right normal visual field. Left normal visual field.  CN III, IV, VI: Extraocular movements intact bilaterally. Pupils equal round and reactive to light bilaterally.  CN V: Facial sensation is normal.  CN VII:  Right: There is no facial weakness.  Left: There is no facial weakness.  CN XI: Shoulder shrug strength is normal.  CN XII: Tongue midline without atrophy or fasciculations.    Motor  Normal muscle bulk throughout. Normal muscle tone. Strength is 5/5 throughout all four extremities.    Sensory  Sensation is intact to light touch, pinprick, vibration and proprioception in all four extremities.      Lab Results (last 24 hours)       Procedure Component Value Units Date/Time    Urinalysis With Microscopic If Indicated (No Culture) - Urine, Clean Catch [[id number]]  " (Normal) Collected: 04/29/25 1752    Specimen: Urine, Clean Catch Updated: 04/29/25 1834     Color, UA Yellow     Appearance, UA Clear     pH, UA 7.0     Specific Gravity, UA <=1.005     Glucose, UA Negative     Ketones, UA Negative     Bilirubin, UA Negative     Blood, UA Negative     Protein, UA Negative     Leuk Esterase, UA Negative     Nitrite, UA Negative     Urobilinogen, UA 0.2 E.U./dL    Narrative:      Urine microscopic not indicated.    aPTT [[id number]]  (Normal) Collected: 04/29/25 1426    Specimen: Blood Updated: 04/29/25 1525     PTT 24.7 seconds     Narrative:      PTT = The equivalent PTT values for the therapeutic range of heparin levels at 0.3 to 0.7 U/ml are 77 - 99 seconds.    Protime-INR [[id number]]  (Normal) Collected: 04/29/25 1426    Specimen: Blood Updated: 04/29/25 1525     Protime 13.5 Seconds      INR 0.99    Comprehensive Metabolic Panel [[id number]]  (Abnormal) Collected: 04/29/25 1426    Specimen: Blood Updated: 04/29/25 1507     Glucose 119 mg/dL      BUN 10 mg/dL      Creatinine 0.83 mg/dL      Sodium 133 mmol/L      Potassium 3.7 mmol/L      Chloride 94 mmol/L      CO2 26.0 mmol/L      Calcium 8.9 mg/dL      Total Protein 6.2 g/dL      Albumin 3.8 g/dL      ALT (SGPT) 14 U/L      AST (SGOT) 17 U/L      Alkaline Phosphatase 98 U/L      Total Bilirubin 0.3 mg/dL      Globulin 2.4 gm/dL      A/G Ratio 1.6 g/dL      BUN/Creatinine Ratio 12.0     Anion Gap 13.0 mmol/L      eGFR 67.9 mL/min/1.73     Narrative:      GFR Categories in Chronic Kidney Disease (CKD)      GFR Category          GFR (mL/min/1.73)    Interpretation  G1                     90 or greater         Normal or high (1)  G2                      60-89                Mild decrease (1)  G3a                   45-59                Mild to moderate decrease  G3b                   30-44                Moderate to severe decrease  G4                    15-29                Severe decrease  G5                    14 or less            Kidney failure          (1)In the absence of evidence of kidney disease, neither GFR category G1 or G2 fulfill the criteria for CKD.    eGFR calculation 2021 CKD-EPI creatinine equation, which does not include race as a factor    CBC Auto Differential [[id number]]  (Abnormal) Collected: 04/29/25 1426    Specimen: Blood Updated: 04/29/25 1445     WBC 11.60 10*3/mm3      RBC 4.93 10*6/mm3      Hemoglobin 13.1 g/dL      Hematocrit 41.5 %      MCV 84.2 fL      MCH 26.6 pg      MCHC 31.6 g/dL      RDW 15.1 %      RDW-SD 45.9 fl      MPV 9.0 fL      Platelets 296 10*3/mm3      Neutrophil % 63.2 %      Lymphocyte % 21.5 %      Monocyte % 11.3 %      Eosinophil % 2.2 %      Basophil % 0.8 %      Immature Grans % 1.0 %      Neutrophils, Absolute 7.34 10*3/mm3      Lymphocytes, Absolute 2.49 10*3/mm3      Monocytes, Absolute 1.31 10*3/mm3      Eosinophils, Absolute 0.25 10*3/mm3      Basophils, Absolute 0.09 10*3/mm3      Immature Grans, Absolute 0.12 10*3/mm3      nRBC 0.0 /100 WBC                 Plan:   Patient still with significant pain in left buttocks. Will proceed with left sacralplasty today. Questions and concerns were addressed      Sacral fracture, closed        Kendall Pena, APRN

## 2025-04-30 NOTE — ANESTHESIA POSTPROCEDURE EVALUATION
"Patient: Linda Spear    Procedure Summary       Date: 04/30/25 Room / Location:  [facility] OR  /  [facility] OR    Anesthesia Start: 1226 Anesthesia Stop: 1345    Procedure: KYPHOPLASTY WITH BIOPSY LEFT SACRALPLASTY with O-ARM (Left: Spine Lumbar) Diagnosis:       Closed fracture of sacrum, unspecified portion of sacrum, initial encounter      (Closed fracture of sacrum, unspecified portion of sacrum, initial encounter [S32.10XA])    Surgeons: Viktor Solitario MD Provider: Johan Terrazas CRNA    Anesthesia Type: general ASA Status: 2 - Emergent            Anesthesia Type: general    Vitals  Vitals Value Taken Time   /71 04/30/25 14:05   Temp 98.6 °F (37 °C) 04/30/25 14:10   Pulse 72 04/30/25 14:13   Resp 15 04/30/25 14:10   SpO2 96 % 04/30/25 14:13   Vitals shown include unfiled device data.        Post Anesthesia Care and Evaluation    Patient location during evaluation: PACU  Patient participation: complete - patient participated  Level of consciousness: awake and awake and alert  Pain score: 0  Pain management: adequate    Airway patency: patent  Anesthetic complications: No anesthetic complications  PONV Status: none  Cardiovascular status: acceptable  Respiratory status: acceptable  Hydration status: acceptable    Comments: Patient discharged according to acceptable Efrain score per RN assessment. See nursing records for further information.     Blood pressure 160/71, pulse 74, temperature 98.6 °F (37 °C), temperature source Temporal, resp. rate 15, height 160 cm (62.99\"), weight 71.5 kg (157 lb 9.6 oz), SpO2 94%, not currently breastfeeding.      "

## 2025-04-30 NOTE — OP NOTE
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  The patient now presents for sacroplasty and vertebral biopsy after discussing therapeutic alternatives.          Surgeon: Viktor Solitario MD     Assistants: None    Anesthesia: General endotracheal anesthesia    ASA Class: 3    Procedure Details   After obtaining informed consent, having the risks and benefits of the procedure explained including but not limited to infection, bleeding, paralysis, spinal fluid leak, bowel bladder incontinence, extravasation of kyphoplasty cement resulting in neurocompression, pulmonary embolism, stroke, coma, and death, the patient was brought to the operating room.  The patient was given general anesthesia via an endotracheal tube. The patient was flipped prone onto a Alejandro axis table.  Portable fluoroscopy was used to localize level of sacrum . Preplanned incision along the  midline was then marked with an indelible marker.  The patient was then prepped and draped in a standard sterile fashion.  The preplanned incision was infiltrated with Marcaine and epinephrine.  A 10 blade scalpel was used to make an incision at the dermis and epidermis.  Jamshidi needle was then advanced in the medial to lateral orientation through the S1 pedicle out to the left sacral ala using image guided stereotactic navigation and under direct fluoroscopic guidance on the left and right.  The inner cannula was removed. . Hand drill was then used to drill channel through the fractured sacral ala  under direct fluoroscopic guidance.  Kyphoplasty  balloons were then inserted  under direct fluoroscopic guidance into the vertebral bodies.  The balloons were inflated and deflated and then removed.  And then several syringes of kyphoplasty cement were injected into the fractured sacral ala under direct fluoroscopic guidance. The cement was allowed to harden.  The Jamshidi needles were removed.  The wounds were then irrigated with an antibiotic solution and closed with inverted interrupted Monocryl, Mastisol and Steri-Strips.  All sponge, needle and instrument counts were correct at the end of the procedure.  The patient was extubated in stable condition and returned to recovery room with about 11 mL of blood loss.       Findings:  Left sacral insufficiency fracture    Estimated Blood Loss:   11           Drains: None           Total IV Fluids: ml           Specimens: None           Implants:   Implant Name Type Inv. Item Serial No.  Lot No. LRB No. Used Action   KT MIXER KYPHON W/CMT BONE KYPHX HV R - [id number] Implant KT MIXER KYPHON W/CMT BONE KYPHX HV R  [employer] [id number] Left 1 Implanted              Complications:  none           Disposition: PACU - hemodynamically stable.           Condition: stable        Viktor Solitario MD

## 2025-04-30 NOTE — CASE MANAGEMENT/SOCIAL WORK
"Continued Stay Note  [facility]     Patient Name: Linda Spear  MRN: [id number]  Today's Date: 4/30/2025    Admit Date: 4/29/2025        Discharge Plan       Row Name 04/30/25 1012       Plan    Plan Comments SW received consult \"Pt currently has DME\". Pt is scheduled for Kypho later today. NILO will follow up with pt/family post op and after therapy evaluates to help determine appropriate dc plan.                   Discharge Codes    No documentation.                       GOPI Ortiz    "

## 2025-04-30 NOTE — ANESTHESIA PROCEDURE NOTES
Airway  Reason: elective    Date/Time: 4/30/2025 12:34 PM  Airway not difficult    General Information and Staff    Patient location during procedure: OR  CRNA/CAA: Farhana Nuñez CRNA    Indications and Patient Condition  Indications for airway management: airway protection    Preoxygenated: yes    Mask difficulty assessment: 1 - vent by mask    Final Airway Details    Final airway type: endotracheal airway      Successful airway: ETT  Cuffed: yes   Successful intubation technique: video laryngoscopy  Adjuncts used in placement: intubating stylet  Endotracheal tube insertion site: oral  Blade: Velasquez  Blade size: 3  ETT size (mm): 7.5  Cormack-Lehane Classification: grade I - full view of glottis  Cuff volume (mL): 7  Measured from: gums  ETT/EBT to gums (cm): 20  Number of attempts at approach: 1  Assessment: lips, teeth, and gum same as pre-op and atraumatic intubation

## 2025-05-01 ENCOUNTER — READMISSION MANAGEMENT (OUTPATIENT)
Dept: CALL CENTER | Facility: HOSPITAL | Age: 89
End: 2025-05-01
Payer: MEDICARE

## 2025-05-01 ENCOUNTER — TELEPHONE (OUTPATIENT)
Dept: NEUROSURGERY | Facility: CLINIC | Age: 89
End: 2025-05-01
Payer: MEDICARE

## 2025-05-01 VITALS
RESPIRATION RATE: 18 BRPM | OXYGEN SATURATION: 97 % | SYSTOLIC BLOOD PRESSURE: 142 MMHG | TEMPERATURE: 97.9 F | HEART RATE: 73 BPM | BODY MASS INDEX: 27.93 KG/M2 | HEIGHT: 63 IN | WEIGHT: 157.6 LBS | DIASTOLIC BLOOD PRESSURE: 80 MMHG

## 2025-05-01 LAB
ANION GAP SERPL CALCULATED.3IONS-SCNC: 8 MMOL/L (ref 5–15)
BASOPHILS # BLD AUTO: 0.04 10*3/MM3 (ref 0–0.2)
BASOPHILS NFR BLD AUTO: 0.4 % (ref 0–1.5)
BUN SERPL-MCNC: 8 MG/DL (ref 8–23)
BUN/CREAT SERPL: 9.8 (ref 7–25)
CALCIUM SPEC-SCNC: 8.2 MG/DL (ref 8.6–10.5)
CHLORIDE SERPL-SCNC: 101 MMOL/L (ref 98–107)
CO2 SERPL-SCNC: 27 MMOL/L (ref 22–29)
CREAT SERPL-MCNC: 0.82 MG/DL (ref 0.57–1)
DEPRECATED RDW RBC AUTO: 46.8 FL (ref 37–54)
EGFRCR SERPLBLD CKD-EPI 2021: 68.9 ML/MIN/1.73
EOSINOPHIL # BLD AUTO: 0.09 10*3/MM3 (ref 0–0.4)
EOSINOPHIL NFR BLD AUTO: 0.9 % (ref 0.3–6.2)
ERYTHROCYTE [DISTWIDTH] IN BLOOD BY AUTOMATED COUNT: 14.9 % (ref 12.3–15.4)
GLUCOSE SERPL-MCNC: 84 MG/DL (ref 65–99)
HCT VFR BLD AUTO: 38.4 % (ref 34–46.6)
HGB BLD-MCNC: 11.9 G/DL (ref 12–15.9)
IMM GRANULOCYTES # BLD AUTO: 0.07 10*3/MM3 (ref 0–0.05)
IMM GRANULOCYTES NFR BLD AUTO: 0.7 % (ref 0–0.5)
LYMPHOCYTES # BLD AUTO: 1.33 10*3/MM3 (ref 0.7–3.1)
LYMPHOCYTES NFR BLD AUTO: 13.3 % (ref 19.6–45.3)
MCH RBC QN AUTO: 26.6 PG (ref 26.6–33)
MCHC RBC AUTO-ENTMCNC: 31 G/DL (ref 31.5–35.7)
MCV RBC AUTO: 85.9 FL (ref 79–97)
MONOCYTES # BLD AUTO: 1.16 10*3/MM3 (ref 0.1–0.9)
MONOCYTES NFR BLD AUTO: 11.6 % (ref 5–12)
NEUTROPHILS NFR BLD AUTO: 7.32 10*3/MM3 (ref 1.7–7)
NEUTROPHILS NFR BLD AUTO: 73.1 % (ref 42.7–76)
NRBC BLD AUTO-RTO: 0 /100 WBC (ref 0–0.2)
PLATELET # BLD AUTO: 266 10*3/MM3 (ref 140–450)
PMV BLD AUTO: 9 FL (ref 6–12)
POTASSIUM SERPL-SCNC: 4 MMOL/L (ref 3.5–5.2)
RBC # BLD AUTO: 4.47 10*6/MM3 (ref 3.77–5.28)
SODIUM SERPL-SCNC: 136 MMOL/L (ref 136–145)
WBC NRBC COR # BLD AUTO: 10.01 10*3/MM3 (ref 3.4–10.8)

## 2025-05-01 PROCEDURE — 80048 BASIC METABOLIC PNL TOTAL CA: CPT | Performed by: NURSE PRACTITIONER

## 2025-05-01 PROCEDURE — 25010000002 CEFAZOLIN PER 500 MG: Performed by: NURSE PRACTITIONER

## 2025-05-01 PROCEDURE — 97161 PT EVAL LOW COMPLEX 20 MIN: CPT | Performed by: PHYSICAL THERAPIST

## 2025-05-01 PROCEDURE — 99024 POSTOP FOLLOW-UP VISIT: CPT | Performed by: NURSE PRACTITIONER

## 2025-05-01 PROCEDURE — 85025 COMPLETE CBC W/AUTO DIFF WBC: CPT | Performed by: NURSE PRACTITIONER

## 2025-05-01 PROCEDURE — 97165 OT EVAL LOW COMPLEX 30 MIN: CPT | Performed by: OCCUPATIONAL THERAPIST

## 2025-05-01 RX ORDER — TRAMADOL HYDROCHLORIDE 50 MG/1
50 TABLET ORAL EVERY 8 HOURS PRN
Qty: 12 TABLET | Refills: 0 | Status: SHIPPED | OUTPATIENT
Start: 2025-05-01 | End: 2025-05-05

## 2025-05-01 RX ADMIN — POLYETHYLENE GLYCOL 3350 17 G: 17 POWDER, FOR SOLUTION ORAL at 08:02

## 2025-05-01 RX ADMIN — ACETAMINOPHEN 650 MG: 325 TABLET, FILM COATED ORAL at 01:02

## 2025-05-01 RX ADMIN — CEFAZOLIN 2000 MG: 2 INJECTION, POWDER, FOR SOLUTION INTRAMUSCULAR; INTRAVENOUS at 05:05

## 2025-05-01 RX ADMIN — LEVOTHYROXINE SODIUM 112 MCG: 0.11 TABLET ORAL at 05:05

## 2025-05-01 RX ADMIN — METOPROLOL SUCCINATE 25 MG: 25 TABLET, EXTENDED RELEASE ORAL at 08:02

## 2025-05-01 RX ADMIN — ACETAMINOPHEN 650 MG: 325 TABLET, FILM COATED ORAL at 08:02

## 2025-05-01 RX ADMIN — SENNOSIDES, DOCUSATE SODIUM 2 TABLET: 50; 8.6 TABLET, FILM COATED ORAL at 08:02

## 2025-05-01 NOTE — TELEPHONE ENCOUNTER
PATIENT CALLED WANTING TO SPEAK WITH VANE ASKED ABOUT USING HEAT ON HER INCISION STATED THAT HER AFTER SURGERY INSTRUCTIONS STATED TO USE ICE. PATIENT STATED THE HEAT AS HELPED.     PER VALDEZ IT IS OKAY FOR THE PATIENT TO USE HEAT ON HER INCISION

## 2025-05-01 NOTE — PLAN OF CARE
Goal Outcome Evaluation:  Plan of Care Reviewed With: patient        Progress: improving  Outcome Evaluation: . A&Ox4. VSS on RA.  Up SBA to BR. Dressing to back CDI. Minimal c/o pain with tylenol given x2. IV abx administered. Call light within reach, family at bedside, safety maintained.

## 2025-05-01 NOTE — DISCHARGE SUMMARY
Date of Discharge:  5/1/2025    Discharge Diagnosis: Sacral fracture, closed [S32.10XA]    Presenting Problem/History of Present Illness  Sacral fracture, closed [S32.10XA]       Hospital Course  Patient is a 89 y.o. female presented with Sacral fracture, closed [S32.10XA].  The patient has been through all manner of conservative care without any meaningful improvement. The patient went to the operating room on April 30, 2025 for a left sacral plasty.  The patient tolerated procedure well.  Currently the patient is ambulating.  The patient is tolerating p.o.  The patient is voiding spontaneously.  It is felt that at this time the patient is stable and suitable to be discharged home.  See orders for discharge instructions and restrictions.  The patient can take the dressing off in 72 hours and can get the wound wet at that time.  The patient is to clean the wound daily with soap and water.  They are to call the office with any drainage from the incision or worsening pain.  I will follow-up with her in the office in 3 weeks.  Will schedule her for an outpatient bone density scan    Procedures Performed  Procedure(s):  KYPHOPLASTY WITH BIOPSY LEFT SACRALPLASTY with O-ARM       Consults:   Consults       No orders found from 3/31/2025 to 4/30/2025.              Condition on Discharge: Stable    Vital Signs  Temp:  [97.7 °F (36.5 °C)-98.6 °F (37 °C)] 97.9 °F (36.6 °C)  Heart Rate:  [71-77] 73  Resp:  [15-18] 18  BP: (122-162)/(66-86) 142/80    Physical Exam:   Physical Exam  Constitutional:       General: She is awake.      Appearance: She is well-developed.   HENT:      Head: Normocephalic.   Eyes:      Extraocular Movements: Extraocular movements intact.      Pupils: Pupils are equal, round, and reactive to light.   Pulmonary:      Effort: Pulmonary effort is normal.   Musculoskeletal:         General: Normal range of motion.      Cervical back: Normal range of motion.   Skin:     General: Skin is warm.    Neurological:      Mental Status: She is alert and oriented to person, place, and time.      GCS: GCS eye subscore is 4. GCS verbal subscore is 5. GCS motor subscore is 6.      Cranial Nerves: No cranial nerve deficit.      Sensory: No sensory deficit.      Motor: Motor strength is normal.     Gait: Gait normal.      Deep Tendon Reflexes: Reflexes are normal and symmetric.   Psychiatric:         Speech: Speech normal.         Behavior: Behavior normal.         Thought Content: Thought content normal.          Neurological Exam  Mental Status  Awake and alert. Oriented to person, place and time. Oriented to person, place, and time. Speech is normal. Language is fluent with no aphasia. Attention and concentration are normal.    Cranial Nerves  CN I: Sense of smell is normal.  CN II: Right normal visual field. Left normal visual field.  CN III, IV, VI: Extraocular movements intact bilaterally. Pupils equal round and reactive to light bilaterally.  CN V: Facial sensation is normal.  CN VII:  Right: There is no facial weakness.  Left: There is no facial weakness.  CN XI: Shoulder shrug strength is normal.  CN XII: Tongue midline without atrophy or fasciculations.    Motor  Normal muscle bulk throughout. Normal muscle tone. Strength is 5/5 throughout all four extremities.    Sensory  Sensation is intact to light touch, pinprick, vibration and proprioception in all four extremities.    Reflexes  Deep tendon reflexes are 2+ and symmetric in all four extremities.    Gait  Normal casual, toe, heel and tandem gait. Normal gait.         Discharge Disposition  Home or Self Care    Discharge Medications     Discharge Medications        New Medications        Instructions Start Date   traMADol 50 MG tablet  Commonly known as: ULTRAM   50 mg, Oral, Every 8 Hours PRN             Continue These Medications        Instructions Start Date   Calcium Citrate-Vitamin D3 315-6.25 MG-MCG tablet tablet  Commonly known as: CITRACAL   1  tablet, Oral, Daily      levothyroxine 112 MCG tablet  Commonly known as: SYNTHROID, LEVOTHROID   112 mcg, Every Early Morning      metoprolol succinate XL 25 MG 24 hr tablet  Commonly known as: TOPROL-XL   25 mg, Every Morning      metoprolol succinate XL 25 MG 24 hr tablet  Commonly known as: TOPROL-XL   50 mg, Every Evening      multivitamin with minerals tablet tablet   1 tablet, Oral, Daily      nitroglycerin 0.4 MG SL tablet  Commonly known as: NITROSTAT   0.4 mg, Sublingual, Every 5 Minutes PRN, Take no more than 3 doses in 15 minutes.      predniSONE 5 MG tablet  Commonly known as: DELTASONE   5 mg, 2 Times Daily      terbinafine 250 MG tablet  Commonly known as: lamiSIL   250 mg, Nightly      TURMERIC PO   1 tablet, Oral, Daily      vitamin B-12 1000 MCG tablet  Commonly known as: CYANOCOBALAMIN   1,000 mcg, Daily               Discharge Diet:   Diet Instructions       Diet: Regular/House Diet; Regular Texture (IDDSI 7); Thin (IDDSI 0)      Discharge Diet: Regular/House Diet    Texture: Regular Texture (IDDSI 7)    Fluid Consistency: Thin (IDDSI 0)            Activity at Discharge:   Activity Instructions       Other Instructions (Specify)      Activity Instructions: no strenuous activity.            Follow-up Appointments  No future appointments.  Additional Instructions for the Follow-ups that You Need to Schedule       Call MD With Problems / Concerns   As directed      Call with worsening back or leg pain, drainage from wound, fever, or difficulty walking    Order Comments: Call with worsening back or leg pain, drainage from wound, fever, or difficulty walking         Discharge Follow-up with Specialty: suma pena np; 3 Weeks   As directed      Specialty: suma pena np   Follow Up: 3 Weeks                Test Results Pending at Discharge       Suma Pena, MARGARETTE  05/01/25  08:24 CDT    Time: Discharge 20 min

## 2025-05-01 NOTE — THERAPY DISCHARGE NOTE
Acute Care - Occupational Therapy Discharge  [facility]    Patient Name: Linda Spear  : 1935    MRN: [id number]                              Today's Date: 2025       Admit Date: 2025    Visit Dx:     ICD-10-CM ICD-9-CM   1. Closed fracture of sacrum, unspecified portion of sacrum, initial encounter  S32.10XA 805.6   2. Closed fracture of sacrum with routine healing, unspecified portion of sacrum, subsequent encounter  S32.10XD V54.17     806.60     Patient Active Problem List   Diagnosis    Sensorineural hearing loss (SNHL) of both ears    Primary hypertension    Hypothyroidism (acquired)    Nonrheumatic aortic valve stenosis    CKD (chronic kidney disease) stage 3, GFR 30-59 ml/min    Sacral fracture, closed     Past Medical History:   Diagnosis Date    Chronic shoulder pain     Clotting disorder     Conductive hearing loss     Disease of thyroid gland     History of tonsillectomy 1965    Low back pain     Perforation of left tympanic membrane     Primary hypertension 12/15/2023     Past Surgical History:   Procedure Laterality Date    ADENOIDECTOMY  1965    CARDIAC CATHETERIZATION      COLONOSCOPY  2011    diverticulosis in the sigmoid colon    COLONOSCOPY  2005    normal exam    KYPHOPLASTY Left 2025    Procedure: KYPHOPLASTY WITH BIOPSY LEFT SACRALPLASTY with O-ARM;  Surgeon: Viktor Solitario MD;  Location: [facility];  Service: Neurosurgery;  Laterality: Left;    TONSILLECTOMY  1965      General Information       Row Name 25 7250          OT Time and Intention    Document Type evaluation  -CH     Mode of Treatment occupational therapy  -CH     Patient Effort excellent  -CH       Row Name 25 9245          General Information    Patient Profile Reviewed yes  -CH     Prior Level of Function independent:;ADL's;all household mobility  -CH     Existing Precautions/Restrictions spinal  -CH       Row Name 25 4335          Living Environment    Current Living  Arrangements home  -     People in Home spouse  -       Row Name 05/01/25 0850          Home Main Entrance    Number of Stairs, Main Entrance none  -       Row Name 05/01/25 0850          Stairs Within Home, Primary    Number of Stairs, Within Home, Primary none  -       Row Name 05/01/25 0850          Cognition    Orientation Status (Cognition) oriented x 4  -       Row Name 05/01/25 0850          Safety Issues/Impairments Affecting Functional Mobility    Impairments Affecting Function (Mobility) pain  -               User Key  (r) = Recorded By, (t) = Taken By, (c) = Cosigned By      Initials Name Provider Type     Giselle Gonzalez, OTR/L Occupational Therapist                   Mobility/ADL's       Row Name 05/01/25 0850          Bed Mobility    Bed Mobility sit-supine  -     Sit-Supine [place] (Bed Mobility) minimum assist (75% patient effort);verbal cues;nonverbal cues (demo/gesture)  -     Comment, (Bed Mobility) pt assisted by son  -       Row Name 05/01/25 0850          Transfers    Transfers sit-stand transfer;stand-sit transfer;toilet transfer  -       Row Name 05/01/25 0850          Sit-Stand Transfer    Sit-Stand [place] (Transfers) independent  -       Row Name 05/01/25 0850          Stand-Sit Transfer    Stand-Sit [place] (Transfers) independent  -       Row Name 05/01/25 0850          Toilet Transfer    Type (Toilet Transfer) sit-stand;stand-sit;stand pivot/stand step  -     [place] Level (Toilet Transfer) independent  -       Row Name 05/01/25 0850          Functional Mobility    Functional Mobility- Ind. Level supervision required  -     Functional Mobility-Distance (Feet) 100  -     Functional Mobility- Comment in room hallway  -     Patient was able to Ambulate yes  -       Row Name 05/01/25 0850          Activities of Daily Living    BADL Assessment/Intervention upper body dressing;lower body dressing  -       Row Name 05/01/25 0850           Upper Body Dressing Assessment/Training    [place] Level (Upper Body Dressing) independent;don;doff;front opening garment  -     Position (Upper Body Dressing) unsupported sitting;unsupported standing  -       Row Name 05/01/25 0850          Lower Body Dressing Assessment/Training    [place] Level (Lower Body Dressing) independent;don;pants/bottoms  -     Position (Lower Body Dressing) unsupported sitting;unsupported standing  -               User Key  (r) = Recorded By, (t) = Taken By, (c) = Cosigned By      Initials Name Provider Type     Giselle Gonzalez, OTR/L Occupational Therapist                   Obj/Interventions       Row Name 05/01/25 0850          Sensory Assessment (Somatosensory)    Sensory Assessment (Somatosensory) UE sensation intact  -[facility] Name 05/01/25 0850          Vision Assessment/Intervention    Visual Impairment/Limitations WFL  -[facility] Name 05/01/25 0850          Range of Motion Comprehensive    General Range of Motion bilateral upper extremity ROM [facility]  -[facility] Name 05/01/25 0850          Strength Comprehensive (MMT)    General Manual Muscle Testing (MMT) Assessment no strength deficits identified  -[facility] Name 05/01/25 0850          Balance    Balance Assessment sitting static balance;sitting dynamic balance;sit to stand dynamic balance;standing static balance;standing dynamic balance  -     Static Sitting Balance independent  -     Dynamic Sitting Balance independent  -     Position, Sitting Balance unsupported;sitting edge of bed  -     Sit to Stand Dynamic Balance independent  -     Static Standing Balance independent  -     Dynamic Standing Balance independent  -     Position/Device Used, Standing Balance unsupported  -               User Key  (r) = Recorded By, (t) = Taken By, (c) = Cosigned By      Initials Name Provider Type    Giselle Corona, OTR/L Occupational Therapist                   Goals/Plan    No  documentation.                  Clinical Impression       Row Name 05/01/25 0850          Pain Assessment    Pretreatment Pain Rating 3/10  -     Posttreatment Pain Rating 5/10  -     Pain Side/Orientation left  -       Row Name 05/01/25 0850          Plan of Care Review    Plan of Care Reviewed With patient  -     Progress improving  -     Outcome Evaluation Ms. Spear is AxO x 4 & extremely pleasant.  She reports mild/moderate pain but is able to ambulate & care for herself without assist.  Pt. plans to DC home.  Rec assist from family for IADLs.  No further OT tx needed at this time.  -       Row Name 05/01/25 0850          Therapy Assessment/Plan (OT)    Criteria for Skilled Therapeutic Interventions Met (OT) no;skilled treatment is necessary  -     Therapy Frequency (OT) evaluation only  -       Row Name 05/01/25 0850          Therapy Plan Review/Discharge Plan (OT)    Anticipated Discharge Disposition (OT) home with assist  -       Row Name 05/01/25 0850          Positioning and Restraints    Pre-Treatment Position in bed  -     Post Treatment Position bed  -     In Bed fowlers;call light within reach;encouraged to call for assist;side rails up x2;with family/caregiver  -               User Key  (r) = Recorded By, (t) = Taken By, (c) = Cosigned By      Initials Name Provider Type     Giselle Gonzalez, OTR/L Occupational Therapist                   Outcome Measures       Row Name 05/01/25 0850          How much help from another is currently needed...    Putting on and taking off regular lower body clothing? 4  -CH     Bathing (including washing, rinsing, and drying) 4  -CH     Toileting (which includes using toilet bed pan or urinal) 4  -CH     Putting on and taking off regular upper body clothing 4  -CH     Taking care of personal grooming (such as brushing teeth) 4  -CH     Eating meals 4  -CH     AM-PAC 6 Clicks Score (OT) 24  -       Row Name 05/01/25 0830 05/01/25 0764       How  much help from another person do you currently need...    Turning from your back to your side while in flat bed without using bedrails? 4  -MS 4  -TM    Moving from lying on back to sitting on the side of a flat bed without bedrails? 4  -MS 3  -TM    Moving to and from a bed to a chair (including a wheelchair)? 4  -MS 3  -TM    Standing up from a chair using your arms (e.g., wheelchair, bedside chair)? 4  -MS 3  -TM    Climbing 3-5 steps with a railing? 4  -MS 3  -TM    To walk in hospital room? 4  -MS 3  -TM    AM-PAC 6 Clicks Score (PT) 24  -MS 19  -TM    Highest Level of Mobility Goal 8 --> Walked 250 feet or more  -MS 6 --> Walk 10 steps or more  -TM      Row Name 05/01/25 0850 05/01/25 0830       Functional Assessment    Outcome Measure Options AM-PAC 6 Clicks Daily Activity (OT)  - AM-PAC 6 Clicks Basic Mobility (PT)  -MS              User Key  (r) = Recorded By, (t) = Taken By, (c) = Cosigned By      Initials Name Provider Type    CH Giselle Gonzalez, OTR/L Occupational Therapist    MS Karissa Jerry, PT, DPT, NCS Physical Therapist    TM Marine Keita, RN Registered Nurse                    OT Recommendation and Plan  Therapy Frequency (OT): evaluation only  Plan of Care Review  Plan of Care Reviewed With: patient  Progress: improving  Outcome Evaluation: Ms. Spear is AxO x 4 & extremely pleasant.  She reports mild/moderate pain but is able to ambulate & care for herself without assist.  Pt. plans to DC home.  Rec assist from family for IADLs.  No further OT tx needed at this time.  Plan of Care Reviewed With: patient  Outcome Evaluation: Ms. Spear is AxO x 4 & extremely pleasant.  She reports mild/moderate pain but is able to ambulate & care for herself without assist.  Pt. plans to DC home.  Rec assist from family for IADLs.  No further OT tx needed at this time.     Time Calculation:         Time Calculation- OT       Row Name 05/01/25 0837             Time Calculation- OT    OT Start Time 0837  add 10  for CR  -CH      OT Stop Time 0850  -      OT Time Calculation (min) 13 min  -CH      OT Received On 05/01/25  -CH         Untimed Charges    OT Eval/Re-eval Minutes 23  -CH         Total Minutes    Untimed Charges Total Minutes 23  -CH       Total Minutes 23  -CH                User Key  (r) = Recorded By, (t) = Taken By, (c) = Cosigned By      Initials Name Provider Type     Giselle Gonzalez OTR/L Occupational Therapist                  Therapy Charges for Today       Code Description Service Date Service Provider Modifiers Qty    [id number]  OT EVAL LOW COMPLEXITY 2 5/1/2025 Giselle Gonzalez OTR/L GO 1               OT Discharge Summary  Anticipated Discharge Disposition (OT): home with assist  Reason for Discharge: Maximum functional potential achieved  Outcomes Achieved: Refer to plan of care for updates on goals achieved  Discharge Destination: Home with assist    ЕКАТЕРИНА Aparicio/ABDIRAHMAN  5/1/2025

## 2025-05-01 NOTE — PLAN OF CARE
Goal Outcome Evaluation:home with family, AVS reviewed and questions answered.

## 2025-05-01 NOTE — PLAN OF CARE
Goal Outcome Evaluation:  Plan of Care Reviewed With: patient, child, durable power of         Progress: improving  Outcome Evaluation: The patient presents alert and oriented x4 standing in the room with children in the room. The patient is ready to go home and is attempting to get dressed. She demonstrates no focal neurological deficits in strength, sensation, or coordination. She was able to safely ambulate in the hallway and has no concerns about going home. She has no further needs for PT at this time. Recommend discharge home with assist.    Anticipated Discharge Disposition (PT): home with assist

## 2025-05-01 NOTE — PLAN OF CARE
Goal Outcome Evaluation:  Plan of Care Reviewed With: patient        Progress: improving  Outcome Evaluation: Ms. Spear is AxO x 4 & extremely pleasant.  She reports mild/moderate pain but is able to ambulate & care for herself without assist.  Pt. plans to DC home.  Rec assist from family for IADLs.  No further OT tx needed at this time.    Anticipated Discharge Disposition (OT): home with assist

## 2025-05-01 NOTE — THERAPY DISCHARGE NOTE
Patient Name: Linda Spear  : 1935    MRN: [id number]                              Today's Date: 2025       Admit Date: 2025    Visit Dx:     ICD-10-CM ICD-9-CM   1. Closed fracture of sacrum, unspecified portion of sacrum, initial encounter  S32.10XA 805.6   2. Closed fracture of sacrum with routine healing, unspecified portion of sacrum, subsequent encounter  S32.10XD V54.17     806.60     Patient Active Problem List   Diagnosis    Sensorineural hearing loss (SNHL) of both ears    Primary hypertension    Hypothyroidism (acquired)    Nonrheumatic aortic valve stenosis    CKD (chronic kidney disease) stage 3, GFR 30-59 ml/min    Sacral fracture, closed     Past Medical History:   Diagnosis Date    Chronic shoulder pain     Clotting disorder     Conductive hearing loss     Disease of thyroid gland 1986    History of tonsillectomy     Low back pain     Perforation of left tympanic membrane     Primary hypertension 12/15/2023     Past Surgical History:   Procedure Laterality Date    ADENOIDECTOMY  1965    CARDIAC CATHETERIZATION      COLONOSCOPY  2011    diverticulosis in the sigmoid colon    COLONOSCOPY  2005    normal exam    KYPHOPLASTY Left 2025    Procedure: KYPHOPLASTY WITH BIOPSY LEFT SACRALPLASTY with O-ARM;  Surgeon: Viktor Solitario MD;  Location: [facility];  Service: Neurosurgery;  Laterality: Left;    TONSILLECTOMY        General Information       Row Name 25 0830          Physical Therapy Time and Intention    Document Type evaluation  s/p sacroplasty L, due to closed fx of sacrum after lifting a box of protein shakes at [facility]  -MS     Mode of Treatment physical therapy;co-treatment  -MS       Row Name 25 5484          General Information    Patient Profile Reviewed yes  -MS     Prior Level of Function independent:;all household mobility;community mobility;ADL's  -MS     Existing Precautions/Restrictions spinal  -MS     Barriers to Rehab none identified   -MS       Row Name 05/01/25 0830          Living Environment    Current Living Arrangements home  -MS     People in Home spouse  -MS       Row Name 05/01/25 0830          Home Main Entrance    Number of Stairs, Main Entrance none  -MS       Row Name 05/01/25 0830          Stairs Within Home, Primary    Number of Stairs, Within Home, Primary none  -MS       Row Name 05/01/25 0830          Cognition    Orientation Status (Cognition) oriented x 4  -MS               User Key  (r) = Recorded By, (t) = Taken By, (c) = Cosigned By      Initials Name Provider Type    Karissa Wilson, PT, DPT, NCS Physical Therapist                   Mobility       Row Name 05/01/25 0830          Bed Mobility    Bed Mobility sit-supine  -MS     Sit-Supine Girard (Bed Mobility) minimum assist (75% patient effort);verbal cues;nonverbal cues (demo/gesture)  assist for LEs  -MS       Row Name 05/01/25 0830          Sit-Stand Transfer    Sit-Stand Girard (Transfers) independent  -MS       Row Name 05/01/25 0830          Gait/Stairs (Locomotion)    Girard Level (Gait) independent  -MS     Patient was able to Ambulate yes  -MS     Distance in Feet (Gait) 100  -MS               User Key  (r) = Recorded By, (t) = Taken By, (c) = Cosigned By      Initials Name Provider Type    Karissa Wilson, PT, DPT, NCS Physical Therapist                   Obj/Interventions       Row Name 05/01/25 0830          Range of Motion Comprehensive    General Range of Motion bilateral upper extremity ROM WFL;bilateral lower extremity ROM WFL  -MS       Row Name 05/01/25 0830          Strength Comprehensive (MMT)    General Manual Muscle Testing (MMT) Assessment no strength deficits identified  -MS       Row Name 05/01/25 0830          Balance    Balance Assessment sitting static balance;sitting dynamic balance;standing static balance;standing dynamic balance  -MS     Static Sitting Balance independent  -MS     Dynamic Sitting Balance independent   -MS     Position, Sitting Balance unsupported;sitting edge of bed  -MS     Static Standing Balance independent  -MS     Dynamic Standing Balance independent  -MS       Row Name 05/01/25 0830          Sensory Assessment (Somatosensory)    Sensory Assessment (Somatosensory) sensation intact  -MS               User Key  (r) = Recorded By, (t) = Taken By, (c) = Cosigned By      Initials Name Provider Type    Karissa Wilson R, PT, DPT, NCS Physical Therapist                   Goals/Plan    No documentation.                  Clinical Impression       Row Name 05/01/25 0830          Pain    Pretreatment Pain Rating 3/10  -MS     Posttreatment Pain Rating 5/10  -MS     Pain Location buttock  -MS     Pain Side/Orientation left  -MS     Pain Management Interventions activity modification encouraged;exercise or physical activity utilized  -MS     Response to Pain Interventions no change per patient report  -MS       Row Name 05/01/25 0830          Plan of Care Review    Plan of Care Reviewed With patient;child;durable power of   -MS     Progress improving  -MS     Outcome Evaluation The patient presents alert and oriented x4 standing in the room with children in the room. The patient is ready to go home and is attempting to get dressed. She demonstrates no focal neurological deficits in strength, sensation, or coordination. She was able to safely ambulate in the hallway and has no concerns about going home. She has no further needs for PT at this time. Recommend discharge home with assist.  -MS       Row Name 05/01/25 0830          Therapy Assessment/Plan (PT)    Criteria for Skilled Interventions Met (PT) no;no problems identified which require skilled intervention;does not meet criteria for skilled intervention  -MS     Therapy Frequency (PT) evaluation only  -MS       Row Name 05/01/25 0830          Positioning and Restraints    Post Treatment Position bed  -MS     In Bed call light within reach;encouraged to  call for assist;fowlers;with family/caregiver;side rails up x2  -MS               User Key  (r) = Recorded By, (t) = Taken By, (c) = Cosigned By      Initials Name Provider Type    Karissa Wilson JI, PT, DPT, NCS Physical Therapist                   Outcome Measures       Row Name 05/01/25 0830 05/01/25 0737       How much help from another person do you currently need...    Turning from your back to your side while in flat bed without using bedrails? 4  -MS 4  -TM    Moving from lying on back to sitting on the side of a flat bed without bedrails? 4  -MS 3  -TM    Moving to and from a bed to a chair (including a wheelchair)? 4  -MS 3  -TM    Standing up from a chair using your arms (e.g., wheelchair, bedside chair)? 4  -MS 3  -TM    Climbing 3-5 steps with a railing? 4  -MS 3  -TM    To walk in hospital room? 4  -MS 3  -TM    AM-PAC 6 Clicks Score (PT) 24  -MS 19  -TM    Highest Level of Mobility Goal 8 --> Walked 250 feet or more  -MS 6 --> Walk 10 steps or more  -TM      Row Name 05/01/25 0830          Functional Assessment    Outcome Measure Options AM-PAC 6 Clicks Basic Mobility (PT)  -MS               User Key  (r) = Recorded By, (t) = Taken By, (c) = Cosigned By      Initials Name Provider Type    Karissa Wilson JI, PT, DPT, NCS Physical Therapist    TM Marine Keita, RN Registered Nurse                    PT Recommendation and Plan     Progress: improving  Outcome Evaluation: The patient presents alert and oriented x4 standing in the room with children in the room. The patient is ready to go home and is attempting to get dressed. She demonstrates no focal neurological deficits in strength, sensation, or coordination. She was able to safely ambulate in the hallway and has no concerns about going home. She has no further needs for PT at this time. Recommend discharge home with assist.     Time Calculation:         PT Charges       Row Name 05/01/25 0830             Time Calculation    Start Time 0830  -MS       Stop Time 0859  -MS      Time Calculation (min) 29 min  -MS      PT Received On 05/01/25  -MS         Untimed Charges    PT Eval/Re-eval Minutes 29  -MS         Total Minutes    Untimed Charges Total Minutes 29  -MS       Total Minutes 29  -MS                User Key  (r) = Recorded By, (t) = Taken By, (c) = Cosigned By      Initials Name Provider Type    Karissa Wilson, PT, DPT, NCS Physical Therapist                      PT G-Codes  Outcome Measure Options: AM-PAC 6 Clicks Basic Mobility (PT)  AM-PAC 6 Clicks Score (PT): 24    PT Discharge Summary  Anticipated Discharge Disposition (PT): home with assist    Karissa Jerry, PT, DPT, NCS  5/1/2025

## 2025-05-02 NOTE — OUTREACH NOTE
Prep Survey      Flowsheet Row Responses   [facility] facility patient discharged from? [facility]   Is LACE score < 7 ? No   Eligibility Readm Mgmt   Discharge diagnosis Back Pain   Does the patient have one of the following disease processes/diagnoses(primary or secondary)? Other   Does the patient have Home health ordered? No   Is there a DME ordered? No   Prep survey completed? Yes            YORDAN GALDAMEZ - Registered Nurse

## 2025-05-05 ENCOUNTER — READMISSION MANAGEMENT (OUTPATIENT)
Dept: CALL CENTER | Facility: HOSPITAL | Age: 89
End: 2025-05-05
Payer: MEDICARE

## 2025-05-05 DIAGNOSIS — M48.58XA COLLAPSED VERTEBRA, NOT ELSEWHERE CLASSIFIED, SACRAL AND SACROCOCCYGEAL REGION, INITIAL ENCOUNTER FOR FRACTURE: Primary | ICD-10-CM

## 2025-05-05 NOTE — OUTREACH NOTE
Medical Week 1 Survey      Flowsheet Row Responses   [facility] patient discharged from? [facility]   Does the patient have one of the following disease processes/diagnoses(primary or secondary)? Other   Week 1 attempt successful? Yes   Call start time 1937   Call end time 1942   Discharge diagnosis Back Pain   Meds reviewed with patient/caregiver? Yes   Is the patient having any side effects they believe may be caused by any medication additions or changes? No   Does the patient have all medications ordered at discharge? Yes   Is the patient taking all medications as directed (includes completed medication regime)? Yes   Does the patient have a primary care provider?  Yes   Does the patient have an appointment with their PCP within 7 days of discharge? Yes   Has the patient kept scheduled appointments due by today? Yes   Psychosocial issues? No   Did the patient receive a copy of their discharge instructions? Yes   Nursing interventions Reviewed instructions with patient   What is the patient's perception of their health status since discharge? Improving   Is the patient/caregiver able to teach back signs and symptoms related to disease process for when to call PCP? Yes   Is the patient/caregiver able to teach back signs and symptoms related to disease process for when to call 911? Yes   Is the patient/caregiver able to teach back the hierarchy of who to call/visit for symptoms/problems? PCP, Specialist, Home health nurse, Urgent Care, ED, 911 Yes   If the patient is a current smoker, are they able to teach back resources for cessation? Not a smoker   Week 1 call completed? Yes   Graduated Yes   Did the patient feel the follow up calls were helpful during their recovery period? Yes   Was the number of calls appropriate? Yes   Would this patient benefit from a Referral to Amb Social Work? No   Is the patient interested in additional calls from an ambulatory ? No   Wrap up additional comments pt doing  well.   Call end time 1942            YORDAN GALDAMEZ - Registered Nurse

## 2025-05-06 ENCOUNTER — HOSPITAL ENCOUNTER (OUTPATIENT)
Dept: BONE DENSITY | Facility: HOSPITAL | Age: 89
Discharge: HOME | End: 2025-05-06
Admitting: NURSE PRACTITIONER
Payer: MEDICARE

## 2025-05-06 DIAGNOSIS — S32.10XD CLOSED FRACTURE OF SACRUM WITH ROUTINE HEALING, UNSPECIFIED PORTION OF SACRUM, SUBSEQUENT ENCOUNTER: ICD-10-CM

## 2025-05-06 PROCEDURE — 77080 DXA BONE DENSITY AXIAL: CPT

## 2025-05-13 ENCOUNTER — HOSPITAL ENCOUNTER (OUTPATIENT)
Dept: GENERAL RADIOLOGY | Facility: HOSPITAL | Age: 89
Discharge: HOME | End: 2025-05-13
Payer: MEDICARE

## 2025-05-13 ENCOUNTER — OFFICE VISIT (OUTPATIENT)
Dept: NEUROSURGERY | Facility: CLINIC | Age: 89
End: 2025-05-13
Payer: MEDICARE

## 2025-05-13 VITALS — BODY MASS INDEX: 27.82 KG/M2 | WEIGHT: 157 LBS | HEIGHT: 63 IN

## 2025-05-13 DIAGNOSIS — M48.57XS: ICD-10-CM

## 2025-05-13 DIAGNOSIS — M25.551 RIGHT HIP PAIN: ICD-10-CM

## 2025-05-13 DIAGNOSIS — M48.57XS: Primary | ICD-10-CM

## 2025-05-13 PROCEDURE — 72100 X-RAY EXAM L-S SPINE 2/3 VWS: CPT

## 2025-05-13 PROCEDURE — 99213 OFFICE O/P EST LOW 20 MIN: CPT | Performed by: NURSE PRACTITIONER

## 2025-05-13 PROCEDURE — 73502 X-RAY EXAM HIP UNI 2-3 VIEWS: CPT

## 2025-05-13 PROCEDURE — 1160F RVW MEDS BY RX/DR IN RCRD: CPT | Performed by: NURSE PRACTITIONER

## 2025-05-13 PROCEDURE — 1159F MED LIST DOCD IN RCRD: CPT | Performed by: NURSE PRACTITIONER

## 2025-05-13 RX ORDER — HYDROCORTISONE 25 MG/G
CREAM TOPICAL
COMMUNITY
Start: 2025-05-05

## 2025-05-13 NOTE — PROGRESS NOTES
"  Chief complaint:   Chief Complaint   Patient presents with    R Hip Pain     Pt is here for 2wk p/o f/u new on set symptoms in R hip. Pt is in wheelchair today.         Subjective     HPI: This is a 89 y.o. female patient who went to the operating room on 4/30/2025 for a KYPHOPLASTY WITH BIOPSY LEFT SACRALPLASTY with O-ARM - Left. The patient is here in follow up today for postoperative visit.  The patient says that the pain that she was having over on the left side has improved significantly.  She says on May 8, 2025 she started having pain off on the right side that is seeming to radiate down into her groin area.  There is no accident or injury associated with this.  Denies any pain radiating down into her leg.  She is having difficulty with mobilizing again.        Review of Systems   Musculoskeletal:  Positive for arthralgias and back pain.         Objective      Vital Signs  Ht 160 cm (62.99\")   Wt 71.2 kg (157 lb)   LMP  (LMP Unknown)   BMI 27.82 kg/m²     Physical Exam  Constitutional:       General: She is awake.      Appearance: She is well-developed.   HENT:      Head: Normocephalic.   Eyes:      Extraocular Movements: Extraocular movements intact.      Pupils: Pupils are equal, round, and reactive to light.   Pulmonary:      Effort: Pulmonary effort is normal.   Musculoskeletal:         General: Normal range of motion.      Cervical back: Normal range of motion.   Skin:     General: Skin is warm.   Neurological:      Mental Status: She is alert and oriented to person, place, and time.      GCS: GCS eye subscore is 4. GCS verbal subscore is 5. GCS motor subscore is 6.      Cranial Nerves: No cranial nerve deficit.      Sensory: No sensory deficit.      Motor: Motor strength is normal.     Gait: Gait normal.      Deep Tendon Reflexes: Reflexes are normal and symmetric.   Psychiatric:         Speech: Speech normal.         Behavior: Behavior normal.         Thought Content: Thought content normal. "       Incisions clean dry and intact    Neurological Exam  Mental Status  Awake and alert. Oriented to person, place and time. Oriented to person, place, and time. Speech is normal. Language is fluent with no aphasia. Attention and concentration are normal.    Cranial Nerves  CN I: Sense of smell is normal.  CN II: Right normal visual field. Left normal visual field.  CN III, IV, VI: Extraocular movements intact bilaterally. Pupils equal round and reactive to light bilaterally.  CN V: Facial sensation is normal.  CN VII:  Right: There is no facial weakness.  Left: There is no facial weakness.  CN XI: Shoulder shrug strength is normal.  CN XII: Tongue midline without atrophy or fasciculations.    Motor  Normal muscle bulk throughout. Normal muscle tone. Strength is 5/5 throughout all four extremities.    Sensory  Sensation is intact to light touch, pinprick, vibration and proprioception in all four extremities.    Reflexes  Deep tendon reflexes are 2+ and symmetric in all four extremities.    Gait  Normal casual, toe, heel and tandem gait. Normal gait.      Imaging review: Bone density scan that was done on May 6, 2025 shows concern for osteopenia              Assessment/Plan: Patient is having worsening pain on the right side.  I am concerned about a fracture.  We can have her go for x-rays of the lumbar spine as well as her right hip.  Will set her up to do an MRI of the lumbar spine stat.  I will follow-up with her after imaging is completed and make further recommendation at that time.  She was told to call us for any further problems or concerns.    Patient is a nonsmoker  The patient's Body mass index is 27.82 kg/m².. BMI is above normal parameters. Recommendations include: educational material and nutrition counseling    Diagnoses and all orders for this visit:    1. Collapsed vertebra, not elsewhere classified, lumbosacral region, sequela of fracture (Primary)  -     XR Spine Lumbar AP & Lateral; Future  -      MRI Lumbar Spine Without Contrast; Future    2. Right hip pain  -     XR Hip With or Without Pelvis 2 - 3 View Right; Future        I discussed the patients findings and my recommendations with patient  Kendall Pena, APRN  05/13/25  14:40 CDT

## 2025-05-13 NOTE — PATIENT INSTRUCTIONS
"DASH Eating Plan  DASH stands for Dietary Approaches to Stop Hypertension. The DASH eating plan is a healthy eating plan that has been shown to:  Lower high blood pressure (hypertension).  Reduce your risk for type 2 diabetes, heart disease, and stroke.  Help with weight loss.  What are tips for following this plan?  Reading food labels  Check food labels for the amount of salt (sodium) per serving. Choose foods with less than 5 percent of the Daily Value (DV) of sodium. In general, foods with less than 300 milligrams (mg) of sodium per serving fit into this eating plan.  To find whole grains, look for the word \"whole\" as the first word in the ingredient list.  Shopping  Buy products labeled as \"low-sodium\" or \"no salt added.\"  Buy fresh foods. Avoid canned foods and pre-made or frozen meals.  Cooking  Try not to add salt when you cook. Use salt-free seasonings or herbs instead of table salt or sea salt. Check with your health care provider or pharmacist before using salt substitutes.  Do not coelho foods. Cook foods in healthy ways, such as baking, boiling, grilling, roasting, or broiling.  Cook using oils that are good for your heart. These include olive, canola, avocado, soybean, and sunflower oil.  Meal planning    Eat a balanced diet. This should include:  4 or more servings of fruits and 4 or more servings of vegetables each day. Try to fill half of your plate with fruits and vegetables.  6-8 servings of whole grains each day.  6 or less servings of lean meat, poultry, or fish each day. 1 oz is 1 serving. A 3 oz (85 g) serving of meat is about the same size as the palm of your hand. One egg is 1 oz (28 g).  2-3 servings of low-fat dairy each day. One serving is 1 cup (237 mL).  1 serving of nuts, seeds, or beans 5 times each week.  2-3 servings of heart-healthy fats. Healthy fats called omega-3 fatty acids are found in foods such as walnuts, flaxseeds, fortified milks, and eggs. These fats are also found in " cold-water fish, such as sardines, salmon, and mackerel.  Limit how much you eat of:  Canned or prepackaged foods.  Food that is high in trans fat, such as fried foods.  Food that is high in saturated fat, such as fatty meat.  Desserts and other sweets, sugary drinks, and other foods with added sugar.  Full-fat dairy products.  Do not salt foods before eating.  Do not eat more than 4 egg yolks a week.  Try to eat at least 2 vegetarian meals a week.  Eat more home-cooked food and less restaurant, buffet, and fast food.  Lifestyle  When eating at a restaurant, ask if your food can be made with less salt or no salt.  If you drink alcohol:  Limit how much you have to:  0-1 drink a day if you are female.  0-2 drinks a day if you are male.  Know how much alcohol is in your drink. In the [place] one drink is one 12 oz bottle of beer (355 mL), one 5 oz glass of wine (148 mL), or one 1½ oz glass of hard liquor (44 mL).  General information  Avoid eating more than 2,300 mg of salt a day. If you have hypertension, you may need to reduce your sodium intake to 1,500 mg a day.  Work with your provider to stay at a healthy body weight or lose weight. Ask what the best weight range is for you.  On most days of the week, get at least 30 minutes of exercise that causes your heart to beat faster. This may include walking, swimming, or biking.  Work with your provider or dietitian to adjust your eating plan to meet your specific calorie needs.  What foods should I eat?  Fruits  All fresh, dried, or frozen fruit. Canned fruits that are in their natural juice and do not have sugar added to them.  Vegetables  Fresh or frozen vegetables that are raw, steamed, roasted, or grilled. Low-sodium or reduced-sodium tomato and vegetable juice. Low-sodium or reduced-sodium tomato sauce and tomato paste. Low-sodium or reduced-sodium canned vegetables.  Grains  Whole-grain or whole-wheat bread. Whole-grain or whole-wheat pasta. Brown rice. Oatmeal.  Quinoa. Bulgur. Whole-grain and low-sodium cereals. Skylar bread. Low-fat, low-sodium crackers. Whole-wheat flour tortillas.  Meats and other proteins  Skinless chicken or turkey. Ground chicken or turkey. Pork with fat trimmed off. Fish and seafood. Egg whites. Dried beans, peas, or lentils. Unsalted nuts, nut butters, and seeds. Unsalted canned beans. Lean cuts of beef with fat trimmed off. Low-sodium, lean precooked or cured meat, such as sausages or meat loaves.  Dairy  Low-fat (1%) or fat-free (skim) milk. Reduced-fat, low-fat, or fat-free cheeses. Nonfat, low-sodium ricotta or cottage cheese. Low-fat or nonfat yogurt. Low-fat, low-sodium cheese.  Fats and oils  Soft margarine without trans fats. Vegetable oil. Reduced-fat, low-fat, or light mayonnaise and salad dressings (reduced-sodium). Canola, safflower, olive, avocado, soybean, and sunflower oils. Avocado.  Seasonings and condiments  Herbs. Spices. Seasoning mixes without salt.  Other foods  Unsalted popcorn and pretzels. Fat-free sweets.  The items listed above may not be all the foods and drinks you can have. Talk to a dietitian to learn more.  What foods should I avoid?  Fruits  Canned fruit in a light or heavy syrup. Fried fruit. Fruit in cream or butter sauce.  Vegetables  Creamed or fried vegetables. Vegetables in a cheese sauce. Regular canned vegetables that are not marked as low-sodium or reduced-sodium. Regular canned tomato sauce and paste that are not marked as low-sodium or reduced-sodium. Regular tomato and vegetable juices that are not marked as low-sodium or reduced-sodium. Pickles. Olives.  Grains  Baked goods made with fat, such as croissants, muffins, or some breads. Dry pasta or rice meal packs.  Meats and other proteins  Fatty cuts of meat. Ribs. Fried meat. [place]galde. Bologna, salami, and other precooked or cured meats, such as sausages or meat loaves, that are not lean and low in sodium. Fat from the back of a pig (fatback). [place].  Salted nuts and seeds. Canned beans with added salt. Canned or smoked fish. Whole eggs or egg yolks. Chicken or turkey with skin.  Dairy  Whole or 2% milk, cream, and half-and-half. Whole or full-fat cream cheese. Whole-fat or sweetened yogurt. Full-fat cheese. Nondairy creamers. Whipped toppings. Processed cheese and cheese spreads.  Fats and oils  Butter. Stick margarine. Lard. Shortening. Ghee. Ugalde fat. Tropical oils, such as coconut, palm kernel, or palm oil.  Seasonings and condiments  Onion salt, garlic salt, seasoned salt, table salt, and sea salt. Worcestershire sauce. Tartar sauce. Barbecue sauce. Teriyaki sauce. Soy sauce, including reduced-sodium soy sauce. Steak sauce. Canned and packaged gravies. Fish sauce. Oyster sauce. Cocktail sauce. Store-bought horseradish. Ketchup. Mustard. Meat flavorings and tenderizers. Bouillon cubes. Hot sauces. Pre-made or packaged marinades. Pre-made or packaged taco seasonings. Relishes. Regular salad dressings.  Other foods  Salted popcorn and pretzels.  The items listed above may not be all the foods and drinks you should avoid. Talk to a dietitian to learn more.  Where to find more information  National Heart, Lung, and Blood [place] (NHLBI): nhlbi.nih.gov  American Heart Association (AHA): heart.org  Academy of Nutrition and Dietetics: eatright.org  National Kidney Foundation (NKF): kidney.org  This information is not intended to replace advice given to you by your health care provider. Make sure you discuss any questions you have with your health care provider.  Document Revised: 01/04/2024 Document Reviewed: 01/04/2024  Elsevier Patient Education © 2024 [employer]BMI for Adults  Body mass index (BMI) is a number found using a person's weight and height. BMI can help tell how much of a person's weight is made up of fat. BMI does not measure body fat directly. It is used instead of tests that directly measure body fat, which can be difficult and  "expensive.  What are BMI measurements used for?  BMI is useful to:  Find out if your weight puts you at higher risk for medical problems.  Help recommend changes, such as in diet and exercise. This can help you reach a healthy weight. BMI screening can be done again to see if these changes are working.  How is BMI calculated?  Your height and weight are measured. The BMI is found from those numbers. This can be done with [place] or metric measurements. Note that charts and online BMI calculators are available to help you find your BMI quickly and easily without doing these calculations.  To calculate your BMI in [place] measurements:  Measure your weight in pounds (lb).  Multiply the number of pounds by 703.  So, for an adult who weighs 150 lb, multiply that number by 703: 150 x 703, which equals 105,450.  Measure your height in inches. Then multiply that number by itself to get a measurement called \"inches squared.\"  So, for an adult who is 70 inches tall, the \"inches squared\" measurement is 70 inches x 70 inches, which equals 4,900 inches squared.  Divide the total from step 2 (number of lb x 703) by the total from step 3 (inches squared): 105,450 ÷ 4,900 = 21.5. This is your BMI.  To calculate your BMI in metric measurements:    Measure your weight in kilograms (kg).  For this example, the weight is 70 kg.  Measure your height in meters (m). Then multiply that number by itself to get a measurement called \"meters squared.\"  So, for an adult who is 1.75 m tall, the \"meters squared\" measurement is 1.75 m x 1.75 m, which equals 3.1 meters squared.  Divide the number of kilograms (your weight) by the meters squared number. In this example: 70 ÷ 3.1 = 22.6. This is your BMI.  What do the results mean?  BMI charts are used to see if you are underweight, normal weight, overweight, or obese. The following guidelines will be used:  Underweight: BMI less than 18.5.  Normal weight: BMI between 18.5 and 24.9.  Overweight: BMI " between 25 and 29.9.  Obese: BMI of 30 or above.  BMI is a tool and cannot diagnose a condition. Talk with your health care provider about what your BMI means for you. Keep these notes in mind:  Weight includes fat and muscle. Someone with a muscular build, such as an athlete, may have a BMI that is higher than 24.9. In cases like these, BMI is not a correct measure of body fat.  If you have a BMI of 25 or higher, your provider may need to do more testing to find out if excess body fat is the cause.  BMI is measured the same way for males and females. Females usually have more body fat than males of the same height and weight.  Where to find more information  For more information about BMI, including tools to quickly find your BMI, go to:  Centers for Disease Control and Prevention: cdc.gov  American Heart Association: heart.org  National Heart, Lung, and Blood [place]: nhlbi.nih.gov  This information is not intended to replace advice given to you by your health care provider. Make sure you discuss any questions you have with your health care provider.  Document Revised: 09/07/2023 Document Reviewed: 08/31/2023  Elsevier Patient Education © 2024 [employer] Inc.

## 2025-05-14 ENCOUNTER — HOSPITAL ENCOUNTER (OUTPATIENT)
Dept: MRI IMAGING | Facility: HOSPITAL | Age: 89
Discharge: HOME | End: 2025-05-14
Admitting: NURSE PRACTITIONER
Payer: MEDICARE

## 2025-05-14 DIAGNOSIS — M48.57XS: ICD-10-CM

## 2025-05-14 PROCEDURE — 72148 MRI LUMBAR SPINE W/O DYE: CPT

## 2025-05-15 ENCOUNTER — PREP FOR SURGERY (OUTPATIENT)
Dept: OTHER | Facility: HOSPITAL | Age: 89
End: 2025-05-15
Payer: MEDICARE

## 2025-05-15 ENCOUNTER — OFFICE VISIT (OUTPATIENT)
Dept: NEUROSURGERY | Facility: CLINIC | Age: 89
End: 2025-05-15
Payer: MEDICARE

## 2025-05-15 VITALS — HEIGHT: 63 IN | WEIGHT: 157 LBS | BODY MASS INDEX: 27.82 KG/M2

## 2025-05-15 DIAGNOSIS — E66.3 OVERWEIGHT WITH BODY MASS INDEX (BMI) OF 27 TO 27.9 IN ADULT: ICD-10-CM

## 2025-05-15 DIAGNOSIS — S32.10XA CLOSED FRACTURE OF SACRUM, UNSPECIFIED PORTION OF SACRUM, INITIAL ENCOUNTER: Primary | ICD-10-CM

## 2025-05-15 DIAGNOSIS — M48.58XA COLLAPSED VERTEBRA, NOT ELSEWHERE CLASSIFIED, SACRAL AND SACROCOCCYGEAL REGION, INITIAL ENCOUNTER FOR FRACTURE: Primary | ICD-10-CM

## 2025-05-15 DIAGNOSIS — S32.10XA CLOSED FRACTURE OF SACRUM, UNSPECIFIED PORTION OF SACRUM, INITIAL ENCOUNTER: ICD-10-CM

## 2025-05-15 RX ORDER — HYDROCODONE BITARTRATE AND ACETAMINOPHEN 5; 325 MG/1; MG/1
1 TABLET ORAL EVERY 6 HOURS PRN
Qty: 20 TABLET | Refills: 0 | Status: SHIPPED | OUTPATIENT
Start: 2025-05-15 | End: 2025-05-19 | Stop reason: SDUPTHER

## 2025-05-15 NOTE — H&P (VIEW-ONLY)
Chief complaint:   Chief Complaint   Patient presents with    Back Pain     Pt is here for MRI results.        Subjective     HPI: This is a 89 y.o. female patient who went to the operating room on 4/30/2025 for a KYPHOPLASTY WITH BIOPSY LEFT SACRALPLASTY with O-ARM - Left. The patient is here in follow up today for postoperative visit.  The patient says that the pain that she was having over on the left side has improved significantly.  She says on May 8, 2025 she started having pain off on the right side that is seeming to radiate down into her groin area.  There is no accident or injury associated with this.  Denies any pain radiating down into her leg.  She is having difficulty with mobilizing again.     We did send the patient for x-rays and an MRI of the lumbar spine.  She is here in follow-up today.  She continues to complain of pain in her right buttocks.  She is not having any issues on the left buttocks at this time.  She does like this pain is the same kind of pain that she had on the left side.  Rates her pain on a scale 0-10 at a 9.  She is working with her primary care doctor in regards to treatment for her osteopenia        Review of Systems   Constitutional:  Positive for activity change.   Musculoskeletal:  Positive for arthralgias and back pain.   All other systems reviewed and are negative.       Past Medical History:   Diagnosis Date    Chronic shoulder pain     Clotting disorder     Conductive hearing loss     Disease of thyroid gland 1986    History of tonsillectomy 1965    Low back pain     Perforation of left tympanic membrane     Primary hypertension 12/15/2023     Past Surgical History:   Procedure Laterality Date    ADENOIDECTOMY  1965    CARDIAC CATHETERIZATION      COLONOSCOPY  02/03/2011    diverticulosis in the sigmoid colon    COLONOSCOPY  01/04/2005    normal exam    KYPHOPLASTY Left 4/30/2025    Procedure: KYPHOPLASTY WITH BIOPSY LEFT SACRALPLASTY with O-ARM;  Surgeon: Kassi  "Viktor DEL TORO MD;  Location: [facility] OR;  Service: Neurosurgery;  Laterality: Left;    TONSILLECTOMY  1965     Family History   Problem Relation Age of Onset    Clotting disorder Father     Heart attack Sister     Cancer Sister              Breast cancer Neg Hx     Cirrhosis Neg Hx     Colon cancer Neg Hx      Social History     Tobacco Use    Smoking status: Never     Passive exposure: Never    Smokeless tobacco: Never   Vaping Use    Vaping status: Never Used   Substance Use Topics    Alcohol use: Yes     Comment: occ    Drug use: Never     (Not in a hospital admission)    Allergies:  Amlodipine, Imdur [isosorbide nitrate], Lisinopril, Aleve [naproxen], Codeine, Hydralazine, Ibuprofen, Levofloxacin, Losartan, and Other    Objective      Vital Signs  Ht 160 cm (62.99\")   Wt 71.2 kg (157 lb)   LMP  (LMP Unknown)   BMI 27.82 kg/m²     Physical Exam  Constitutional:       General: She is awake.      Appearance: Normal appearance. She is well-developed.   HENT:      Head: Normocephalic.   Eyes:      General: Lids are normal.      Extraocular Movements: Extraocular movements intact.      Conjunctiva/sclera: Conjunctivae normal.      Pupils: Pupils are equal, round, and reactive to light.   Pulmonary:      Effort: Pulmonary effort is normal.      Breath sounds: Normal breath sounds.   Musculoskeletal:         General: Normal range of motion.      Cervical back: Normal range of motion.   Skin:     General: Skin is warm.   Neurological:      Mental Status: She is alert and oriented to person, place, and time.      GCS: GCS eye subscore is 4. GCS verbal subscore is 5. GCS motor subscore is 6.      Cranial Nerves: No cranial nerve deficit.      Sensory: No sensory deficit.      Motor: Motor strength is normal.     Deep Tendon Reflexes: Reflexes are normal and symmetric. Reflexes normal.   Psychiatric:         Speech: Speech normal.         Behavior: Behavior normal.         Thought Content: Thought content normal. "         Neurological Exam  Mental Status  Awake and alert. Oriented to person, place and time. Oriented to person, place, and time. Speech is normal. Language is fluent with no aphasia. Attention and concentration are normal.    Cranial Nerves  CN I: Sense of smell is normal.  CN II: Right normal visual field. Left normal visual field.  CN III, IV, VI: Extraocular movements intact bilaterally. Normal lids and orbits bilaterally. Pupils equal round and reactive to light bilaterally.  CN V: Facial sensation is normal.  CN VII:  Right: There is no facial weakness.  Left: There is no facial weakness.  CN XI: Shoulder shrug strength is normal.  CN XII: Tongue midline without atrophy or fasciculations.    Motor  Normal muscle bulk throughout. Normal muscle tone. Strength is 5/5 throughout all four extremities.    Sensory  Sensation is intact to light touch, pinprick, vibration and proprioception in all four extremities.    Reflexes  Deep tendon reflexes are 2+ and symmetric in all four extremities.    Gait  Normal casual, toe, heel and tandem gait.      Imaging review: X-ray of the lumbar spine that was done on May 13, 2025 shows a degenerative scoliosis in the lumbar spine.  Left sacroplasty completed.      X-ray of the right hip shows arthritis but no fracture    MRI of the lumbar spine that was done on May 14, 2025 shows new STIR changes in the right sacrum concerning for right sided sacral insufficiency fracture.  At L5-S1 there is bilateral foraminal narrowing with the right being worse than left.  At L4-5 disc degeneration with bilateral foraminal narrowing the right being worse than the left.  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  At L2-3 left-sided foraminal narrowing.      Bone density scan that was done on May 6, 2025 shows concern for osteopenia       Assessment/Plan: The patient does have a right sacral insufficiency fracture.  I have discussed  this patient with Dr. Solitario reviewed the imaging with him.  At this time it is felt that the patient would benefit by going to the operating room for a right sacroplasty.  Dr. Solitario to come in and discussed this with the patient.  Please see his addendum on the patient.  Will have the patient set up for surgery as quickly as possible.  Questions and concerns were addressed.    I spent 35 minutes caring for Linda on this date of service. This time includes time spent by me in the following activities: preparing for the visit, reviewing tests, performing a medically appropriate examination and/or evaluation, counseling and educating the patient/family/caregiver, referring and communicating with other health care professionals, documenting information in the medical record, independently interpreting results and communicating that information with the patient/family/caregiver, care coordination, ordering procedure(s), and reviewing a separately obtained history       Patient is a nonsmoker  The patient's Body mass index is 27.82 kg/m².. BMI is above normal parameters. Recommendations include: educational material and nutrition counseling  Advance Care Planning   ACP discussion was held with the patient during this visit. Patient does not have an advance directive, information provided.  STEADI Fall Risk Assessment was completed, and patient is at MODERATE risk for falls. Assessment completed on:4/29/2025       Diagnoses and all orders for this visit:    1. Collapsed vertebra, not elsewhere classified, sacral and sacrococcygeal region, initial encounter for fracture (Primary)    2. Closed fracture of sacrum, unspecified portion of sacrum, initial encounter    3. Overweight with body mass index (BMI) of 27 to 27.9 in adult          I discussed the patients findings and my recommendations with patient    Kendall Pena, APRN  05/15/25  13:21 CDT

## 2025-05-15 NOTE — PATIENT INSTRUCTIONS
"DASH Eating Plan  DASH stands for Dietary Approaches to Stop Hypertension. The DASH eating plan is a healthy eating plan that has been shown to:  Lower high blood pressure (hypertension).  Reduce your risk for type 2 diabetes, heart disease, and stroke.  Help with weight loss.  What are tips for following this plan?  Reading food labels  Check food labels for the amount of salt (sodium) per serving. Choose foods with less than 5 percent of the Daily Value (DV) of sodium. In general, foods with less than 300 milligrams (mg) of sodium per serving fit into this eating plan.  To find whole grains, look for the word \"whole\" as the first word in the ingredient list.  Shopping  Buy products labeled as \"low-sodium\" or \"no salt added.\"  Buy fresh foods. Avoid canned foods and pre-made or frozen meals.  Cooking  Try not to add salt when you cook. Use salt-free seasonings or herbs instead of table salt or sea salt. Check with your health care provider or pharmacist before using salt substitutes.  Do not coelho foods. Cook foods in healthy ways, such as baking, boiling, grilling, roasting, or broiling.  Cook using oils that are good for your heart. These include olive, canola, avocado, soybean, and sunflower oil.  Meal planning    Eat a balanced diet. This should include:  4 or more servings of fruits and 4 or more servings of vegetables each day. Try to fill half of your plate with fruits and vegetables.  6-8 servings of whole grains each day.  6 or less servings of lean meat, poultry, or fish each day. 1 oz is 1 serving. A 3 oz (85 g) serving of meat is about the same size as the palm of your hand. One egg is 1 oz (28 g).  2-3 servings of low-fat dairy each day. One serving is 1 cup (237 mL).  1 serving of nuts, seeds, or beans 5 times each week.  2-3 servings of heart-healthy fats. Healthy fats called omega-3 fatty acids are found in foods such as walnuts, flaxseeds, fortified milks, and eggs. These fats are also found in " cold-water fish, such as sardines, salmon, and mackerel.  Limit how much you eat of:  Canned or prepackaged foods.  Food that is high in trans fat, such as fried foods.  Food that is high in saturated fat, such as fatty meat.  Desserts and other sweets, sugary drinks, and other foods with added sugar.  Full-fat dairy products.  Do not salt foods before eating.  Do not eat more than 4 egg yolks a week.  Try to eat at least 2 vegetarian meals a week.  Eat more home-cooked food and less restaurant, buffet, and fast food.  Lifestyle  When eating at a restaurant, ask if your food can be made with less salt or no salt.  If you drink alcohol:  Limit how much you have to:  0-1 drink a day if you are female.  0-2 drinks a day if you are male.  Know how much alcohol is in your drink. In the [place] one drink is one 12 oz bottle of beer (355 mL), one 5 oz glass of wine (148 mL), or one 1½ oz glass of hard liquor (44 mL).  General information  Avoid eating more than 2,300 mg of salt a day. If you have hypertension, you may need to reduce your sodium intake to 1,500 mg a day.  Work with your provider to stay at a healthy body weight or lose weight. Ask what the best weight range is for you.  On most days of the week, get at least 30 minutes of exercise that causes your heart to beat faster. This may include walking, swimming, or biking.  Work with your provider or dietitian to adjust your eating plan to meet your specific calorie needs.  What foods should I eat?  Fruits  All fresh, dried, or frozen fruit. Canned fruits that are in their natural juice and do not have sugar added to them.  Vegetables  Fresh or frozen vegetables that are raw, steamed, roasted, or grilled. Low-sodium or reduced-sodium tomato and vegetable juice. Low-sodium or reduced-sodium tomato sauce and tomato paste. Low-sodium or reduced-sodium canned vegetables.  Grains  Whole-grain or whole-wheat bread. Whole-grain or whole-wheat pasta. Brown rice. Oatmeal.  Quinoa. Bulgur. Whole-grain and low-sodium cereals. Skylar bread. Low-fat, low-sodium crackers. Whole-wheat flour tortillas.  Meats and other proteins  Skinless chicken or turkey. Ground chicken or turkey. Pork with fat trimmed off. Fish and seafood. Egg whites. Dried beans, peas, or lentils. Unsalted nuts, nut butters, and seeds. Unsalted canned beans. Lean cuts of beef with fat trimmed off. Low-sodium, lean precooked or cured meat, such as sausages or meat loaves.  Dairy  Low-fat (1%) or fat-free (skim) milk. Reduced-fat, low-fat, or fat-free cheeses. Nonfat, low-sodium ricotta or cottage cheese. Low-fat or nonfat yogurt. Low-fat, low-sodium cheese.  Fats and oils  Soft margarine without trans fats. Vegetable oil. Reduced-fat, low-fat, or light mayonnaise and salad dressings (reduced-sodium). Canola, safflower, olive, avocado, soybean, and sunflower oils. Avocado.  Seasonings and condiments  Herbs. Spices. Seasoning mixes without salt.  Other foods  Unsalted popcorn and pretzels. Fat-free sweets.  The items listed above may not be all the foods and drinks you can have. Talk to a dietitian to learn more.  What foods should I avoid?  Fruits  Canned fruit in a light or heavy syrup. Fried fruit. Fruit in cream or butter sauce.  Vegetables  Creamed or fried vegetables. Vegetables in a cheese sauce. Regular canned vegetables that are not marked as low-sodium or reduced-sodium. Regular canned tomato sauce and paste that are not marked as low-sodium or reduced-sodium. Regular tomato and vegetable juices that are not marked as low-sodium or reduced-sodium. Pickles. Olives.  Grains  Baked goods made with fat, such as croissants, muffins, or some breads. Dry pasta or rice meal packs.  Meats and other proteins  Fatty cuts of meat. Ribs. Fried meat. Ugalde. Bologna, salami, and other precooked or cured meats, such as sausages or meat loaves, that are not lean and low in sodium. Fat from the back of a pig (fatback). [place].  Salted nuts and seeds. Canned beans with added salt. Canned or smoked fish. Whole eggs or egg yolks. Chicken or turkey with skin.  Dairy  Whole or 2% milk, cream, and half-and-half. Whole or full-fat cream cheese. Whole-fat or sweetened yogurt. Full-fat cheese. Nondairy creamers. Whipped toppings. Processed cheese and cheese spreads.  Fats and oils  Butter. Stick margarine. Lard. Shortening. Ghee. Ugalde fat. Tropical oils, such as coconut, palm kernel, or palm oil.  Seasonings and condiments  Onion salt, garlic salt, seasoned salt, table salt, and sea salt. Worcestershire sauce. Tartar sauce. Barbecue sauce. Teriyaki sauce. Soy sauce, including reduced-sodium soy sauce. Steak sauce. Canned and packaged gravies. Fish sauce. Oyster sauce. Cocktail sauce. Store-bought horseradish. Ketchup. Mustard. Meat flavorings and tenderizers. Bouillon cubes. Hot sauces. Pre-made or packaged marinades. Pre-made or packaged taco seasonings. Relishes. Regular salad dressings.  Other foods  Salted popcorn and pretzels.  The items listed above may not be all the foods and drinks you should avoid. Talk to a dietitian to learn more.  Where to find more information  National Heart, Lung, and Blood [place] (NHLBI): nhlbi.nih.gov  American Heart Association (AHA): heart.org  Academy of Nutrition and Dietetics: eatright.org  National Kidney Foundation (NKF): kidney.org  This information is not intended to replace advice given to you by your health care provider. Make sure you discuss any questions you have with your health care provider.  Document Revised: 01/04/2024 Document Reviewed: 01/04/2024  Elsevier Patient Education © 2024 [employer]BMI for Adults  Body mass index (BMI) is a number found using a person's weight and height. BMI can help tell how much of a person's weight is made up of fat. BMI does not measure body fat directly. It is used instead of tests that directly measure body fat, which can be difficult and  "expensive.  What are BMI measurements used for?  BMI is useful to:  Find out if your weight puts you at higher risk for medical problems.  Help recommend changes, such as in diet and exercise. This can help you reach a healthy weight. BMI screening can be done again to see if these changes are working.  How is BMI calculated?  Your height and weight are measured. The BMI is found from those numbers. This can be done with [place] or metric measurements. Note that charts and online BMI calculators are available to help you find your BMI quickly and easily without doing these calculations.  To calculate your BMI in [place] measurements:  Measure your weight in pounds (lb).  Multiply the number of pounds by 703.  So, for an adult who weighs 150 lb, multiply that number by 703: 150 x 703, which equals 105,450.  Measure your height in inches. Then multiply that number by itself to get a measurement called \"inches squared.\"  So, for an adult who is 70 inches tall, the \"inches squared\" measurement is 70 inches x 70 inches, which equals 4,900 inches squared.  Divide the total from step 2 (number of lb x 703) by the total from step 3 (inches squared): 105,450 ÷ 4,900 = 21.5. This is your BMI.  To calculate your BMI in metric measurements:    Measure your weight in kilograms (kg).  For this example, the weight is 70 kg.  Measure your height in meters (m). Then multiply that number by itself to get a measurement called \"meters squared.\"  So, for an adult who is 1.75 m tall, the \"meters squared\" measurement is 1.75 m x 1.75 m, which equals 3.1 meters squared.  Divide the number of kilograms (your weight) by the meters squared number. In this example: 70 ÷ 3.1 = 22.6. This is your BMI.  What do the results mean?  BMI charts are used to see if you are underweight, normal weight, overweight, or obese. The following guidelines will be used:  Underweight: BMI less than 18.5.  Normal weight: BMI between 18.5 and 24.9.  Overweight: BMI " between 25 and 29.9.  Obese: BMI of 30 or above.  BMI is a tool and cannot diagnose a condition. Talk with your health care provider about what your BMI means for you. Keep these notes in mind:  Weight includes fat and muscle. Someone with a muscular build, such as an athlete, may have a BMI that is higher than 24.9. In cases like these, BMI is not a correct measure of body fat.  If you have a BMI of 25 or higher, your provider may need to do more testing to find out if excess body fat is the cause.  BMI is measured the same way for males and females. Females usually have more body fat than males of the same height and weight.  Where to find more information  For more information about BMI, including tools to quickly find your BMI, go to:  Centers for Disease Control and Prevention: cdc.gov  American Heart Association: heart.org  National Heart, Lung, and Blood [place]: nhlbi.nih.gov  This information is not intended to replace advice given to you by your health care provider. Make sure you discuss any questions you have with your health care provider.  Document Revised: 09/07/2023 Document Reviewed: 08/31/2023  Elsevier Patient Education © 2024 Elsevier Inc.

## 2025-05-15 NOTE — PROGRESS NOTES
Chief complaint:   Chief Complaint   Patient presents with    Back Pain     Pt is here for MRI results.        Subjective     HPI: This is a 89 y.o. female patient who went to the operating room on 4/30/2025 for a KYPHOPLASTY WITH BIOPSY LEFT SACRALPLASTY with O-ARM - Left. The patient is here in follow up today for postoperative visit.  The patient says that the pain that she was having over on the left side has improved significantly.  She says on May 8, 2025 she started having pain off on the right side that is seeming to radiate down into her groin area.  There is no accident or injury associated with this.  Denies any pain radiating down into her leg.  She is having difficulty with mobilizing again.     We did send the patient for x-rays and an MRI of the lumbar spine.  She is here in follow-up today.  She continues to complain of pain in her right buttocks.  She is not having any issues on the left buttocks at this time.  She does like this pain is the same kind of pain that she had on the left side.  Rates her pain on a scale 0-10 at a 9.  She is working with her primary care doctor in regards to treatment for her osteopenia        Review of Systems   Constitutional:  Positive for activity change.   Musculoskeletal:  Positive for arthralgias and back pain.   All other systems reviewed and are negative.       Past Medical History:   Diagnosis Date    Chronic shoulder pain     Clotting disorder     Conductive hearing loss     Disease of thyroid gland 1986    History of tonsillectomy 1965    Low back pain     Perforation of left tympanic membrane     Primary hypertension 12/15/2023     Past Surgical History:   Procedure Laterality Date    ADENOIDECTOMY  1965    CARDIAC CATHETERIZATION      COLONOSCOPY  02/03/2011    diverticulosis in the sigmoid colon    COLONOSCOPY  01/04/2005    normal exam    KYPHOPLASTY Left 4/30/2025    Procedure: KYPHOPLASTY WITH BIOPSY LEFT SACRALPLASTY with O-ARM;  Surgeon: Kassi  "Viktor DEL TORO MD;  Location: [facility] OR;  Service: Neurosurgery;  Laterality: Left;    TONSILLECTOMY  1965     Family History   Problem Relation Age of Onset    Clotting disorder Father     Heart attack Sister     Cancer Sister              Breast cancer Neg Hx     Cirrhosis Neg Hx     Colon cancer Neg Hx      Social History     Tobacco Use    Smoking status: Never     Passive exposure: Never    Smokeless tobacco: Never   Vaping Use    Vaping status: Never Used   Substance Use Topics    Alcohol use: Yes     Comment: occ    Drug use: Never     (Not in a hospital admission)    Allergies:  Amlodipine, Imdur [isosorbide nitrate], Lisinopril, Aleve [naproxen], Codeine, Hydralazine, Ibuprofen, Levofloxacin, Losartan, and Other    Objective      Vital Signs  Ht 160 cm (62.99\")   Wt 71.2 kg (157 lb)   LMP  (LMP Unknown)   BMI 27.82 kg/m²     Physical Exam  Constitutional:       General: She is awake.      Appearance: Normal appearance. She is well-developed.   HENT:      Head: Normocephalic.   Eyes:      General: Lids are normal.      Extraocular Movements: Extraocular movements intact.      Conjunctiva/sclera: Conjunctivae normal.      Pupils: Pupils are equal, round, and reactive to light.   Pulmonary:      Effort: Pulmonary effort is normal.      Breath sounds: Normal breath sounds.   Musculoskeletal:         General: Normal range of motion.      Cervical back: Normal range of motion.   Skin:     General: Skin is warm.   Neurological:      Mental Status: She is alert and oriented to person, place, and time.      GCS: GCS eye subscore is 4. GCS verbal subscore is 5. GCS motor subscore is 6.      Cranial Nerves: No cranial nerve deficit.      Sensory: No sensory deficit.      Motor: Motor strength is normal.     Deep Tendon Reflexes: Reflexes are normal and symmetric. Reflexes normal.   Psychiatric:         Speech: Speech normal.         Behavior: Behavior normal.         Thought Content: Thought content normal. "         Neurological Exam  Mental Status  Awake and alert. Oriented to person, place and time. Oriented to person, place, and time. Speech is normal. Language is fluent with no aphasia. Attention and concentration are normal.    Cranial Nerves  CN I: Sense of smell is normal.  CN II: Right normal visual field. Left normal visual field.  CN III, IV, VI: Extraocular movements intact bilaterally. Normal lids and orbits bilaterally. Pupils equal round and reactive to light bilaterally.  CN V: Facial sensation is normal.  CN VII:  Right: There is no facial weakness.  Left: There is no facial weakness.  CN XI: Shoulder shrug strength is normal.  CN XII: Tongue midline without atrophy or fasciculations.    Motor  Normal muscle bulk throughout. Normal muscle tone. Strength is 5/5 throughout all four extremities.    Sensory  Sensation is intact to light touch, pinprick, vibration and proprioception in all four extremities.    Reflexes  Deep tendon reflexes are 2+ and symmetric in all four extremities.    Gait  Normal casual, toe, heel and tandem gait.      Imaging review: X-ray of the lumbar spine that was done on May 13, 2025 shows a degenerative scoliosis in the lumbar spine.  Left sacroplasty completed.      X-ray of the right hip shows arthritis but no fracture    MRI of the lumbar spine that was done on May 14, 2025 shows new STIR changes in the right sacrum concerning for right sided sacral insufficiency fracture.  At L5-S1 there is bilateral foraminal narrowing with the right being worse than left.  At L4-5 disc degeneration with bilateral foraminal narrowing the right being worse than the left.  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  At L2-3 left-sided foraminal narrowing.      Bone density scan that was done on May 6, 2025 shows concern for osteopenia       Assessment/Plan: The patient does have a right sacral insufficiency fracture.  I have discussed  this patient with Dr. Solitario reviewed the imaging with him.  At this time it is felt that the patient would benefit by going to the operating room for a right sacroplasty.  Dr. Solitario to come in and discussed this with the patient.  Please see his addendum on the patient.  Will have the patient set up for surgery as quickly as possible.  Questions and concerns were addressed.    Patient is a nonsmoker  The patient's Body mass index is 27.82 kg/m².. BMI is above normal parameters. Recommendations include: educational material and nutrition counseling  Advance Care Planning   ACP discussion was held with the patient during this visit. Patient does not have an advance directive, information provided.  STEADI Fall Risk Assessment was completed, and patient is at MODERATE risk for falls. Assessment completed on:4/29/2025       Diagnoses and all orders for this visit:    1. Collapsed vertebra, not elsewhere classified, sacral and sacrococcygeal region, initial encounter for fracture (Primary)    2. Closed fracture of sacrum, unspecified portion of sacrum, initial encounter    3. Overweight with body mass index (BMI) of 27 to 27.9 in adult          I discussed the patients findings and my recommendations with patient    Kendall Pena, APRN  05/15/25  13:21 CDT

## 2025-05-18 ENCOUNTER — NURSE TRIAGE (OUTPATIENT)
Dept: CALL CENTER | Facility: HOSPITAL | Age: 89
End: 2025-05-18
Payer: MEDICARE

## 2025-05-18 NOTE — TELEPHONE ENCOUNTER
Per Web MD the max tylenol dosage in 24 hours. It is not to exceed 4000 mg in a 24 hour period. She has been prescribed. Hydrocodone 5- 325 mg 1 po every 6 hours. She states this is not cutting the pain. She wants to know if she can take more Norco then prescribed. Explained no. She would need to speak with her Surgeon.     She wants to know what she can do? She could take 1 pill of a 500mg tylenol with the Norco to see if helped with the pain. Do not take more than 4 of the 500 mg tylenol in a 24 hour period. She will talk to her MD on  Monday. She did not fully disclose how bad she was hurting in the pelvic region.  She is scheduled for surgery on Friday of this week.       Outcome - she She could take 1 pill of a 500mg tylenol with the Norco to see if helped with the pain. Do not take more than 4 of the 500 mg tylenol in a 24 hour period. She will talk to her MD on  Monday. She did not fully disclose how bad she was hurting in the pelvic region.  She is scheduled for surgery on Friday of this week.   Reason for Disposition   Caller has medicine question, adult has minor symptoms, caller declines triage, AND triager answers question    Additional Information   Negative: [1] Intentional drug overdose AND [2] suicidal thoughts or ideas   Negative: Drug overdose and triager unable to answer question   Negative: Caller requesting a renewal or refill of a medicine patient is currently taking   Negative: Caller requesting information unrelated to medicine   Negative: Caller requesting information about COVID-19 Vaccine   Negative: Caller requesting information about Emergency Contraception   Negative: Caller requesting information about Combined Birth Control Pills   Negative: Caller requesting information about Progestin Birth Control Pills   Negative: Caller requesting information about Post-Op pain or medicines   Negative: Caller requesting a prescription antibiotic (such as Penicillin) for Strep throat and has a  positive culture result   Negative: Caller requesting a prescription anti-viral med (such as Tamiflu) and has influenza (flu) symptoms   Negative: Immunization reaction suspected   Negative: Rash while taking a medicine or within 3 days of stopping it   Negative: [1] Asthma and [2] having symptoms of asthma (cough, wheezing, etc.)   Negative: [1] Symptom of illness (e.g., headache, abdominal pain, earache, vomiting) AND [2] more than mild   Negative: Breastfeeding questions about mother's medicines and diet   Negative: MORE THAN A DOUBLE DOSE of a prescription or over-the-counter (OTC) drug   Negative: [1] DOUBLE DOSE (an extra dose or lesser amount) of prescription drug AND [2] any symptoms (e.g., dizziness, nausea, pain, sleepiness)   Negative: [1] DOUBLE DOSE (an extra dose or lesser amount) of over-the-counter (OTC) drug AND [2] any symptoms (e.g., dizziness, nausea, pain, sleepiness)   Negative: Took another person's prescription drug   Negative: [1] DOUBLE DOSE (an extra dose or lesser amount) of prescription drug AND [2] NO symptoms  (Exception: A double dose of antibiotics.)   Negative: Diabetes drug error or overdose (e.g., took wrong type of insulin or took extra dose)   Negative: [1] Prescription not at pharmacy AND [2] was prescribed by PCP recently (Exception: Triager has access to EMR and prescription is recorded there. Go to Home Care and confirm for pharmacy.)   Negative: [1] Pharmacy calling with prescription question AND [2] triager unable to answer question   Negative: [1] Caller has URGENT medicine question about med that PCP or specialist prescribed AND [2] triager unable to answer question   Negative: Medicine patch causing local rash or itching   Negative: [1] Caller has medicine question about med NOT prescribed by PCP AND [2] triager unable to answer question (e.g., compatibility with other med, storage)   Negative: Prescription request for new medicine (not a refill)   Negative: [1] Caller  "has NON-URGENT medicine question about med that PCP prescribed AND [2] triager unable to answer question   Negative: Caller wants to use a complementary or alternative medicine   Negative: [1] Prescription prescribed recently is not at pharmacy AND [2] triager has access to patient's EMR AND [3] prescription is recorded in the EMR   Negative: [1] DOUBLE DOSE (an extra dose or lesser amount) of over-the-counter (OTC) drug AND [2] NO symptoms   Negative: [1] DOUBLE DOSE (an extra dose or lesser amount) of antibiotic drug AND [2] NO symptoms   Negative: Caller has medicine question only, adult not sick, AND triager answers question    Answer Assessment - Initial Assessment Questions  1. NAME of MEDICINE: \"What medicine(s) are you calling about?\"      What to take for pain   2. QUESTION: \"What is your question?\" (e.g., double dose of medicine, side effect)      What to take for pain she has Norco  3. PRESCRIBER: \"Who prescribed the medicine?\" Reason: if prescribed by specialist, call should be referred to that group.      Dr. Solitario   4. SYMPTOMS: \"Do you have any symptoms?\" If Yes, ask: \"What symptoms are you having?\"  \"How bad are the symptoms (e.g., mild, moderate, severe)      Pelvis pain   5. PREGNANCY:  \"Is there any chance that you are pregnant?\" \"When was your last menstrual period?\"      no    Protocols used: Medication Question Call-ADULT-AH    "

## 2025-05-19 ENCOUNTER — PRE-ADMISSION TESTING (OUTPATIENT)
Dept: PREADMISSION TESTING | Facility: HOSPITAL | Age: 89
End: 2025-05-19
Payer: MEDICARE

## 2025-05-19 VITALS
OXYGEN SATURATION: 96 % | RESPIRATION RATE: 18 BRPM | BODY MASS INDEX: 25.1 KG/M2 | HEART RATE: 64 BPM | WEIGHT: 147.05 LBS | HEIGHT: 64 IN

## 2025-05-19 DIAGNOSIS — S32.10XA CLOSED FRACTURE OF SACRUM, UNSPECIFIED PORTION OF SACRUM, INITIAL ENCOUNTER: ICD-10-CM

## 2025-05-19 DIAGNOSIS — M48.58XA COLLAPSED VERTEBRA, NOT ELSEWHERE CLASSIFIED, SACRAL AND SACROCOCCYGEAL REGION, INITIAL ENCOUNTER FOR FRACTURE: ICD-10-CM

## 2025-05-19 PROCEDURE — 93005 ELECTROCARDIOGRAM TRACING: CPT

## 2025-05-19 RX ORDER — HYDROCODONE BITARTRATE AND ACETAMINOPHEN 5; 325 MG/1; MG/1
1 TABLET ORAL EVERY 6 HOURS PRN
Qty: 20 TABLET | Refills: 0 | Status: ON HOLD | OUTPATIENT
Start: 2025-05-19

## 2025-05-19 NOTE — DISCHARGE INSTRUCTIONS

## 2025-05-20 LAB
QT INTERVAL: 388 MS
QTC INTERVAL: 374 MS

## 2025-05-21 ENCOUNTER — TELEPHONE (OUTPATIENT)
Dept: NEUROSURGERY | Facility: CLINIC | Age: 89
End: 2025-05-21
Payer: MEDICARE

## 2025-05-21 NOTE — TELEPHONE ENCOUNTER
[facility] received a fax that requires your attention. The document has been indexed to the patient’s chart for your review.      Reason for sending: RECEIVED MEDICAL CLEARANCE    Documents Description: MEDICAL CLEARANCE_[facility]_05/16/25    Name of Sender: DR. MATA &  [facility]-MARITZA MERAZ    Date Indexed: 05/21/25    Notes (if needed):

## 2025-05-23 ENCOUNTER — ANESTHESIA EVENT (OUTPATIENT)
Dept: PERIOP | Facility: HOSPITAL | Age: 89
End: 2025-05-23
Payer: MEDICARE

## 2025-05-23 ENCOUNTER — APPOINTMENT (OUTPATIENT)
Dept: GENERAL RADIOLOGY | Facility: HOSPITAL | Age: 89
End: 2025-05-23
Payer: MEDICARE

## 2025-05-23 ENCOUNTER — ANESTHESIA (OUTPATIENT)
Dept: PERIOP | Facility: HOSPITAL | Age: 89
End: 2025-05-23
Payer: MEDICARE

## 2025-05-23 ENCOUNTER — HOSPITAL ENCOUNTER (OUTPATIENT)
Facility: HOSPITAL | Age: 89
LOS: 1 days | Discharge: NURSING, REHAB OR LONG-TERM CARE | End: 2025-05-28
Attending: NEUROLOGICAL SURGERY | Admitting: NEUROLOGICAL SURGERY
Payer: MEDICARE

## 2025-05-23 DIAGNOSIS — M84.48XD SACRAL INSUFFICIENCY FRACTURE WITH ROUTINE HEALING, SUBSEQUENT ENCOUNTER: ICD-10-CM

## 2025-05-23 DIAGNOSIS — Z74.09 IMPAIRED MOBILITY: Primary | ICD-10-CM

## 2025-05-23 DIAGNOSIS — S32.10XA CLOSED FRACTURE OF SACRUM, UNSPECIFIED PORTION OF SACRUM, INITIAL ENCOUNTER: ICD-10-CM

## 2025-05-23 PROBLEM — M84.48XA SACRAL INSUFFICIENCY FRACTURE: Status: ACTIVE | Noted: 2025-05-23

## 2025-05-23 PROCEDURE — 25810000003 LACTATED RINGERS PER 1000 ML: Performed by: NEUROLOGICAL SURGERY

## 2025-05-23 PROCEDURE — 25010000002 SUGAMMADEX 200 MG/2ML SOLUTION: Performed by: NURSE ANESTHETIST, CERTIFIED REGISTERED

## 2025-05-23 PROCEDURE — 25010000002 FENTANYL CITRATE (PF) 100 MCG/2ML SOLUTION: Performed by: NURSE ANESTHETIST, CERTIFIED REGISTERED

## 2025-05-23 PROCEDURE — 25010000002 ESMOLOL 100 MG/10ML SOLUTION: Performed by: NURSE ANESTHETIST, CERTIFIED REGISTERED

## 2025-05-23 PROCEDURE — 76380 CAT SCAN FOLLOW-UP STUDY: CPT

## 2025-05-23 PROCEDURE — 25010000002 LIDOCAINE PF 2% 2 % SOLUTION: Performed by: NURSE ANESTHETIST, CERTIFIED REGISTERED

## 2025-05-23 PROCEDURE — 25810000003 SODIUM CHLORIDE 0.9 % SOLUTION: Performed by: NEUROLOGICAL SURGERY

## 2025-05-23 PROCEDURE — 25010000002 PROPOFOL 10 MG/ML EMULSION: Performed by: NURSE ANESTHETIST, CERTIFIED REGISTERED

## 2025-05-23 PROCEDURE — 72220 X-RAY EXAM SACRUM TAILBONE: CPT

## 2025-05-23 PROCEDURE — 25010000002 ONDANSETRON PER 1 MG: Performed by: NEUROLOGICAL SURGERY

## 2025-05-23 PROCEDURE — 25010000002 FENTANYL CITRATE (PF) 50 MCG/ML SOLUTION: Performed by: ANESTHESIOLOGY

## 2025-05-23 PROCEDURE — C1713 ANCHOR/SCREW BN/BN,TIS/BN: HCPCS | Performed by: NEUROLOGICAL SURGERY

## 2025-05-23 PROCEDURE — 25510000001 IOPAMIDOL 61 % SOLUTION: Performed by: NEUROLOGICAL SURGERY

## 2025-05-23 PROCEDURE — 63710000001 PREDNISONE PER 5 MG: Performed by: NEUROLOGICAL SURGERY

## 2025-05-23 PROCEDURE — 0200T PERQ SACRAL AUGMT UNILAT INJ: CPT | Performed by: NEUROLOGICAL SURGERY

## 2025-05-23 PROCEDURE — 25010000002 CEFAZOLIN PER 500 MG: Performed by: NURSE PRACTITIONER

## 2025-05-23 PROCEDURE — 25010000002 MORPHINE PER 10 MG: Performed by: NEUROLOGICAL SURGERY

## 2025-05-23 PROCEDURE — 76000 FLUOROSCOPY <1 HR PHYS/QHP: CPT

## 2025-05-23 RX ORDER — SODIUM CHLORIDE, SODIUM LACTATE, POTASSIUM CHLORIDE, CALCIUM CHLORIDE 600; 310; 30; 20 MG/100ML; MG/100ML; MG/100ML; MG/100ML
1000 INJECTION, SOLUTION INTRAVENOUS CONTINUOUS
Status: DISPENSED | OUTPATIENT
Start: 2025-05-23 | End: 2025-05-24

## 2025-05-23 RX ORDER — MIDAZOLAM HYDROCHLORIDE 2 MG/2ML
0.5 INJECTION, SOLUTION INTRAMUSCULAR; INTRAVENOUS
Status: DISCONTINUED | OUTPATIENT
Start: 2025-05-23 | End: 2025-05-23 | Stop reason: HOSPADM

## 2025-05-23 RX ORDER — SODIUM CHLORIDE 0.9 % (FLUSH) 0.9 %
10 SYRINGE (ML) INJECTION EVERY 12 HOURS SCHEDULED
Status: DISCONTINUED | OUTPATIENT
Start: 2025-05-23 | End: 2025-05-28 | Stop reason: HOSPADM

## 2025-05-23 RX ORDER — SODIUM CHLORIDE 0.9 % (FLUSH) 0.9 %
3-10 SYRINGE (ML) INJECTION AS NEEDED
Status: DISCONTINUED | OUTPATIENT
Start: 2025-05-23 | End: 2025-05-23 | Stop reason: HOSPADM

## 2025-05-23 RX ORDER — SODIUM CHLORIDE 9 MG/ML
40 INJECTION, SOLUTION INTRAVENOUS AS NEEDED
Status: DISCONTINUED | OUTPATIENT
Start: 2025-05-23 | End: 2025-05-28 | Stop reason: HOSPADM

## 2025-05-23 RX ORDER — SODIUM CHLORIDE 0.9 % (FLUSH) 0.9 %
10 SYRINGE (ML) INJECTION AS NEEDED
Status: DISCONTINUED | OUTPATIENT
Start: 2025-05-23 | End: 2025-05-28 | Stop reason: HOSPADM

## 2025-05-23 RX ORDER — SODIUM CHLORIDE 9 MG/ML
40 INJECTION, SOLUTION INTRAVENOUS AS NEEDED
Status: DISCONTINUED | OUTPATIENT
Start: 2025-05-23 | End: 2025-05-23 | Stop reason: HOSPADM

## 2025-05-23 RX ORDER — MORPHINE SULFATE 2 MG/ML
2 INJECTION, SOLUTION INTRAMUSCULAR; INTRAVENOUS EVERY 4 HOURS PRN
Status: DISCONTINUED | OUTPATIENT
Start: 2025-05-23 | End: 2025-05-26

## 2025-05-23 RX ORDER — LIDOCAINE HYDROCHLORIDE 10 MG/ML
0.5 INJECTION, SOLUTION EPIDURAL; INFILTRATION; INTRACAUDAL; PERINEURAL ONCE AS NEEDED
Status: DISCONTINUED | OUTPATIENT
Start: 2025-05-23 | End: 2025-05-23 | Stop reason: HOSPADM

## 2025-05-23 RX ORDER — EPHEDRINE SULFATE 50 MG/ML
INJECTION INTRAVENOUS AS NEEDED
Status: DISCONTINUED | OUTPATIENT
Start: 2025-05-23 | End: 2025-05-23 | Stop reason: SURG

## 2025-05-23 RX ORDER — ACETAMINOPHEN 500 MG
1000 TABLET ORAL ONCE
Status: DISCONTINUED | OUTPATIENT
Start: 2025-05-23 | End: 2025-05-23 | Stop reason: HOSPADM

## 2025-05-23 RX ORDER — PROPOFOL 10 MG/ML
VIAL (ML) INTRAVENOUS AS NEEDED
Status: DISCONTINUED | OUTPATIENT
Start: 2025-05-23 | End: 2025-05-23 | Stop reason: SURG

## 2025-05-23 RX ORDER — LEVOTHYROXINE SODIUM 112 UG/1
112 TABLET ORAL
Status: DISCONTINUED | OUTPATIENT
Start: 2025-05-24 | End: 2025-05-28 | Stop reason: HOSPADM

## 2025-05-23 RX ORDER — HYDROCORTISONE 25 MG/G
CREAM TOPICAL DAILY
Status: DISCONTINUED | OUTPATIENT
Start: 2025-05-24 | End: 2025-05-28 | Stop reason: HOSPADM

## 2025-05-23 RX ORDER — POLYETHYLENE GLYCOL 3350 17 G/17G
17 POWDER, FOR SOLUTION ORAL DAILY PRN
Status: DISCONTINUED | OUTPATIENT
Start: 2025-05-23 | End: 2025-05-28 | Stop reason: HOSPADM

## 2025-05-23 RX ORDER — LIDOCAINE HYDROCHLORIDE 20 MG/ML
INJECTION, SOLUTION EPIDURAL; INFILTRATION; INTRACAUDAL; PERINEURAL AS NEEDED
Status: DISCONTINUED | OUTPATIENT
Start: 2025-05-23 | End: 2025-05-23 | Stop reason: SURG

## 2025-05-23 RX ORDER — SODIUM CHLORIDE 0.9 % (FLUSH) 0.9 %
3 SYRINGE (ML) INJECTION EVERY 12 HOURS SCHEDULED
Status: DISCONTINUED | OUTPATIENT
Start: 2025-05-23 | End: 2025-05-23 | Stop reason: HOSPADM

## 2025-05-23 RX ORDER — AMOXICILLIN 250 MG
2 CAPSULE ORAL 2 TIMES DAILY PRN
Status: DISCONTINUED | OUTPATIENT
Start: 2025-05-23 | End: 2025-05-28 | Stop reason: HOSPADM

## 2025-05-23 RX ORDER — IOPAMIDOL 612 MG/ML
INJECTION, SOLUTION INTRATHECAL AS NEEDED
Status: DISCONTINUED | OUTPATIENT
Start: 2025-05-23 | End: 2025-05-23 | Stop reason: HOSPADM

## 2025-05-23 RX ORDER — ONDANSETRON 2 MG/ML
4 INJECTION INTRAMUSCULAR; INTRAVENOUS EVERY 6 HOURS PRN
Status: DISCONTINUED | OUTPATIENT
Start: 2025-05-23 | End: 2025-05-28 | Stop reason: HOSPADM

## 2025-05-23 RX ORDER — SODIUM CHLORIDE 0.9 % (FLUSH) 0.9 %
3 SYRINGE (ML) INJECTION AS NEEDED
Status: DISCONTINUED | OUTPATIENT
Start: 2025-05-23 | End: 2025-05-23 | Stop reason: HOSPADM

## 2025-05-23 RX ORDER — BISACODYL 5 MG/1
5 TABLET, DELAYED RELEASE ORAL DAILY PRN
Status: DISCONTINUED | OUTPATIENT
Start: 2025-05-23 | End: 2025-05-28 | Stop reason: HOSPADM

## 2025-05-23 RX ORDER — HYDROCODONE BITARTRATE AND ACETAMINOPHEN 5; 325 MG/1; MG/1
1 TABLET ORAL EVERY 4 HOURS PRN
Status: DISCONTINUED | OUTPATIENT
Start: 2025-05-23 | End: 2025-05-23 | Stop reason: HOSPADM

## 2025-05-23 RX ORDER — FENTANYL CITRATE 50 UG/ML
50 INJECTION, SOLUTION INTRAMUSCULAR; INTRAVENOUS
Status: DISCONTINUED | OUTPATIENT
Start: 2025-05-23 | End: 2025-05-23 | Stop reason: HOSPADM

## 2025-05-23 RX ORDER — FLUMAZENIL 0.1 MG/ML
0.2 INJECTION INTRAVENOUS AS NEEDED
Status: DISCONTINUED | OUTPATIENT
Start: 2025-05-23 | End: 2025-05-23 | Stop reason: HOSPADM

## 2025-05-23 RX ORDER — FENTANYL CITRATE 50 UG/ML
INJECTION, SOLUTION INTRAMUSCULAR; INTRAVENOUS AS NEEDED
Status: DISCONTINUED | OUTPATIENT
Start: 2025-05-23 | End: 2025-05-23 | Stop reason: SURG

## 2025-05-23 RX ORDER — OXYCODONE AND ACETAMINOPHEN 5; 325 MG/1; MG/1
1 TABLET ORAL EVERY 6 HOURS PRN
Refills: 0 | Status: DISCONTINUED | OUTPATIENT
Start: 2025-05-23 | End: 2025-05-24

## 2025-05-23 RX ORDER — METOPROLOL SUCCINATE 25 MG/1
25 TABLET, EXTENDED RELEASE ORAL DAILY
Status: DISCONTINUED | OUTPATIENT
Start: 2025-05-24 | End: 2025-05-28 | Stop reason: HOSPADM

## 2025-05-23 RX ORDER — PREDNISONE 5 MG/1
5 TABLET ORAL 2 TIMES DAILY
Status: DISCONTINUED | OUTPATIENT
Start: 2025-05-23 | End: 2025-05-28 | Stop reason: HOSPADM

## 2025-05-23 RX ORDER — HYDROCODONE BITARTRATE AND ACETAMINOPHEN 10; 325 MG/1; MG/1
1 TABLET ORAL EVERY 4 HOURS PRN
Status: DISCONTINUED | OUTPATIENT
Start: 2025-05-23 | End: 2025-05-23 | Stop reason: HOSPADM

## 2025-05-23 RX ORDER — ONDANSETRON 4 MG/1
4 TABLET, ORALLY DISINTEGRATING ORAL EVERY 6 HOURS PRN
Status: DISCONTINUED | OUTPATIENT
Start: 2025-05-23 | End: 2025-05-28 | Stop reason: HOSPADM

## 2025-05-23 RX ORDER — NALOXONE HCL 0.4 MG/ML
0.4 VIAL (ML) INJECTION
Status: DISCONTINUED | OUTPATIENT
Start: 2025-05-23 | End: 2025-05-26

## 2025-05-23 RX ORDER — MICONAZOLE NITRATE 20 MG/G
1 CREAM TOPICAL
Status: DISCONTINUED | OUTPATIENT
Start: 2025-05-23 | End: 2025-05-28 | Stop reason: HOSPADM

## 2025-05-23 RX ORDER — HYDROCODONE BITARTRATE AND ACETAMINOPHEN 5; 325 MG/1; MG/1
1 TABLET ORAL EVERY 6 HOURS PRN
Qty: 12 TABLET | Refills: 0 | Status: SHIPPED | OUTPATIENT
Start: 2025-05-23

## 2025-05-23 RX ORDER — LABETALOL HYDROCHLORIDE 5 MG/ML
5 INJECTION, SOLUTION INTRAVENOUS
Status: DISCONTINUED | OUTPATIENT
Start: 2025-05-23 | End: 2025-05-23 | Stop reason: HOSPADM

## 2025-05-23 RX ORDER — ONDANSETRON 2 MG/ML
4 INJECTION INTRAMUSCULAR; INTRAVENOUS ONCE AS NEEDED
Status: DISCONTINUED | OUTPATIENT
Start: 2025-05-23 | End: 2025-05-23 | Stop reason: HOSPADM

## 2025-05-23 RX ORDER — NITROGLYCERIN 0.4 MG/1
0.4 TABLET SUBLINGUAL
Status: DISCONTINUED | OUTPATIENT
Start: 2025-05-23 | End: 2025-05-28 | Stop reason: HOSPADM

## 2025-05-23 RX ORDER — NALOXONE HCL 0.4 MG/ML
0.4 VIAL (ML) INJECTION AS NEEDED
Status: DISCONTINUED | OUTPATIENT
Start: 2025-05-23 | End: 2025-05-23 | Stop reason: HOSPADM

## 2025-05-23 RX ORDER — SODIUM CHLORIDE 9 MG/ML
50 INJECTION, SOLUTION INTRAVENOUS CONTINUOUS
Status: DISPENSED | OUTPATIENT
Start: 2025-05-23 | End: 2025-05-24

## 2025-05-23 RX ORDER — ROCURONIUM BROMIDE 10 MG/ML
INJECTION, SOLUTION INTRAVENOUS AS NEEDED
Status: DISCONTINUED | OUTPATIENT
Start: 2025-05-23 | End: 2025-05-23 | Stop reason: SURG

## 2025-05-23 RX ORDER — ACETAMINOPHEN 500 MG
1000 TABLET ORAL 3 TIMES DAILY
Status: COMPLETED | OUTPATIENT
Start: 2025-05-23 | End: 2025-05-25

## 2025-05-23 RX ORDER — METOPROLOL SUCCINATE 50 MG/1
50 TABLET, EXTENDED RELEASE ORAL EVERY EVENING
Status: DISCONTINUED | OUTPATIENT
Start: 2025-05-23 | End: 2025-05-28 | Stop reason: HOSPADM

## 2025-05-23 RX ORDER — BISACODYL 10 MG
10 SUPPOSITORY, RECTAL RECTAL DAILY PRN
Status: DISCONTINUED | OUTPATIENT
Start: 2025-05-23 | End: 2025-05-28 | Stop reason: HOSPADM

## 2025-05-23 RX ORDER — SODIUM CHLORIDE, SODIUM LACTATE, POTASSIUM CHLORIDE, CALCIUM CHLORIDE 600; 310; 30; 20 MG/100ML; MG/100ML; MG/100ML; MG/100ML
100 INJECTION, SOLUTION INTRAVENOUS CONTINUOUS
Status: DISPENSED | OUTPATIENT
Start: 2025-05-23 | End: 2025-05-24

## 2025-05-23 RX ORDER — ESMOLOL HYDROCHLORIDE 10 MG/ML
INJECTION INTRAVENOUS AS NEEDED
Status: DISCONTINUED | OUTPATIENT
Start: 2025-05-23 | End: 2025-05-23 | Stop reason: SURG

## 2025-05-23 RX ADMIN — EPHEDRINE SULFATE 10 MG: 50 INJECTION INTRAVENOUS at 12:50

## 2025-05-23 RX ADMIN — ROCURONIUM 40 MG: 50 INJECTION, SOLUTION INTRAVENOUS at 12:17

## 2025-05-23 RX ADMIN — SUGAMMADEX 200 MG: 100 INJECTION, SOLUTION INTRAVENOUS at 13:06

## 2025-05-23 RX ADMIN — LIDOCAINE HYDROCHLORIDE 100 MG: 20 INJECTION, SOLUTION EPIDURAL; INFILTRATION; INTRACAUDAL; PERINEURAL at 12:17

## 2025-05-23 RX ADMIN — MORPHINE SULFATE 2 MG: 2 INJECTION, SOLUTION INTRAMUSCULAR; INTRAVENOUS at 21:51

## 2025-05-23 RX ADMIN — SODIUM CHLORIDE, POTASSIUM CHLORIDE, SODIUM LACTATE AND CALCIUM CHLORIDE 1000 ML: 600; 310; 30; 20 INJECTION, SOLUTION INTRAVENOUS at 11:28

## 2025-05-23 RX ADMIN — FENTANYL CITRATE 50 MCG: 50 INJECTION, SOLUTION INTRAMUSCULAR; INTRAVENOUS at 13:29

## 2025-05-23 RX ADMIN — EPHEDRINE SULFATE 10 MG: 50 INJECTION INTRAVENOUS at 12:40

## 2025-05-23 RX ADMIN — MORPHINE SULFATE 2 MG: 2 INJECTION, SOLUTION INTRAMUSCULAR; INTRAVENOUS at 17:35

## 2025-05-23 RX ADMIN — ACETAMINOPHEN 1000 MG: 500 TABLET, FILM COATED ORAL at 20:28

## 2025-05-23 RX ADMIN — Medication 5 MG: at 13:27

## 2025-05-23 RX ADMIN — PROPOFOL 120 MG: 10 INJECTION, EMULSION INTRAVENOUS at 12:17

## 2025-05-23 RX ADMIN — HYDROCODONE BITARTRATE AND ACETAMINOPHEN 1 TABLET: 10; 325 TABLET ORAL at 13:40

## 2025-05-23 RX ADMIN — PREDNISONE 5 MG: 5 TABLET ORAL at 20:27

## 2025-05-23 RX ADMIN — EPHEDRINE SULFATE 10 MG: 50 INJECTION INTRAVENOUS at 12:31

## 2025-05-23 RX ADMIN — CEFAZOLIN 2 G: 2 INJECTION, POWDER, FOR SOLUTION INTRAMUSCULAR; INTRAVENOUS at 12:22

## 2025-05-23 RX ADMIN — ONDANSETRON 4 MG: 2 INJECTION INTRAMUSCULAR; INTRAVENOUS at 17:29

## 2025-05-23 RX ADMIN — METOPROLOL SUCCINATE 50 MG: 50 TABLET, FILM COATED, EXTENDED RELEASE ORAL at 20:28

## 2025-05-23 RX ADMIN — SODIUM CHLORIDE 50 ML/HR: 9 INJECTION, SOLUTION INTRAVENOUS at 17:23

## 2025-05-23 RX ADMIN — ESMOLOL HYDROCHLORIDE 10 MG: 10 INJECTION, SOLUTION INTRAVENOUS at 12:57

## 2025-05-23 RX ADMIN — Medication 10 ML: at 20:29

## 2025-05-23 RX ADMIN — EPHEDRINE SULFATE 10 MG: 50 INJECTION INTRAVENOUS at 12:26

## 2025-05-23 RX ADMIN — OXYCODONE HYDROCHLORIDE AND ACETAMINOPHEN 1 TABLET: 5; 325 TABLET ORAL at 19:02

## 2025-05-23 RX ADMIN — FENTANYL CITRATE 100 MCG: 50 INJECTION, SOLUTION INTRAMUSCULAR; INTRAVENOUS at 12:32

## 2025-05-23 NOTE — PLAN OF CARE
Goal Outcome Evaluation:     Patient resting in bed, A&Ox4, 02@ 3L per NC, CPOX, IV infusing, medicated for pain as needed. Family at bed side. SCD's, patient is very needy. Family at bedside. All safety maintained and call light in reach.

## 2025-05-23 NOTE — ANESTHESIA POSTPROCEDURE EVALUATION
Patient: Linda Spear    Procedure Summary       Date: 05/23/25 Room / Location: [facility] OR  /  [facility] OR    Anesthesia Start: 1212 Anesthesia Stop: 1317    Procedure: Sacralplasty Right (Right: Spine Lumbar) Diagnosis:       Closed fracture of sacrum, unspecified portion of sacrum, initial encounter      (Closed fracture of sacrum, unspecified portion of sacrum, initial encounter [S32.10XA])    Surgeons: Viktor Solitario MD Provider: Jose A Begum CRNA    Anesthesia Type: general ASA Status: 2            Anesthesia Type: general    Vitals  Vitals Value Taken Time   /65 05/23/25 14:01   Temp 97.1 °F (36.2 °C) 05/23/25 13:14   Pulse 77 05/23/25 14:01   Resp 16 05/23/25 14:01   SpO2 94 % 05/23/25 14:01           Post Anesthesia Care and Evaluation    Patient location during evaluation: PACU  Patient participation: complete - patient participated  Level of consciousness: awake and alert  Pain management: adequate    Airway patency: patent  Anesthetic complications: No anesthetic complications  PONV Status: none  Cardiovascular status: acceptable and hemodynamically stable  Respiratory status: acceptable  Hydration status: acceptable    Comments: Blood pressure 161/58, pulse 76, temperature 97.1 °F (36.2 °C), temperature source Temporal, resp. rate 16, SpO2 94%, not currently breastfeeding.    Patient discharged from PACU based upon Efrain score. Please see RN notes for further details

## 2025-05-23 NOTE — ANESTHESIA PROCEDURE NOTES
Airway  Reason: elective    Date/Time: 5/23/2025 12:19 PM    General Information and Staff    Patient location during procedure: OR  CRNA/CAA: Smith Arcos CRNA    Indications and Patient Condition  Indications for airway management: airway protection    Preoxygenated: yes  MILS maintained throughout    Mask difficulty assessment: 0 - not attempted    Final Airway Details    Final airway type: endotracheal airway      Successful airway: ETT  Cuffed: yes   Successful intubation technique: direct laryngoscopy  Endotracheal tube insertion site: oral  Blade: Roxanna  Blade size: 3.5  ETT size (mm): 7.5  Cormack-Lehane Classification: grade I - full view of glottis  Placement verified by: chest auscultation and capnometry   Cuff volume (mL): 5  Measured from: lips  ETT/EBT  to lips (cm): 21  Number of attempts at approach: 1  Assessment: lips, teeth, and gum same as pre-op and atraumatic intubation

## 2025-05-23 NOTE — ANESTHESIA POSTPROCEDURE EVALUATION
Patient: Linda Spear    Procedure Summary       Date: 05/23/25 Room / Location: [facility] OR  /  [facility] OR    Anesthesia Start: 1212 Anesthesia Stop: 1317    Procedure: Sacralplasty Right (Right: Spine Lumbar) Diagnosis:       Closed fracture of sacrum, unspecified portion of sacrum, initial encounter      (Closed fracture of sacrum, unspecified portion of sacrum, initial encounter [S32.10XA])    Surgeons: Viktor Solitario MD Provider: Jose A Begum CRNA    Anesthesia Type: general ASA Status: 2            Anesthesia Type: general    Vitals  Vitals Value Taken Time   /65 05/23/25 14:01   Temp 97.1 °F (36.2 °C) 05/23/25 13:14   Pulse 77 05/23/25 14:01   Resp 16 05/23/25 14:01   SpO2 94 % 05/23/25 14:01           Post Anesthesia Care and Evaluation    Patient location during evaluation: PACU  Patient participation: complete - patient participated  Level of consciousness: awake and alert  Pain management: adequate    Airway patency: patent  Anesthetic complications: No anesthetic complications  PONV Status: none  Cardiovascular status: acceptable and hemodynamically stable  Respiratory status: acceptable  Hydration status: acceptable    Comments: Blood pressure 150/56, pulse 74, temperature 97.1 °F (36.2 °C), temperature source Temporal, resp. rate 16, SpO2 97%, not currently breastfeeding.    Patient discharged from PACU based upon Efrain score. Please see RN notes for further details

## 2025-05-23 NOTE — ANESTHESIA PREPROCEDURE EVALUATION
Anesthesia Evaluation     no history of anesthetic complications:   NPO Solid Status: > 8 hours  NPO Liquid Status: > 8 hours           Airway   Mallampati: I  TM distance: >3 FB  No difficulty expected  Dental      Pulmonary    (-) sleep apnea, not a smoker  Cardiovascular   Exercise tolerance: poor (<4 METS)    (+) hypertension    ROS comment: Echo 2023    Left ventricular systolic function is normal. Left ventricular ejection fraction appears to be 66 - 70%.  ·  Left ventricular diastolic function was indeterminate.  ·  Normal right ventricular cavity size and systolic function noted.  ·  The left atrial cavity is mildly dilated.  ·  There is moderate calcification of the aortic valve without significant stenosis or regurgitation.      Neuro/Psych  (-) seizures, TIA, CVA  GI/Hepatic/Renal/Endo    (+) renal disease- CRI, thyroid problem hypothyroidism  (-) diabetes    Musculoskeletal     Abdominal    Substance History      OB/GYN          Other                          Anesthesia Plan    ASA 2     general     intravenous induction     Anesthetic plan, risks, benefits, and alternatives have been provided, discussed and informed consent has been obtained with: patient.        CODE STATUS:

## 2025-05-23 NOTE — OP NOTE
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  The patient now presents for sacroplasty and vertebral biopsy after discussing therapeutic alternatives.          Surgeon: Viktor Solitario MD     Assistants: None    Anesthesia: General endotracheal anesthesia    ASA Class: 3    Procedure Details   After obtaining informed consent, having the risks and benefits of the procedure explained including but not limited to infection, bleeding, paralysis, spinal fluid leak, bowel bladder incontinence, extravasation of kyphoplasty cement resulting in neurocompression, pulmonary embolism, stroke, coma, and death, the patient was brought to the operating room.  The patient was given general anesthesia via an endotracheal tube. The patient was flipped prone onto a Alejandro axis table.  Portable fluoroscopy was used to localize level of sacrum . Preplanned incision along the  midline was then marked with an indelible marker.  The patient was then prepped and draped in a standard sterile fashion.  The preplanned incision was infiltrated with Marcaine and epinephrine.  A 10 blade scalpel was used to make an incision at the dermis and epidermis.  Jamshidi needles were then advanced in the medial to lateral orientation through the S1 pedicle out to the sacral ala using image guided stereotactic navigation and under direct fluoroscopic guidance on the right.  The inner cannula was removed. . Hand drill was then used to drill channel through the fractured sacral ala from the right under direct fluoroscopic guidance.   Kyphoplasty balloons were then inserted from the  right side under direct fluoroscopic guidance into the vertebral bodies.  The balloons were inflated and deflated and then removed.  And then several syringes of kyphoplasty cement were injected into the fractured sacral ala under direct fluoroscopic guidance from the  right.  The cement was allowed to harden.  The Jamshidi needles were removed.  The wounds were then irrigated with an antibiotic solution and closed with inverted interrupted Monocryl, Mastisol and Steri-Strips.  All sponge, needle and instrument counts were correct at the end of the procedure.  The patient was extubated in stable condition and returned to recovery room with about 15 mL of blood loss.       Findings:  Sacral insufficiency fracture    Estimated Blood Loss:   15           Drains: None           Total IV Fluids: ml           Specimens: None           Implants:   Implant Name Type Inv. Item Serial No.  Lot No. LRB No. Used Action   KT MIXER KYPHON W/CMT BONE KYPHX HV R - [id number] Implant KT MIXER KYPHON W/CMT BONE KYPHX HV R  [employer] [id number] Right 2 Implanted              Complications:  none           Disposition: PACU - hemodynamically stable.           Condition: stable        Viktor Solitario MD

## 2025-05-24 LAB
ANION GAP SERPL CALCULATED.3IONS-SCNC: 10 MMOL/L (ref 5–15)
BUN SERPL-MCNC: 6 MG/DL (ref 8–23)
BUN/CREAT SERPL: 8.7 (ref 7–25)
CALCIUM SPEC-SCNC: 8.4 MG/DL (ref 8.6–10.5)
CHLORIDE SERPL-SCNC: 94 MMOL/L (ref 98–107)
CO2 SERPL-SCNC: 30 MMOL/L (ref 22–29)
CREAT SERPL-MCNC: 0.69 MG/DL (ref 0.57–1)
DEPRECATED RDW RBC AUTO: 46.7 FL (ref 37–54)
EGFRCR SERPLBLD CKD-EPI 2021: 83.6 ML/MIN/1.73
ERYTHROCYTE [DISTWIDTH] IN BLOOD BY AUTOMATED COUNT: 15.3 % (ref 12.3–15.4)
GLUCOSE SERPL-MCNC: 98 MG/DL (ref 65–99)
HCT VFR BLD AUTO: 41.7 % (ref 34–46.6)
HGB BLD-MCNC: 12.9 G/DL (ref 12–15.9)
MCH RBC QN AUTO: 26.1 PG (ref 26.6–33)
MCHC RBC AUTO-ENTMCNC: 30.9 G/DL (ref 31.5–35.7)
MCV RBC AUTO: 84.4 FL (ref 79–97)
PLATELET # BLD AUTO: 256 10*3/MM3 (ref 140–450)
PMV BLD AUTO: 8.6 FL (ref 6–12)
POTASSIUM SERPL-SCNC: 4.1 MMOL/L (ref 3.5–5.2)
RBC # BLD AUTO: 4.94 10*6/MM3 (ref 3.77–5.28)
SODIUM SERPL-SCNC: 134 MMOL/L (ref 136–145)
WBC NRBC COR # BLD AUTO: 5.55 10*3/MM3 (ref 3.4–10.8)

## 2025-05-24 PROCEDURE — A9270 NON-COVERED ITEM OR SERVICE: HCPCS | Performed by: NEUROLOGICAL SURGERY

## 2025-05-24 PROCEDURE — 63710000001 OXYCODONE 5 MG TABLET: Performed by: NEUROLOGICAL SURGERY

## 2025-05-24 PROCEDURE — 97166 OT EVAL MOD COMPLEX 45 MIN: CPT

## 2025-05-24 PROCEDURE — 63710000001 ACETAMINOPHEN 500 MG TABLET: Performed by: NEUROLOGICAL SURGERY

## 2025-05-24 PROCEDURE — 63710000001 SENNOSIDES-DOCUSATE 8.6-50 MG TABLET: Performed by: NEUROLOGICAL SURGERY

## 2025-05-24 PROCEDURE — 63710000001 PREDNISONE PER 5 MG: Performed by: NEUROLOGICAL SURGERY

## 2025-05-24 PROCEDURE — 63710000001 METOPROLOL SUCCINATE XL 25 MG TABLET SUSTAINED-RELEASE 24 HOUR: Performed by: NEUROLOGICAL SURGERY

## 2025-05-24 PROCEDURE — 25010000002 MORPHINE PER 10 MG: Performed by: NEUROLOGICAL SURGERY

## 2025-05-24 PROCEDURE — 80048 BASIC METABOLIC PNL TOTAL CA: CPT | Performed by: NEUROLOGICAL SURGERY

## 2025-05-24 PROCEDURE — 63710000001 POLYETHYLENE GLYCOL 17 G PACK: Performed by: NEUROLOGICAL SURGERY

## 2025-05-24 PROCEDURE — 85027 COMPLETE CBC AUTOMATED: CPT | Performed by: NEUROLOGICAL SURGERY

## 2025-05-24 PROCEDURE — 63710000001 METOPROLOL SUCCINATE XL 50 MG TABLET SUSTAINED-RELEASE 24 HOUR: Performed by: NEUROLOGICAL SURGERY

## 2025-05-24 PROCEDURE — 97162 PT EVAL MOD COMPLEX 30 MIN: CPT

## 2025-05-24 PROCEDURE — 63710000001 OXYCODONE-ACETAMINOPHEN 5-325 MG TABLET: Performed by: NEUROLOGICAL SURGERY

## 2025-05-24 RX ORDER — OXYCODONE HYDROCHLORIDE 5 MG/1
5 TABLET ORAL EVERY 4 HOURS PRN
Refills: 0 | Status: DISCONTINUED | OUTPATIENT
Start: 2025-05-24 | End: 2025-05-25

## 2025-05-24 RX ADMIN — PREDNISONE 5 MG: 5 TABLET ORAL at 08:35

## 2025-05-24 RX ADMIN — Medication 10 ML: at 20:39

## 2025-05-24 RX ADMIN — PREDNISONE 5 MG: 5 TABLET ORAL at 20:39

## 2025-05-24 RX ADMIN — LEVOTHYROXINE SODIUM 112 MCG: 0.11 TABLET ORAL at 05:10

## 2025-05-24 RX ADMIN — OXYCODONE HYDROCHLORIDE AND ACETAMINOPHEN 1 TABLET: 5; 325 TABLET ORAL at 10:32

## 2025-05-24 RX ADMIN — SENNOSIDES, DOCUSATE SODIUM 2 TABLET: 50; 8.6 TABLET, FILM COATED ORAL at 10:46

## 2025-05-24 RX ADMIN — POLYETHYLENE GLYCOL 3350 17 G: 17 POWDER, FOR SOLUTION ORAL at 17:31

## 2025-05-24 RX ADMIN — METOPROLOL SUCCINATE 50 MG: 50 TABLET, FILM COATED, EXTENDED RELEASE ORAL at 17:30

## 2025-05-24 RX ADMIN — MORPHINE SULFATE 2 MG: 2 INJECTION, SOLUTION INTRAMUSCULAR; INTRAVENOUS at 05:10

## 2025-05-24 RX ADMIN — ACETAMINOPHEN 1000 MG: 500 TABLET, FILM COATED ORAL at 20:39

## 2025-05-24 RX ADMIN — ACETAMINOPHEN 1000 MG: 500 TABLET, FILM COATED ORAL at 08:35

## 2025-05-24 RX ADMIN — OXYCODONE HYDROCHLORIDE 5 MG: 5 TABLET ORAL at 17:52

## 2025-05-24 RX ADMIN — Medication 10 ML: at 10:33

## 2025-05-24 RX ADMIN — OXYCODONE HYDROCHLORIDE AND ACETAMINOPHEN 1 TABLET: 5; 325 TABLET ORAL at 01:55

## 2025-05-24 RX ADMIN — ACETAMINOPHEN 1000 MG: 500 TABLET, FILM COATED ORAL at 15:20

## 2025-05-24 RX ADMIN — METOPROLOL SUCCINATE 25 MG: 25 TABLET, EXTENDED RELEASE ORAL at 08:35

## 2025-05-24 NOTE — PLAN OF CARE
Goal Outcome Evaluation:  Plan of Care Reviewed With: patient, son        Progress: no change  Outcome Evaluation: OT eval completed. Pt presents A&Ox4 c/o pain in buttocks and radiulcar into R LE. She often focues on this pain and needs positive redirection to participate in task at hand. She lives alone but has had daily assist to help given her fxnal decline in last few weeks. She required Karla for sidelying to sit and was unable to ind'ly don her socks due to limited B LE ROM. She stood with Karla x1 and amb around the bed to the chair with cues for tech'q and maximizing her activity. Pt requried encouragement to sit up in the chair but she was agreeable and was given a waffle cushion to increase comfort/time out of bed. Skilled OT indicated to address deficits and increase safety, indep and reduce fall risk. Rec rehab at d/c vs home with assist.    Anticipated Discharge Disposition (OT): sub acute care setting, skilled nursing facility

## 2025-05-24 NOTE — THERAPY EVALUATION
Patient Name: Linda Spear  : 1935    MRN: [id number]                              Today's Date: 2025       Admit Date: 2025    Visit Dx:     ICD-10-CM ICD-9-CM   1. Impaired mobility [Z74.09]  Z74.09 799.89   2. Closed fracture of sacrum, unspecified portion of sacrum, initial encounter  S32.10XA 805.6     Patient Active Problem List   Diagnosis    Sensorineural hearing loss (SNHL) of both ears    Primary hypertension    Hypothyroidism (acquired)    Nonrheumatic aortic valve stenosis    CKD (chronic kidney disease) stage 3, GFR 30-59 ml/min    Sacral fracture, closed    Sacral insufficiency fracture     Past Medical History:   Diagnosis Date    Chronic shoulder pain     Clotting disorder     Conductive hearing loss     Disease of thyroid gland     History of tonsillectomy     Low back pain     Perforation of left tympanic membrane     Primary hypertension 12/15/2023     Past Surgical History:   Procedure Laterality Date    ADENOIDECTOMY  1965    CARDIAC CATHETERIZATION      COLONOSCOPY  2011    diverticulosis in the sigmoid colon    COLONOSCOPY  2005    normal exam    KYPHOPLASTY Left 2025    Procedure: KYPHOPLASTY WITH BIOPSY LEFT SACRALPLASTY with O-ARM;  Surgeon: Viktor Solitario MD;  Location: [facility];  Service: Neurosurgery;  Laterality: Left;    TONSILLECTOMY        General Information       Row Name 25 0956          Physical Therapy Time and Intention    Document Type evaluation  pt presents with continued hip pain that radiates to buttocks. Pt dx with closed sacral fx and resulted in sacralplasty on   -AJ     Mode of Treatment physical therapy  -AJ       Row Name 25 0956          General Information    Patient Profile Reviewed yes  -AJ     Prior Level of Function mod assist:;all household mobility;bathing;dressing;transfer  -AJ     Existing Precautions/Restrictions fall;spinal  -AJ     Barriers to Rehab medically complex;previous functional  deficit;physical barrier  -       Row Name 05/24/25 0956          Living Environment    Current Living Arrangements home  -     People in Home alone  -       Row Name 05/24/25 0956          Home Main Entrance    Number of Stairs, Main Entrance none  -       Row Name 05/24/25 0956          Stairs Within Home, Primary    Number of Stairs, Within Home, Primary none  -       Row Name 05/24/25 0956          Cognition    Orientation Status (Cognition) oriented x 4  -       Row Name 05/24/25 0956          Safety Issues/Impairments Affecting Functional Mobility    Safety Issues Affecting Function (Mobility) friction/shear risk;safety precaution awareness;safety precautions follow-through/compliance  -     Impairments Affecting Function (Mobility) balance;endurance/activity tolerance;pain;range of motion (ROM);strength  -               User Key  (r) = Recorded By, (t) = Taken By, (c) = Cosigned By      Initials Name Provider Type    Michael Santoyo PT DPT Physical Therapist                   Mobility       Row Name 05/24/25 0956          Bed Mobility    Bed Mobility sidelying-sit  -     Sidelying-Sit Watson (Bed Mobility) minimum assist (75% patient effort);verbal cues  -     Assistive Device (Bed Mobility) head of bed elevated;bed rails  -       Row Name 05/24/25 0956          Bed-Chair Transfer    Bed-Chair Watson (Transfers) minimum assist (75% patient effort);1 person assist;2 person assist  -       Row Name 05/24/25 0956          Sit-Stand Transfer    Sit-Stand Watson (Transfers) verbal cues;minimum assist (75% patient effort);contact guard;1 person assist  -     Assistive Device (Sit-Stand Transfers) walker, front-wheeled  -       Row Name 05/24/25 0956          Gait/Stairs (Locomotion)    Watson Level (Gait) contact guard;minimum assist (75% patient effort);1 person assist  -     Assistive Device (Gait) walker, front-wheeled  -     Patient was able to Ambulate  yes  -     Distance in Feet (Gait) 15  -AJ     Deviations/Abnormal Patterns (Gait) right sided deviations;base of support, narrow  -AJ     Bilateral Gait Deviations forward flexed posture  -AJ     Left Sided Gait Deviations lateral trunk flexion  -AJ     Right Sided Gait Deviations weight shift ability decreased  -               User Key  (r) = Recorded By, (t) = Taken By, (c) = Cosigned By      Initials Name Provider Type    Michael Santoyo PT DPDENICE Physical Therapist                   Obj/Interventions       Row Name 05/24/25 0956          Range of Motion Comprehensive    General Range of Motion no range of motion deficits identified  -     Comment, General Range of Motion no formal testing due to pt perseverating on discomfort in R hip, remained WLF to t/f  -       Row Name 05/24/25 0956          Strength Comprehensive (MMT)    General Manual Muscle Testing (MMT) Assessment no strength deficits identified  -     Comment, General Manual Muscle Testing (MMT) Assessment >3/5 as she t/f and ambulated around bed  -       Row Name 05/24/25 0956          Balance    Balance Assessment sitting static balance;sitting dynamic balance;standing static balance;standing dynamic balance  -     Static Sitting Balance standby assist  -     Dynamic Sitting Balance standby assist  -     Position, Sitting Balance unsupported;sitting edge of bed  -     Static Standing Balance contact guard  -AJ     Dynamic Standing Balance contact guard;minimal assist  -     Position/Device Used, Standing Balance supported;walker, front-wheeled  -     Balance Interventions sitting;standing;sit to stand;supported;static;dynamic  -               User Key  (r) = Recorded By, (t) = Taken By, (c) = Cosigned By      Initials Name Provider Type    Michael Santoyo PT DPT Physical Therapist                   Goals/Plan       Row Name 05/24/25 0956          Bed Mobility Goal 1 (PT)    Activity/Assistive Device (Bed Mobility Goal 1,  PT) bed mobility activities, all  -AJ     [place] Level/Cues Needed (Bed Mobility Goal 1, PT) independent  -AJ     Time Frame (Bed Mobility Goal 1, PT) long term goal (LTG);10 days  -AJ     Strategies/Barriers (Bed Mobility Goal 1, PT) indep as she lives alone and wants to return home if possible  -AJ     Progress/Outcomes (Bed Mobility Goal 1, PT) new goal  -AJ       Row Name 05/24/25 0956          Transfer Goal 1 (PT)    Activity/Assistive Device (Transfer Goal 1, PT) sit-to-stand/stand-to-sit;bed-to-chair/chair-to-bed;toilet  -AJ     [place] Level/Cues Needed (Transfer Goal 1, PT) independent  -AJ     Time Frame (Transfer Goal 1, PT) long term goal (LTG);10 days  -AJ     Strategies/Barriers (Transfers Goal 1, PT) indep as she lives alone and wants to return home if possible  -AJ     Progress/Outcome (Transfer Goal 1, PT) new goal  -AJ       Row Name 05/24/25 0956          Gait Training Goal 1 (PT)    Activity/Assistive Device (Gait Training Goal 1, PT) gait (walking locomotion);decrease asymmetrical patterns;decrease fall risk;diminish gait deviation;forward stepping;improve balance and speed;increase endurance/gait distance;increase energy conservation;assistive device use;walker, rolling  -AJ     [place] Level (Gait Training Goal 1, PT) modified independence  -AJ     Distance (Gait Training Goal 1, PT) 75' with pain 5/10 or less  -AJ     Time Frame (Gait Training Goal 1, PT) long term goal (LTG);10 days  -AJ     Progress/Outcome (Gait Training Goal 1, PT) new goal  -AJ       Row Name 05/24/25 0956          Balance Goal 1 (PT)    Activity/Assistive Device (Balance Goal) sit to stand dynamic balance;standing static balance;standing dynamic balance;unsupported;with functional activities/occupations;with functional mobility activities;with functional reaching activities  -AJ     [place] Level/Cues Needed (Balance Goal 1, PT) independent  -AJ     Time Frame (Balance Goal 1, PT) long-term goal  (LTG);by discharge  -     Progress/Outcomes (Balance Goal 1, PT) other (see comments)  new goal  -       Row Name 05/24/25 0956          Therapy Assessment/Plan (PT)    Planned Therapy Interventions (PT) balance training;bed mobility training;gait training;home exercise program;patient/family education;transfer training;postural re-education;ROM (range of motion);strengthening;stretching  -               User Key  (r) = Recorded By, (t) = Taken By, (c) = Cosigned By      Initials Name Provider Type    Michael Santoyo, PT DPT Physical Therapist                   Clinical Impression       Row Name 05/24/25 0956          Pain    Pretreatment Pain Rating 8/10  -     Posttreatment Pain Rating 9/10  -     Pain Location buttock  -     Pain Side/Orientation right;generalized  -     Pain Management Interventions activity modification encouraged;exercise or physical activity utilized;nursing notified  -     Response to Pain Interventions activity participation with increased pain  -       Row Name 05/24/25 0956          Plan of Care Review    Plan of Care Reviewed With patient;family  -     Outcome Evaluation PT eval complete. Pt in L sidelying upon arrival, AOx4 with complaints of continued R hip and buttock pain. Pt reports living alone but has noticed significant decline in past month due to pain and difficulty with performing any tasks. Throughout session pt perseverates on her pain and difficulty with weight acceptance to R LE but is easily redirected each time with verbal cues. Pt performed sidelying to sit with Min A, stood and ambulated around bed with Min A. Pt was agreeable to sit in recliner and left on waffle cushion due to continued pain and educated to call for assist as needed. Pt will benefit from skilled PT services to improve t/f, decrease pain with mobility, decreasing fall risk and continued safety awareness. Recommend sub acute rehab vs home with assist pending pain and progress  towards goals.  -       Row Name 05/24/25 0956          Therapy Assessment/Plan (PT)    Patient/Family Therapy Goals Statement (PT) pain control  -     Rehab Potential (PT) good  -     Criteria for Skilled Interventions Met (PT) yes;meets criteria;skilled treatment is necessary  -     Therapy Frequency (PT) 2 times/day  -     Predicted Duration of Therapy Intervention (PT) until d/c  -       Row Name 05/24/25 0956          Positioning and Restraints    Pre-Treatment Position in bed  -     Post Treatment Position chair  -AJ     In Chair notified nsg;reclined;call light within reach;encouraged to call for assist;waffle cushion;with family/caregiver  -               User Key  (r) = Recorded By, (t) = Taken By, (c) = Cosigned By      Initials Name Provider Type    Michael Santoyo PT DPT Physical Therapist                   Outcome Measures       Row Name 05/24/25 0956          How much help from another person do you currently need...    Turning from your back to your side while in flat bed without using bedrails? 3  -AJ     Moving from lying on back to sitting on the side of a flat bed without bedrails? 3  -AJ     Moving to and from a bed to a chair (including a wheelchair)? 3  -AJ     Standing up from a chair using your arms (e.g., wheelchair, bedside chair)? 3  -AJ     Climbing 3-5 steps with a railing? 2  -AJ     To walk in hospital room? 3  -AJ     AM-PAC 6 Clicks Score (PT) 17  -     Highest Level of Mobility Goal Stand (1 or More Minutes)-5  -       Row Name 05/24/25 1000 05/24/25 0956       Functional Assessment    Outcome Measure Options AM-PAC 6 Clicks Daily Activity (OT)  - AM-PAC 6 Clicks Basic Mobility (PT)  -              User Key  (r) = Recorded By, (t) = Taken By, (c) = Cosigned By      Initials Name Provider Type    Kari Erickson, OTR/L Occupational Therapist    Michael Santoyo, PT DPT Physical Therapist                                 Physical Therapy Education        Title: PT OT SLP Therapies (Done)       Topic: Physical Therapy (Done)       Point: Mobility training (Done)       Learning Progress Summary            Patient Acceptance, E, VU by ROCHELLE at 5/24/2025 1313    Comment: role of PT, log roll for pain control, OOB acitivty as much as tolerated, d/c planning   Family Acceptance, E, VU by ROCHELLE at 5/24/2025 1313    Comment: role of PT, log roll for pain control, OOB acitivty as much as tolerated, d/c planning                      Point: Home exercise program (Done)       Learning Progress Summary            Patient Acceptance, E, VU by ROCHELLE at 5/24/2025 1313    Comment: role of PT, log roll for pain control, OOB acitivty as much as tolerated, d/c planning   Family Acceptance, E, VU by ROCHELLE at 5/24/2025 1313    Comment: role of PT, log roll for pain control, OOB acitivty as much as tolerated, d/c planning                      Point: Body mechanics (Done)       Learning Progress Summary            Patient Acceptance, E, VU by ROCHELLE at 5/24/2025 1313    Comment: role of PT, log roll for pain control, OOB acitivty as much as tolerated, d/c planning   Family Acceptance, E, VU by ROCHELLE at 5/24/2025 1313    Comment: role of PT, log roll for pain control, OOB acitivty as much as tolerated, d/c planning                      Point: Precautions (Done)       Learning Progress Summary            Patient Acceptance, E, VU by ROCHELLE at 5/24/2025 1313    Comment: role of PT, log roll for pain control, OOB acitivty as much as tolerated, d/c planning   Family Acceptance, E, VU by ROCHELLE at 5/24/2025 1313    Comment: role of PT, log roll for pain control, OOB acitivty as much as tolerated, d/c planning                                      User Key       Initials Effective Dates Name Provider Type Discipline    ROCHELLE 08/15/24 -  Michael Hewitt PT DPT Physical Therapist PT                  PT Recommendation and Plan  Planned Therapy Interventions (PT): balance training, bed mobility training, gait training, home  exercise program, patient/family education, transfer training, postural re-education, ROM (range of motion), strengthening, stretching  Outcome Evaluation: PT eval complete. Pt in L sidelying upon arrival, AOx4 with complaints of continued R hip and buttock pain. Pt reports living alone but has noticed significant decline in past month due to pain and difficulty with performing any tasks. Throughout session pt perseverates on her pain and difficulty with weight acceptance to R LE but is easily redirected each time with verbal cues. Pt performed sidelying to sit with Min A, stood and ambulated around bed with Min A. Pt was agreeable to sit in recliner and left on waffle cushion due to continued pain and educated to call for assist as needed. Pt will benefit from skilled PT services to improve t/f, decrease pain with mobility, decreasing fall risk and continued safety awareness. Recommend sub acute rehab vs home with assist pending pain and progress towards goals.     Time Calculation:         PT Charges       Row Name 05/24/25 0956             Time Calculation    Start Time 0956  -      Stop Time 1030  +5 for chart review  -      Time Calculation (min) 34 min  -AJ      PT Received On 05/24/25  -AJ      PT Goal Re-Cert Due Date 06/03/25  -AJ         Untimed Charges    PT Eval/Re-eval Minutes 39  -AJ         Total Minutes    Untimed Charges Total Minutes 39  -AJ       Total Minutes 39  -AJ                User Key  (r) = Recorded By, (t) = Taken By, (c) = Cosigned By      Initials Name Provider Type    AJ Michael Hewitt, PT DPT Physical Therapist                  Therapy Charges for Today       Code Description Service Date Service Provider Modifiers Qty    [id number]  PT EVAL MOD COMPLEXITY 3 5/24/2025 Michael Hewitt, PT DPT GP 1            PT G-Codes  Outcome Measure Options: AM-PAC 6 Clicks Daily Activity (OT)  AM-PAC 6 Clicks Score (PT): 17  AM-PAC 6 Clicks Score (OT): 17  PT Discharge Summary  Anticipated  Discharge Disposition (PT): home with assist, sub acute care setting    Michael Hewitt, PT DPT  5/24/2025

## 2025-05-24 NOTE — PLAN OF CARE
Goal Outcome Evaluation:  Plan of Care Reviewed With: patient, family           Outcome Evaluation: PT eval complete. Pt in L sidelying upon arrival, AOx4 with complaints of continued R hip and buttock pain. Pt reports living alone but has noticed significant decline in past month due to pain and difficulty with performing any tasks. Throughout session pt perseverates on her pain and difficulty with weight acceptance to R LE but is easily redirected each time with verbal cues. Pt performed sidelying to sit with Min A, stood and ambulated around bed with Min A. Pt was agreeable to sit in recliner and left on waffle cushion due to continued pain and educated to call for assist as needed. Pt will benefit from skilled PT services to improve t/f, decrease pain with mobility, decreasing fall risk and continued safety awareness. Recommend sub acute rehab vs home with assist pending pain and progress towards goals.    Anticipated Discharge Disposition (PT): home with assist, sub acute care setting

## 2025-05-24 NOTE — CASE MANAGEMENT/SOCIAL WORK
Discharge Planning Assessment  [facility]     Patient Name: Linda Spear  MRN: [id number]  Today's Date: 5/24/2025    Admit Date: 5/23/2025        Discharge Needs Assessment       Row Name 05/24/25 1053       Living Environment    People in Home alone    Current Living Arrangements home    Potentially Unsafe Housing Conditions none    Primary Care Provided by self    Provides Primary Care For no one    Family Caregiver if Needed child(abena), adult    Quality of Family Relationships helpful;involved;supportive    Able to Return to Prior Arrangements yes       Resource/Environmental Concerns    Resource/Environmental Concerns none    Transportation Concerns none       Transition Planning    Patient/Family Anticipates Transition to home with family    Patient/Family Anticipated Services at Transition none    Transportation Anticipated family or friend will provide       Discharge Needs Assessment    Equipment Currently Used at Home walker, standard    Concerns to be Addressed no discharge needs identified    Anticipated Changes Related to Illness none    Equipment Needed After Discharge none    Discharge Coordination/Progress Pt currently lives at home alone, and stated she can not physcially care for herself at this time. Pts Son who was in the room stated he and his 3 brothers last time took turns taking care of her after her last surgery. Pt understands that if she is not feeling better soon, rehab placement will need to be considered. SW will follow up with pt and family, and assist with any d/c needs.                   Discharge Plan    No documentation.                   Expected Discharge Date and Time       Expected Discharge Date Expected Discharge Time    May 23, 2025            Demographic Summary    No documentation.                  Functional Status    No documentation.                  Psychosocial    No documentation.                  Abuse/Neglect    No documentation.                  Legal    No  documentation.                  Substance Abuse    No documentation.                  Patient Forms    No documentation.                     Topher Anglin

## 2025-05-24 NOTE — PLAN OF CARE
Goal Outcome Evaluation:  Plan of Care Reviewed With: patient, family        Progress: no change  Outcome Evaluation: A&Ox4. Lumbar dressing, cdi. Prn pain med. Lance. NC at times. Family at bs.

## 2025-05-24 NOTE — PLAN OF CARE
Goal Outcome Evaluation:            Patient alert and responsive able to make needs known. Patient has been up and down to chair and toilet all day. Taking pain meds when needed. Had percocet changed to oxy due to 4gm apap already given for 24 hr period. Son at bedside. Safety maintained, no acute distress noted.

## 2025-05-24 NOTE — THERAPY EVALUATION
Acute Care - Occupational Therapy Initial Evaluation  [facility]     Patient Name: Linda Spear  : 1935  MRN: [id number]  Today's Date: 2025     Date of Referral to OT: 25       Admit Date: 2025       ICD-10-CM ICD-9-CM   1. Impaired mobility [Z74.09]  Z74.09 799.89   2. Closed fracture of sacrum, unspecified portion of sacrum, initial encounter  S32.10XA 805.6     Patient Active Problem List   Diagnosis    Sensorineural hearing loss (SNHL) of both ears    Primary hypertension    Hypothyroidism (acquired)    Nonrheumatic aortic valve stenosis    CKD (chronic kidney disease) stage 3, GFR 30-59 ml/min    Sacral fracture, closed    Sacral insufficiency fracture     Past Medical History:   Diagnosis Date    Chronic shoulder pain     Clotting disorder     Conductive hearing loss     Disease of thyroid gland 1986    History of tonsillectomy     Low back pain     Perforation of left tympanic membrane     Primary hypertension 12/15/2023     Past Surgical History:   Procedure Laterality Date    ADENOIDECTOMY  1965    CARDIAC CATHETERIZATION      COLONOSCOPY  2011    diverticulosis in the sigmoid colon    COLONOSCOPY  2005    normal exam    KYPHOPLASTY Left 2025    Procedure: KYPHOPLASTY WITH BIOPSY LEFT SACRALPLASTY with O-ARM;  Surgeon: Viktor Solitario MD;  Location: [facility];  Service: Neurosurgery;  Laterality: Left;    TONSILLECTOMY           OT ASSESSMENT FLOWSHEET (Last 12 Hours)       OT Evaluation and Treatment       Row Name 25 0952                   OT Time and Intention    Subjective Information complains of;pain;numbness  -EC        Document Type evaluation  -EC        Mode of Treatment occupational therapy  -EC           General Information    Patient Profile Reviewed yes  -EC        Prior Level of Function mod assist:;dressing;bathing  could not tolerate standing or walking due to pain  -EC        Equipment Currently Used at Home cane, straight;walker,  rolling  -EC        Pertinent History of Current Functional Problem pathologic fx of sacrum s/p sacroplasty PMH: kyphplasty 4/30, fxnal decline in last few weeks  -EC        Existing Precautions/Restrictions fall;spinal  -EC        Barriers to Rehab medically complex;previous functional deficit;physical barrier  -EC           Living Environment    Current Living Arrangements home  walk in shower  -EC        People in Home alone  -EC           Pain Assessment    Pretreatment Pain Rating 8/10  -EC        Posttreatment Pain Rating 9/10  -EC        Pain Location buttock  -EC        Pain Management Interventions exercise or physical activity utilized;positioning techniques utilized  -EC        Response to Pain Interventions activity participation with increased pain  -EC        Pre/Posttreatment Pain Comment radicular into R LE  -EC           Cognition    Orientation Status (Cognition) oriented x 4  -EC           Range of Motion Comprehensive    General Range of Motion no range of motion deficits identified  -EC        Comment, General Range of Motion --  -EC           Strength Comprehensive (MMT)    Comment, General Manual Muscle Testing (MMT) Assessment functionally 4/5  -EC           Activities of Daily Living    BADL Assessment/Intervention lower body dressing  -EC           Lower Body Dressing Assessment/Training    [place] Level (Lower Body Dressing) don;socks;maximum assist (25% patient effort)  -EC        Position (Lower Body Dressing) edge of bed sitting  -EC           BADL Safety/Performance    Impairments, BADL Safety/Performance balance;endurance/activity tolerance;pain;range of motion;strength  -EC           Bed Mobility    Bed Mobility sidelying-sit  -EC        Sidelying-Sit [place] (Bed Mobility) minimum assist (75% patient effort);verbal cues  -EC           Functional Mobility    Functional Mobility- Ind. Level verbal cues required;contact guard assist  -EC        Functional Mobility- Device  walker, front-wheeled  -EC        Functional Mobility- Comment around bed to chair  -EC           Transfer Assessment/Treatment    Transfers sit-stand transfer;stand-sit transfer  -EC           Sit-Stand Transfer    Sit-Stand [place] (Transfers) verbal cues;minimum assist (75% patient effort);contact guard  -EC        Assistive Device (Sit-Stand Transfers) walker, front-wheeled  -EC        Comment, (Sit-Stand Transfer) Karla on initial  -EC           Stand-Sit Transfer    Stand-Sit [place] (Transfers) verbal cues;contact guard;minimum assist (75% patient effort)  -EC           Safety Issues/Impairments Affecting Functional Mobility    Safety Issues Affecting Function (Mobility) friction/shear risk;safety precautions follow-through/compliance  -EC        Impairments Affecting Function (Mobility) balance;endurance/activity tolerance;pain;range of motion (ROM);strength  -EC           Balance    Balance Assessment sitting static balance;sitting dynamic balance;standing static balance;standing dynamic balance  -EC        Static Sitting Balance standby assist  -EC        Dynamic Sitting Balance standby assist  -EC        Position, Sitting Balance unsupported;sitting edge of bed  -EC        Static Standing Balance contact guard  -EC        Dynamic Standing Balance contact guard  -EC        Position/Device Used, Standing Balance supported;walker, front-wheeled  -EC           Wound 05/23/25 1258 sacral spine Surgical    Wound - Properties Group Placement Date: 05/23/25  -SOREN Placement Time: 1258  -SOREN Location: sacral spine  -SOREN Primary Wound Type: Surgical  -SOREN    Retired Wound - Properties Group Placement Date: 05/23/25  -SOREN Placement Time: 1258  -SOREN Location: sacral spine  -SOREN    Retired Wound - Properties Group Placement Date: 05/23/25  -SOREN Placement Time: 1258  -SOREN Location: sacral spine  -SOREN    Retired Wound - Properties Group Date first assessed: 05/23/25  -SOREN Time first assessed: 1258  -SOREN Location: sacral  spine  -SOREN       Plan of Care Review    Plan of Care Reviewed With patient;son  -EC        Progress no change  -EC        Outcome Evaluation OT eval completed. Pt presents A&Ox4 c/o pain in buttocks and radiulcar into R LE. She often focues on this pain and needs positive redirection to participate in task at hand. She lives alone but has had daily assist to help given her fxnal decline in last few weeks. She required Karla for sidelying to sit and was unable to ind'ly don her socks due to limited B LE ROM. She stood with Karla x1 and amb around the bed to the chair with cues for tech'q and maximizing her activity. Pt requried encouragement to sit up in the chair but she was agreeable and was given a waffle cushion to increase comfort/time out of bed. Skilled OT indicated to address deficits and increase safety, indep and reduce fall risk. Rec rehab at d/c vs home with assist.  -EC           Positioning and Restraints    Pre-Treatment Position in bed  -EC        Post Treatment Position chair  -EC        In Chair notified nsg;reclined;call light within reach;encouraged to call for assist;with family/caregiver;waffle cushion  -EC           Therapy Assessment/Plan (OT)    Date of Referral to OT 05/23/25  -EC        OT Diagnosis decreased adls  -EC        Rehab Potential (OT) limited  -EC        Criteria for Skilled Therapeutic Interventions Met (OT) yes;meets criteria;skilled treatment is necessary  -EC        Therapy Frequency (OT) 5 times/wk  -EC        Predicted Duration of Therapy Intervention (OT) 10 days  -EC        Planned Therapy Interventions (OT) activity tolerance training;adaptive equipment training;BADL retraining;functional balance retraining;occupation/activity based interventions;patient/caregiver education/training;ROM/therapeutic exercise;transfer/mobility retraining  -EC           Therapy Plan Review/Discharge Plan (OT)    Anticipated Discharge Disposition (OT) sub acute care setting;skilled nursing  facility  -EC           OT Goals    Transfer Goal Selection (OT) transfer, OT goal 1  -EC        Bathing Goal Selection (OT) bathing, OT goal 1  -EC        Problem Specific Goal Selection (OT) problem specific goal 1, OT  -EC           Transfer Goal 1 (OT)    Activity/Assistive Device (Transfer Goal 1, OT) transfers, all  -EC        [place] Level/Cues Needed (Transfer Goal 1, OT) supervision required  -EC        Time Frame (Transfer Goal 1, OT) long term goal (LTG)  -EC        Progress/Outcome (Transfer Goal 1, OT) new goal  -EC           Bathing Goal 1 (OT)    Activity/Device (Bathing Goal 1, OT) bathing skills, all  -EC        [place] Level/Cues Needed (Bathing Goal 1, OT) minimum assist (75% or more patient effort)  -EC        Time Frame (Bathing Goal 1, OT) long term goal (LTG)  -EC        Progress/Outcomes (Bathing Goal 1, OT) new goal  -EC           Problem Specific Goal 1 (OT)    Problem Specific Goal 1 (OT) Pt will independently implement one pain management technique to decrease pain and improve functional performance.  -EC        Time Frame (Problem Specific Goal 1, OT) long term goal (LTG)  -EC        Progress/Outcome (Problem Specific Goal 1, OT) new goal  -EC                  User Key  (r) = Recorded By, (t) = Taken By, (c) = Cosigned By      Initials Name Effective Dates    SOREN Balwinder Dyson RN 02/17/22 -     EC Kari Carrasco OTR/L 10/13/23 -                      Occupational Therapy Education       Title: PT OT SLP Therapies (Done)       Topic: Occupational Therapy (Done)       Point: ADL training (Done)       Learning Progress Summary            Patient Acceptance, E, VU,NR by EC at 5/24/2025 1027   Family Acceptance, E, VU,NR by EC at 5/24/2025 1027                      Point: Home exercise program (Done)       Learning Progress Summary            Patient Acceptance, E, VU,NR by EC at 5/24/2025 1027   Family Acceptance, E, VU,NR by EC at 5/24/2025 1027                      Point:  Precautions (Done)       Learning Progress Summary            Patient Acceptance, E, VU,NR by  at 5/24/2025 1027   Family Acceptance, E, VU,NR by  at 5/24/2025 1027                      Point: Body mechanics (Done)       Learning Progress Summary            Patient Acceptance, E, VU,NR by  at 5/24/2025 1027   Family Acceptance, E, VU,NR by  at 5/24/2025 1027                                      User Key       Initials Effective Dates Name Provider Type Discipline     10/13/23 -  Kari Carrasco, OTR/L Occupational Therapist OT                      OT Recommendation and Plan  Planned Therapy Interventions (OT): activity tolerance training, adaptive equipment training, BADL retraining, functional balance retraining, occupation/activity based interventions, patient/caregiver education/training, ROM/therapeutic exercise, transfer/mobility retraining  Therapy Frequency (OT): 5 times/wk  Plan of Care Review  Plan of Care Reviewed With: patient, son  Progress: no change  Outcome Evaluation: OT eval completed. Pt presents A&Ox4 c/o pain in buttocks and radiulcar into R LE. She often focues on this pain and needs positive redirection to participate in task at hand. She lives alone but has had daily assist to help given her fxnal decline in last few weeks. She required Karla for sidelying to sit and was unable to ind'ly don her socks due to limited B LE ROM. She stood with Karla x1 and amb around the bed to the chair with cues for tech'q and maximizing her activity. Pt requried encouragement to sit up in the chair but she was agreeable and was given a waffle cushion to increase comfort/time out of bed. Skilled OT indicated to address deficits and increase safety, indep and reduce fall risk. Rec rehab at d/c vs home with assist.  Plan of Care Reviewed With: patient, son  Outcome Evaluation: OT eval completed. Pt presents A&Ox4 c/o pain in buttocks and radiulcar into R LE. She often focues on this pain and needs positive  redirection to participate in task at hand. She lives alone but has had daily assist to help given her fxnal decline in last few weeks. She required Karla for sidelying to sit and was unable to ind'ly don her socks due to limited B LE ROM. She stood with Karla x1 and amb around the bed to the chair with cues for tech'q and maximizing her activity. Pt requried encouragement to sit up in the chair but she was agreeable and was given a waffle cushion to increase comfort/time out of bed. Skilled OT indicated to address deficits and increase safety, indep and reduce fall risk. Rec rehab at d/c vs home with assist.     Outcome Measures       Row Name 05/24/25 1000             How much help from another is currently needed...    Putting on and taking off regular lower body clothing? 2  -EC      Bathing (including washing, rinsing, and drying) 2  -EC      Toileting (which includes using toilet bed pan or urinal) 3  -EC      Putting on and taking off regular upper body clothing 3  -EC      Taking care of personal grooming (such as brushing teeth) 3  -EC      Eating meals 4  -EC      AM-PAC 6 Clicks Score (OT) 17  -EC         Functional Assessment    Outcome Measure Options AM-PAC 6 Clicks Daily Activity (OT)  -EC                User Key  (r) = Recorded By, (t) = Taken By, (c) = Cosigned By      Initials Name Provider Type    EC Kari Carrasco, OTR/L Occupational Therapist                    Time Calculation:    Time Calculation- OT       Row Name 05/24/25 1030             Time Calculation- OT    OT Start Time 0950  +15 min CR  -EC      OT Stop Time 1030  -EC      OT Time Calculation (min) 40 min  -EC      OT Received On 05/24/25  -EC      OT Goal Re-Cert Due Date 06/03/25  -EC         Untimed Charges    OT Eval/Re-eval Minutes 55  -EC         Total Minutes    Untimed Charges Total Minutes 55  -EC       Total Minutes 55  -EC                User Key  (r) = Recorded By, (t) = Taken By, (c) = Cosigned By      Initials Name  Provider Type    EC Kari Carrasco OTR/L Occupational Therapist                  Therapy Charges for Today       Code Description Service Date Service Provider Modifiers Qty    16509782831 HC OT EVAL MOD COMPLEXITY 4 5/24/2025 Kari Carrasco OTR/L GO 1                 ЕКАТЕРИНА Londono/ABDIRAHMAN  5/24/2025

## 2025-05-25 PROCEDURE — A9270 NON-COVERED ITEM OR SERVICE: HCPCS | Performed by: NEUROLOGICAL SURGERY

## 2025-05-25 PROCEDURE — 63710000001 METOPROLOL SUCCINATE XL 25 MG TABLET SUSTAINED-RELEASE 24 HOUR: Performed by: NEUROLOGICAL SURGERY

## 2025-05-25 PROCEDURE — 63710000001 SENNOSIDES-DOCUSATE 8.6-50 MG TABLET: Performed by: NEUROLOGICAL SURGERY

## 2025-05-25 PROCEDURE — 63710000001 POLYETHYLENE GLYCOL 17 G PACK: Performed by: NEUROLOGICAL SURGERY

## 2025-05-25 PROCEDURE — 97535 SELF CARE MNGMENT TRAINING: CPT

## 2025-05-25 PROCEDURE — 63710000001 ACETAMINOPHEN 500 MG TABLET: Performed by: NEUROLOGICAL SURGERY

## 2025-05-25 PROCEDURE — 99231 SBSQ HOSP IP/OBS SF/LOW 25: CPT | Performed by: NEUROLOGICAL SURGERY

## 2025-05-25 PROCEDURE — 63710000001 LEVOTHYROXINE 112 MCG TABLET: Performed by: NEUROLOGICAL SURGERY

## 2025-05-25 PROCEDURE — 63710000001 OXYCODONE 5 MG TABLET: Performed by: NEUROLOGICAL SURGERY

## 2025-05-25 PROCEDURE — 63710000001 BISACODYL 5 MG TABLET DELAYED-RELEASE: Performed by: NEUROLOGICAL SURGERY

## 2025-05-25 PROCEDURE — 63710000001 METOPROLOL SUCCINATE XL 50 MG TABLET SUSTAINED-RELEASE 24 HOUR: Performed by: NEUROLOGICAL SURGERY

## 2025-05-25 PROCEDURE — 97116 GAIT TRAINING THERAPY: CPT

## 2025-05-25 PROCEDURE — 63710000001 PREDNISONE PER 5 MG: Performed by: NEUROLOGICAL SURGERY

## 2025-05-25 RX ORDER — OXYCODONE HYDROCHLORIDE 5 MG/1
10 TABLET ORAL EVERY 4 HOURS PRN
Refills: 0 | Status: DISCONTINUED | OUTPATIENT
Start: 2025-05-25 | End: 2025-05-28 | Stop reason: HOSPADM

## 2025-05-25 RX ADMIN — OXYCODONE HYDROCHLORIDE 10 MG: 5 TABLET ORAL at 19:22

## 2025-05-25 RX ADMIN — OXYCODONE HYDROCHLORIDE 5 MG: 5 TABLET ORAL at 06:21

## 2025-05-25 RX ADMIN — ACETAMINOPHEN 1000 MG: 500 TABLET, FILM COATED ORAL at 10:41

## 2025-05-25 RX ADMIN — ACETAMINOPHEN 1000 MG: 500 TABLET, FILM COATED ORAL at 15:16

## 2025-05-25 RX ADMIN — METOPROLOL SUCCINATE 50 MG: 50 TABLET, FILM COATED, EXTENDED RELEASE ORAL at 17:39

## 2025-05-25 RX ADMIN — OXYCODONE HYDROCHLORIDE 5 MG: 5 TABLET ORAL at 02:36

## 2025-05-25 RX ADMIN — BISACODYL 5 MG: 5 TABLET, COATED ORAL at 17:40

## 2025-05-25 RX ADMIN — PREDNISONE 5 MG: 5 TABLET ORAL at 08:28

## 2025-05-25 RX ADMIN — LEVOTHYROXINE SODIUM 112 MCG: 0.11 TABLET ORAL at 06:21

## 2025-05-25 RX ADMIN — METOPROLOL SUCCINATE 25 MG: 25 TABLET, EXTENDED RELEASE ORAL at 08:28

## 2025-05-25 RX ADMIN — OXYCODONE HYDROCHLORIDE 10 MG: 5 TABLET ORAL at 15:16

## 2025-05-25 RX ADMIN — POLYETHYLENE GLYCOL 3350 17 G: 17 POWDER, FOR SOLUTION ORAL at 17:39

## 2025-05-25 RX ADMIN — PREDNISONE 5 MG: 5 TABLET ORAL at 20:07

## 2025-05-25 RX ADMIN — OXYCODONE HYDROCHLORIDE 10 MG: 5 TABLET ORAL at 10:41

## 2025-05-25 RX ADMIN — OXYCODONE HYDROCHLORIDE 10 MG: 5 TABLET ORAL at 23:18

## 2025-05-25 RX ADMIN — SENNOSIDES, DOCUSATE SODIUM 2 TABLET: 50; 8.6 TABLET, FILM COATED ORAL at 17:40

## 2025-05-25 NOTE — THERAPY TREATMENT NOTE
Patient Name: Linda Spear  : 1935    MRN: [id number]                              Today's Date: 2025       Admit Date: 2025    Visit Dx:     ICD-10-CM ICD-9-CM   1. Impaired mobility [Z74.09]  Z74.09 799.89   2. Closed fracture of sacrum, unspecified portion of sacrum, initial encounter  S32.10XA 805.6     Patient Active Problem List   Diagnosis    Sensorineural hearing loss (SNHL) of both ears    Primary hypertension    Hypothyroidism (acquired)    Nonrheumatic aortic valve stenosis    CKD (chronic kidney disease) stage 3, GFR 30-59 ml/min    Sacral fracture, closed    Sacral insufficiency fracture     Past Medical History:   Diagnosis Date    Chronic shoulder pain     Clotting disorder     Conductive hearing loss     Disease of thyroid gland     History of tonsillectomy     Low back pain     Perforation of left tympanic membrane     Primary hypertension 12/15/2023     Past Surgical History:   Procedure Laterality Date    ADENOIDECTOMY  1965    CARDIAC CATHETERIZATION      COLONOSCOPY  2011    diverticulosis in the sigmoid colon    COLONOSCOPY  2005    normal exam    KYPHOPLASTY Left 2025    Procedure: KYPHOPLASTY WITH BIOPSY LEFT SACRALPLASTY with O-ARM;  Surgeon: Viktor Solitario MD;  Location: [facility];  Service: Neurosurgery;  Laterality: Left;    TONSILLECTOMY        General Information       Row Name 25 1503          OT Time and Intention    Subjective Information complains of;pain  -MT     Document Type therapy note (daily note)  -MT     Mode of Treatment occupational therapy  -MT     Patient Effort good  -MT       Row Name 25 1503          General Information    Patient Profile Reviewed yes  -MT     Existing Precautions/Restrictions fall;spinal  -MT       Row Name 25 1503          Cognition    Orientation Status (Cognition) oriented x 4  -MT       Row Name 25 1503          Safety Issues/Impairments Affecting Functional Mobility     Impairments Affecting Function (Mobility) balance;endurance/activity tolerance;pain;range of motion (ROM);strength  -MT               User Key  (r) = Recorded By, (t) = Taken By, (c) = Cosigned By      Initials Name Provider Type    MT Jess Omer COTA Occupational Therapist Assistant                     Mobility/ADL's       Row Name 05/25/25 [facility]          Bed Mobility    Bed Mobility sit-sidelying;sidelying-sit  -MT     Sidelying-Sit [place] (Bed Mobility) contact guard  -MT     Sit-Sidelying [place] (Bed Mobility) contact guard  -MT     Assistive Device (Bed Mobility) head of bed elevated;bed rails  -MT     Comment, (Bed Mobility) pt has adjustable bed at home and completed as she would in home setting CGA  -MT       Row Name 05/25/25 [facility]          Transfers    Transfers sit-stand transfer;stand-sit transfer;toilet transfer  -MT       Row Name 05/25/25 [facility]          Stand-Sit Transfer    Stand-Sit [place] (Transfers) verbal cues;contact guard;minimum assist (75% patient effort)  -MT       Row Name 05/25/25 [facility]          Toilet Transfer    Type (Toilet Transfer) sit-stand;stand-sit  -MT     [place] Level (Toilet Transfer) contact guard;minimum assist (75% patient effort)  -MT     Comment, (Toilet Transfer) assist with lowering self d/t pain  -MT       Row Name 05/25/25 [facility]          Functional Mobility    Functional Mobility- Ind. Level verbal cues required;contact guard assist  -MT     Functional Mobility- Device walker, front-wheeled  -MT       Row Name 05/25/25 [facility]          Activities of Daily Living    BADL Assessment/Intervention lower body dressing;toileting;grooming  -MT       Row Name 05/25/25 [facility]          Lower Body Dressing Assessment/Training    Comment, (Lower Body Dressing) Karla for brief. Educated on adapted bathing/dressing techs  -MT       Row Name 05/25/25 [facility]          Toileting Assessment/Training    Comment, (Toileting) s/u  -MT       Row Name 05/25/25 [facility]           Grooming Assessment/Training    Comment, (Grooming) s/u sink side  -MT               User Key  (r) = Recorded By, (t) = Taken By, (c) = Cosigned By      Initials Name Provider Type    Jess Centeno COTA Occupational Therapist Assistant                   Obj/Interventions    No documentation.                  Goals/Plan    No documentation.                  Clinical Impression       Row Name 05/25/25 1553          Pain Assessment    Pretreatment Pain Rating 6/10  -MT     Posttreatment Pain Rating 6/10  -MT     Pre/Posttreatment Pain Comment R hip/buttock  -MT       Row Name 05/25/25 1553          Plan of Care Review    Plan of Care Reviewed With patient;son  -MT       Row Name 05/25/25 1553          Therapy Plan Review/Discharge Plan (OT)    Anticipated Discharge Disposition (OT) home with 24/7 care;home with home health  -MT       Row Name 05/25/25 5793          Positioning and Restraints    Pre-Treatment Position in bed  -MT     Post Treatment Position bed  -MT     In Bed fowlers;call light within reach;encouraged to call for assist;exit alarm on;side rails up x2;with family/caregiver  -MT               User Key  (r) = Recorded By, (t) = Taken By, (c) = Cosigned By      Initials Name Provider Type    Jess Centeno COTA Occupational Therapist Assistant                   Outcome Measures       Row Name 05/25/25 3522          How much help from another is currently needed...    Putting on and taking off regular lower body clothing? 2  -MT     Bathing (including washing, rinsing, and drying) 2  -MT     Toileting (which includes using toilet bed pan or urinal) 3  -MT     Putting on and taking off regular upper body clothing 3  -MT     Taking care of personal grooming (such as brushing teeth) 4  -MT     Eating meals 4  -MT     AM-PAC 6 Clicks Score (OT) 18  -MT       Row Name 05/25/25 0800          How much help from another person do you currently need...    Turning from your back to your side while in flat  bed without using bedrails? 3  -DS     Moving from lying on back to sitting on the side of a flat bed without bedrails? 3  -DS     Moving to and from a bed to a chair (including a wheelchair)? 3  -DS     Standing up from a chair using your arms (e.g., wheelchair, bedside chair)? 3  -DS     Climbing 3-5 steps with a railing? 2  -DS     To walk in hospital room? 3  -DS     AM-PAC 6 Clicks Score (PT) 17  -DS     Highest Level of Mobility Goal Stand (1 or More Minutes)-5  -DS               User Key  (r) = Recorded By, (t) = Taken By, (c) = Cosigned By      Initials Name Provider Type    MT Jess Omer COTA Occupational Therapist Assistant    Belkis Cloud, RN Registered Nurse                    Occupational Therapy Education       Title: PT OT SLP Therapies (Done)       Topic: Occupational Therapy (Done)       Point: ADL training (Done)       Learning Progress Summary            Patient Acceptance, E, VU,NR by  at 5/24/2025 1027   Family Acceptance, E, VU,NR by  at 5/24/2025 1027                      Point: Home exercise program (Done)       Learning Progress Summary            Patient Acceptance, E, VU,NR by EC at 5/24/2025 1027   Family Acceptance, E, VU,NR by EC at 5/24/2025 1027                      Point: Precautions (Done)       Learning Progress Summary            Patient Acceptance, E, VU,NR by EC at 5/24/2025 1027   Family Acceptance, E, VU,NR by EC at 5/24/2025 1027                      Point: Body mechanics (Done)       Learning Progress Summary            Patient Acceptance, E, VU,NR by EC at 5/24/2025 1027   Family Acceptance, E, VU,NR by EC at 5/24/2025 1027                                      User Key       Initials Effective Dates Name Provider Type Discipline     10/13/23 -  Kari Carrasco OTR/L Occupational Therapist OT                  OT Recommendation and Plan     Plan of Care Review  Plan of Care Reviewed With: patient, son  Progress: improving  Outcome Evaluation: pt CGA for bed  mobility with bed adjusted d/t adjustable bed at home. Pt sit<>stands CGA-Karla, toilet t/f Karla to control sit d/t height of commode and pain control. Toilet seated s/u SBA. Karla for brief. Educated on adapted bathing/dressing techs. Pt s/u grooming sink side CGA standing. Educated on home safety, pain management techs, AROM ther ex for decreased soreness and increased fxl activity. Pt son supportive and present. Pt would benefit from  and plans to have 24/7 care     Time Calculation:         Time Calculation- OT       Row Name 05/25/25 1553             Time Calculation- OT    OT Start Time 1553  -MT      OT Stop Time 1632  -MT      OT Time Calculation (min) 39 min  -MT      Total Timed Code Minutes- OT 39 minute(s)  -MT      OT Received On 05/25/25  -MT         Timed Charges    74087 - OT Self Care/Mgmt Minutes 39  -MT         Total Minutes    Timed Charges Total Minutes 39  -MT       Total Minutes 39  -MT                User Key  (r) = Recorded By, (t) = Taken By, (c) = Cosigned By      Initials Name Provider Type    MT Jess Omer COTA Occupational Therapist Assistant                  Therapy Charges for Today       Code Description Service Date Service Provider Modifiers Qty    14052947899 HC OT SELF CARE/MGMT/TRAIN EA 15 MIN 5/25/2025 Jess Omer COTA GO 3                 JENNI Gee  5/25/2025

## 2025-05-25 NOTE — THERAPY TREATMENT NOTE
Acute Care - Physical Therapy Treatment Note  [facility]     Patient Name: Linda Spear  : 1935  MRN: [id number]  Today's Date: 2025      Visit Dx:     ICD-10-CM ICD-9-CM   1. Impaired mobility [Z74.09]  Z74.09 799.89   2. Closed fracture of sacrum, unspecified portion of sacrum, initial encounter  S32.10XA 805.6     Patient Active Problem List   Diagnosis    Sensorineural hearing loss (SNHL) of both ears    Primary hypertension    Hypothyroidism (acquired)    Nonrheumatic aortic valve stenosis    CKD (chronic kidney disease) stage 3, GFR 30-59 ml/min    Sacral fracture, closed    Sacral insufficiency fracture     Past Medical History:   Diagnosis Date    Chronic shoulder pain     Clotting disorder     Conductive hearing loss     Disease of thyroid gland     History of tonsillectomy     Low back pain     Perforation of left tympanic membrane     Primary hypertension 12/15/2023     Past Surgical History:   Procedure Laterality Date    ADENOIDECTOMY  1965    CARDIAC CATHETERIZATION      COLONOSCOPY  2011    diverticulosis in the sigmoid colon    COLONOSCOPY  2005    normal exam    KYPHOPLASTY Left 2025    Procedure: KYPHOPLASTY WITH BIOPSY LEFT SACRALPLASTY with O-ARM;  Surgeon: Viktor Solitario MD;  Location: [facility];  Service: Neurosurgery;  Laterality: Left;    TONSILLECTOMY       PT Assessment (Last 12 Hours)       PT Evaluation and Treatment       Row Name 25 0827          Physical Therapy Time and Intention    Subjective Information complains of;pain  -AB     Document Type therapy note (daily note)  -AB     Mode of Treatment physical therapy  -AB       Row Name 25 0827          General Information    Existing Precautions/Restrictions fall;spinal  -AB       Row Name 25 0827          Pain    Pretreatment Pain Rating 9/10  -AB     Posttreatment Pain Rating 10/10  -AB     Pain Location hip;buttock  -AB     Pain Side/Orientation right  -AB       Row Name  05/25/25 0827          Bed Mobility    Comment, (Bed Mobility) up in chair  -AB       Row Name 05/25/25 0827          Sit-Stand Transfer    Sit-Stand [place] (Transfers) verbal cues;contact guard  -AB     Assistive Device (Sit-Stand Transfers) walker, front-wheeled  -AB       Row Name 05/25/25 0827          Stand-Sit Transfer    Stand-Sit [place] (Transfers) verbal cues;contact guard;minimum assist (75% patient effort)  -AB       Row Name 05/25/25 0827          Gait/Stairs (Locomotion)    [place] Level (Gait) contact guard  -AB     Assistive Device (Gait) walker, front-wheeled  -AB     Distance in Feet (Gait) 50  -AB     Deviations/Abnormal Patterns (Gait) right sided deviations;base of support, narrow  -AB     Bilateral Gait Deviations forward flexed posture  -AB     Right Sided Gait Deviations weight shift ability decreased  -AB       Row Name             Wound 05/23/25 1258 sacral spine Surgical    Wound - Properties Group Placement Date: 05/23/25  -SOREN Placement Time: 1258  -SOREN Location: sacral spine  -SOREN Primary Wound Type: Surgical  -SOREN    Retired Wound - Properties Group Placement Date: 05/23/25  -SOREN Placement Time: 1258  -SOREN Location: sacral spine  -SOREN    Retired Wound - Properties Group Placement Date: 05/23/25  -SOREN Placement Time: 1258  -SOREN Location: sacral spine  -SOREN    Retired Wound - Properties Group Date first assessed: 05/23/25  -SOREN Time first assessed: 1258  -SOREN Location: sacral spine  -SOREN      Row Name 05/25/25 0827          Positioning and Restraints    Pre-Treatment Position sitting in chair/recliner  -AB     Post Treatment Position chair  -AB     In Chair sitting;call light within reach;with family/caregiver  -AB               User Key  (r) = Recorded By, (t) = Taken By, (c) = Cosigned By      Initials Name Provider Type    AB Farhana Quesada PTA Physical Therapist Assistant    Balwinder Hoskins, RN Registered Nurse                    Physical Therapy Education       Title: PT OT  SLP Therapies (Done)       Topic: Physical Therapy (Done)       Point: Mobility training (Done)       Learning Progress Summary            Patient Acceptance, E, VU by ROCHELLE at 5/24/2025 1313    Comment: role of PT, log roll for pain control, OOB acitivty as much as tolerated, d/c planning    Acceptance, E, VU by NICOLASA at 5/24/2025 1000   Family Acceptance, E, VU by ROCHELLE at 5/24/2025 1313    Comment: role of PT, log roll for pain control, OOB acitivty as much as tolerated, d/c planning                      Point: Home exercise program (Done)       Learning Progress Summary            Patient Acceptance, E, VU by ROCHELLE at 5/24/2025 1313    Comment: role of PT, log roll for pain control, OOB acitivty as much as tolerated, d/c planning    Acceptance, E, VU by NICOLASA at 5/24/2025 1000   Family Acceptance, E, VU by ROCHELLE at 5/24/2025 1313    Comment: role of PT, log roll for pain control, OOB acitivty as much as tolerated, d/c planning                      Point: Body mechanics (Done)       Learning Progress Summary            Patient Acceptance, E, VU by ROCHELLE at 5/24/2025 1313    Comment: role of PT, log roll for pain control, OOB acitivty as much as tolerated, d/c planning    Acceptance, E, VU by NICOLASA at 5/24/2025 1000   Family Acceptance, E, VU by ROCHELLE at 5/24/2025 1313    Comment: role of PT, log roll for pain control, OOB acitivty as much as tolerated, d/c planning                      Point: Precautions (Done)       Learning Progress Summary            Patient Acceptance, E, VU by ROCHELLE at 5/24/2025 1313    Comment: role of PT, log roll for pain control, OOB acitivty as much as tolerated, d/c planning    Acceptance, E, VU by NICOLASA at 5/24/2025 1000   Family Acceptance, E, VU by ROCHELLE at 5/24/2025 1313    Comment: role of PT, log roll for pain control, OOB acitivty as much as tolerated, d/c planning                                      User Key       Initials Effective Dates Name Provider Type Discipline    ROCHELLE 08/15/24 -  Michael Hewitt, PT DPT  Physical Therapist PT    DS 03/24/25 -  Belkis Franco, RN Registered Nurse Nurse                  PT Recommendation and Plan         Outcome Measures       Row Name 05/24/25 1000             How much help from another is currently needed...    Putting on and taking off regular lower body clothing? 2  -EC      Bathing (including washing, rinsing, and drying) 2  -EC      Toileting (which includes using toilet bed pan or urinal) 3  -EC      Putting on and taking off regular upper body clothing 3  -EC      Taking care of personal grooming (such as brushing teeth) 3  -EC      Eating meals 4  -EC      AM-PAC 6 Clicks Score (OT) 17  -EC         Functional Assessment    Outcome Measure Options AM-PAC 6 Clicks Daily Activity (OT)  -EC                User Key  (r) = Recorded By, (t) = Taken By, (c) = Cosigned By      Initials Name Provider Type    EC Kari Carrasco, OTR/L Occupational Therapist                     Time Calculation:    PT Charges       Row Name 05/25/25 0827             Time Calculation    Start Time 0827  -AB      Stop Time 0854  -AB      Time Calculation (min) 27 min  -AB      PT Received On 05/25/25  -AB         Time Calculation- PT    Total Timed Code Minutes- PT 27 minute(s)  -AB                User Key  (r) = Recorded By, (t) = Taken By, (c) = Cosigned By      Initials Name Provider Type    AB Farhana Quesada PTA Physical Therapist Assistant                      PT G-Codes  Outcome Measure Options: AM-PAC 6 Clicks Daily Activity (OT)  AM-PAC 6 Clicks Score (PT): 17  AM-PAC 6 Clicks Score (OT): 17    Farhana Quesada PTA  5/25/2025

## 2025-05-25 NOTE — PLAN OF CARE
Goal Outcome Evaluation:  Plan of Care Reviewed With: patient, family        Progress: improving  Outcome Evaluation: Pt A/Ox4. Son at bedside. Up x1 assist with walker to bathroom multiple times this shift. Pain medication given x1 per pt request. VSS. O2 at 1L. Safety maintained. Call light in reach.

## 2025-05-25 NOTE — PLAN OF CARE
Goal Outcome Evaluation:                    Patient alert and responsive able to make needs known. Family at bedside at all times. Patient did better today with ambulation and sitting up. Pain meds better controlled after increase oxycodone. Apap order completed. Patient has not has a bm yet several bowel agents have been given today to see if she will go. Safety maintained. No acute distress noted.

## 2025-05-25 NOTE — PLAN OF CARE
Goal Outcome Evaluation:  Plan of Care Reviewed With: patient, son  Plan of Care Reviewed With: patient, child        Progress: improving  Outcome Evaluation: pt CGA for bed mobility with bed adjusted d/t adjustable bed at home. Pt sit<>stands CGA-Karla, toilet t/f Karla to control sit d/t height of commode and pain control. Toilet seated s/u SBA. Karla for brief. Educated on adapted bathing/dressing techs. Pt s/u grooming sink side CGA standing. Educated on home safety, pain management techs, AROM ther ex for decreased soreness and increased fxl activity. Pt son supportive and present. Pt would benefit from HH and plans to have 24/7 care    Anticipated Discharge Disposition (OT): home with 24/7 care, home with home health

## 2025-05-26 PROCEDURE — A9270 NON-COVERED ITEM OR SERVICE: HCPCS | Performed by: NEUROLOGICAL SURGERY

## 2025-05-26 PROCEDURE — 63710000001 SENNOSIDES-DOCUSATE 8.6-50 MG TABLET: Performed by: NEUROLOGICAL SURGERY

## 2025-05-26 PROCEDURE — 63710000001 METOPROLOL SUCCINATE XL 50 MG TABLET SUSTAINED-RELEASE 24 HOUR: Performed by: NEUROLOGICAL SURGERY

## 2025-05-26 PROCEDURE — 63710000001 LEVOTHYROXINE 112 MCG TABLET: Performed by: NEUROLOGICAL SURGERY

## 2025-05-26 PROCEDURE — 63710000001 OXYCODONE 5 MG TABLET: Performed by: NEUROLOGICAL SURGERY

## 2025-05-26 PROCEDURE — 97116 GAIT TRAINING THERAPY: CPT

## 2025-05-26 PROCEDURE — 97530 THERAPEUTIC ACTIVITIES: CPT

## 2025-05-26 PROCEDURE — 63710000001 METOPROLOL SUCCINATE XL 25 MG TABLET SUSTAINED-RELEASE 24 HOUR: Performed by: NEUROLOGICAL SURGERY

## 2025-05-26 PROCEDURE — 63710000001 PREDNISONE PER 5 MG: Performed by: NEUROLOGICAL SURGERY

## 2025-05-26 PROCEDURE — 99231 SBSQ HOSP IP/OBS SF/LOW 25: CPT | Performed by: NEUROLOGICAL SURGERY

## 2025-05-26 PROCEDURE — 97535 SELF CARE MNGMENT TRAINING: CPT | Performed by: OCCUPATIONAL THERAPIST

## 2025-05-26 PROCEDURE — 63710000001 ONDANSETRON ODT 4 MG TABLET DISPERSIBLE: Performed by: NEUROLOGICAL SURGERY

## 2025-05-26 PROCEDURE — 63710000001 POLYETHYLENE GLYCOL 17 G PACK: Performed by: NEUROLOGICAL SURGERY

## 2025-05-26 PROCEDURE — 63710000001 HYDROCORTISONE (PERIANAL) 2.5 % CREAM 30 G TUBE: Performed by: NEUROLOGICAL SURGERY

## 2025-05-26 RX ADMIN — POLYETHYLENE GLYCOL 3350 17 G: 17 POWDER, FOR SOLUTION ORAL at 17:48

## 2025-05-26 RX ADMIN — OXYCODONE HYDROCHLORIDE 10 MG: 5 TABLET ORAL at 20:02

## 2025-05-26 RX ADMIN — ONDANSETRON 4 MG: 4 TABLET, ORALLY DISINTEGRATING ORAL at 18:43

## 2025-05-26 RX ADMIN — SENNOSIDES, DOCUSATE SODIUM 2 TABLET: 50; 8.6 TABLET, FILM COATED ORAL at 11:04

## 2025-05-26 RX ADMIN — METOPROLOL SUCCINATE 50 MG: 50 TABLET, FILM COATED, EXTENDED RELEASE ORAL at 17:56

## 2025-05-26 RX ADMIN — LEVOTHYROXINE SODIUM 112 MCG: 0.11 TABLET ORAL at 06:03

## 2025-05-26 RX ADMIN — PREDNISONE 5 MG: 5 TABLET ORAL at 11:04

## 2025-05-26 RX ADMIN — OXYCODONE HYDROCHLORIDE 10 MG: 5 TABLET ORAL at 03:55

## 2025-05-26 RX ADMIN — OXYCODONE HYDROCHLORIDE 10 MG: 5 TABLET ORAL at 08:04

## 2025-05-26 RX ADMIN — OXYCODONE HYDROCHLORIDE 10 MG: 5 TABLET ORAL at 11:47

## 2025-05-26 RX ADMIN — OXYCODONE HYDROCHLORIDE 10 MG: 5 TABLET ORAL at 16:00

## 2025-05-26 RX ADMIN — OXYCODONE HYDROCHLORIDE 10 MG: 5 TABLET ORAL at 23:58

## 2025-05-26 RX ADMIN — HYDROCORTISONE 2.5%: 25 CREAM TOPICAL at 08:06

## 2025-05-26 RX ADMIN — METOPROLOL SUCCINATE 25 MG: 25 TABLET, EXTENDED RELEASE ORAL at 08:04

## 2025-05-26 RX ADMIN — PREDNISONE 5 MG: 5 TABLET ORAL at 20:03

## 2025-05-26 NOTE — THERAPY TREATMENT NOTE
Acute Care - Occupational Therapy Treatment Note  [facility]     Patient Name: Linda Spear  : 1935  MRN: [id number]  Today's Date: 2025     Date of Referral to OT: 25       Admit Date: 2025       ICD-10-CM ICD-9-CM   1. Impaired mobility [Z74.09]  Z74.09 799.89   2. Closed fracture of sacrum, unspecified portion of sacrum, initial encounter  S32.10XA 805.6     Patient Active Problem List   Diagnosis    Sensorineural hearing loss (SNHL) of both ears    Primary hypertension    Hypothyroidism (acquired)    Nonrheumatic aortic valve stenosis    CKD (chronic kidney disease) stage 3, GFR 30-59 ml/min    Sacral fracture, closed    Sacral insufficiency fracture     Past Medical History:   Diagnosis Date    Chronic shoulder pain     Clotting disorder     Conductive hearing loss     Disease of thyroid gland 1986    History of tonsillectomy     Low back pain     Perforation of left tympanic membrane     Primary hypertension 12/15/2023     Past Surgical History:   Procedure Laterality Date    ADENOIDECTOMY  1965    CARDIAC CATHETERIZATION      COLONOSCOPY  2011    diverticulosis in the sigmoid colon    COLONOSCOPY  2005    normal exam    KYPHOPLASTY Left 2025    Procedure: KYPHOPLASTY WITH BIOPSY LEFT SACRALPLASTY with O-ARM;  Surgeon: Viktor Solitario MD;  Location: [facility];  Service: Neurosurgery;  Laterality: Left;    TONSILLECTOMY           OT ASSESSMENT FLOWSHEET (Last 12 Hours)       OT Evaluation and Treatment       Row Name 25 0835                   OT Time and Intention    Subjective Information complains of;pain;numbness  Numbness in R LE.  -BELGICA (shyann) TESSA (jaja) BELGICA (sandra)        Document Type therapy note (daily note)  -BELGICA (shyann) TESSA (jaja) BELGICA (sandra)        Mode of Treatment occupational therapy  -BELGICA (shyann) TESSA (jaja) BELGICA (sandra)           General Information    Existing Precautions/Restrictions fall;spinal  -BELGICA (shyann) TESSA (jaja) BELGICA decker)           Pain Assessment    Pretreatment Pain Rating 7/10  -BELGICA  (r) TESSA (t) BELGICA (c)        Posttreatment Pain Rating 5/10  -BELGICA (r) TESSA (t) BELGICA (c)        Pain Location buttock  -BELGICA (r) TESSA (t) BELGICA (c)        Pain Side/Orientation right  -BELGICA (r) TESSA (t) BELGICA (sandra)        Pain Management Interventions exercise or physical activity utilized;premedicated for activity  -BELGICA (r) TESSA (t) BELGICA (c)        Response to Pain Interventions activity participation with decreased pain  -BELGICA (r) TESSA (t) BELGICA (sandra)           Cognition    Personal Safety Interventions fall prevention program maintained;gait belt;muscle strengthening facilitated;nonskid shoes/slippers when out of bed;supervised activity  -BELGICA (r) TESSA (t) BELGICA (c)           Activities of Daily Living    BADL Assessment/Intervention grooming;lower body dressing  -BELGICA (r)  (t) BELGICA (c)           Lower Body Dressing Assessment/Training    [place] Level (Lower Body Dressing) don;socks;maximum assist (25% patient effort)  -BELGICA (r)  (t) BELGICA (sandra)        Position (Lower Body Dressing) edge of bed sitting  -BELGICA (r) TESSA (t) BELGICA (sandra)        Comment, (Lower Body Dressing) Simulated  -BELGICA (r) TESSA (t) BELGICA (sandra)           Grooming Assessment/Training    [place] Level (Grooming) hair care, combing/brushing;oral care regimen;wash face, hands;standby assist  -BELGICA (r) TESSA (t) BELGICA (sandra)        Position (Grooming) sink side;supported standing  -BELGICA (r) TESSA (t) BELGICA (sandra)           Bed Mobility    Bed Mobility rolling left;supine-sit  -BELGICA (r) TESSA (t) BELGICA (sandra)        Rolling Left [place] (Bed Mobility) standby assist;verbal cues  -BELGICA (r)  (t) BELGICA (c)        Supine-Sit [place] (Bed Mobility) standby assist;verbal cues  -BELGICA (r)  (t) BELGICA (sandra)           Functional Mobility    Functional Mobility- Ind. Level verbal cues required;contact guard assist  -BELGICA (r) TESSA (t) BELGICA (c)        Functional Mobility- Device walker, front-wheeled  -BELGICA (r) TESSA (t) BELGICA (sandra)        Functional Mobility- Comment to BR, to chair  -BELGICA (r) TESSA (t) BELGICA (c)           Transfer Assessment/Treatment    Transfers stand-sit  transfer;sit-stand transfer;bed-chair transfer  -JW (r) TESSA (t) BELGICA (c)           Bed-Chair Transfer    Bed-Chair [place] (Transfers) contact guard  -BELGICA (r) TESSA (t) BELGICA (sandra)        Assistive Device (Bed-Chair Transfers) walker, front-wheeled  -JW (r) TESSA (t) BELGICA (c)           Sit-Stand Transfer    Sit-Stand [place] (Transfers) contact guard;verbal cues  -BELGICA (r) TESSA (t) BELGICA (sandra)        Assistive Device (Sit-Stand Transfers) walker, front-wheeled  -JW (r) TESSA (t) BELGICA (c)           Stand-Sit Transfer    Stand-Sit [place] (Transfers) contact guard;verbal cues  -BELGICA (r) TESSA (t) BELGICA (c)        Assistive Device (Stand-Sit Transfers) walker, front-wheeled  -BELGICA (r) TESSA (t) BELGICA (c)           Wound 05/23/25 1258 sacral spine Surgical    Wound - Properties Group Placement Date: 05/23/25  -SOREN Placement Time: 1258  -SOREN Location: sacral spine  -SOREN Primary Wound Type: Surgical  -SOREN    Retired Wound - Properties Group Placement Date: 05/23/25  -SOREN Placement Time: 1258  -SOREN Location: sacral spine  -SOREN    Retired Wound - Properties Group Placement Date: 05/23/25  -SOREN Placement Time: 1258  -SOREN Location: sacral spine  -SOREN    Retired Wound - Properties Group Date first assessed: 05/23/25  -SOREN Time first assessed: 1258  -SOREN Location: sacral spine  -SOREN       Plan of Care Review    Plan of Care Reviewed With patient  -BELGICA (shyann) TESSA (jaja) BELGICA (sandra)        Progress improving  -BELGICA (shyann) TESSA (jaja) BELGICA (sandra)        Outcome Evaluation OT tx complete. Pt seen supine in bed with son at bedside. Pt pleasant and agreeable to working with tx stating she would like to get up and walking this morning. Pt only able to recall 1/3 spinal precautions, no lifting, when prompted. Pt educated on spinal precautions and able to recall 3/3 at end of tx. Pt completed supine to EOB with SBA requiring initial verbal cues for proper log rolling form to adhere to her spinal precautions with good return demonstrated. Pt simulated donning/doffing socks with MaxA while seated EOB. Pt  completed all transitions, functional mobility and t/fs with CGA using FWW. Pt stood supported at sink completing personal hygiene tasks with SBA. Pt educated on DME she can implement at home to reduce risk of falls and safely adhere to spina precautions. Pt is states she is worried about falling but feels as if she has gotten stronger each day she has been at the hospital. OT will continue to tx as indicated. Recommend home with assist and OT/PT home health at d/c.  - (r)  (t) BELGICA (c)           Therapy Plan Review/Discharge Plan (OT)    Anticipated Discharge Disposition (OT) home with assist;home with home health  -BELGICA (r) TESSA (t) BELGICA (c)                  User Key  (r) = Recorded By, (t) = Taken By, (c) = Cosigned By      Initials Name Effective Dates    Balwinder Hoskins, RN 02/17/22 -     Jazmine Hi, OTR/L, CSRS 11/15/24 -     Elías Simms, OT Student 05/12/25 -                      Occupational Therapy Education       Title: PT OT SLP Therapies (Done)       Topic: Occupational Therapy (Done)       Point: ADL training (Done)       Learning Progress Summary            Patient Acceptance, E,TB,D, VU,DU by  at 5/26/2025 0849    Comment: OT POC, spinal precautions, bed mobility training, DME for home use, t/f training.    Acceptance, E, VU,NR by  at 5/24/2025 1027   Family Acceptance, E,TB,D, VU,DU by  at 5/26/2025 0849    Comment: OT POC, spinal precautions, bed mobility training, DME for home use, t/f training.    Acceptance, E, VU,NR by  at 5/24/2025 1027                      Point: Home exercise program (Done)       Learning Progress Summary            Patient Acceptance, E, VU,NR by  at 5/24/2025 1027   Family Acceptance, E, VU,NR by  at 5/24/2025 1027                      Point: Precautions (Done)       Learning Progress Summary            Patient Acceptance, E,TB,D, VU,DU by  at 5/26/2025 0849    Comment: OT POC, spinal precautions, bed mobility training, DME for home use, t/f  training.    Acceptance, E, VU,NR by  at 5/24/2025 1027   Family Acceptance, E,TB,D, VU,DU by  at 5/26/2025 0849    Comment: OT POC, spinal precautions, bed mobility training, DME for home use, t/f training.    Acceptance, E, VU,NR by  at 5/24/2025 1027                      Point: Body mechanics (Done)       Learning Progress Summary            Patient Acceptance, E,TB,D, VU,DU by  at 5/26/2025 0849    Comment: OT POC, spinal precautions, bed mobility training, DME for home use, t/f training.    Acceptance, E, VU,NR by  at 5/24/2025 1027   Family Acceptance, E,TB,D, VU,DU by  at 5/26/2025 0849    Comment: OT POC, spinal precautions, bed mobility training, DME for home use, t/f training.    Acceptance, E, VU,NR by  at 5/24/2025 1027                                      User Key       Initials Effective Dates Name Provider Type Discipline     10/13/23 -  Kari Carrasco, OTR/L Occupational Therapist OT     05/12/25 -  Elías Brush OT Student OT Student OT                      OT Recommendation and Plan     Plan of Care Review  Plan of Care Reviewed With: patient  Progress: improving  Outcome Evaluation: OT tx complete. Pt seen supine in bed with son at bedside. Pt pleasant and agreeable to working with tx stating she would like to get up and walking this morning. Pt only able to recall 1/3 spinal precautions, no lifting, when prompted. Pt educated on spinal precautions and able to recall 3/3 at end of tx. Pt completed supine to EOB with SBA requiring initial verbal cues for proper log rolling form to adhere to her spinal precautions with good return demonstrated. Pt simulated donning/doffing socks with MaxA while seated EOB. Pt completed all transitions, functional mobility and t/fs with CGA using FWW. Pt stood supported at sink completing personal hygiene tasks with SBA. Pt educated on DME she can implement at home to reduce risk of falls and safely adhere to spina precautions. Pt is states she is  worried about falling but feels as if she has gotten stronger each day she has been at the hospital. OT will continue to tx as indicated. Recommend home with assist and OT/PT home health at d/c.  Plan of Care Reviewed With: patient  Outcome Evaluation: OT tx complete. Pt seen supine in bed with son at bedside. Pt pleasant and agreeable to working with tx stating she would like to get up and walking this morning. Pt only able to recall 1/3 spinal precautions, no lifting, when prompted. Pt educated on spinal precautions and able to recall 3/3 at end of tx. Pt completed supine to EOB with SBA requiring initial verbal cues for proper log rolling form to adhere to her spinal precautions with good return demonstrated. Pt simulated donning/doffing socks with MaxA while seated EOB. Pt completed all transitions, functional mobility and t/fs with CGA using FWW. Pt stood supported at sink completing personal hygiene tasks with SBA. Pt educated on DME she can implement at home to reduce risk of falls and safely adhere to spina precautions. Pt is states she is worried about falling but feels as if she has gotten stronger each day she has been at the hospital. OT will continue to tx as indicated. Recommend home with assist and OT/PT home health at d/c.     Outcome Measures       Row Name 05/26/25 0835 05/24/25 1000          How much help from another is currently needed...    Putting on and taking off regular lower body clothing? 2  -JW (r) BH (t) JW (c) 2  -EC     Bathing (including washing, rinsing, and drying) 2  -JW (r) BH (t) JW (c) 2  -EC     Toileting (which includes using toilet bed pan or urinal) 3  -JW (r) BH (t) JW (c) 3  -EC     Putting on and taking off regular upper body clothing 3  -JW (r) BH (t) JW (c) 3  -EC     Taking care of personal grooming (such as brushing teeth) 4  -JW (r) BH (t) JW (c) 3  -EC     Eating meals 4  -JW (r) BH (t) JW (c) 4  -EC     AM-PAC 6 Clicks Score (OT) 18  -JW (r) BH (t) 17  -EC         Functional Assessment    Outcome Measure Options AM-PAC 6 Clicks Daily Activity (OT)  -JW (r) BH (t) JW (c) AM-PAC 6 Clicks Daily Activity (OT)  -EC               User Key  (r) = Recorded By, (t) = Taken By, (c) = Cosigned By      Initials Name Provider Type    Jazmine Hi, OTR/L, CSRS Occupational Therapist     Kari Carrasco OTR/L Occupational Therapist    Elías Simms, OT Student OT Student                    Time Calculation:    Time Calculation- OT       Row Name 05/26/25 0836             Time Calculation- OT    OT Start Time 0755  -JW (r) BH (t) JW (c)      OT Stop Time 0834  -JW (r) BH (t) JW (c)      OT Time Calculation (min) 39 min  -JW (r) BH (t)      Total Timed Code Minutes- OT 39 minute(s)  -JW (r) BH (t) JW (c)      OT Received On 05/26/25  -JW (r) BH (t) JW (c)         Timed Charges    13729 - OT Self Care/Mgmt Minutes 39  -JW (r) BH (t) JW (c)         Total Minutes    Timed Charges Total Minutes 39  -JW (r) BH (t)       Total Minutes 39  -JW (r) BH (t)                User Key  (r) = Recorded By, (t) = Taken By, (c) = Cosigned By      Initials Name Provider Type     Jazmine Whipple, OTR/L, CSRS Occupational Therapist    Elías Simms, OT Student OT Student                           Elías Brush, OT Student  5/26/2025

## 2025-05-26 NOTE — PLAN OF CARE
Goal Outcome Evaluation:  Plan of Care Reviewed With: patient, family        Progress: improving  Outcome Evaluation: Pt A/Ox4, pleasant and cooperative. Son at bedside. Pain medication given q4 per pt request. Up standby assist with walker to bathroom. Mobility and pain control improving. Safety maintained. Call light in reach.

## 2025-05-26 NOTE — THERAPY TREATMENT NOTE
Acute Care - Physical Therapy Treatment Note  [facility]     Patient Name: Linda Spear  : 1935  MRN: [id number]  Today's Date: 2025      Visit Dx:     ICD-10-CM ICD-9-CM   1. Impaired mobility [Z74.09]  Z74.09 799.89   2. Closed fracture of sacrum, unspecified portion of sacrum, initial encounter  S32.10XA 805.6     Patient Active Problem List   Diagnosis    Sensorineural hearing loss (SNHL) of both ears    Primary hypertension    Hypothyroidism (acquired)    Nonrheumatic aortic valve stenosis    CKD (chronic kidney disease) stage 3, GFR 30-59 ml/min    Sacral fracture, closed    Sacral insufficiency fracture     Past Medical History:   Diagnosis Date    Chronic shoulder pain     Clotting disorder     Conductive hearing loss     Disease of thyroid gland     History of tonsillectomy     Low back pain     Perforation of left tympanic membrane     Primary hypertension 12/15/2023     Past Surgical History:   Procedure Laterality Date    ADENOIDECTOMY  1965    CARDIAC CATHETERIZATION      COLONOSCOPY  2011    diverticulosis in the sigmoid colon    COLONOSCOPY  2005    normal exam    KYPHOPLASTY Left 2025    Procedure: KYPHOPLASTY WITH BIOPSY LEFT SACRALPLASTY with O-ARM;  Surgeon: Viktor Solitario MD;  Location: [facility];  Service: Neurosurgery;  Laterality: Left;    TONSILLECTOMY       PT Assessment (Last 12 Hours)       PT Evaluation and Treatment       Row Name 25 1126 25 1010       Physical Therapy Time and Intention    Subjective Information complains of;pain  -AB --    Document Type therapy note (daily note)  -AB --  -AB    Mode of Treatment physical therapy  -AB --  -AB      Row Name 25 0817          Physical Therapy Time and Intention    Mode of Treatment physical therapy  -AB     Session Not Performed patient unavailable for treatment  -AB     Comment, Session Not Performed with OT  -AB       Row Name 25 1126 25 1010       General  Information    Existing Precautions/Restrictions fall;spinal  -AB --  -AB      Row Name 05/26/25 1126          Pain    Pretreatment Pain Rating 5/10  -AB     Posttreatment Pain Rating 9/10  -AB     Pain Location hip  -AB     Pain Side/Orientation right  -AB     Pain Management Interventions exercise or physical activity utilized  -AB     Response to Pain Interventions activity participation with increased pain  -AB       Row Name 05/26/25 1126          Bed Mobility    Rolling Left [place] (Bed Mobility) standby assist;verbal cues  -AB     Sidelying-Sit [place] (Bed Mobility) standby assist;contact guard  -AB     Sit-Sidelying [place] (Bed Mobility) not tested  up in chair  -AB       Row Name 05/26/25 1126          Sit-Stand Transfer    Sit-Stand [place] (Transfers) contact guard;verbal cues  -AB     Assistive Device (Sit-Stand Transfers) walker, front-wheeled  -AB       Row Name 05/26/25 1126          Stand-Sit Transfer    Stand-Sit [place] (Transfers) contact guard;verbal cues  -AB     Assistive Device (Stand-Sit Transfers) walker, front-wheeled  -AB       Row Name 05/26/25 1126          Gait/Stairs (Locomotion)    [place] Level (Gait) contact guard  -AB     Assistive Device (Gait) walker, front-wheeled  -AB     Distance in Feet (Gait) 60  x 2  -AB     Bilateral Gait Deviations forward flexed posture  -AB     Left Sided Gait Deviations lateral trunk flexion  -AB     Right Sided Gait Deviations weight shift ability decreased  -AB       Row Name             Wound 05/23/25 1258 sacral spine Surgical    Wound - Properties Group Placement Date: 05/23/25  -SOREN Placement Time: 1258  -SOREN Location: sacral spine  -SOREN Primary Wound Type: Surgical  -SOREN    Retired Wound - Properties Group Placement Date: 05/23/25  -SOREN Placement Time: 1258  -SOREN Location: sacral spine  -SOREN    Retired Wound - Properties Group Placement Date: 05/23/25  -SOREN Placement Time: 1258  -SOREN Location: sacral spine  -SOREN    Retired  Wound - Properties Group Date first assessed: 05/23/25  -SOREN Time first assessed: 1258  -SOREN Location: sacral spine  -SOREN      Row Name 05/26/25 1126          Positioning and Restraints    Pre-Treatment Position in bed  -AB     Post Treatment Position chair  -AB     In Chair sitting;call light within reach;with family/caregiver  -AB               User Key  (r) = Recorded By, (t) = Taken By, (c) = Cosigned By      Initials Name Provider Type    AB Farhana Quesada, JASMINE Physical Therapist Assistant    Balwinder Hoskins RN Registered Nurse                    Physical Therapy Education       Title: PT OT SLP Therapies (Done)       Topic: Physical Therapy (Done)       Point: Mobility training (Done)       Learning Progress Summary            Patient Acceptance, E, VU,NR by NICOLASA at 5/25/2025 1000    Acceptance, E, VU by ROCHELLE at 5/24/2025 1313    Comment: role of PT, log roll for pain control, OOB acitivty as much as tolerated, d/c planning    Acceptance, E, VU by NICOLASA at 5/24/2025 1000   Family Acceptance, E, VU by ROCHELLE at 5/24/2025 1313    Comment: role of PT, log roll for pain control, OOB acitivty as much as tolerated, d/c planning                      Point: Home exercise program (Done)       Learning Progress Summary            Patient Acceptance, E, VU,NR by NICOLASA at 5/25/2025 1000    Acceptance, E, VU by ROCHELLE at 5/24/2025 1313    Comment: role of PT, log roll for pain control, OOB acitivty as much as tolerated, d/c planning    Acceptance, E, VU by NICOLASA at 5/24/2025 1000   Family Acceptance, E, VU by ROCHELLE at 5/24/2025 1313    Comment: role of PT, log roll for pain control, OOB acitivty as much as tolerated, d/c planning                      Point: Body mechanics (Done)       Learning Progress Summary            Patient Acceptance, E, VU,NR by NICOLASA at 5/25/2025 1000    Acceptance, E, VU by ROCHELLE at 5/24/2025 1313    Comment: role of PT, log roll for pain control, OOB acitivty as much as tolerated, d/c planning    Acceptance, E, VU by NICOLASA at  5/24/2025 1000   Family Acceptance, E, VU by ROCHELLE at 5/24/2025 1313    Comment: role of PT, log roll for pain control, OOB acitivty as much as tolerated, d/c planning                      Point: Precautions (Done)       Learning Progress Summary            Patient Acceptance, E, VU,NR by NICOLASA at 5/25/2025 1000    Acceptance, E, VU by ROCHELLE at 5/24/2025 1313    Comment: role of PT, log roll for pain control, OOB acitivty as much as tolerated, d/c planning    Acceptance, E, VU by NICOLASA at 5/24/2025 1000   Family Acceptance, E, VU by ROCHELLE at 5/24/2025 1313    Comment: role of PT, log roll for pain control, OOB acitivty as much as tolerated, d/c planning                                      User Key       Initials Effective Dates Name Provider Type Discipline     08/15/24 -  Michael Hewitt, PT DPT Physical Therapist PT    NICOLASA 03/24/25 -  Belkis Franco, RN Registered Nurse Nurse                  PT Recommendation and Plan         Outcome Measures       Row Name 05/26/25 0835 05/24/25 1000          How much help from another is currently needed...    Putting on and taking off regular lower body clothing? 2  -JW (r) BH (t) JW (c) 2  -EC     Bathing (including washing, rinsing, and drying) 2  -JW (r) BH (t) JW (c) 2  -EC     Toileting (which includes using toilet bed pan or urinal) 3  -JW (r) BH (t) JW (c) 3  -EC     Putting on and taking off regular upper body clothing 3  -JW (r) BH (t) JW (c) 3  -EC     Taking care of personal grooming (such as brushing teeth) 4  -JW (r) BH (t) JW (c) 3  -EC     Eating meals 4  -JW (r) BH (t) JW (c) 4  -EC     AM-PAC 6 Clicks Score (OT) 18  -JW (r) BH (t) 17  -EC        Functional Assessment    Outcome Measure Options AM-PAC 6 Clicks Daily Activity (OT)  -JW (r) BH (t) JW (c) AM-PAC 6 Clicks Daily Activity (OT)  -EC               User Key  (r) = Recorded By, (t) = Taken By, (c) = Cosigned By      Initials Name Provider Type    Jazmine Hi, OTR/L, CSRS Occupational Therapist    BLAS Carrasco,  Kari, OTR/L Occupational Therapist     Elías Brush, OT Student OT Student                     Time Calculation:    PT Charges       Row Name 05/26/25 1126             Time Calculation    Start Time 1126  -AB      Stop Time 1154  -AB      Time Calculation (min) 28 min  -AB      PT Received On 05/26/25  -AB         Time Calculation- PT    Total Timed Code Minutes- PT 28 minute(s)  -AB                User Key  (r) = Recorded By, (t) = Taken By, (c) = Cosigned By      Initials Name Provider Type    AB Farhana Quesada PTA Physical Therapist Assistant                  Therapy Charges for Today       Code Description Service Date Service Provider Modifiers Qty    96791335843 HC GAIT TRAINING EA 15 MIN 5/25/2025 Farhana Quesada PTA GP 2            PT G-Codes  Outcome Measure Options: AM-PAC 6 Clicks Daily Activity (OT)  AM-PAC 6 Clicks Score (PT): 23  AM-PAC 6 Clicks Score (OT): 18    Farhana Quesada PTA  5/26/2025

## 2025-05-26 NOTE — THERAPY TREATMENT NOTE
Acute Care - Physical Therapy Treatment Note  [facility]     Patient Name: Linda Spear  : 1935  MRN: [id number]  Today's Date: 2025      Visit Dx:     ICD-10-CM ICD-9-CM   1. Impaired mobility [Z74.09]  Z74.09 799.89   2. Closed fracture of sacrum, unspecified portion of sacrum, initial encounter  S32.10XA 805.6     Patient Active Problem List   Diagnosis    Sensorineural hearing loss (SNHL) of both ears    Primary hypertension    Hypothyroidism (acquired)    Nonrheumatic aortic valve stenosis    CKD (chronic kidney disease) stage 3, GFR 30-59 ml/min    Sacral fracture, closed    Sacral insufficiency fracture     Past Medical History:   Diagnosis Date    Chronic shoulder pain     Clotting disorder     Conductive hearing loss     Disease of thyroid gland     History of tonsillectomy     Low back pain     Perforation of left tympanic membrane     Primary hypertension 12/15/2023     Past Surgical History:   Procedure Laterality Date    ADENOIDECTOMY  1965    CARDIAC CATHETERIZATION      COLONOSCOPY  2011    diverticulosis in the sigmoid colon    COLONOSCOPY  2005    normal exam    KYPHOPLASTY Left 2025    Procedure: KYPHOPLASTY WITH BIOPSY LEFT SACRALPLASTY with O-ARM;  Surgeon: Viktor Solitario MD;  Location: [facility];  Service: Neurosurgery;  Laterality: Left;    TONSILLECTOMY       PT Assessment (Last 12 Hours)       PT Evaluation and Treatment       Row Name 25 1322 25 1252       Physical Therapy Time and Intention    Subjective Information complains of;pain  -AB --    Document Type therapy note (daily note)  -AB --  -AB    Mode of Treatment physical therapy  -AB --  -AB      Row Name 25 1126 25 1010       Physical Therapy Time and Intention    Subjective Information complains of;pain  -AB --    Document Type therapy note (daily note)  -AB --  -AB    Mode of Treatment physical therapy  -AB --  -AB      Row Name 25 0817          Physical  Therapy Time and Intention    Mode of Treatment physical therapy  -AB     Session Not Performed patient unavailable for treatment  -AB     Comment, Session Not Performed with OT  -AB       Row Name 05/26/25 1322 05/26/25 1252       General Information    Existing Precautions/Restrictions fall;spinal  -AB --  -AB      Row Name 05/26/25 1126 05/26/25 1010       General Information    Existing Precautions/Restrictions fall;spinal  -AB --  -AB      Row Name 05/26/25 1322 05/26/25 1126       Pain    Pretreatment Pain Rating 6/10  -AB 5/10  -AB    Posttreatment Pain Rating 8/10  -AB 9/10  -AB    Pain Location hip  -AB hip  -AB    Pain Side/Orientation right  -AB right  -AB    Pain Management Interventions exercise or physical activity utilized  -AB exercise or physical activity utilized  -AB    Response to Pain Interventions activity participation with increased pain  -AB activity participation with increased pain  -AB      Row Name 05/26/25 1322 05/26/25 1126       Bed Mobility    Rolling Left [place] (Bed Mobility) -- standby assist;verbal cues  -AB    Sidelying-Sit [place] (Bed Mobility) verbal cues;standby assist  -AB standby assist;contact guard  -AB    Sit-Sidelying [place] (Bed Mobility) not tested  sitting EOB  -AB not tested  up in chair  -AB      Row Name 05/26/25 1322 05/26/25 1126       Sit-Stand Transfer    Sit-Stand [place] (Transfers) contact guard;verbal cues  -AB contact guard;verbal cues  -AB    Assistive Device (Sit-Stand Transfers) walker, front-wheeled  -AB walker, front-wheeled  -AB      Row Name 05/26/25 1322 05/26/25 1126       Stand-Sit Transfer    Stand-Sit [place] (Transfers) contact guard;verbal cues  -AB contact guard;verbal cues  -AB    Assistive Device (Stand-Sit Transfers) walker, front-wheeled  -AB walker, front-wheeled  -AB      Row Name 05/26/25 1322          Toilet Transfer    Type (Toilet Transfer) sit-stand;stand-sit  -AB     [place] Level (Toilet  Transfer) contact guard;minimum assist (75% patient effort)  -AB       Row Name 05/26/25 1322 05/26/25 1126       Gait/Stairs (Locomotion)    [place] Level (Gait) contact guard  -AB contact guard  -AB    Assistive Device (Gait) walker, front-wheeled  -AB walker, front-wheeled  -AB    Distance in Feet (Gait) 60  x3  -AB 60  x 2  -AB    Deviations/Abnormal Patterns (Gait) right sided deviations;base of support, narrow  -AB --    Bilateral Gait Deviations forward flexed posture  -AB forward flexed posture  -AB    Left Sided Gait Deviations lateral trunk flexion  -AB lateral trunk flexion  -AB    Right Sided Gait Deviations weight shift ability decreased  -AB weight shift ability decreased  -AB      Row Name 05/26/25 1322          Balance    Static Standing Balance contact guard  -AB     Dynamic Standing Balance contact guard  -AB     Position/Device Used, Standing Balance walker, front-wheeled  -AB     Comment, Balance stood at sink brushing teeth  -AB       Row Name             Wound 05/23/25 1258 sacral spine Surgical    Wound - Properties Group Placement Date: 05/23/25  -SOREN Placement Time: 1258  -SOREN Location: sacral spine  -SOREN Primary Wound Type: Surgical  -SOREN    Retired Wound - Properties Group Placement Date: 05/23/25  -SOREN Placement Time: 1258  -SOREN Location: sacral spine  -SOREN    Retired Wound - Properties Group Placement Date: 05/23/25  -SOREN Placement Time: 1258  -SOREN Location: sacral spine  -SOREN    Retired Wound - Properties Group Date first assessed: 05/23/25  -SOREN Time first assessed: 1258  -SOREN Location: sacral spine  -SOREN      Row Name 05/26/25 1322 05/26/25 1126       Positioning and Restraints    Pre-Treatment Position in bed  -AB in bed  -AB    Post Treatment Position bed  -AB chair  -AB    In Bed sitting EOB;call light within reach;with family/caregiver  -AB --    In Chair -- sitting;call light within reach;with family/caregiver  -AB              User Key  (r) = Recorded By, (t) = Taken By, (c) = Cosigned  By      Initials Name Provider Type    Farhana Whitehead, PTA Physical Therapist Assistant    Balwinder Hoskins, RN Registered Nurse                    Physical Therapy Education       Title: PT OT SLP Therapies (Done)       Topic: Physical Therapy (Done)       Point: Mobility training (Done)       Learning Progress Summary            Patient Acceptance, E, VU,NR by NICOLASA at 5/25/2025 1000    Acceptance, E, VU by ROCHELLE at 5/24/2025 1313    Comment: role of PT, log roll for pain control, OOB acitivty as much as tolerated, d/c planning    Acceptance, E, VU by NICOLASA at 5/24/2025 1000   Family Acceptance, E, VU by ROCHELLE at 5/24/2025 1313    Comment: role of PT, log roll for pain control, OOB acitivty as much as tolerated, d/c planning                      Point: Home exercise program (Done)       Learning Progress Summary            Patient Acceptance, E, VU,NR by NICOLASA at 5/25/2025 1000    Acceptance, E, VU by ROCHELLE at 5/24/2025 1313    Comment: role of PT, log roll for pain control, OOB acitivty as much as tolerated, d/c planning    Acceptance, E, VU by NICOLASA at 5/24/2025 1000   Family Acceptance, E, VU by ROCHELLE at 5/24/2025 1313    Comment: role of PT, log roll for pain control, OOB acitivty as much as tolerated, d/c planning                      Point: Body mechanics (Done)       Learning Progress Summary            Patient Acceptance, E, VU,NR by NICOLASA at 5/25/2025 1000    Acceptance, E, VU by ROCHELLE at 5/24/2025 1313    Comment: role of PT, log roll for pain control, OOB acitivty as much as tolerated, d/c planning    Acceptance, E, VU by NICOLASA at 5/24/2025 1000   Family Acceptance, E, VU by ROCHELLE at 5/24/2025 1313    Comment: role of PT, log roll for pain control, OOB acitivty as much as tolerated, d/c planning                      Point: Precautions (Done)       Learning Progress Summary            Patient Acceptance, E, VU,NR by NICOLASA at 5/25/2025 1000    Acceptance, E, VU by ROCHELLE at 5/24/2025 1313    Comment: role of PT, log roll for pain control,  OOB acitivty as much as tolerated, d/c planning    Acceptance, E, VU by DS at 5/24/2025 1000   Family Acceptance, E, VU by ROCHELLE at 5/24/2025 1313    Comment: role of PT, log roll for pain control, OOB acitivty as much as tolerated, d/c planning                                      User Key       Initials Effective Dates Name Provider Type Discipline     08/15/24 -  Michael Hewitt, PT DPT Physical Therapist PT    NICOLASA 03/24/25 -  Belkis Franco, RN Registered Nurse Nurse                  PT Recommendation and Plan         Outcome Measures       Row Name 05/26/25 0835 05/24/25 1000          How much help from another is currently needed...    Putting on and taking off regular lower body clothing? 2  -JW (r) BH (t) JW (c) 2  -EC     Bathing (including washing, rinsing, and drying) 2  -JW (r) BH (t) JW (c) 2  -EC     Toileting (which includes using toilet bed pan or urinal) 3  -JW (r) BH (t) JW (c) 3  -EC     Putting on and taking off regular upper body clothing 3  -JW (r) BH (t) JW (c) 3  -EC     Taking care of personal grooming (such as brushing teeth) 4  -JW (r) BH (t) JW (c) 3  -EC     Eating meals 4  -JW (r) BH (t) JW (c) 4  -EC     AM-PAC 6 Clicks Score (OT) 18  -JW (r) BH (t) 17  -EC        Functional Assessment    Outcome Measure Options AM-PAC 6 Clicks Daily Activity (OT)  -JW (r) BH (t) JW (c) AM-PAC 6 Clicks Daily Activity (OT)  -EC               User Key  (r) = Recorded By, (t) = Taken By, (c) = Cosigned By      Initials Name Provider Type    Jazmine Hi, OTR/L, CSRS Occupational Therapist    Kari Erickson OTR/L Occupational Therapist    Elías Simms, OT Student OT Student                     Time Calculation:    PT Charges       Row Name 05/26/25 1322 05/26/25 1126          Time Calculation    Start Time 1322  -AB 1126  -AB     Stop Time 1403  -AB 1154  -AB     Time Calculation (min) 41 min  -AB 28 min  -AB     PT Received On 05/26/25  -AB 05/26/25  -AB        Time Calculation- PT    Total  Timed Code Minutes- PT 41 minute(s)  -AB 28 minute(s)  -AB               User Key  (r) = Recorded By, (t) = Taken By, (c) = Cosigned By      Initials Name Provider Type    AB Farhana Quesada PTA Physical Therapist Assistant                  Therapy Charges for Today       Code Description Service Date Service Provider Modifiers Qty    85018141092 HC GAIT TRAINING EA 15 MIN 5/25/2025 Farhana Quesada, JASMINE GP 2    24807827423 HC GAIT TRAINING EA 15 MIN 5/26/2025 Farhana Quesada PTA GP 2            PT G-Codes  Outcome Measure Options: AM-PAC 6 Clicks Daily Activity (OT)  AM-PAC 6 Clicks Score (PT): 23  AM-PAC 6 Clicks Score (OT): 18    Farhana Quesada PTA  5/26/2025

## 2025-05-26 NOTE — CASE MANAGEMENT/SOCIAL WORK
Continued Stay Note  [facility]     Patient Name: Linda Spear  MRN: [id number]  Today's Date: 5/26/2025    Admit Date: 5/23/2025    Plan: Home   Discharge Plan       Row Name 05/26/25 6328       Plan    Plan Home    Patient/Family in Agreement with Plan yes    Plan Comments Pt plans to return home at d/c. She has sons who will rotate staying with her. Therapy has recommended home with assist and possibly home health.                   Discharge Codes    No documentation.                 Expected Discharge Date and Time       Expected Discharge Date Expected Discharge Time    May 23, 2025               WANDER Tafoya

## 2025-05-26 NOTE — PLAN OF CARE
"Goal Outcome Evaluation:     Pt Aox4. VSS on RA. Uses NC as needed when sleeping. C/o pain managed with prn meds. States pain is mostly in right buttock and right hip, radiating to RLE at times. Denies n/t, but states when she puts weight on right foot, \"it feels like stepping on a sponge\". Voiding well. No BM today. Upx1 with walker to bsc/chair. Dressing to back cdi. Son at bedside. Safety maintained.  "

## 2025-05-26 NOTE — PLAN OF CARE
Goal Outcome Evaluation:  Plan of Care Reviewed With: patient        Progress: improving  Outcome Evaluation: OT tx complete. Pt seen supine in bed with son at bedside. Pt pleasant and agreeable to working with tx stating she would like to get up and walking this morning. Pt only able to recall 1/3 spinal precautions, no lifting, when prompted. Pt educated on spinal precautions and able to recall 3/3 at end of tx. Pt completed supine to EOB with SBA requiring initial verbal cues for proper log rolling form to adhere to her spinal precautions with good return demonstrated. Pt simulated donning/doffing socks with MaxA while seated EOB. Pt completed all transitions, functional mobility and t/fs with CGA using FWW. Pt stood supported at sink completing personal hygiene tasks with SBA. Pt educated on DME she can implement at home to reduce risk of falls and safely adhere to spina precautions. Pt is states she is worried about falling but feels as if she has gotten stronger each day she has been at the hospital. OT will continue to tx as indicated. Recommend home with assist and OT/PT home health at d/c.    Anticipated Discharge Disposition (OT): home with assist, home with home health

## 2025-05-27 ENCOUNTER — APPOINTMENT (OUTPATIENT)
Dept: GENERAL RADIOLOGY | Facility: HOSPITAL | Age: 89
End: 2025-05-27
Payer: MEDICARE

## 2025-05-27 LAB
ALBUMIN SERPL-MCNC: 3 G/DL (ref 3.5–5.2)
ALBUMIN/GLOB SERPL: 1.2 G/DL
ALP SERPL-CCNC: 235 U/L (ref 39–117)
ALT SERPL W P-5'-P-CCNC: 25 U/L (ref 1–33)
ANION GAP SERPL CALCULATED.3IONS-SCNC: 11 MMOL/L (ref 5–15)
AST SERPL-CCNC: 22 U/L (ref 1–32)
BASOPHILS # BLD AUTO: 0.02 10*3/MM3 (ref 0–0.2)
BASOPHILS NFR BLD AUTO: 0.2 % (ref 0–1.5)
BILIRUB SERPL-MCNC: 0.3 MG/DL (ref 0–1.2)
BUN SERPL-MCNC: 7.5 MG/DL (ref 8–23)
BUN/CREAT SERPL: 10.1 (ref 7–25)
CALCIUM SPEC-SCNC: 8.7 MG/DL (ref 8.6–10.5)
CHLORIDE SERPL-SCNC: 93 MMOL/L (ref 98–107)
CO2 SERPL-SCNC: 28 MMOL/L (ref 22–29)
CREAT SERPL-MCNC: 0.74 MG/DL (ref 0.57–1)
DEPRECATED RDW RBC AUTO: 47.4 FL (ref 37–54)
EGFRCR SERPLBLD CKD-EPI 2021: 77.9 ML/MIN/1.73
EOSINOPHIL # BLD AUTO: 0.06 10*3/MM3 (ref 0–0.4)
EOSINOPHIL NFR BLD AUTO: 0.6 % (ref 0.3–6.2)
ERYTHROCYTE [DISTWIDTH] IN BLOOD BY AUTOMATED COUNT: 15.2 % (ref 12.3–15.4)
GEN 5 1HR TROPONIN T REFLEX: 14 NG/L
GLOBULIN UR ELPH-MCNC: 2.5 GM/DL
GLUCOSE BLDC GLUCOMTR-MCNC: 99 MG/DL (ref 70–130)
GLUCOSE SERPL-MCNC: 110 MG/DL (ref 65–99)
HCT VFR BLD AUTO: 40.8 % (ref 34–46.6)
HGB BLD-MCNC: 12.7 G/DL (ref 12–15.9)
IMM GRANULOCYTES # BLD AUTO: 0.06 10*3/MM3 (ref 0–0.05)
IMM GRANULOCYTES NFR BLD AUTO: 0.6 % (ref 0–0.5)
LYMPHOCYTES # BLD AUTO: 1.24 10*3/MM3 (ref 0.7–3.1)
LYMPHOCYTES NFR BLD AUTO: 11.4 % (ref 19.6–45.3)
MCH RBC QN AUTO: 26.4 PG (ref 26.6–33)
MCHC RBC AUTO-ENTMCNC: 31.1 G/DL (ref 31.5–35.7)
MCV RBC AUTO: 84.8 FL (ref 79–97)
MONOCYTES # BLD AUTO: 1.14 10*3/MM3 (ref 0.1–0.9)
MONOCYTES NFR BLD AUTO: 10.5 % (ref 5–12)
NEUTROPHILS NFR BLD AUTO: 76.7 % (ref 42.7–76)
NEUTROPHILS NFR BLD AUTO: 8.38 10*3/MM3 (ref 1.7–7)
NRBC BLD AUTO-RTO: 0 /100 WBC (ref 0–0.2)
PLATELET # BLD AUTO: 232 10*3/MM3 (ref 140–450)
PMV BLD AUTO: 9.2 FL (ref 6–12)
POTASSIUM SERPL-SCNC: 4 MMOL/L (ref 3.5–5.2)
PROT SERPL-MCNC: 5.5 G/DL (ref 6–8.5)
QT INTERVAL: 398 MS
QTC INTERVAL: 413 MS
RBC # BLD AUTO: 4.81 10*6/MM3 (ref 3.77–5.28)
SODIUM SERPL-SCNC: 132 MMOL/L (ref 136–145)
TROPONIN T NUMERIC DELTA: 3 NG/L
TROPONIN T SERPL HS-MCNC: 11 NG/L
TROPONIN T SERPL HS-MCNC: 16 NG/L
WBC NRBC COR # BLD AUTO: 10.9 10*3/MM3 (ref 3.4–10.8)

## 2025-05-27 PROCEDURE — A9270 NON-COVERED ITEM OR SERVICE: HCPCS | Performed by: NEUROLOGICAL SURGERY

## 2025-05-27 PROCEDURE — 63710000001 BISACODYL 10 MG SUPPOSITORY: Performed by: NEUROLOGICAL SURGERY

## 2025-05-27 PROCEDURE — 63710000001 LEVOTHYROXINE 112 MCG TABLET: Performed by: NEUROLOGICAL SURGERY

## 2025-05-27 PROCEDURE — 63710000001 METOPROLOL SUCCINATE XL 25 MG TABLET SUSTAINED-RELEASE 24 HOUR: Performed by: NEUROLOGICAL SURGERY

## 2025-05-27 PROCEDURE — 84484 ASSAY OF TROPONIN QUANT: CPT | Performed by: NEUROLOGICAL SURGERY

## 2025-05-27 PROCEDURE — 97530 THERAPEUTIC ACTIVITIES: CPT

## 2025-05-27 PROCEDURE — 85025 COMPLETE CBC W/AUTO DIFF WBC: CPT | Performed by: NEUROLOGICAL SURGERY

## 2025-05-27 PROCEDURE — 74018 RADEX ABDOMEN 1 VIEW: CPT

## 2025-05-27 PROCEDURE — 63710000001 NITROGLYCERIN 0.4 MG SUBLINGUAL TABLET: Performed by: NEUROLOGICAL SURGERY

## 2025-05-27 PROCEDURE — 97116 GAIT TRAINING THERAPY: CPT

## 2025-05-27 PROCEDURE — 63710000001 OXYCODONE 5 MG TABLET: Performed by: NEUROLOGICAL SURGERY

## 2025-05-27 PROCEDURE — 99214 OFFICE O/P EST MOD 30 MIN: CPT | Performed by: HOSPITALIST

## 2025-05-27 PROCEDURE — 63710000001 ASPIRIN 325 MG TABLET: Performed by: HOSPITALIST

## 2025-05-27 PROCEDURE — 84484 ASSAY OF TROPONIN QUANT: CPT | Performed by: HOSPITALIST

## 2025-05-27 PROCEDURE — 99231 SBSQ HOSP IP/OBS SF/LOW 25: CPT | Performed by: NURSE PRACTITIONER

## 2025-05-27 PROCEDURE — 80053 COMPREHEN METABOLIC PANEL: CPT | Performed by: NEUROLOGICAL SURGERY

## 2025-05-27 PROCEDURE — 93010 ELECTROCARDIOGRAM REPORT: CPT | Performed by: HOSPITALIST

## 2025-05-27 PROCEDURE — 63710000001 PREDNISONE PER 5 MG: Performed by: NEUROLOGICAL SURGERY

## 2025-05-27 PROCEDURE — 93005 ELECTROCARDIOGRAM TRACING: CPT | Performed by: NEUROLOGICAL SURGERY

## 2025-05-27 PROCEDURE — 82948 REAGENT STRIP/BLOOD GLUCOSE: CPT

## 2025-05-27 PROCEDURE — A9270 NON-COVERED ITEM OR SERVICE: HCPCS | Performed by: HOSPITALIST

## 2025-05-27 RX ORDER — ASPIRIN 325 MG
324 TABLET ORAL DAILY
Status: DISCONTINUED | OUTPATIENT
Start: 2025-05-27 | End: 2025-05-28

## 2025-05-27 RX ADMIN — HYDROCORTISONE 2.5%: 25 CREAM TOPICAL at 10:02

## 2025-05-27 RX ADMIN — PREDNISONE 5 MG: 5 TABLET ORAL at 08:06

## 2025-05-27 RX ADMIN — BISACODYL 10 MG: 10 SUPPOSITORY RECTAL at 08:12

## 2025-05-27 RX ADMIN — METOPROLOL SUCCINATE 25 MG: 25 TABLET, EXTENDED RELEASE ORAL at 08:06

## 2025-05-27 RX ADMIN — ASPIRIN 325 MG ORAL TABLET 324 MG: 325 PILL ORAL at 20:15

## 2025-05-27 RX ADMIN — OXYCODONE HYDROCHLORIDE 10 MG: 5 TABLET ORAL at 03:58

## 2025-05-27 RX ADMIN — OXYCODONE HYDROCHLORIDE 10 MG: 5 TABLET ORAL at 20:15

## 2025-05-27 RX ADMIN — OXYCODONE HYDROCHLORIDE 10 MG: 5 TABLET ORAL at 08:06

## 2025-05-27 RX ADMIN — NITROGLYCERIN 0.4 MG: 0.4 TABLET SUBLINGUAL at 13:56

## 2025-05-27 RX ADMIN — OXYCODONE HYDROCHLORIDE 10 MG: 5 TABLET ORAL at 11:58

## 2025-05-27 RX ADMIN — PREDNISONE 5 MG: 5 TABLET ORAL at 20:15

## 2025-05-27 RX ADMIN — LEVOTHYROXINE SODIUM 112 MCG: 0.11 TABLET ORAL at 05:54

## 2025-05-27 RX ADMIN — NITROGLYCERIN 0.4 MG: 0.4 TABLET SUBLINGUAL at 14:17

## 2025-05-27 RX ADMIN — OXYCODONE HYDROCHLORIDE 10 MG: 5 TABLET ORAL at 16:16

## 2025-05-27 NOTE — PLAN OF CARE
Goal Outcome Evaluation:  Plan of Care Reviewed With: patient, family        Progress: no change  Outcome Evaluation: Patient A+Ox4, VICTOR- working with therapy this a.m.- main complaint was constipation and pain- Patient given suppository  without success, experienced painful external hemorrhoids, cream and medicated Tucks-like pads applied along with ice to site. Fleets enema given without much success, APRN notified of patient reported discomfort and new orders. Patient aggreable to [facility] for attempted BM - nurse at bedside and patient appeared to have a vaso-vagal response as she was unresponsive to questioning, staring and mumbling-Rapid Response activated. Pt was able to regain faculties and began to answer questions appropriately, then reported chest pain-nitro given x2. New orders and continued monitor of patient's vitals, pain and BM activity.

## 2025-05-27 NOTE — CONSULTS
HPI: Linda Spear is a 89 y.o. female with a PMH of hypertension (multiple medication intolerances), aortic valve stenosis, CKD 3, hypothyroidism who is admitted for sacroplasty with cardiology consult regarding chest pain and troponin elevation.    The patient underwent surgery and was admitted by Dr. Solitario 5/23.  She has been slowly recovering from her surgery and hopes to go to rehab tomorrow.  However, she has not been able to have a bowel movement, so she received an enema today.  She was assisted in trying to get up to go to the restroom and had a syncopal episode thought to represent a vasovagal event.  When she regained consciousness she was lightheaded and fatigued and reported some chest discomfort.  This eventually resolved after 2 sublingual nitroglycerin.  Troponins were checked with initial value of 11 that increased to 14 on repeat.  ECG showed no acute ischemic changes.  Cardiology was consulted.  At the time of my evaluation, the patient points that she has not had any recurrent chest pain.  She states that this is the only episode of chest discomfort that she has had while she has been in the hospital.  That being said, she states that she intermittently has chest discomfort at home for which she takes nitroglycerin.  She states that on average this happens every 2 or 3 months.  She had an episode about a month ago that she spoke to her son about and considered going to the ED, but the chest discomfort ultimately resolved after she took the nitroglycerin.    The patient previously followed with Dr. Cotton but has not been seen in cardiology clinic since 1/2024.  She had been evaluated at that time for chest discomfort and underwent a Lexiscan myocardial perfusion SPECT that was low risk for ischemia (he did have some mild breast attenuation artifact).  She underwent several medication adjustments initially with some antianginal therapy and transitioning more towards blood pressure control, but she  ultimately did not tolerate isosorbide, amlodipine, lisinopril, or losartan due to adverse effects.  She was able to be managed with metoprolol and currently takes 25 mg in the morning and 50 mg in the evening.      Medications: Reviewed    Review of Systems: All pertinent negatives and positives as noted above.  Otherwise, 10 systems reviewed and found to be negative.    Past Medical History:   Diagnosis Date    Chronic shoulder pain     Clotting disorder     Conductive hearing loss     Disease of thyroid gland 1986    History of tonsillectomy 1965    Low back pain     Perforation of left tympanic membrane     Primary hypertension 12/15/2023       Past Surgical History:   Procedure Laterality Date    ADENOIDECTOMY  1965    CARDIAC CATHETERIZATION      COLONOSCOPY  2011    diverticulosis in the sigmoid colon    COLONOSCOPY  2005    normal exam    KYPHOPLASTY Left 2025    Procedure: KYPHOPLASTY WITH BIOPSY LEFT SACRALPLASTY with O-ARM;  Surgeon: Viktor Solitario MD;  Location:  [facility];  Service: Neurosurgery;  Laterality: Left;    KYPHOPLASTY Right 2025    Procedure: Sacralplasty Right;  Surgeon: Viktor Solitario MD;  Location:  [facility];  Service: Neurosurgery;  Laterality: Right;    TONSILLECTOMY  1965       Family History   Problem Relation Age of Onset    Clotting disorder Father     Heart attack Sister     Cancer Sister              Breast cancer Neg Hx     Cirrhosis Neg Hx     Colon cancer Neg Hx        Social History     Socioeconomic History    Marital status:    Tobacco Use    Smoking status: Never     Passive exposure: Never    Smokeless tobacco: Never   Vaping Use    Vaping status: Never Used   Substance and Sexual Activity    Alcohol use: Yes     Comment: occ    Drug use: Never    Sexual activity: Defer     Partners: Male     Birth control/protection: Tubal ligation         O:  /58 (BP Location: Right arm, Patient Position: Lying)   Pulse 84   Temp 97.4 °F  "(36.3 °C) (Oral)   Resp 17   Ht 161.5 cm (63.58\")   Wt 66.7 kg (147 lb 0.8 oz)   LMP  (LMP Unknown)   SpO2 97%   BMI 25.57 kg/m²   Temp:  [97.4 °F (36.3 °C)-98 °F (36.7 °C)] 97.4 °F (36.3 °C)  Heart Rate:  [55-84] 84  Resp:  [16-18] 17  BP: (111-170)/(50-60) 141/58    Gen: female lying in bed in NAD  HEENT: NC/AT, sclera anicteric  Neck: Supple, JVD not elevated  CV: RRR, III/VI systolic murmur at RUSB  Pulm: CTAB, NWOB  Abd: Soft, ND/NT  Ext: WWP, no edema    Diagnostic Data:    CMP   CMP          5/1/2025    04:42 5/24/2025    05:00 5/27/2025    14:15   CMP   Glucose 84  98  110    BUN 8  6  7.5    Creatinine 0.82  0.69  0.74    EGFR 68.9  83.6  77.9    Sodium 136  134  132    Potassium 4.0  4.1  4.0    Chloride 101  94  93    Calcium 8.2  8.4  8.7    Total Protein   5.5    Albumin   3.0    Globulin   2.5    Total Bilirubin   0.3    Alkaline Phosphatase   235    AST (SGOT)   22    ALT (SGPT)   25    Albumin/Globulin Ratio   1.2    BUN/Creatinine Ratio 9.8  8.7  10.1    Anion Gap 8.0  10.0  11.0      CBC   CBC          5/1/2025    04:42 5/24/2025    05:00 5/27/2025    14:15   CBC   WBC 10.01  5.55  10.90    RBC 4.47  4.94  4.81    Hemoglobin 11.9  12.9  12.7    Hematocrit 38.4  41.7  40.8    MCV 85.9  84.4  84.8    MCH 26.6  26.1  26.4    MCHC 31.0  30.9  31.1    RDW 14.9  15.3  15.2    Platelets 266  256  232      Troponin   HS Troponin T   Date/Time Value Ref Range Status   05/27/2025 1628 14 (H) <14 ng/L Final   05/27/2025 1415 11 <14 ng/L Final       ASSESSMENT/PLAN:    1.  Troponin elevation  2.  Chest pain  3.  Hypertension  4.  Coronary artery calcification-heavy calcifications noted on my personal review of her CT chest from 6/3/2024  5.  Vasovagal syncope  6.  Constipation    - The patient had an episode of chest discomfort similar to what she has had intermittently in the past along with a very mild troponin elevation following a vasovagal syncopal episode.  No recurrent chest pain.  I do suspect " that some of her symptoms are related to transient stress and hemodynamic changes around her syncopal episode and are less likely related to an ACS event.  That being said, I think is reasonable to obtain a third troponin and to continue evaluation if this is continuing to trend up in a pattern concerning for an acute coronary event.  If she does well without recurrent chest pain issues and otherwise reassuring labs, it still may be reasonable to consider a stress cardiac PET at some point given her continued intermittent chest discomfort requiring nitroglycerin.  If cardiac workup reassuring, it may be reasonable to treat her for reflux/esophageal spasm, which also could contribute to nitroglycerin responsive chest discomfort.  - Administering aspirin 324 mg  - Repeat troponin 6 hours after initial troponin  - Continue Toprol-XL 25 mg in the morning and 50 mg in the evening  - N.p.o. midnight with consideration to additional cardiac testing pending clinical course and repeat troponin

## 2025-05-27 NOTE — THERAPY TREATMENT NOTE
Acute Care - Physical Therapy Treatment Note  [facility]     Patient Name: Linda Spear  : 1935  MRN: [id number]  Today's Date: 2025      Visit Dx:     ICD-10-CM ICD-9-CM   1. Impaired mobility [Z74.09]  Z74.09 799.89   2. Closed fracture of sacrum, unspecified portion of sacrum, initial encounter  S32.10XA 805.6     Patient Active Problem List   Diagnosis    Sensorineural hearing loss (SNHL) of both ears    Primary hypertension    Hypothyroidism (acquired)    Nonrheumatic aortic valve stenosis    CKD (chronic kidney disease) stage 3, GFR 30-59 ml/min    Sacral fracture, closed    Sacral insufficiency fracture     Past Medical History:   Diagnosis Date    Chronic shoulder pain     Clotting disorder     Conductive hearing loss     Disease of thyroid gland     History of tonsillectomy     Low back pain     Perforation of left tympanic membrane     Primary hypertension 12/15/2023     Past Surgical History:   Procedure Laterality Date    ADENOIDECTOMY  1965    CARDIAC CATHETERIZATION      COLONOSCOPY  2011    diverticulosis in the sigmoid colon    COLONOSCOPY  2005    normal exam    KYPHOPLASTY Left 2025    Procedure: KYPHOPLASTY WITH BIOPSY LEFT SACRALPLASTY with O-ARM;  Surgeon: Viktor Solitario MD;  Location: [facility];  Service: Neurosurgery;  Laterality: Left;    KYPHOPLASTY Right 2025    Procedure: Sacralplasty Right;  Surgeon: Viktor Solitario MD;  Location: [facility];  Service: Neurosurgery;  Laterality: Right;    TONSILLECTOMY       PT Assessment (Last 12 Hours)       PT Evaluation and Treatment       Row Name 25 1002          Physical Therapy Time and Intention    Subjective Information complains of;pain;nausea/vomiting  -SOREN     Document Type therapy note (daily note)  -SOREN     Mode of Treatment physical therapy  -SOREN     Comment pt had received suppository.  Pt c/o nausea and increase pain in hemorrhoids that limited amb  -SOREN       Row Name 25 100           General Information    Existing Precautions/Restrictions fall;spinal  -SOREN       Row Name 05/27/25 1003          Pain    Pretreatment Pain Rating 6/10  -SOREN     Posttreatment Pain Rating 6/10  -SOREN     Pain Location buttock  -SOREN     Pain Management Interventions positioning techniques utilized  -SOREN     Response to Pain Interventions no change per patient report  -SOREN       Row Name 05/27/25 1003          Bed Mobility    Sidelying-Sit [place] (Bed Mobility) verbal cues;contact guard  -SOREN     Sit-Sidelying [place] (Bed Mobility) verbal cues;contact guard  -SOREN       Row Name 05/27/25 1003          Transfers    Transfers toilet transfer  -SOREN       Row Name 05/27/25 1003          Sit-Stand Transfer    Sit-Stand [place] (Transfers) verbal cues;contact guard  -SOREN     Assistive Device (Sit-Stand Transfers) walker, front-wheeled  -SOREN       Row Name 05/27/25 1003          Stand-Sit Transfer    Stand-Sit [place] (Transfers) verbal cues;contact guard  -SOREN     Assistive Device (Stand-Sit Transfers) walker, front-wheeled  -SOREN       Row Name 05/27/25 1003          Toilet Transfer    Type (Toilet Transfer) sit-stand;squat pivot  -SOREN     [place] Level (Toilet Transfer) verbal cues;contact guard;minimum assist (75% patient effort)  -SOREN     Assistive Device (Toilet Transfer) commode;grab bars/safety frame;walker, front-wheeled  -SOREN       Row Name 05/27/25 1003          Gait/Stairs (Locomotion)    [place] Level (Gait) verbal cues;contact guard  -SOREN     Assistive Device (Gait) walker, front-wheeled  -SOREN     Distance in Feet (Gait) 15  x 2  -SOREN       Row Name             Wound 05/23/25 1258 sacral spine Surgical    Wound - Properties Group Placement Date: 05/23/25  -JBA Placement Time: 1258  -JBA Location: sacral spine  -JBA Primary Wound Type: Surgical  -JBA    Retired Wound - Properties Group Placement Date: 05/23/25  -JBA Placement Time: 1258  -JBA Location: sacral spine  -JBA    Retired Wound -  Properties Group Placement Date: 05/23/25  -JBA Placement Time: 1258  -JBA Location: sacral spine  -JBA    Retired Wound - Properties Group Date first assessed: 05/23/25  -JBA Time first assessed: 1258  -JBA Location: sacral spine  -JBA      Row Name 05/27/25 1003          Positioning and Restraints    Pre-Treatment Position in bed  -SOREN     Post Treatment Position bed  -SOREN     In Bed supine;call light within reach;encouraged to call for assist;with family/caregiver;notified nsg;side rails up x2  -SOREN               User Key  (r) = Recorded By, (t) = Taken By, (c) = Cosigned By      Initials Name Provider Type    Harlan Stark PTA Physical Therapist Assistant    Balwinder Boateng, RN Registered Nurse                    Physical Therapy Education       Title: PT OT SLP Therapies (Done)       Topic: Physical Therapy (Done)       Point: Mobility training (Done)       Learning Progress Summary            Patient Acceptance, E, VU,NR by NICOLASA at 5/25/2025 1000    Acceptance, E, VU by ROCHELLE at 5/24/2025 1313    Comment: role of PT, log roll for pain control, OOB acitivty as much as tolerated, d/c planning    Acceptance, E, VU by NICOLASA at 5/24/2025 1000   Family Acceptance, E, VU by ROCHELLE at 5/24/2025 1313    Comment: role of PT, log roll for pain control, OOB acitivty as much as tolerated, d/c planning                      Point: Home exercise program (Done)       Learning Progress Summary            Patient Acceptance, E, VU,NR by NICOLASA at 5/25/2025 1000    Acceptance, E, VU by ROCHELLE at 5/24/2025 1313    Comment: role of PT, log roll for pain control, OOB acitivty as much as tolerated, d/c planning    Acceptance, E, VU by NICOLASA at 5/24/2025 1000   Family Acceptance, E, VU by ROCHELLE at 5/24/2025 1313    Comment: role of PT, log roll for pain control, OOB acitivty as much as tolerated, d/c planning                      Point: Body mechanics (Done)       Learning Progress Summary            Patient Acceptance, E, VU,NR by NICOLASA at 5/25/2025 1000     Acceptance, E, VU by ROCHELLE at 5/24/2025 1313    Comment: role of PT, log roll for pain control, OOB acitivty as much as tolerated, d/c planning    Acceptance, E, VU by DS at 5/24/2025 1000   Family Acceptance, E, VU by AJ at 5/24/2025 1313    Comment: role of PT, log roll for pain control, OOB acitivty as much as tolerated, d/c planning                      Point: Precautions (Done)       Learning Progress Summary            Patient Acceptance, E, VU,NR by NICOLASA at 5/25/2025 1000    Acceptance, E, VU by ROCHELLE at 5/24/2025 1313    Comment: role of PT, log roll for pain control, OOB acitivty as much as tolerated, d/c planning    Acceptance, E, VU by NICOLASA at 5/24/2025 1000   Family Acceptance, E, VU by ROCHELLE at 5/24/2025 1313    Comment: role of PT, log roll for pain control, OOB acitivty as much as tolerated, d/c planning                                      User Key       Initials Effective Dates Name Provider Type Discipline    ROCHELLE 08/15/24 -  Michael Hewitt, PT DPT Physical Therapist PT    NICOLASA 03/24/25 -  Belkis Franco, RN Registered Nurse Nurse                  PT Recommendation and Plan     Progress: no change  Outcome Evaluation: Pt was in bed, agreed to therapy.  Pt was able to perform bed mobility and transfers with CGA.  Amb 15' x 2 with RWX and CGA.  Pt amb to the bathroom and back to bed.  Pt having increase pain, burning, and nausea from needing to have a BM.  Pt would benefit from continued rehab to increase strength and improve mobility before returning home alone.   Outcome Measures       Row Name 05/27/25 1003 05/26/25 0835          How much help from another person do you currently need...    Turning from your back to your side while in flat bed without using bedrails? 3  -SOREN --     Moving from lying on back to sitting on the side of a flat bed without bedrails? 3  -SOREN --     Moving to and from a bed to a chair (including a wheelchair)? 3  -SOREN --     Standing up from a chair using your arms (e.g., wheelchair,  bedside chair)? 3  -SOREN --     Climbing 3-5 steps with a railing? 3  -SOREN --     To walk in hospital room? 3  -SOREN --     AM-PAC 6 Clicks Score (PT) 18  -SOREN --        How much help from another is currently needed...    Putting on and taking off regular lower body clothing? -- 2  -JW (r) BH (t) JW (c)     Bathing (including washing, rinsing, and drying) -- 2  -JW (r) BH (t) JW (c)     Toileting (which includes using toilet bed pan or urinal) -- 3  -JW (r) BH (t) JW (c)     Putting on and taking off regular upper body clothing -- 3  -JW (r) BH (t) JW (c)     Taking care of personal grooming (such as brushing teeth) -- 4  -JW (r) BH (t) JW (c)     Eating meals -- 4  -JW (r) BH (t) JW (c)     AM-PAC 6 Clicks Score (OT) -- 18  -JW (r) BH (t)        Functional Assessment    Outcome Measure Options AM-PAC 6 Clicks Basic Mobility (PT)  -SOREN AM-PAC 6 Clicks Daily Activity (OT)  -JW (r) BH (t) JW (c)               User Key  (r) = Recorded By, (t) = Taken By, (c) = Cosigned By      Initials Name Provider Type    Harlan Stark PTA Physical Therapist Assistant    Jazmine Hi, OTR/L, CSRS Occupational Therapist     Elías Brush, OT Student OT Student                     Time Calculation:    PT Charges       Row Name 05/27/25 1003             Time Calculation    Start Time 1003  -SOREN      Stop Time 1030  -SOREN      Time Calculation (min) 27 min  -SOREN      PT Received On 05/27/25  -SOREN         Time Calculation- PT    Total Timed Code Minutes- PT 27 minute(s)  -SOREN         Timed Charges    05742 - Gait Training Minutes  10  -SOREN      34651 - PT Therapeutic Activity Minutes 17  -SOREN         Total Minutes    Timed Charges Total Minutes 27  -SOREN       Total Minutes 27  -SOREN                User Key  (r) = Recorded By, (t) = Taken By, (c) = Cosigned By      Initials Name Provider Type    Harlan Stark PTA Physical Therapist Assistant                  Therapy Charges for Today       Code Description Service Date Service  Provider Modifiers Qty    11946361230 HC PT THERAPEUTIC ACT EA 15 MIN 5/27/2025 Harlan Jackson, PTA GP 1    00272401925 HC GAIT TRAINING EA 15 MIN 5/27/2025 Harlan Jackson, PTA GP 1            PT G-Codes  Outcome Measure Options: AM-PAC 6 Clicks Basic Mobility (PT)  AM-PAC 6 Clicks Score (PT): 18  AM-PAC 6 Clicks Score (OT): 18    Harlan Jackson PTA  5/27/2025

## 2025-05-27 NOTE — DISCHARGE PLACEMENT REQUEST
"To: [facility]    From: Cheryl SMALLWOODUnique [phone]        Linda Randall (89 y.o. Female)       Date of Birth   1935    Social Security Number       Address   [address] [place], [place] [address]    Home Phone   [phone]    MRN   [id number]       [facility]    Marital Status                               Admission Date   5/23/2025    Admission Type   Elective    Admitting Provider   Viktor Solitario MD    Attending Provider   Viktor Solitario MD    Department, Room/Bed   [facility] 3A, 356/1       Discharge Date       Discharge Disposition       Discharge Destination                                 Attending Provider: Viktor Solitario MD    Allergies: Amlodipine, Imdur [Isosorbide Nitrate], Lisinopril, Aleve [Naproxen], Codeine, Hydralazine, Ibuprofen, Levofloxacin, Losartan    Isolation: None   Infection: None   Code Status: CPR    Ht: 161.5 cm (63.58\")   Wt: 66.7 kg (147 lb 0.8 oz)    Admission Cmt: None   Principal Problem: Sacral fracture, closed [S32.10XA]                   Active Insurance as of 5/23/2025       Primary Coverage       Payor Plan Insurance Group Employer/Plan Group    MEDICARE MEDICARE A & B        Payor Plan Address Payor Plan Phone Number Payor Plan Fax Number Effective Dates    [address] [phone]  8/1/2001 - None Entered    [facility] [id number]         Subscriber Name Subscriber Birth Date Member ID       LINDA RANDALL 1935 [id number]               Secondary Coverage       Payor Plan Insurance Group Employer/Plan Group    [employer]                  Payor Plan Address Payor Plan Phone Number Payor Plan Fax Number Effective Dates    [address]   12/1/2019 - None Entered    [place] [id number]         Subscriber Name Subscriber Birth Date Member ID       LINDA RANDALL 1935 [id number]                     Emergency Contacts        (Rel.) Home Phone Work Phone Mobile Phone    Bipin Richards (POA) (Son) " [phone] -- --    Anton Mensah (POA) (Son) [phone] -- --    bernabe mensah (Son) -- -- [phone]              Insurance Information                  MEDICARE/MEDICARE A & B Phone: [phone]    Subscriber: Linda Spear Subscriber#: [id number]    Group#: -- Precert#: --    Authorization#: NPN Effective Date: --        [employer]           Phone: --    Subscriber: Linda Spear Subscriber#: [id number]    Group#: -- Precert#: --    Authorization#: NPN Effective Date: --

## 2025-05-27 NOTE — PLAN OF CARE
Goal Outcome Evaluation:  Plan of Care Reviewed With: patient, family        Progress: improving  Outcome Evaluation: Pt A/Ox4. Son at bedside. Pain medication given q4 per pt request. Ice pack to right hip per pt request. Up standby assist with walker to bathroom. Safety maintained. Call light in reach.

## 2025-05-27 NOTE — PLAN OF CARE
Goal Outcome Evaluation:  Plan of Care Reviewed With: patient        Progress: no change  Outcome Evaluation: Pt was in bed, agreed to therapy.  Pt was able to perform bed mobility and transfers with CGA.  Amb 15' x 2 with RWX and CGA.  Pt amb to the bathroom and back to bed.  Pt having increase pain, burning, and nausea from needing to have a BM.  Pt would benefit from continued rehab to increase strength and improve mobility before returning home alone.

## 2025-05-27 NOTE — PROGRESS NOTES
"Linda Spear  89 y.o.      Chief complaint:   Back and right leg pain status post sacralplasty    Subjective  No events overnight    Temp:  [97.6 °F (36.4 °C)-98.5 °F (36.9 °C)] 97.9 °F (36.6 °C)  Heart Rate:  [63-75] 63  Resp:  [16-18] 18  BP: (137-170)/(52-60) 137/58      Objective:  General Appearance:  Comfortable, well-appearing, in no acute distress and in pain.    Vital signs: (most recent): Blood pressure 137/58, pulse 63, temperature 97.9 °F (36.6 °C), temperature source Oral, resp. rate 18, height 161.5 cm (63.58\"), weight 66.7 kg (147 lb 0.8 oz), SpO2 97%, not currently breastfeeding.  Vital signs are normal.  No fever.    Output: Producing urine.    HEENT: Normal HEENT exam.    Lungs:  Normal effort and normal respiratory rate.  She is not in respiratory distress.    Heart: Normal rate.  Regular rhythm.    Chest: Symmetric chest wall expansion.   Extremities: Normal range of motion.    Skin:  Warm and dry.    Pulses: Distal pulses are intact.        Neurological Exam  Mental Status  Awake. Oriented to person, place and time. Speech is normal. Language is fluent with no aphasia. Attention and concentration are normal.    Cranial Nerves  CN I: Sense of smell is normal.  CN II: Right normal visual field. Left normal visual field.  CN III, IV, VI: Extraocular movements intact bilaterally. Pupils equal round and reactive to light bilaterally.  CN V: Facial sensation is normal.  CN VII:  Right: There is no facial weakness.  Left: There is no facial weakness.  CN XI: Shoulder shrug strength is normal.  CN XII: Tongue midline without atrophy or fasciculations.    Motor  Normal muscle bulk throughout. Normal muscle tone. Strength is 5/5 throughout all four extremities.      Lab Results (last 24 hours)       ** No results found for the last 24 hours. **                Plan:   Patient still having difficulty with mobility due to pain but does feel pain is improving some. Patient and family wish to see about SNF " placement for rehab. SS to see about placement       Sacral fracture, closed    Sacral insufficiency fracture        Kendall Pena, APRN

## 2025-05-27 NOTE — PROGRESS NOTES
"Linda Spear  89 y.o.      Chief complaint:   Back pain    Subjective  Some worsening back pain this morning.  Tolerating p.o.  Working with physical therapy.    Temp:  [97.6 °F (36.4 °C)-98.5 °F (36.9 °C)] 98 °F (36.7 °C)  Heart Rate:  [63-75] 67  Resp:  [16-18] 18  BP: (150-170)/(52-60) 162/58      Objective:  Vital signs: (most recent): Blood pressure 162/58, pulse 67, temperature 98 °F (36.7 °C), temperature source Oral, resp. rate 18, height 161.5 cm (63.58\"), weight 66.7 kg (147 lb 0.8 oz), SpO2 96%, not currently breastfeeding.        Neurological Exam  Mental Status  Awake. Oriented to person, place and time. Speech is normal. Language is fluent with no aphasia. Attention and concentration are normal.    Cranial Nerves  CN I: Sense of smell is normal.  CN II: Right normal visual field. Left normal visual field.  CN III, IV, VI: Extraocular movements intact bilaterally. Pupils equal round and reactive to light bilaterally.  CN V: Facial sensation is normal.  CN VII:  Right: There is no facial weakness.  Left: There is no facial weakness.  CN XI: Shoulder shrug strength is normal.  CN XII: Tongue midline without atrophy or fasciculations.    Motor  Normal muscle bulk throughout. Normal muscle tone. Strength is 5/5 throughout all four extremities.    Sensory  Sensation is intact to light touch, pinprick, vibration and proprioception in all four extremities.    Reflexes  Deep tendon reflexes are 2+ and symmetric in all four extremities.    Gait  Normal casual, toe, heel and tandem gait.      Lab Results (last 24 hours)       ** No results found for the last 24 hours. **                Plan:   Status post sacroplasty.  Doing well.  Mobilize with physical therapy.  Pain medication adjusted this morning.      Sacral fracture, closed    Sacral insufficiency fracture        Viktor Solitario MD   "

## 2025-05-27 NOTE — PROGRESS NOTES
"Linda Spear  89 y.o.    13-year-old  Chief complaint:   Back pain     Subjective  Back pain improved during the day today.  Worked with physical therapy aggressive this morning.  Tolerating p.o.    Temp:  [97.6 °F (36.4 °C)-98.5 °F (36.9 °C)] 98 °F (36.7 °C)  Heart Rate:  [63-75] 67  Resp:  [16-18] 18  BP: (150-170)/(52-60) 162/58      Objective:  Vital signs: (most recent): Blood pressure 162/58, pulse 67, temperature 98 °F (36.7 °C), temperature source Oral, resp. rate 18, height 161.5 cm (63.58\"), weight 66.7 kg (147 lb 0.8 oz), SpO2 96%, not currently breastfeeding.        Neurological Exam  Mental Status  Awake. Oriented to person, place and time. Speech is normal. Language is fluent with no aphasia. Attention and concentration are normal.    Cranial Nerves  CN I: Sense of smell is normal.  CN II: Right normal visual field. Left normal visual field.  CN III, IV, VI: Extraocular movements intact bilaterally. Pupils equal round and reactive to light bilaterally.  CN V: Facial sensation is normal.  CN VII:  Right: There is no facial weakness.  Left: There is no facial weakness.  CN XI: Shoulder shrug strength is normal.  CN XII: Tongue midline without atrophy or fasciculations.    Motor  Normal muscle bulk throughout. Normal muscle tone. Strength is 5/5 throughout all four extremities.    Sensory  Sensation is intact to light touch, pinprick, vibration and proprioception in all four extremities.    Reflexes  Deep tendon reflexes are 2+ and symmetric in all four extremities.    Gait  Normal casual, toe, heel and tandem gait.      Lab Results (last 24 hours)       ** No results found for the last 24 hours. **                Plan:   Sacroplasty.  Doing well with physical therapy.  Plan rehab placement this week.        Sacral fracture, closed    Sacral insufficiency fracture        Viktor Solitario MD   "

## 2025-05-27 NOTE — CASE MANAGEMENT/SOCIAL WORK
Continued Stay Note   [facility]     Patient Name: Linda Spear  MRN: [id number]  Today's Date: 5/27/2025    Admit Date: 5/23/2025    Plan: Home   Discharge Plan       Row Name 05/27/25 1103       Plan    Plan Comments NILO spoke with patient's son/POA, Bipin Richards, regarding SNF placement.  Patient is currently outpatient status and would not have Medicare coverage for SNF.  SW has informed son of this.   is reviewing for any inpatient criteria.  SW following.                   Discharge Codes    No documentation.                 Expected Discharge Date and Time       Expected Discharge Date Expected Discharge Time    May 23, 2025               RUSSELL Kinsey

## 2025-05-28 ENCOUNTER — HOSPITAL ENCOUNTER (INPATIENT)
Age: 89
LOS: 9 days | Discharge: HOME HEALTH | DRG: 949 | End: 2025-06-06
Attending: PSYCHIATRY & NEUROLOGY | Admitting: PSYCHIATRY & NEUROLOGY
Payer: MEDICARE

## 2025-05-28 ENCOUNTER — APPOINTMENT (OUTPATIENT)
Facility: HOSPITAL | Age: 89
End: 2025-05-28
Payer: MEDICARE

## 2025-05-28 VITALS
OXYGEN SATURATION: 96 % | HEART RATE: 77 BPM | RESPIRATION RATE: 18 BRPM | SYSTOLIC BLOOD PRESSURE: 142 MMHG | HEIGHT: 64 IN | DIASTOLIC BLOOD PRESSURE: 48 MMHG | BODY MASS INDEX: 25.1 KG/M2 | TEMPERATURE: 98.2 F | WEIGHT: 147 LBS

## 2025-05-28 DIAGNOSIS — R09.02 HYPOXIA: ICD-10-CM

## 2025-05-28 DIAGNOSIS — S32.10XS: ICD-10-CM

## 2025-05-28 DIAGNOSIS — M35.3 POLYMYALGIA RHEUMATICA: Primary | ICD-10-CM

## 2025-05-28 PROBLEM — S32.10XA SACRAL FRACTURE (HCC): Status: ACTIVE | Noted: 2025-05-28

## 2025-05-28 LAB
ANION GAP SERPL CALCULATED.3IONS-SCNC: 12 MMOL/L (ref 5–15)
BH CV NUCLEAR PRIOR STUDY: 3
BH CV REST NUCLEAR ISOTOPE DOSE: 16.9 MCI
BH CV STRESS BP STAGE 1: NORMAL
BH CV STRESS COMMENTS STAGE 1: NORMAL
BH CV STRESS DOSE REGADENOSON STAGE 1: 0.4
BH CV STRESS DURATION MIN STAGE 1: 0
BH CV STRESS DURATION SEC STAGE 1: 10
BH CV STRESS HR STAGE 1: 101
BH CV STRESS NUCLEAR ISOTOPE DOSE: 17.1 MCI
BH CV STRESS PROTOCOL 1: NORMAL
BH CV STRESS RECOVERY BP: NORMAL MMHG
BH CV STRESS RECOVERY HR: 96 BPM
BH CV STRESS STAGE 1: 1
BUN SERPL-MCNC: 8.2 MG/DL (ref 8–23)
BUN/CREAT SERPL: 10.3 (ref 7–25)
CALCIUM SPEC-SCNC: 8.9 MG/DL (ref 8.6–10.5)
CHLORIDE SERPL-SCNC: 91 MMOL/L (ref 98–107)
CHOLEST SERPL-MCNC: 199 MG/DL (ref 0–200)
CO2 SERPL-SCNC: 28 MMOL/L (ref 22–29)
CREAT SERPL-MCNC: 0.8 MG/DL (ref 0.57–1)
DEPRECATED RDW RBC AUTO: 45.7 FL (ref 37–54)
EGFRCR SERPLBLD CKD-EPI 2021: 71 ML/MIN/1.73
ERYTHROCYTE [DISTWIDTH] IN BLOOD BY AUTOMATED COUNT: 15.1 % (ref 12.3–15.4)
GLUCOSE SERPL-MCNC: 97 MG/DL (ref 65–99)
HCT VFR BLD AUTO: 43.6 % (ref 34–46.6)
HDLC SERPL-MCNC: 50 MG/DL (ref 40–60)
HGB BLD-MCNC: 13.7 G/DL (ref 12–15.9)
LDLC SERPL CALC-MCNC: 126 MG/DL (ref 0–100)
LDLC/HDLC SERPL: 2.47 {RATIO}
MAXIMAL PREDICTED HEART RATE: 132 BPM
MCH RBC QN AUTO: 26.3 PG (ref 26.6–33)
MCHC RBC AUTO-ENTMCNC: 31.4 G/DL (ref 31.5–35.7)
MCV RBC AUTO: 83.7 FL (ref 79–97)
PERCENT MAX PREDICTED HR: 76.52 %
PLATELET # BLD AUTO: 258 10*3/MM3 (ref 140–450)
PMV BLD AUTO: 9.3 FL (ref 6–12)
POTASSIUM SERPL-SCNC: 3.8 MMOL/L (ref 3.5–5.2)
RBC # BLD AUTO: 5.21 10*6/MM3 (ref 3.77–5.28)
SODIUM SERPL-SCNC: 131 MMOL/L (ref 136–145)
STRESS BASELINE BP: NORMAL MMHG
STRESS BASELINE HR: 86 BPM
STRESS PERCENT HR: 90 %
STRESS POST EXERCISE DUR MIN: 0 MIN
STRESS POST EXERCISE DUR SEC: 10 SEC
STRESS POST PEAK BP: NORMAL MMHG
STRESS POST PEAK HR: 101 BPM
STRESS TARGET HR: 112 BPM
TRIGL SERPL-MCNC: 127 MG/DL (ref 0–150)
TROPONIN T SERPL HS-MCNC: 15 NG/L
VLDLC SERPL-MCNC: 23 MG/DL (ref 5–40)
WBC NRBC COR # BLD AUTO: 10.25 10*3/MM3 (ref 3.4–10.8)

## 2025-05-28 PROCEDURE — 1180000000 HC REHAB R&B

## 2025-05-28 PROCEDURE — 78431 MYOCRD IMG PET RST&STRS CT: CPT

## 2025-05-28 PROCEDURE — 2700000000 HC OXYGEN THERAPY PER DAY

## 2025-05-28 PROCEDURE — 94760 N-INVAS EAR/PLS OXIMETRY 1: CPT

## 2025-05-28 PROCEDURE — 84484 ASSAY OF TROPONIN QUANT: CPT | Performed by: HOSPITALIST

## 2025-05-28 PROCEDURE — 25010000002 REGADENOSON 0.4 MG/5ML SOLUTION: Performed by: HOSPITALIST

## 2025-05-28 PROCEDURE — 97116 GAIT TRAINING THERAPY: CPT

## 2025-05-28 PROCEDURE — 80048 BASIC METABOLIC PNL TOTAL CA: CPT | Performed by: HOSPITALIST

## 2025-05-28 PROCEDURE — 78431 MYOCRD IMG PET RST&STRS CT: CPT | Performed by: HOSPITALIST

## 2025-05-28 PROCEDURE — 93018 CV STRESS TEST I&R ONLY: CPT | Performed by: HOSPITALIST

## 2025-05-28 PROCEDURE — A9270 NON-COVERED ITEM OR SERVICE: HCPCS | Performed by: NEUROLOGICAL SURGERY

## 2025-05-28 PROCEDURE — 63710000001 PREDNISONE PER 5 MG: Performed by: NEUROLOGICAL SURGERY

## 2025-05-28 PROCEDURE — 93016 CV STRESS TEST SUPVJ ONLY: CPT | Performed by: HOSPITALIST

## 2025-05-28 PROCEDURE — 97535 SELF CARE MNGMENT TRAINING: CPT

## 2025-05-28 PROCEDURE — 93017 CV STRESS TEST TRACING ONLY: CPT

## 2025-05-28 PROCEDURE — 6370000000 HC RX 637 (ALT 250 FOR IP): Performed by: PSYCHIATRY & NEUROLOGY

## 2025-05-28 PROCEDURE — 63710000001 ASPIRIN 325 MG TABLET: Performed by: HOSPITALIST

## 2025-05-28 PROCEDURE — 34310000005 RUBIDIUM CHLORIDE: Performed by: NEUROLOGICAL SURGERY

## 2025-05-28 PROCEDURE — 99231 SBSQ HOSP IP/OBS SF/LOW 25: CPT | Performed by: NURSE PRACTITIONER

## 2025-05-28 PROCEDURE — 99214 OFFICE O/P EST MOD 30 MIN: CPT

## 2025-05-28 PROCEDURE — 85027 COMPLETE CBC AUTOMATED: CPT | Performed by: HOSPITALIST

## 2025-05-28 PROCEDURE — 94150 VITAL CAPACITY TEST: CPT

## 2025-05-28 PROCEDURE — A9270 NON-COVERED ITEM OR SERVICE: HCPCS | Performed by: HOSPITALIST

## 2025-05-28 PROCEDURE — 80061 LIPID PANEL: CPT

## 2025-05-28 PROCEDURE — 63710000001 OXYCODONE 5 MG TABLET: Performed by: NEUROLOGICAL SURGERY

## 2025-05-28 PROCEDURE — 97110 THERAPEUTIC EXERCISES: CPT

## 2025-05-28 PROCEDURE — A9555 RB82 RUBIDIUM: HCPCS | Performed by: NEUROLOGICAL SURGERY

## 2025-05-28 RX ORDER — METOPROLOL SUCCINATE 50 MG/1
50 TABLET, EXTENDED RELEASE ORAL EVERY EVENING
Status: ON HOLD | COMMUNITY
Start: 2025-05-28 | End: 2025-06-03 | Stop reason: HOSPADM

## 2025-05-28 RX ORDER — LANOLIN ALCOHOL/MO/W.PET/CERES
1000 CREAM (GRAM) TOPICAL 2 TIMES DAILY
Status: ON HOLD | COMMUNITY
Start: 2025-05-28 | End: 2025-06-03

## 2025-05-28 RX ORDER — THIAMINE HCL 100 MG
1 TABLET ORAL 2 TIMES DAILY
Status: ON HOLD | COMMUNITY
Start: 2025-05-28 | End: 2025-06-03 | Stop reason: HOSPADM

## 2025-05-28 RX ORDER — ACETAMINOPHEN 325 MG/1
650 TABLET ORAL EVERY 4 HOURS PRN
Status: DISCONTINUED | OUTPATIENT
Start: 2025-05-28 | End: 2025-06-06 | Stop reason: HOSPADM

## 2025-05-28 RX ORDER — M-VIT,TX,IRON,MINS/CALC/FOLIC 27MG-0.4MG
1 TABLET ORAL DAILY
Status: ON HOLD | COMMUNITY
Start: 2025-05-29 | End: 2025-06-03

## 2025-05-28 RX ORDER — HYDROCORTISONE 25 MG/G
1 CREAM TOPICAL 2 TIMES DAILY
Status: ON HOLD | COMMUNITY
Start: 2025-05-28 | End: 2025-06-03 | Stop reason: HOSPADM

## 2025-05-28 RX ORDER — OXYCODONE HYDROCHLORIDE 10 MG/1
10 TABLET ORAL EVERY 6 HOURS PRN
Start: 2025-05-28 | End: 2025-05-29

## 2025-05-28 RX ORDER — NALOXONE HYDROCHLORIDE 0.4 MG/ML
0.4 INJECTION, SOLUTION INTRAMUSCULAR; INTRAVENOUS; SUBCUTANEOUS PRN
Status: ON HOLD | COMMUNITY
End: 2025-06-03 | Stop reason: HOSPADM

## 2025-05-28 RX ORDER — PREDNISONE 5 MG/1
5 TABLET ORAL 2 TIMES DAILY
Status: DISCONTINUED | OUTPATIENT
Start: 2025-05-28 | End: 2025-06-06 | Stop reason: HOSPADM

## 2025-05-28 RX ORDER — METOPROLOL SUCCINATE 25 MG/1
25 TABLET, EXTENDED RELEASE ORAL DAILY
Status: DISCONTINUED | OUTPATIENT
Start: 2025-05-29 | End: 2025-06-06 | Stop reason: HOSPADM

## 2025-05-28 RX ORDER — NITROGLYCERIN 0.4 MG/1
0.4 TABLET SUBLINGUAL EVERY 5 MIN PRN
Status: ON HOLD | COMMUNITY
End: 2025-06-03 | Stop reason: HOSPADM

## 2025-05-28 RX ORDER — POLYETHYLENE GLYCOL 3350 17 G/17G
17 POWDER, FOR SOLUTION ORAL DAILY PRN
Status: DISCONTINUED | OUTPATIENT
Start: 2025-05-28 | End: 2025-06-06 | Stop reason: HOSPADM

## 2025-05-28 RX ORDER — RANOLAZINE 500 MG/1
500 TABLET, EXTENDED RELEASE ORAL EVERY 12 HOURS SCHEDULED
Qty: 60 TABLET | Refills: 5 | Status: SHIPPED | OUTPATIENT
Start: 2025-05-28

## 2025-05-28 RX ORDER — METOPROLOL SUCCINATE 50 MG/1
50 TABLET, EXTENDED RELEASE ORAL EVERY EVENING
Status: DISCONTINUED | OUTPATIENT
Start: 2025-05-28 | End: 2025-06-06 | Stop reason: HOSPADM

## 2025-05-28 RX ORDER — REGADENOSON 0.08 MG/ML
0.4 INJECTION, SOLUTION INTRAVENOUS ONCE
Status: COMPLETED | OUTPATIENT
Start: 2025-05-28 | End: 2025-05-28

## 2025-05-28 RX ORDER — NITROGLYCERIN 0.4 MG/1
0.4 TABLET SUBLINGUAL EVERY 5 MIN PRN
Status: DISCONTINUED | OUTPATIENT
Start: 2025-05-28 | End: 2025-06-06 | Stop reason: HOSPADM

## 2025-05-28 RX ORDER — HYDROCODONE BITARTRATE AND ACETAMINOPHEN 5; 325 MG/1; MG/1
1 TABLET ORAL EVERY 6 HOURS PRN
Refills: 0 | Status: DISCONTINUED | OUTPATIENT
Start: 2025-05-28 | End: 2025-05-28

## 2025-05-28 RX ORDER — OXYCODONE HYDROCHLORIDE 5 MG/1
10 TABLET ORAL EVERY 6 HOURS PRN
Status: ON HOLD | COMMUNITY
End: 2025-06-03 | Stop reason: HOSPADM

## 2025-05-28 RX ORDER — M-VIT,TX,IRON,MINS/CALC/FOLIC 27MG-0.4MG
1 TABLET ORAL DAILY
Status: DISCONTINUED | OUTPATIENT
Start: 2025-05-29 | End: 2025-06-06 | Stop reason: HOSPADM

## 2025-05-28 RX ORDER — TERBINAFINE HYDROCHLORIDE 250 MG/1
250 TABLET ORAL NIGHTLY
Status: ON HOLD | COMMUNITY
Start: 2025-05-28 | End: 2025-06-03

## 2025-05-28 RX ORDER — LEVOTHYROXINE SODIUM 112 UG/1
112 TABLET ORAL
Status: DISCONTINUED | OUTPATIENT
Start: 2025-05-29 | End: 2025-05-29

## 2025-05-28 RX ORDER — OXYCODONE HYDROCHLORIDE 5 MG/1
10 TABLET ORAL EVERY 4 HOURS PRN
Refills: 0 | Status: DISCONTINUED | OUTPATIENT
Start: 2025-05-28 | End: 2025-06-06 | Stop reason: HOSPADM

## 2025-05-28 RX ORDER — OXYCODONE HYDROCHLORIDE 5 MG/1
10 TABLET ORAL EVERY 6 HOURS PRN
Refills: 0 | Status: DISCONTINUED | OUTPATIENT
Start: 2025-05-28 | End: 2025-05-28

## 2025-05-28 RX ORDER — MULTIVITAMIN WITH IRON
1000 TABLET ORAL 2 TIMES DAILY
Status: DISCONTINUED | OUTPATIENT
Start: 2025-05-28 | End: 2025-06-06 | Stop reason: HOSPADM

## 2025-05-28 RX ORDER — ASPIRIN 81 MG/1
81 TABLET ORAL DAILY
Status: DISCONTINUED | OUTPATIENT
Start: 2025-05-29 | End: 2025-05-28 | Stop reason: HOSPADM

## 2025-05-28 RX ORDER — ASPIRIN 81 MG/1
81 TABLET ORAL DAILY
Qty: 30 TABLET | Refills: 5 | Status: SHIPPED | OUTPATIENT
Start: 2025-05-29

## 2025-05-28 RX ORDER — METOPROLOL SUCCINATE 25 MG/1
25 TABLET, EXTENDED RELEASE ORAL DAILY
Status: ON HOLD | COMMUNITY
Start: 2025-05-29 | End: 2025-06-03 | Stop reason: HOSPADM

## 2025-05-28 RX ORDER — TERBINAFINE HYDROCHLORIDE 250 MG/1
250 TABLET ORAL NIGHTLY
Status: DISCONTINUED | OUTPATIENT
Start: 2025-05-28 | End: 2025-06-06 | Stop reason: HOSPADM

## 2025-05-28 RX ORDER — HYDROCODONE BITARTRATE AND ACETAMINOPHEN 5; 325 MG/1; MG/1
1 TABLET ORAL EVERY 6 HOURS PRN
Status: ON HOLD | COMMUNITY
End: 2025-06-03 | Stop reason: HOSPADM

## 2025-05-28 RX ORDER — RANOLAZINE 500 MG/1
500 TABLET, EXTENDED RELEASE ORAL EVERY 12 HOURS SCHEDULED
Status: DISCONTINUED | OUTPATIENT
Start: 2025-05-28 | End: 2025-05-28 | Stop reason: HOSPADM

## 2025-05-28 RX ORDER — BENZOCAINE/MENTHOL 6 MG-10 MG
LOZENGE MUCOUS MEMBRANE 2 TIMES DAILY
Status: DISCONTINUED | OUTPATIENT
Start: 2025-05-28 | End: 2025-06-02

## 2025-05-28 RX ADMIN — ASPIRIN 325 MG ORAL TABLET 324 MG: 325 PILL ORAL at 08:21

## 2025-05-28 RX ADMIN — PREDNISONE 5 MG: 5 TABLET ORAL at 08:22

## 2025-05-28 RX ADMIN — TERBINAFINE 250 MG: 250 TABLET ORAL at 21:41

## 2025-05-28 RX ADMIN — OXYCODONE HYDROCHLORIDE 10 MG: 5 TABLET ORAL at 04:07

## 2025-05-28 RX ADMIN — PREDNISONE 5 MG: 5 TABLET ORAL at 20:35

## 2025-05-28 RX ADMIN — HYDROCORTISONE 2.5%: 25 CREAM TOPICAL at 08:50

## 2025-05-28 RX ADMIN — Medication 1 TABLET: at 21:41

## 2025-05-28 RX ADMIN — OXYCODONE HYDROCHLORIDE 10 MG: 5 TABLET ORAL at 16:08

## 2025-05-28 RX ADMIN — CYANOCOBALAMIN TAB 500 MCG 1000 MCG: 500 TAB at 20:35

## 2025-05-28 RX ADMIN — OXYCODONE HYDROCHLORIDE 10 MG: 5 TABLET ORAL at 08:22

## 2025-05-28 RX ADMIN — OXYCODONE HYDROCHLORIDE 10 MG: 5 TABLET ORAL at 00:14

## 2025-05-28 RX ADMIN — OXYCODONE HYDROCHLORIDE 10 MG: 5 TABLET ORAL at 12:19

## 2025-05-28 RX ADMIN — REGADENOSON 0.4 MG: 0.08 INJECTION, SOLUTION INTRAVENOUS at 12:59

## 2025-05-28 RX ADMIN — OXYCODONE 10 MG: 5 TABLET ORAL at 20:34

## 2025-05-28 ASSESSMENT — PAIN SCALES - GENERAL
PAINLEVEL_OUTOF10: 7
PAINLEVEL_OUTOF10: 4

## 2025-05-28 ASSESSMENT — PAIN - FUNCTIONAL ASSESSMENT: PAIN_FUNCTIONAL_ASSESSMENT: ACTIVITIES ARE NOT PREVENTED

## 2025-05-28 ASSESSMENT — PAIN DESCRIPTION - LOCATION: LOCATION: BACK;HIP

## 2025-05-28 ASSESSMENT — PAIN DESCRIPTION - DESCRIPTORS: DESCRIPTORS: ACHING

## 2025-05-28 ASSESSMENT — PAIN DESCRIPTION - ORIENTATION: ORIENTATION: RIGHT;LEFT

## 2025-05-28 NOTE — CASE MANAGEMENT/SOCIAL WORK
Continued Stay Note   [place]     Patient Name: Linda Spear  MRN: [id number]  Today's Date: 5/28/2025    Admit Date: 5/23/2025    Plan: [facility]   Discharge Plan       Row Name 05/28/25 1051       Plan    Plan [facility]    Plan Comments Patient has been accepted to [facility] when ready for discharge.  If dc today she will go to room #814. [phone].    Final Discharge Disposition Code 62 - inpatient rehab facility                   Discharge Codes    No documentation.                 Expected Discharge Date and Time       Expected Discharge Date Expected Discharge Time    May 23, 2025               RUSSELL Kinsey

## 2025-05-28 NOTE — PLAN OF CARE
Goal Outcome Evaluation:  Plan of Care Reviewed With: patient, family   Pt A&ox4, RA, VSS. Up x standby assist with walker, voiding in toilet. Lower back dressing dry and intact.  PO pain med admin q 4 hr per request of pt. Stress test completed today, cardio cleared for DC per MARGARETTE Rodriguez. Family at bedside to transfer pt.

## 2025-05-28 NOTE — THERAPY TREATMENT NOTE
Acute Care - Physical Therapy Treatment Note  [facility]     Patient Name: Linda Spear  : 1935  MRN: [id number]  Today's Date: 2025      Visit Dx:     ICD-10-CM ICD-9-CM   1. Impaired mobility [Z74.09]  Z74.09 799.89   2. Closed fracture of sacrum, unspecified portion of sacrum, initial encounter  S32.10XA 805.6     Patient Active Problem List   Diagnosis    Sensorineural hearing loss (SNHL) of both ears    Primary hypertension    Hypothyroidism (acquired)    Nonrheumatic aortic valve stenosis    CKD (chronic kidney disease) stage 3, GFR 30-59 ml/min    Sacral fracture, closed    Sacral insufficiency fracture     Past Medical History:   Diagnosis Date    Chronic shoulder pain     Clotting disorder     Conductive hearing loss     Disease of thyroid gland     History of tonsillectomy     Low back pain     Perforation of left tympanic membrane     Primary hypertension 12/15/2023     Past Surgical History:   Procedure Laterality Date    ADENOIDECTOMY  1965    CARDIAC CATHETERIZATION      COLONOSCOPY  2011    diverticulosis in the sigmoid colon    COLONOSCOPY  2005    normal exam    KYPHOPLASTY Left 2025    Procedure: KYPHOPLASTY WITH BIOPSY LEFT SACRALPLASTY with O-ARM;  Surgeon: Viktor Solitario MD;  Location: [facility];  Service: Neurosurgery;  Laterality: Left;    KYPHOPLASTY Right 2025    Procedure: Sacralplasty Right;  Surgeon: Viktor Solitario MD;  Location: [facility] OR;  Service: Neurosurgery;  Laterality: Right;    TONSILLECTOMY       PT Assessment (Last 12 Hours)       PT Evaluation and Treatment       Row Name 25 0908          Physical Therapy Time and Intention    Subjective Information complains of;pain  -SOREN     Document Type therapy note (daily note)  -SOREN     Mode of Treatment physical therapy  -SOREN       Row Name 25 0908          General Information    Existing Precautions/Restrictions fall;spinal  -SOREN       Row Name 25 0908          Pain     Pretreatment Pain Rating 4/10  -SOREN     Posttreatment Pain Rating 7/10  -SOREN     Pain Location back;extremity;hip  -SOREN     Pain Side/Orientation right;lower  -SOREN     Pain Management Interventions exercise or physical activity utilized  -SOREN     Response to Pain Interventions activity participation with tolerable pain  -SOREN       Row Name 05/28/25 0908          Bed Mobility    Sidelying-Sit [place] (Bed Mobility) verbal cues;contact guard  -SOREN     Sit-Sidelying [place] (Bed Mobility) verbal cues;contact guard  -SOREN       Row Name 05/28/25 0908          Sit-Stand Transfer    Sit-Stand [place] (Transfers) verbal cues;contact guard  -SOREN     Assistive Device (Sit-Stand Transfers) walker, front-wheeled  -SOREN       Row Name 05/28/25 0908          Stand-Sit Transfer    Stand-Sit [place] (Transfers) verbal cues;contact guard  -SOREN     Assistive Device (Stand-Sit Transfers) walker, front-wheeled  -SOREN       Row Name 05/28/25 0908          Gait/Stairs (Locomotion)    [place] Level (Gait) verbal cues;contact guard  -SOREN     Assistive Device (Gait) walker, front-wheeled  -SOREN     Distance in Feet (Gait) 60  -SOREN     Deviations/Abnormal Patterns (Gait) gait speed decreased;stride length decreased;weight shifting decreased  -SOREN     Bilateral Gait Deviations forward flexed posture  -SOREN       Row Name 05/28/25 0908          Motor Skills    Comments, Therapeutic Exercise in fowlers, AROM BLE X 10  -SOREN     Additional Documentation Comments, Therapeutic Exercise (Row)  -SOREN       Row Name             Wound 05/23/25 1258 sacral spine Surgical    Wound - Properties Group Placement Date: 05/23/25  -JBA Placement Time: 1258  -JBA Location: sacral spine  -JBA Primary Wound Type: Surgical  -JBA    Retired Wound - Properties Group Placement Date: 05/23/25  -JBA Placement Time: 1258  -JBA Location: sacral spine  -JBA    Retired Wound - Properties Group Placement Date: 05/23/25  -JBA Placement Time: 1258  -JBA Location: sacral  spine  -JBA    Retired Wound - Properties Group Date first assessed: 05/23/25  -JBA Time first assessed: 1258  -JBA Location: sacral spine  -JBA      Row Name 05/28/25 0908          Positioning and Restraints    Pre-Treatment Position in bed  -SOREN     Post Treatment Position bed  -SOREN     In Bed fowlers;call light within reach;encouraged to call for assist;with family/caregiver;side rails up x2  -SOREN               User Key  (r) = Recorded By, (t) = Taken By, (c) = Cosigned By      Initials Name Provider Type    Harlan Stark, JASMINE Physical Therapist Assistant    Balwinder Boateng, RN Registered Nurse                    Physical Therapy Education       Title: PT OT SLP Therapies (Done)       Topic: Physical Therapy (Done)       Point: Mobility training (Done)       Learning Progress Summary            Patient Acceptance, E, VU,NR by NICOLASA at 5/25/2025 1000    Acceptance, E, VU by ROCHELLE at 5/24/2025 1313    Comment: role of PT, log roll for pain control, OOB acitivty as much as tolerated, d/c planning    Acceptance, E, VU by NICOLASA at 5/24/2025 1000   Family Acceptance, E, VU by ROCHELLE at 5/24/2025 1313    Comment: role of PT, log roll for pain control, OOB acitivty as much as tolerated, d/c planning                      Point: Home exercise program (Done)       Learning Progress Summary            Patient Acceptance, E, VU,NR by NICOLASA at 5/25/2025 1000    Acceptance, E, VU by ROCHELLE at 5/24/2025 1313    Comment: role of PT, log roll for pain control, OOB acitivty as much as tolerated, d/c planning    Acceptance, E, VU by NICOLASA at 5/24/2025 1000   Family Acceptance, E, VU by ROCHELLE at 5/24/2025 1313    Comment: role of PT, log roll for pain control, OOB acitivty as much as tolerated, d/c planning                      Point: Body mechanics (Done)       Learning Progress Summary            Patient Acceptance, E, VU,NR by NICOLASA at 5/25/2025 1000    Acceptance, E, VU by ROCHELLE at 5/24/2025 1313    Comment: role of PT, log roll for pain control, OOB  acitivty as much as tolerated, d/c planning    Acceptance, E, VU by NICOLASA at 5/24/2025 1000   Family Acceptance, E, VU by ROCHELLE at 5/24/2025 1313    Comment: role of PT, log roll for pain control, OOB acitivty as much as tolerated, d/c planning                      Point: Precautions (Done)       Learning Progress Summary            Patient Acceptance, E, VU,NR by NICOLASA at 5/25/2025 1000    Acceptance, E, VU by ROCHELLE at 5/24/2025 1313    Comment: role of PT, log roll for pain control, OOB acitivty as much as tolerated, d/c planning    Acceptance, E, VU by NICOLASA at 5/24/2025 1000   Family Acceptance, E, VU by ROCHELLE at 5/24/2025 1313    Comment: role of PT, log roll for pain control, OOB acitivty as much as tolerated, d/c planning                                      User Key       Initials Effective Dates Name Provider Type Discipline    ROCHELLE 08/15/24 -  Michael Hewitt, PT DPT Physical Therapist PT    NICOLASA 03/24/25 -  Belkis Franco, RN Registered Nurse Nurse                  PT Recommendation and Plan     Progress: improving  Outcome Evaluation: Pt was in bed, agreed to therapy.  Stated to be feeing better today.  Able to transfer sidelying to sitting with CGA with HOB elevated and using the bed rail.  Transferred sit to stand with CGA.  Amb 60' with RWX and CGA, with amb pt c/o increase pain down RLE rated 7/10.  Pt was able to transfer sit to sidelying with CGA.  In fowlers, performed AROM exercises BLE.  Pt would benefit from inpatient rehab to continue to increase strength and improve mobility.   Outcome Measures       Row Name 05/28/25 0908 05/28/25 0900 05/28/25 0805       How much help from another person do you currently need...    Turning from your back to your side while in flat bed without using bedrails? 3  -SOREN -- --    Moving from lying on back to sitting on the side of a flat bed without bedrails? 3  -SOREN -- --    Moving to and from a bed to a chair (including a wheelchair)? 3  -SOREN -- --    Standing up from a chair using your  arms (e.g., wheelchair, bedside chair)? 3  -SOREN -- --    Climbing 3-5 steps with a railing? 3  -SOREN -- --    To walk in hospital room? 3  -SOREN -- --    AM-PAC 6 Clicks Score (PT) 18  -SOREN -- --       How much help from another is currently needed...    Putting on and taking off regular lower body clothing? -- -- 2 (P)   -    Bathing (including washing, rinsing, and drying) -- -- 2 (P)   -    Toileting (which includes using toilet bed pan or urinal) -- -- 3 (P)   -    Putting on and taking off regular upper body clothing -- -- 3 (P)   -    Taking care of personal grooming (such as brushing teeth) -- -- 4 (P)   -    Eating meals -- -- 4 (P)   -    AM-PAC 6 Clicks Score (OT) -- -- 18 (P)   -       Functional Assessment    Outcome Measure Options AM-PAC 6 Clicks Basic Mobility (PT)  -SOREN -- (P)   - AM-PAC 6 Clicks Daily Activity (OT) (P)   -      Row Name 05/27/25 1003 05/26/25 0835          How much help from another person do you currently need...    Turning from your back to your side while in flat bed without using bedrails? 3  -SOREN --     Moving from lying on back to sitting on the side of a flat bed without bedrails? 3  -SOREN --     Moving to and from a bed to a chair (including a wheelchair)? 3  -SOREN --     Standing up from a chair using your arms (e.g., wheelchair, bedside chair)? 3  -SOREN --     Climbing 3-5 steps with a railing? 3  -SOREN --     To walk in hospital room? 3  -SOREN --     AM-PAC 6 Clicks Score (PT) 18  -SOREN --        How much help from another is currently needed...    Putting on and taking off regular lower body clothing? -- 2  -JW (r) BH (t) JW (c)     Bathing (including washing, rinsing, and drying) -- 2  -JW (r) BH (t) JW (c)     Toileting (which includes using toilet bed pan or urinal) -- 3  -JW (r) BH (t) JW (c)     Putting on and taking off regular upper body clothing -- 3  -JW (r) BH (t) JW (c)     Taking care of personal grooming (such as brushing teeth) -- 4  -JW (r) BH (t) JW (c)      Eating meals -- 4  - (r)  (t)  (c)     AM-PAC 6 Clicks Score (OT) -- 18  - (r)  (t)        Functional Assessment    Outcome Measure Options AM-PAC 6 Clicks Basic Mobility (PT)  -SOREN AM-PAC 6 Clicks Daily Activity (OT)  -JW (r)  (t)  (c)               User Key  (r) = Recorded By, (t) = Taken By, (c) = Cosigned By      Initials Name Provider Type    Harlan Stark PTA Physical Therapist Assistant    Jazmine Hi, OTR/L, CSRS Occupational Therapist     Elías Brush, OT Student OT Student                     Time Calculation:    PT Charges       Row Name 05/28/25 0908             Time Calculation    Start Time 0908  -SOREN      Stop Time 0936  -SOREN      Time Calculation (min) 28 min  -SOREN      PT Received On 05/28/25  -SOREN         Time Calculation- PT    Total Timed Code Minutes- PT 28 minute(s)  -SOREN         Timed Charges    85168 - PT Therapeutic Exercise Minutes 12  -SOREN      22196 - Gait Training Minutes  16  -SOREN         Total Minutes    Timed Charges Total Minutes 28  -SOREN       Total Minutes 28  -SOREN                User Key  (r) = Recorded By, (t) = Taken By, (c) = Cosigned By      Initials Name Provider Type    Harlan Stark PTA Physical Therapist Assistant                  Therapy Charges for Today       Code Description Service Date Service Provider Modifiers Qty    69893474060 HC PT THERAPEUTIC ACT EA 15 MIN 5/27/2025 Harlan Jackson, PTA GP 1    32381653280 HC GAIT TRAINING EA 15 MIN 5/27/2025 Harlan Jackson, PTA GP 1    34802527121 HC GAIT TRAINING EA 15 MIN 5/28/2025 Harlan Jackson, PTA GP 1    85046200119 HC PT THER PROC EA 15 MIN 5/28/2025 Harlan Jackson, PTA GP 1            PT G-Codes  Outcome Measure Options: AM-PAC 6 Clicks Basic Mobility (PT)  AM-PAC 6 Clicks Score (PT): 18  AM-PAC 6 Clicks Score (OT): (P) 18    Harlan Jackson PTA  5/28/2025

## 2025-05-28 NOTE — PROGRESS NOTES
"Linda Spear  89 y.o.      Chief complaint:   Back pain status post sacroplasty    Subjective  Patient was dealing with constipation yesterday ended up having a vagal episode.  She did not fully pass out but afterwards did start to complain of chest pain.  Cardiac markers was drawn which did show trending elevated troponin.  The patient was given 2 nitroglycerin.  Cardiology was consulted who is recommending a stress test due to the elevating troponin.  The patient does state that she does feel like she is doing much better this morning and her pain is under better control    Temp:  [97.4 °F (36.3 °C)-99 °F (37.2 °C)] 98 °F (36.7 °C)  Heart Rate:  [55-84] 69  Resp:  [16-18] 18  BP: (111-154)/(48-59) 132/50      Objective:  General Appearance:  Comfortable, well-appearing, in no acute distress and in pain.    Vital signs: (most recent): Blood pressure 132/50, pulse 69, temperature 98 °F (36.7 °C), temperature source Oral, resp. rate 18, height 161.5 cm (63.58\"), weight 66.7 kg (147 lb 0.8 oz), SpO2 98%, not currently breastfeeding.  Vital signs are normal.  No fever.    Output: Producing urine.    HEENT: Normal HEENT exam.    Lungs:  Normal effort and normal respiratory rate.  She is not in respiratory distress.    Chest: Symmetric chest wall expansion.   Extremities: Normal range of motion.    Skin:  Warm and dry.    Pulses: Distal pulses are intact.        Neurological Exam  Mental Status  Awake. Oriented to person, place and time. Speech is normal. Language is fluent with no aphasia. Attention and concentration are normal.    Cranial Nerves  CN I: Sense of smell is normal.  CN II: Right normal visual field. Left normal visual field.  CN III, IV, VI: Extraocular movements intact bilaterally. Pupils equal round and reactive to light bilaterally.  CN V: Facial sensation is normal.  CN VII:  Right: There is no facial weakness.  Left: There is no facial weakness.  CN XI: Shoulder shrug strength is normal.  CN XII: Tongue " midline without atrophy or fasciculations.    Motor  Normal muscle bulk throughout. Normal muscle tone. Strength is 5/5 throughout all four extremities.    Gait  Normal casual, toe, heel and tandem gait.      Lab Results (last 24 hours)       Procedure Component Value Units Date/Time    High Sensitivity Troponin T [[id number]]  (Abnormal) Collected: 05/27/25 2029    Specimen: Blood Updated: 05/27/25 2100     HS Troponin T 16 ng/L     Narrative:      High Sensitive Troponin T Reference Range:  <14.0 ng/L- Negative Female for AMI  <22.0 ng/L- Negative Male for AMI  >=14 - Abnormal Female indicating possible myocardial injury.  >=22 - Abnormal Male indicating possible myocardial injury.   Clinicians would have to utilize clinical acumen, EKG, Troponin, and serial changes to determine if it is an Acute Myocardial Infarction or myocardial injury due to an underlying chronic condition.         High Sensitivity Troponin T 1Hr [[id number]]  (Abnormal) Collected: 05/27/25 1628    Specimen: Blood Updated: 05/27/25 1707     HS Troponin T 14 ng/L      Troponin T Numeric Delta 3 ng/L     Narrative:      High Sensitive Troponin T Reference Range:  <14.0 ng/L- Negative Female for AMI  <22.0 ng/L- Negative Male for AMI  >=14 - Abnormal Female indicating possible myocardial injury.  >=22 - Abnormal Male indicating possible myocardial injury.   Clinicians would have to utilize clinical acumen, EKG, Troponin, and serial changes to determine if it is an Acute Myocardial Infarction or myocardial injury due to an underlying chronic condition.         Comprehensive Metabolic Panel [[id number]]  (Abnormal) Collected: 05/27/25 1415    Specimen: Blood Updated: 05/27/25 1513     Glucose 110 mg/dL      BUN 7.5 mg/dL      Creatinine 0.74 mg/dL      Sodium 132 mmol/L      Potassium 4.0 mmol/L      Chloride 93 mmol/L      CO2 28.0 mmol/L      Calcium 8.7 mg/dL      Total Protein 5.5 g/dL      Albumin 3.0 g/dL      ALT (SGPT) 25 U/L      AST (SGOT)  22 U/L      Alkaline Phosphatase 235 U/L      Total Bilirubin 0.3 mg/dL      Globulin 2.5 gm/dL      A/G Ratio 1.2 g/dL      BUN/Creatinine Ratio 10.1     Anion Gap 11.0 mmol/L      eGFR 77.9 mL/min/1.73     Narrative:      GFR Categories in Chronic Kidney Disease (CKD)              GFR Category          GFR (mL/min/1.73)    Interpretation  G1                    90 or greater        Normal or high (1)  G2                    60-89                Mild decrease (1)  G3a                   45-59                Mild to moderate decrease  G3b                   30-44                Moderate to severe decrease  G4                    15-29                Severe decrease  G5                    14 or less           Kidney failure    (1)In the absence of evidence of kidney disease, neither GFR category G1 or G2 fulfill the criteria for CKD.    eGFR calculation 2021 CKD-EPI creatinine equation, which does not include race as a factor    High Sensitivity Troponin T [[id number]]  (Normal) Collected: 05/27/25 1415    Specimen: Blood Updated: 05/27/25 1510     HS Troponin T 11 ng/L     Narrative:      High Sensitive Troponin T Reference Range:  <14.0 ng/L- Negative Female for AMI  <22.0 ng/L- Negative Male for AMI  >=14 - Abnormal Female indicating possible myocardial injury.  >=22 - Abnormal Male indicating possible myocardial injury.   Clinicians would have to utilize clinical acumen, EKG, Troponin, and serial changes to determine if it is an Acute Myocardial Infarction or myocardial injury due to an underlying chronic condition.         CBC & Differential [[id number]]  (Abnormal) Collected: 05/27/25 1415    Specimen: Blood Updated: 05/27/25 3731    Narrative:      The following orders were created for panel order CBC & Differential.  Procedure                               Abnormality         Status                     ---------                               -----------         ------                     CBC Auto  Differential[[id number]]        Abnormal            Final result                 Please view results for these tests on the individual orders.    CBC Auto Differential [[id number]]  (Abnormal) Collected: 05/27/25 1415    Specimen: Blood Updated: 05/27/25 1451     WBC 10.90 10*3/mm3      RBC 4.81 10*6/mm3      Hemoglobin 12.7 g/dL      Hematocrit 40.8 %      MCV 84.8 fL      MCH 26.4 pg      MCHC 31.1 g/dL      RDW 15.2 %      RDW-SD 47.4 fl      MPV 9.2 fL      Platelets 232 10*3/mm3      Neutrophil % 76.7 %      Lymphocyte % 11.4 %      Monocyte % 10.5 %      Eosinophil % 0.6 %      Basophil % 0.2 %      Immature Grans % 0.6 %      Neutrophils, Absolute 8.38 10*3/mm3      Lymphocytes, Absolute 1.24 10*3/mm3      Monocytes, Absolute 1.14 10*3/mm3      Eosinophils, Absolute 0.06 10*3/mm3      Basophils, Absolute 0.02 10*3/mm3      Immature Grans, Absolute 0.06 10*3/mm3      nRBC 0.0 /100 WBC     POC Glucose Once [[id number]]  (Normal) Collected: 05/27/25 1349    Specimen: Blood Updated: 05/27/25 1415     Glucose 99 mg/dL      Comment: : [id number] Kevin [employer] ID: [id number]                   Plan:   Patient is feeling better.  Continue with cardiology workup.  Okay from neurosurgical standpoint to be discharged once cleared by cardiology.  Plan will be to hopefully send the patient to [facility] rehab.      Sacral fracture, closed    Sacral insufficiency fracture        Kendall Pena, APRN

## 2025-05-28 NOTE — THERAPY DISCHARGE NOTE
Acute Care - Occupational Therapy Treatment Note/Discharge   [place]     Patient Name: Linda Spear  : 1935  MRN: [id number]  Today's Date: 2025     Date of Referral to OT: 25         Admit Date: 2025       ICD-10-CM ICD-9-CM   1. Impaired mobility [Z74.09]  Z74.09 799.89   2. Closed fracture of sacrum, unspecified portion of sacrum, initial encounter  S32.10XA 805.6     Patient Active Problem List   Diagnosis    Sensorineural hearing loss (SNHL) of both ears    Primary hypertension    Hypothyroidism (acquired)    Nonrheumatic aortic valve stenosis    CKD (chronic kidney disease) stage 3, GFR 30-59 ml/min    Sacral fracture, closed    Sacral insufficiency fracture     Past Medical History:   Diagnosis Date    Chronic shoulder pain     Clotting disorder     Conductive hearing loss     Disease of thyroid gland 1986    History of tonsillectomy     Low back pain     Perforation of left tympanic membrane     Primary hypertension 12/15/2023     Past Surgical History:   Procedure Laterality Date    ADENOIDECTOMY  1965    CARDIAC CATHETERIZATION      COLONOSCOPY  2011    diverticulosis in the sigmoid colon    COLONOSCOPY  2005    normal exam    KYPHOPLASTY Left 2025    Procedure: KYPHOPLASTY WITH BIOPSY LEFT SACRALPLASTY with O-ARM;  Surgeon: Viktor Solitario MD;  Location: [facility] OR;  Service: Neurosurgery;  Laterality: Left;    KYPHOPLASTY Right 2025    Procedure: Sacralplasty Right;  Surgeon: Viktor Solitario MD;  Location: [facility] OR;  Service: Neurosurgery;  Laterality: Right;    TONSILLECTOMY         OT ASSESSMENT FLOWSHEET (Last 12 Hours)       OT Evaluation and Treatment       Row Name 25 0805                   OT Time and Intention    Subjective Information complains of;weakness;pain  -AC (r) BH (t) AC (c)        Document Type therapy note (daily note)  -AC (r) BH (t) AC (c)        Mode of Treatment occupational therapy  -AC (r) BH (t) AC (c)            General Information    Existing Precautions/Restrictions fall;spinal  -AC (r) BH (t) AC (c)           Pain Assessment    Pretreatment Pain Rating 6/10  -AC (r) BH (t) AC (c)        Posttreatment Pain Rating 7/10  -AC (r) BH (t) AC (c)        Pain Location buttock  -AC (r) BH (t) AC (c)        Pain Side/Orientation right  -AC (r) BH (t) AC (c)        Pain Management Interventions exercise or physical activity utilized;premedicated for activity  -AC (r) BH (t) AC (c)        Response to Pain Interventions activity participation with tolerable pain  -AC (r) BH (t) AC (c)           Cognition    Personal Safety Interventions fall prevention program maintained;gait belt;muscle strengthening facilitated;nonskid shoes/slippers when out of bed;supervised activity  -AC (r) BH (t) AC (c)           Activities of Daily Living    BADL Assessment/Intervention grooming;toileting  -AC (r) BH (t) AC (c)           Grooming Assessment/Training    [place] Level (Grooming) hair care, combing/brushing;oral care regimen;wash face, hands;contact guard assist  -AC (r) BH (t) AC (c)        Position (Grooming) sink side;supported standing  -AC (r) BH (t) AC (c)           Toileting Assessment/Training    [place] Level (Toileting) adjust/manage clothing;perform perineal hygiene;standby assist;change pad/brief;minimum assist (75% patient effort)  -AC (r) BH (t) AC (c)        Assistive Devices (Toileting) grab bar/safety frame  -AC (r) BH (t) AC (c)        Position (Toileting) supported sitting  -AC (r) BH (t) AC (c)           Bed Mobility    Bed Mobility rolling left;supine-sit  -AC (r) BH (t) AC (c)        Rolling Left [place] (Bed Mobility) standby assist;verbal cues  -AC (r) BH (t) AC (c)        Supine-Sit [place] (Bed Mobility) standby assist;verbal cues  -AC (r) BH (t) AC (c)        Assistive Device (Bed Mobility) head of bed elevated;bed rails  -AC (r) BH (t) AC (c)           Functional Mobility    Functional Mobility-  [place] Level contact guard assist;verbal cues required  -AC (r) BH (t) AC (c)        Functional Mobility- Device walker, front-wheeled  -AC (r) BH (t) AC (c)        Functional Mobility- Comment To BR, to EOB for medication from nurse, back to BR, and then to chair  -AC (r) BH (t) AC (c)           Transfer Assessment/Treatment    Transfers toilet transfer  -AC (r) BH (t) AC (c)           Bed-Chair Transfer    Bed-Chair [place] (Transfers) contact guard  -AC (r) BH (t) AC (c)        Assistive Device (Bed-Chair Transfers) walker, front-wheeled  -AC (r) BH (t) AC (c)           Sit-Stand Transfer    Sit-Stand [place] (Transfers) contact guard;verbal cues  -AC (r) BH (t) AC (c)        Assistive Device (Sit-Stand Transfers) walker, front-wheeled  -AC (r) BH (t) AC (c)           Stand-Sit Transfer    Stand-Sit [place] (Transfers) contact guard;verbal cues  -AC (r) BH (t) AC (c)        Assistive Device (Stand-Sit Transfers) walker, front-wheeled  -AC (r) BH (t) AC (c)           Toilet Transfer    Type (Toilet Transfer) stand-sit;sit-stand  -AC (r) BH (t) AC (c)        [place] Level (Toilet Transfer) contact guard;verbal cues  -AC (r) BH (t) AC (c)        Assistive Device (Toilet Transfer) commode;grab bars/safety frame;walker, front-wheeled  -AC (r) BH (t) AC (c)           Wound 05/23/25 1258 sacral spine Surgical    Wound - Properties Group Placement Date: 05/23/25  -SOREN Placement Time: 1258  -SOREN Location: sacral spine  -SOREN Primary Wound Type: Surgical  -SOREN    Retired Wound - Properties Group Placement Date: 05/23/25  -SOREN Placement Time: 1258  -SOREN Location: sacral spine  -SOREN    Retired Wound - Properties Group Placement Date: 05/23/25  -SOREN Placement Time: 1258  -SOREN Location: sacral spine  -SOREN    Retired Wound - Properties Group Date first assessed: 05/23/25  -SOREN Time first assessed: 1258  -SOREN Location: sacral spine  -SOREN       Plan of Care Review    Plan of Care Reviewed With patient;family  -REX (r) TESSA (t)  AC (c)        Progress improving  -AC (r) BH (t) AC (c)        Outcome Evaluation OT tx complete. Pt seen in fowlers with son and daughter in law at bedside. Pt pleasant and agreeable to working with therapy today stating she feels stronger and in less pain compared to yesterday. Pt able to recall 3/3 spinal precautions when prompted. Pt completed all bed mobility with SBA performing proper log rolling technique to follow spinal precautions. Pt able to maintain EOB sitting balance with no c/o of pain, LOB, or fatigue while nurse gave medicine. Pt completed all functional mobility and t/fs with CGA with no complaints of dizziness or fatigue while ambulating. Pt completed toileting hygiene and clothing management with SBA but required Justin with changing pad/brief.  While toileting, pt educated and instructed on proper breathing technique with good return noted and no c/o of dizziness or fatique while toileting. Pt responded well today to tx as she was able to complete tasks with limited assist and limited c/o of fatique throughout and after tx. Pt plans on discharging to IP rehab once medically stable. OT will continue to tx as indicated. Recommend IP at d/c.  -AC (r) BH (t) AC (c)           Positioning and Restraints    Pre-Treatment Position in bed  -AC (r) BH (t) AC (c)        Post Treatment Position chair  -AC (r) BH (t) AC (c)        In Bed sitting;call light within reach;encouraged to call for assist;with family/caregiver  -AC (r) BH (t) AC (c)           Therapy Plan Review/Discharge Plan (OT)    Anticipated Discharge Disposition (OT) inpatient rehabilitation facility  -AC (r) BH (t) AC (c)                  User Key  (r) = Recorded By, (t) = Taken By, (c) = Cosigned By      Initials Name Effective Dates    Leo Perez, OTR/L, CNT 02/03/23 -     Balwinder Hoskins, RN 02/17/22 -     Elías Simms, OT Student 05/12/25 -                     Occupational Therapy Education       Title: PT OT SLP Therapies  (Done)       Topic: Occupational Therapy (Done)       Point: ADL training (Done)       Learning Progress Summary            Patient Acceptance, E,D, VU,DU by  at 5/28/2025 0901    Comment: OT POC, spinal precautions, bed mobility, t/f training, breathing technique/exercises.    Acceptance, E,TB,D, VU,DU by  at 5/26/2025 0849    Comment: OT POC, spinal precautions, bed mobility training, DME for home use, t/f training.   Family Acceptance, E,D, VU,DU by  at 5/28/2025 0901    Comment: OT POC, spinal precautions, bed mobility, t/f training, breathing technique/exercises.    Acceptance, E,TB,D, VU,DU by  at 5/26/2025 0849    Comment: OT POC, spinal precautions, bed mobility training, DME for home use, t/f training.                      Point: Precautions (Done)       Learning Progress Summary            Patient Acceptance, E,D, VU,DU by  at 5/28/2025 0901    Comment: OT POC, spinal precautions, bed mobility, t/f training, breathing technique/exercises.    Acceptance, E,TB,D, VU,DU by  at 5/26/2025 0849    Comment: OT POC, spinal precautions, bed mobility training, DME for home use, t/f training.   Family Acceptance, E,D, VU,DU by  at 5/28/2025 0901    Comment: OT POC, spinal precautions, bed mobility, t/f training, breathing technique/exercises.    Acceptance, E,TB,D, VU,DU by  at 5/26/2025 0849    Comment: OT POC, spinal precautions, bed mobility training, DME for home use, t/f training.                      Point: Body mechanics (Done)       Learning Progress Summary            Patient Acceptance, E,D, VU,DU by  at 5/28/2025 0901    Comment: OT POC, spinal precautions, bed mobility, t/f training, breathing technique/exercises.    Acceptance, E,TB,D, VU,DU by  at 5/26/2025 0849    Comment: OT POC, spinal precautions, bed mobility training, DME for home use, t/f training.   Family Acceptance, E,D, VU,DU by  at 5/28/2025 0901    Comment: OT POC, spinal precautions, bed mobility, t/f training,  breathing technique/exercises.    Acceptance, E,TB,D, VU,DU by  at 5/26/2025 0847    Comment: OT POC, spinal precautions, bed mobility training, DME for home use, t/f training.                                      User Key       Initials Effective Dates Name Provider Type Discipline     05/12/25 -  Elías Brush, OT Student OT Student OT                    OT Recommendation and Plan     Plan of Care Review  Plan of Care Reviewed With: patient, family  Progress: improving  Outcome Evaluation: OT tx complete. Pt seen in fowlers with son and daughter in law at bedside. Pt pleasant and agreeable to working with therapy today stating she feels stronger and in less pain compared to yesterday. Pt able to recall 3/3 spinal precautions when prompted. Pt completed all bed mobility with SBA performing proper log rolling technique to follow spinal precautions. Pt able to maintain EOB sitting balance with no c/o of pain, LOB, or fatigue while nurse gave medicine. Pt completed all functional mobility and t/fs with CGA with no complaints of dizziness or fatigue while ambulating. Pt completed toileting hygiene and clothing management with SBA but required Justin with changing pad/brief.  While toileting, pt educated and instructed on proper breathing technique with good return noted and no c/o of dizziness or fatique while toileting. Pt responded well today to tx as she was able to complete tasks with limited assist and limited c/o of fatique throughout and after tx. Pt plans on discharging to IP rehab once medically stable. OT will continue to tx as indicated. Recommend IP at d/c.  Plan of Care Reviewed With: patient, family  Outcome Evaluation: OT tx complete. Pt seen in fowlers with son and daughter in law at bedside. Pt pleasant and agreeable to working with therapy today stating she feels stronger and in less pain compared to yesterday. Pt able to recall 3/3 spinal precautions when prompted. Pt completed all bed mobility with  SBA performing proper log rolling technique to follow spinal precautions. Pt able to maintain EOB sitting balance with no c/o of pain, LOB, or fatigue while nurse gave medicine. Pt completed all functional mobility and t/fs with CGA with no complaints of dizziness or fatigue while ambulating. Pt completed toileting hygiene and clothing management with SBA but required [place] with changing pad/brief.  While toileting, pt educated and instructed on proper breathing technique with good return noted and no c/o of dizziness or fatique while toileting. Pt responded well today to tx as she was able to complete tasks with limited assist and limited c/o of fatique throughout and after tx. Pt plans on discharging to IP rehab once medically stable. OT will continue to tx as indicated. Recommend IP at d/c.          Outcome Measures       Row Name 05/28/25 0908 05/28/25 0900 05/28/25 0805       How much help from another person do you currently need...    Turning from your back to your side while in flat bed without using bedrails? 3  -SOREN -- --    Moving from lying on back to sitting on the side of a flat bed without bedrails? 3  -SOREN -- --    Moving to and from a bed to a chair (including a wheelchair)? 3  -SOREN -- --    Standing up from a chair using your arms (e.g., wheelchair, bedside chair)? 3  -SOREN -- --    Climbing 3-5 steps with a railing? 3  -SOREN -- --    To walk in hospital room? 3  -SOREN -- --    AM-PAC 6 Clicks Score (PT) 18  -SOREN -- --       How much help from another is currently needed...    Putting on and taking off regular lower body clothing? -- -- 2  -AC (r) BH (t) AC (c)    Bathing (including washing, rinsing, and drying) -- -- 2  -AC (r) BH (t) AC (c)    Toileting (which includes using toilet bed pan or urinal) -- -- 3  -AC (r) BH (t) AC (c)    Putting on and taking off regular upper body clothing -- -- 3  -AC (r) BH (t) AC (c)    Taking care of personal grooming (such as brushing teeth) -- -- 4  -AC (r) BH (t) AC (c)     Eating meals -- -- 4  -AC (r) BH (t) AC (c)    AM-PAC 6 Clicks Score (OT) -- -- 18  -AC (r) BH (t)       Functional Assessment    Outcome Measure Options AM-PAC 6 Clicks Basic Mobility (PT)  -SOREN --  -AC (r) BH (t) AC (c) AM-PAC 6 Clicks Daily Activity (OT)  -AC (r) BH (t) AC (c)      Row Name 05/27/25 1003 05/26/25 0835          How much help from another person do you currently need...    Turning from your back to your side while in flat bed without using bedrails? 3  -SOREN --     Moving from lying on back to sitting on the side of a flat bed without bedrails? 3  -SOREN --     Moving to and from a bed to a chair (including a wheelchair)? 3  -SOREN --     Standing up from a chair using your arms (e.g., wheelchair, bedside chair)? 3  -SOREN --     Climbing 3-5 steps with a railing? 3  -SOREN --     To walk in hospital room? 3  -SOREN --     AM-PAC 6 Clicks Score (PT) 18  -SOREN --        How much help from another is currently needed...    Putting on and taking off regular lower body clothing? -- 2  -JW (r) BH (t) JW (c)     Bathing (including washing, rinsing, and drying) -- 2  -JW (r) BH (t) JW (c)     Toileting (which includes using toilet bed pan or urinal) -- 3  -JW (r) BH (t) JW (c)     Putting on and taking off regular upper body clothing -- 3  -JW (r) BH (t) JW (c)     Taking care of personal grooming (such as brushing teeth) -- 4  -JW (r) BH (t) JW (c)     Eating meals -- 4  -JW (r) BH (t) JW (c)     AM-PAC 6 Clicks Score (OT) -- 18  -JW (r) BH (t)        Functional Assessment    Outcome Measure Options AM-PAC 6 Clicks Basic Mobility (PT)  -SOREN AM-PAC 6 Clicks Daily Activity (OT)  -JW (r) BH (t) JW (c)               User Key  (r) = Recorded By, (t) = Taken By, (c) = Cosigned By      Initials Name Provider Type    Leo Perez, OTR/L, CNT Occupational Therapist    Harlan Stark, PTA Physical Therapist Assistant    Jazmine Hi, OTR/L, CSRS Occupational Therapist    BH Elías Brush, OT Student OT  Student                    Time Calculation:    Time Calculation- OT       Row Name 05/28/25 0908 05/28/25 0849          Time Calculation- OT    OT Start Time -- 0803  -AC (r) BH (t) AC (c)     OT Stop Time -- 0847  -AC (r) BH (t) AC (c)     OT Time Calculation (min) -- 44 min  -AC (r) BH (t)     Total Timed Code Minutes- OT -- 44 minute(s)  -AC (r) BH (t) AC (c)     OT Received On -- 05/28/25  -AC (r) BH (t) AC (c)        Timed Charges    20102 - Gait Training Minutes  16  -SOREN --     46754 - OT Self Care/Mgmt Minutes -- 44  -AC (r) BH (t) AC (c)        Total Minutes    Timed Charges Total Minutes 16  -SOREN 44  -AC (r) BH (t)      Total Minutes 16  -SOREN 44  -AC (r) BH (t)               User Key  (r) = Recorded By, (t) = Taken By, (c) = Cosigned By      Initials Name Provider Type    AC Leo Hernandez N, OTR/L, CNT Occupational Therapist    Harlan Stark, PTA Physical Therapist Assistant    Elías Simms, OT Student OT Student                  Timed Therapy Charges  Total Units: 3      Suggested Charges  Total Units: 3      Procedure Name Documented Minutes Units Code    HC OT SELF CARE/MGMT/TRAIN EA 15 MIN 44 3   37161 (CPT®)                 Documented Minutes  Total Minutes: 44      Therapy Provided Minutes    91642 - OT Self Care/Mgmt Minutes 44                           OT Discharge Summary  Anticipated Discharge Disposition (OT): inpatient rehabilitation facility    Elías Brush OT Student  5/28/2025

## 2025-05-28 NOTE — THERAPY DISCHARGE NOTE
Acute Care - Occupational Therapy Discharge Summary   [place]     Patient Name: Linda Spear  : 1935  MRN: [id number]    Today's Date: 2025       Date of Referral to OT: 25         Admit Date: 2025        OT Recommendation and Plan    Visit Dx:    ICD-10-CM ICD-9-CM   1. Impaired mobility [Z74.09]  Z74.09 799.89   2. Closed fracture of sacrum, unspecified portion of sacrum, initial encounter  S32.10XA 805.6   3. Sacral insufficiency fracture with routine healing, subsequent encounter  M84.48XD V54.27          Time Calculation- OT       Row Name 25 0908 25 0849          Time Calculation- OT    OT Start Time -- 0803  -AC (r) BH (t) AC (c)     OT Stop Time -- 0847  -AC (r) BH (t) AC (c)     OT Time Calculation (min) -- 44 min  -AC (r) BH (t)     Total Timed Code Minutes- OT -- 44 minute(s)  -AC (r) BH (t) AC (c)     OT Received On -- 25  -AC (r) BH (t) AC (c)        Timed Charges    71222 - Gait Training Minutes  16  -SOREN --     86340 - OT Self Care/Mgmt Minutes -- 44  -AC (r) BH (t) AC (c)        Total Minutes    Timed Charges Total Minutes 16  -SOREN 44  -AC (r) BH (t)      Total Minutes 16  -SOREN 44  -AC (r) BH (t)               User Key  (r) = Recorded By, (t) = Taken By, (c) = Cosigned By      Initials Name Provider Type    AC Leo Hernandez N, OTR/L, CNT Occupational Therapist    Harlan Stark, PTA Physical Therapist Assistant     Elías Brush, MT Student OT Student                       OT Rehab Goals       Row Name 25 1400             Transfer Goal 1 (OT)    Activity/Assistive Device (Transfer Goal 1, OT) transfers, all  -AC      Ideal Level/Cues Needed (Transfer Goal 1, OT) supervision required  -AC      Time Frame (Transfer Goal 1, OT) long term goal (LTG)  -AC      Progress/Outcome (Transfer Goal 1, OT) goal not met  -AC         Bathing Goal 1 (OT)    Activity/Device (Bathing Goal 1, OT) bathing skills, all  -AC      Ideal Level/Cues Needed  (Bathing Goal 1, OT) minimum assist (75% or more patient effort)  -AC      Time Frame (Bathing Goal 1, OT) long term goal (LTG)  -AC      Progress/Outcomes (Bathing Goal 1, OT) goal not met  -AC         Problem Specific Goal 1 (OT)    Problem Specific Goal 1 (OT) Pt will independently implement one pain management technique to decrease pain and improve functional performance.  -AC      Time Frame (Problem Specific Goal 1, OT) long term goal (LTG)  -AC      Progress/Outcome (Problem Specific Goal 1, OT) goal not met  -AC                User Key  (r) = Recorded By, (t) = Taken By, (c) = Cosigned By      Initials Name Provider Type Discipline    AC Leo Hernandez, OTR/L, CNT Occupational Therapist OT                     Outcome Measures       Row Name 05/28/25 0908 05/28/25 0900 05/28/25 0805       How much help from another person do you currently need...    Turning from your back to your side while in flat bed without using bedrails? 3  -SOREN -- --    Moving from lying on back to sitting on the side of a flat bed without bedrails? 3  -SOREN -- --    Moving to and from a bed to a chair (including a wheelchair)? 3  -SOREN -- --    Standing up from a chair using your arms (e.g., wheelchair, bedside chair)? 3  -SOREN -- --    Climbing 3-5 steps with a railing? 3  -SOREN -- --    To walk in hospital room? 3  -SOREN -- --    AM-PAC 6 Clicks Score (PT) 18  -SOREN -- --       How much help from another is currently needed...    Putting on and taking off regular lower body clothing? -- -- 2  -AC (r) BH (t) AC (c)    Bathing (including washing, rinsing, and drying) -- -- 2  -AC (r) BH (t) AC (c)    Toileting (which includes using toilet bed pan or urinal) -- -- 3  -AC (r) BH (t) AC (c)    Putting on and taking off regular upper body clothing -- -- 3  -AC (r) BH (t) AC (c)    Taking care of personal grooming (such as brushing teeth) -- -- 4  -AC (r) BH (t) AC (c)    Eating meals -- -- 4  -AC (r) BH (t) AC (c)    AM-PAC 6 Clicks Score (OT) -- --  18  -AC (r) BH (t)       Functional Assessment    Outcome Measure Options AM-PAC 6 Clicks Basic Mobility (PT)  -SOREN --  -AC (r) BH (t) AC (c) AM-PAC 6 Clicks Daily Activity (OT)  -AC (r) BH (t) AC (c)      Row Name 05/27/25 1003 05/26/25 0835          How much help from another person do you currently need...    Turning from your back to your side while in flat bed without using bedrails? 3  -SOREN --     Moving from lying on back to sitting on the side of a flat bed without bedrails? 3  -SOREN --     Moving to and from a bed to a chair (including a wheelchair)? 3  -SOREN --     Standing up from a chair using your arms (e.g., wheelchair, bedside chair)? 3  -SOREN --     Climbing 3-5 steps with a railing? 3  -SOREN --     To walk in hospital room? 3  -SOREN --     AM-PAC 6 Clicks Score (PT) 18  -SOREN --        How much help from another is currently needed...    Putting on and taking off regular lower body clothing? -- 2  -JW (r) BH (t) JW (c)     Bathing (including washing, rinsing, and drying) -- 2  -JW (r) BH (t) JW (c)     Toileting (which includes using toilet bed pan or urinal) -- 3  -JW (r) BH (t) JW (c)     Putting on and taking off regular upper body clothing -- 3  -JW (r) BH (t) JW (c)     Taking care of personal grooming (such as brushing teeth) -- 4  -JW (r) BH (t) JW (c)     Eating meals -- 4  -JW (r) BH (t) JW (c)     AM-PAC 6 Clicks Score (OT) -- 18  -JW (r) BH (t)        Functional Assessment    Outcome Measure Options AM-PAC 6 Clicks Basic Mobility (PT)  -SOREN AM-PAC 6 Clicks Daily Activity (OT)  -JW (r) BH (t) JW (c)               User Key  (r) = Recorded By, (t) = Taken By, (c) = Cosigned By      Initials Name Provider Type    Leo Perez, OTR/L, CNT Occupational Therapist    Harlan Stark, PTA Physical Therapist Assistant    Jazmine Hi, OTR/L, CSRS Occupational Therapist    Elías Simms OT Student OT Student                    Timed Therapy Charges  Total Units: 3      Suggested Charges   Total Units: 3      Procedure Name Documented Minutes Units Code    HC OT SELF CARE/MGMT/TRAIN EA 15 MIN 44 3   19174 (CPT®)                 Documented Minutes  Total Minutes: 44      Therapy Provided Minutes    71473 - OT Self Care/Mgmt Minutes 44                    Therapy Charges for Today       Code Description Service Date Service Provider Modifiers Qty    [id number] HC OT SELF CARE/MGMT/TRAIN EA 15 MIN 5/28/2025 Leo Hernandez OTR/L, ELVIN GO 3            OT Discharge Summary  Anticipated Discharge Disposition (OT): inpatient rehabilitation facility  Reason for Discharge: Discharge from facility  Outcomes Achieved: Refer to plan of care for updates on goals achieved  Discharge Destination: Inpatient rehabilitation facility      ЕКАТЕРИНА Bucio/L, CNT  5/28/2025

## 2025-05-28 NOTE — PROGRESS NOTES
[facility] -  Progress Note     LOS: 1 day   Patient Care Team:  Richard Weaver MD as PCP - General (Internal Medicine)  Deandre Angeles Jr., MD as Consulting Physician (Otolaryngology)    Chief Complaint: Follow-up chest pain    Subjective     Interval History:   Overnight patient is doing well.  She denies any chest pain or significant shortness of breath.  She is ready be discharged to a rehab facility.  Stress test pending.      Review of Systems:     Review of Systems   All other systems reviewed and are negative.    Objective     Vital Sign Min/Max for last 24 hours  Temp  Min: 97.4 °F (36.3 °C)  Max: 99 °F (37.2 °C)   BP  Min: 111/50  Max: 154/59   Pulse  Min: 55  Max: 84   Resp  Min: 16  Max: 18   SpO2  Min: 93 %  Max: 98 %   No data recorded   No data recorded         05/23/25  1730   Weight: 66.7 kg (147 lb 0.8 oz)       Telemetry: Sinus rhythm rate predominantly in the 70s      Physical Exam:    Constitutional:       Appearance: Healthy appearance. Not in distress.   Pulmonary:      Effort: Pulmonary effort is normal.      Breath sounds: Normal breath sounds.   Cardiovascular:      PMI at left midclavicular line. Normal rate. Regular rhythm. Normal S1. Normal S2.       Murmurs: There is no murmur.      No gallop.  No click. No rub.   Pulses:     Intact distal pulses.   Edema:     Peripheral edema absent.   Neurological:      Mental Status: Alert and oriented to person, place and time.       Results Review:   Lab Results (last 72 hours)       Procedure Component Value Units Date/Time    CBC (No Diff) [[id number]]  (Abnormal) Collected: 05/28/25 0900    Specimen: Blood Updated: 05/28/25 0932     WBC 10.25 10*3/mm3      RBC 5.21 10*6/mm3      Hemoglobin 13.7 g/dL      Hematocrit 43.6 %      MCV 83.7 fL      MCH 26.3 pg      MCHC 31.4 g/dL      RDW 15.1 %      RDW-SD 45.7 fl      MPV 9.3 fL      Platelets 258 10*3/mm3     Basic Metabolic Panel [[id number]] Collected: 05/28/25  0900    Specimen: Blood Updated: 05/28/25 0927    High Sensitivity Troponin T [[id number]] Collected: 05/28/25 0900    Specimen: Blood Updated: 05/28/25 0927    High Sensitivity Troponin T [[id number]]  (Abnormal) Collected: 05/27/25 2029    Specimen: Blood Updated: 05/27/25 2100     HS Troponin T 16 ng/L     Narrative:      High Sensitive Troponin T Reference Range:  <14.0 ng/L- Negative Female for AMI  <22.0 ng/L- Negative Male for AMI  >=14 - Abnormal Female indicating possible myocardial injury.  >=22 - Abnormal Male indicating possible myocardial injury.   Clinicians would have to utilize clinical acumen, EKG, Troponin, and serial changes to determine if it is an Acute Myocardial Infarction or myocardial injury due to an underlying chronic condition.         High Sensitivity Troponin T 1Hr [[id number]]  (Abnormal) Collected: 05/27/25 1628    Specimen: Blood Updated: 05/27/25 1707     HS Troponin T 14 ng/L      Troponin T Numeric Delta 3 ng/L     Narrative:      High Sensitive Troponin T Reference Range:  <14.0 ng/L- Negative Female for AMI  <22.0 ng/L- Negative Male for AMI  >=14 - Abnormal Female indicating possible myocardial injury.  >=22 - Abnormal Male indicating possible myocardial injury.   Clinicians would have to utilize clinical acumen, EKG, Troponin, and serial changes to determine if it is an Acute Myocardial Infarction or myocardial injury due to an underlying chronic condition.         Comprehensive Metabolic Panel [[id number]]  (Abnormal) Collected: 05/27/25 1415    Specimen: Blood Updated: 05/27/25 1513     Glucose 110 mg/dL      BUN 7.5 mg/dL      Creatinine 0.74 mg/dL      Sodium 132 mmol/L      Potassium 4.0 mmol/L      Chloride 93 mmol/L      CO2 28.0 mmol/L      Calcium 8.7 mg/dL      Total Protein 5.5 g/dL      Albumin 3.0 g/dL      ALT (SGPT) 25 U/L      AST (SGOT) 22 U/L      Alkaline Phosphatase 235 U/L      Total Bilirubin 0.3 mg/dL      Globulin 2.5 gm/dL      A/G Ratio 1.2 g/dL       BUN/Creatinine Ratio 10.1     Anion Gap 11.0 mmol/L      eGFR 77.9 mL/min/1.73     Narrative:      GFR Categories in Chronic Kidney Disease (CKD)              GFR Category          GFR (mL/min/1.73)    Interpretation  G1                    90 or greater        Normal or high (1)  G2                    60-89                Mild decrease (1)  G3a                   45-59                Mild to moderate decrease  G3b                   30-44                Moderate to severe decrease  G4                    15-29                Severe decrease  G5                    14 or less           Kidney failure    (1)In the absence of evidence of kidney disease, neither GFR category G1 or G2 fulfill the criteria for CKD.    eGFR calculation 2021 CKD-EPI creatinine equation, which does not include race as a factor    High Sensitivity Troponin T [[id number]]  (Normal) Collected: 05/27/25 1415    Specimen: Blood Updated: 05/27/25 1510     HS Troponin T 11 ng/L     Narrative:      High Sensitive Troponin T Reference Range:  <14.0 ng/L- Negative Female for AMI  <22.0 ng/L- Negative Male for AMI  >=14 - Abnormal Female indicating possible myocardial injury.  >=22 - Abnormal Male indicating possible myocardial injury.   Clinicians would have to utilize clinical acumen, EKG, Troponin, and serial changes to determine if it is an Acute Myocardial Infarction or myocardial injury due to an underlying chronic condition.         CBC & Differential [[id number]]  (Abnormal) Collected: 05/27/25 1415    Specimen: Blood Updated: 05/27/25 5911    Narrative:      The following orders were created for panel order CBC & Differential.  Procedure                               Abnormality         Status                     ---------                               -----------         ------                     CBC Auto Differential[[id number]]        Abnormal            Final result                 Please view results for these tests on the individual orders.     "CBC Auto Differential [[id number]]  (Abnormal) Collected: 05/27/25 1415    Specimen: Blood Updated: 05/27/25 1451     WBC 10.90 10*3/mm3      RBC 4.81 10*6/mm3      Hemoglobin 12.7 g/dL      Hematocrit 40.8 %      MCV 84.8 fL      MCH 26.4 pg      MCHC 31.1 g/dL      RDW 15.2 %      RDW-SD 47.4 fl      MPV 9.2 fL      Platelets 232 10*3/mm3      Neutrophil % 76.7 %      Lymphocyte % 11.4 %      Monocyte % 10.5 %      Eosinophil % 0.6 %      Basophil % 0.2 %      Immature Grans % 0.6 %      Neutrophils, Absolute 8.38 10*3/mm3      Lymphocytes, Absolute 1.24 10*3/mm3      Monocytes, Absolute 1.14 10*3/mm3      Eosinophils, Absolute 0.06 10*3/mm3      Basophils, Absolute 0.02 10*3/mm3      Immature Grans, Absolute 0.06 10*3/mm3      nRBC 0.0 /100 WBC     POC Glucose Once [[id number]]  (Normal) Collected: 05/27/25 1349    Specimen: Blood Updated: 05/27/25 1415     Glucose 99 mg/dL      Comment: : [id number] Kevin MaryMeter ID: [id number]                   Echo EF Estimated  No results found for: \"ECHOEFEST\"      Cath Ejection Fraction Quantitative  No results found for: \"CATHEF\"        Medication Review: yes  Current Facility-Administered Medications   Medication Dose Route Frequency Provider Last Rate Last Admin    aspirin tablet 324 mg  324 mg Oral Daily Paul Lieberman MD   324 mg at 05/28/25 0821    sennosides-docusate (PERICOLACE) 8.6-50 MG per tablet 2 tablet  2 tablet Oral BID PRN Viktor Solitario MD   2 tablet at 05/26/25 1104    And    polyethylene glycol (MIRALAX) packet 17 g  17 g Oral Daily PRN Viktor Solitario MD   17 g at 05/26/25 1748    And    bisacodyl (DULCOLAX) EC tablet 5 mg  5 mg Oral Daily PRN Viktor Solitario MD   5 mg at 05/25/25 1740    And    bisacodyl (DULCOLAX) suppository 10 mg  10 mg Rectal Daily PRN Viktor Solitario MD   10 mg at 05/27/25 0812    Hydrocortisone (Perianal) (ANUSOL-HC) 2.5 % rectal cream   Rectal Daily Viktor Solitario MD   Given at 05/28/25 0850    levothyroxine " (SYNTHROID, LEVOTHROID) tablet 112 mcg  112 mcg Oral Q AM Viktor Solitario MD   112 mcg at 05/27/25 0554    metoprolol succinate XL (TOPROL-XL) 24 hr tablet 25 mg  25 mg Oral Daily Viktor Solitario MD   25 mg at 05/27/25 0806    metoprolol succinate XL (TOPROL-XL) 24 hr tablet 50 mg  50 mg Oral Q PM Viktor Solitario MD   50 mg at 05/26/25 1756    miconazole (MICOTIN) 2 % cream 1 Application  1 Application Topical Q24H Viktor Solitario MD        nitroglycerin (NITROSTAT) SL tablet 0.4 mg  0.4 mg Sublingual Q5 Min PRN Viktor Solitario MD   0.4 mg at 05/27/25 1417    ondansetron ODT (ZOFRAN-ODT) disintegrating tablet 4 mg  4 mg Oral Q6H PRN Viktor Solitario MD   4 mg at 05/26/25 1843    Or    ondansetron (ZOFRAN) injection 4 mg  4 mg Intravenous Q6H PRN Viktor Solitario MD   4 mg at 05/23/25 1729    oxyCODONE (ROXICODONE) immediate release tablet 10 mg  10 mg Oral Q4H PRN Viktor Solitario MD   10 mg at 05/28/25 0822    predniSONE (DELTASONE) tablet 5 mg  5 mg Oral BID Viktor Solitario MD   5 mg at 05/28/25 0822    Psyllium (METAMUCIL MULTIHEALTH FIBER) 51.7 % packet 1 packet  1 packet Oral BID Kendall Pena APRN        sodium chloride 0.9 % flush 10 mL  10 mL Intravenous Q12H Viktor Solitario MD   10 mL at 05/24/25 2039    sodium chloride 0.9 % flush 10 mL  10 mL Intravenous PRN Viktor Solitario MD        sodium chloride 0.9 % infusion 40 mL  40 mL Intravenous PRN Viktor Solitario MD        tiZANidine (ZANAFLEX) tablet 4 mg  4 mg Oral Q12H PRN Viktor Solitario MD             Assessment & Plan     1.  Troponin elevation  2.  Chest pain  3.  Hypertension  4.  Coronary artery calcification-heavy calcifications noted on my personal review of her CT chest from 6/3/2024  5.  Vasovagal syncope  6.  Constipation    -N.p.o. for PET stress test this morning, likely to discharge to rehab facility this afternoon  - Continue Toprol-XL 25 mg in the a.m. and 50 mg in the p.m.      Further orders per   Mio      Addendum:  Stress test low to intermediate risk.  No symptoms today.  Will start aspirin 81 mg daily as well as Ranexa 500 mg twice daily.  Will add on lipid panel and consider PCSK9 inhibitor in the outpatient setting.  Stable for discharge.  Patient desires to follow-up with Dr. Lieberman in the cardiology clinic.  Follow-up in 1 month has been scheduled.      Electronically signed by MARGARETTE Lomeli, 05/28/25, 10:15 AM CDT.

## 2025-05-28 NOTE — PLAN OF CARE
Goal Outcome Evaluation:  Plan of Care Reviewed With: patient, child        Progress: improving  Outcome Evaluation: Patient is A&Ox4. Up with 1 assist to BSC overnight. Pain medication q4 hours. Ice placed on hemorrhoids. Bed alarm set, family at bedside, call light in reach.

## 2025-05-28 NOTE — DISCHARGE SUMMARY
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 female presented with Closed fracture of sacrum, unspecified portion of sacrum, initial encounter [S32.10XA]  Sacral insufficiency fracture [M84.48XA].  The patient has been through all manner of conservative care without any meaningful improvement. The patient went to the operating room on 4/30/2025 for a KYPHOPLASTY WITH BIOPSY LEFT SACRALPLASTY with O-ARM - Left.  The patient did have difficulty with mobilizing postoperatively.  She has been working with physical therapy.    Currently the patient is ambulating.  The patient is tolerating p.o.  The patient is voiding spontaneously.  The patient did have near syncope episode while trying to have a bowel movement.  Cardiology was consulted due to elevated troponin.  A stress test was completed and it is felt from a cardiology standpoint the patient is safe and suitable to be discharged and will follow-up with cardiology as an outpatient.  The patient will be started on Ranexa and baby aspirin.  It is felt that at this time the patient is stable and suitable to be discharged to [facility] rehab..  See orders for discharge instructions and restrictions.  The patient is to clean the wound daily with soap and water.  They are to call the office with any drainage from the incision or worsening pain.  I will follow-up with her in the office in 3 weeks.    Procedures Performed  Procedure(s):  Sacralplasty Right       Consults:   Consults       Date and Time Order Name Status Description    5/27/2025  5:41 PM Inpatient Cardiology Consult Completed               Condition  on Discharge: Stable    Vital Signs  Temp:  [97.4 °F (36.3 °C)-99 °F (37.2 °C)] 98.2 °F (36.8 °C)  Heart Rate:  [69-84] 77  Resp:  [16-18] 18  BP: (120-154)/(48-59) 142/48    Physical Exam:   Physical Exam  Constitutional:       General: She is awake.      Appearance: She is well-developed.   HENT:      Head: Normocephalic.   Eyes:      Extraocular Movements: Extraocular movements intact.      Pupils: Pupils are equal, round, and reactive to light.   Pulmonary:      Effort: Pulmonary effort is normal.   Musculoskeletal:         General: Normal range of motion.      Cervical back: Normal range of motion.   Skin:     General: Skin is warm.   Neurological:      Mental Status: She is alert and oriented to person, place, and time.      GCS: GCS eye subscore is 4. GCS verbal subscore is 5. GCS motor subscore is 6.      Cranial Nerves: No cranial nerve deficit.      Sensory: No sensory deficit.      Motor: Motor strength is normal.     Gait: Gait normal.   Psychiatric:         Speech: Speech normal.         Behavior: Behavior normal.         Thought Content: Thought content normal.          Neurological Exam  Mental Status  Awake and alert. Oriented to person, place and time. Oriented to person, place, and time. Speech is normal. Language is fluent with no aphasia. Attention and concentration are normal.    Cranial Nerves  CN I: Sense of smell is normal.  CN II: Right normal visual field. Left normal visual field.  CN III, IV, VI: Extraocular movements intact bilaterally. Pupils equal round and reactive to light bilaterally.  CN V: Facial sensation is normal.  CN VII:  Right: There is no facial weakness.  Left: There is no facial weakness.  CN XI: Shoulder shrug strength is normal.  CN XII: Tongue midline without atrophy or fasciculations.    Motor  Normal muscle bulk throughout. Normal muscle tone. Strength is 5/5 throughout all four extremities.    Gait  Normal casual, toe, heel and tandem gait. Normal  gait.         Discharge Disposition  Rehab Facility or Unit (DC - External)    Discharge Medications     Discharge Medications        New Medications        Instructions Start Date   aspirin 81 MG EC tablet   81 mg, Oral, Daily   Start Date: May 29, 2025     naloxone 4 MG/0.1ML nasal spray  Commonly known as: NARCAN   Call 911. Don't prime. [place] in 1 nostril for overdose. Repeat in 2-3 minutes in other nostril if no or minimal breathing/responsiveness.      oxyCODONE 10 MG tablet  Commonly known as: ROXICODONE   10 mg, Oral, Every 6 Hours PRN      ranolazine 500 MG 12 hr tablet  Commonly known as: RANEXA   500 mg, Oral, Every 12 Hours Scheduled             Continue These Medications        Instructions Start Date   Calcium Citrate-Vitamin D3 315-6.25 MG-MCG tablet tablet  Commonly known as: CITRACAL   1 tablet, Oral, 2 Times Daily      HYDROcodone-acetaminophen 5-325 MG per tablet  Commonly known as: NORCO   1 tablet, Oral, Every 6 Hours PRN      levothyroxine 112 MCG tablet  Commonly known as: SYNTHROID, LEVOTHROID   112 mcg, Every Early Morning      metoprolol succinate XL 25 MG 24 hr tablet  Commonly known as: TOPROL-XL   25 mg, Every Morning      metoprolol succinate XL 25 MG 24 hr tablet  Commonly known as: TOPROL-XL   50 mg, Every Evening      multivitamin with minerals tablet tablet   1 tablet, Daily      nitroglycerin 0.4 MG SL tablet  Commonly known as: NITROSTAT   0.4 mg, Every 5 Minutes PRN      OMEGA-3 PO   2 tablets, Daily      predniSONE 5 MG tablet  Commonly known as: DELTASONE   5 mg, Oral, 2 Times Daily      Proctozone-HC 2.5 % rectal cream  Generic drug: Hydrocortisone (Perianal)   Insert 1 Application into the rectum 2 (Two) Times a Day.      terbinafine 250 MG tablet  Commonly known as: lamiSIL   250 mg, Nightly      TURMERIC PO   1 tablet, Oral, 2 Times Daily      vitamin B-12 1000 MCG tablet  Commonly known as: CYANOCOBALAMIN   1,000 mcg, Oral, 2 Times Daily             ASK your doctor about  these medications        Instructions Start Date   vitamin D3 125 MCG (5000 UT) capsule capsule  Ask about: Which instructions should I use?   5,000 Units, Every Evening               Discharge Diet:   Diet Instructions       Diet: Regular/House Diet; Regular Texture (IDDSI 7); Thin (IDDSI 0)      Discharge Diet: Regular/House Diet    Texture: Regular Texture (IDDSI 7)    Fluid Consistency: Thin (IDDSI 0)    Diet: Regular/House Diet; Regular Texture (IDDSI 7); Thin (IDDSI 0)      Discharge Diet: Regular/House Diet    Texture: Regular Texture (IDDSI 7)    Fluid Consistency: Thin (IDDSI 0)            Activity at Discharge:   Activity Instructions       Other Instructions (Specify)      Activity Instructions: no strenuous activity.    Other Instructions (Specify)      Activity Instructions: no strenuous activity.            Follow-up Appointments  Future Appointments   Date Time Provider Department Center   6/18/2025  1:00 PM Suma Pena APRN [facility] PAD   6/24/2025 10:45 AM Paul Lieberman MD [facility] PAD PAD   8/5/2025  2:30 PM Viktor Solitario MD [facility]AD     Additional Instructions for the Follow-ups that You Need to Schedule       Call MD With Problems / Concerns   As directed      Call with worsening back or leg pain, drainage from wound, fever, or difficulty walking    Order Comments: Call with worsening back or leg pain, drainage from wound, fever, or difficulty walking         Call MD With Problems / Concerns   As directed      Call with worsening back or leg pain, drainage from wound, fever, or difficulty walking    Order Comments: Call with worsening back or leg pain, drainage from wound, fever, or difficulty walking         Discharge Follow-up with Specialty: suma pena np; 3 Weeks   As directed      Specialty: suma pena np   Follow Up: 3 Weeks        Discharge Follow-up with Specialty: suma pena np; 3 Weeks   As directed      Specialty: suma pena np   Follow Up: 3  Weeks                Test Results Pending at Discharge       Kendall Pena, APRN  05/28/25  14:40 CDT    Time: Discharge 20 min

## 2025-05-28 NOTE — PLAN OF CARE
Goal Outcome Evaluation:  Plan of Care Reviewed With: patient        Progress: improving  Outcome Evaluation: Pt was in bed, agreed to therapy.  Stated to be feeing better today.  Able to transfer sidelying to sitting with CGA with HOB elevated and using the bed rail.  Transferred sit to stand with CGA.  Amb 60' with RWX and CGA, with amb pt c/o increase pain down RLE rated 7/10.  Pt was able to transfer sit to sidelying with CGA.  In fowlers, performed AROM exercises BLE.  Pt would benefit from inpatient rehab to continue to increase strength and improve mobility.

## 2025-05-28 NOTE — PLAN OF CARE
Goal Outcome Evaluation:  Plan of Care Reviewed With: patient, family        Progress: improving  Outcome Evaluation: OT tx complete. Pt seen in fowlers with son and daughter in law at bedside. Pt pleasant and agreeable to working with therapy today stating she feels stronger and in less pain compared to yesterday. Pt able to recall 3/3 spinal precautions when prompted. Pt completed all bed mobility with SBA performing proper log rolling technique to follow spinal precautions. Pt able to maintain EOB sitting balance with no c/o of pain, LOB, or fatigue while nurse gave medicine. Pt completed all functional mobility and t/fs with CGA with no complaints of dizziness or fatigue while ambulating. Pt completed toileting hygiene and clothing management with SBA but required Justin with changing pad/brief.  While toileting, pt educated and instructed on proper breathing technique with good return noted and no c/o of dizziness or fatique while toileting. Pt responded well today to tx as she was able to complete tasks with limited assist and limited c/o of fatique throughout and after tx. Pt plans on discharging to IP rehab once medically stable. OT will continue to tx as indicated. Recommend IP at d/c.    Anticipated Discharge Disposition (OT): inpatient rehabilitation facility

## 2025-05-29 PROBLEM — I10 ESSENTIAL HYPERTENSION: Status: ACTIVE | Noted: 2025-05-29

## 2025-05-29 PROBLEM — E03.9 ACQUIRED HYPOTHYROIDISM: Status: ACTIVE | Noted: 2025-05-29

## 2025-05-29 PROBLEM — E44.0 MODERATE MALNUTRITION: Status: ACTIVE | Noted: 2025-05-29

## 2025-05-29 PROBLEM — M35.3 POLYMYALGIA RHEUMATICA: Status: ACTIVE | Noted: 2025-05-29

## 2025-05-29 LAB
ALBUMIN SERPL-MCNC: 3.4 G/DL (ref 3.5–5.2)
ALP SERPL-CCNC: 219 U/L (ref 35–104)
ALT SERPL-CCNC: 27 U/L (ref 10–35)
ANION GAP SERPL CALCULATED.3IONS-SCNC: 12 MMOL/L (ref 8–16)
AST SERPL-CCNC: 23 U/L (ref 10–35)
BASOPHILS # BLD: 0 K/UL (ref 0–0.2)
BASOPHILS NFR BLD: 0.4 % (ref 0–1)
BILIRUB SERPL-MCNC: 0.4 MG/DL (ref 0.2–1.2)
BUN SERPL-MCNC: 9 MG/DL (ref 8–23)
CALCIUM SERPL-MCNC: 9.9 MG/DL (ref 8.8–10.2)
CHLORIDE SERPL-SCNC: 92 MMOL/L (ref 98–107)
CO2 SERPL-SCNC: 29 MMOL/L (ref 22–29)
CREAT SERPL-MCNC: 0.8 MG/DL (ref 0.5–0.9)
EOSINOPHIL # BLD: 0.1 K/UL (ref 0–0.6)
EOSINOPHIL NFR BLD: 1.7 % (ref 0–5)
ERYTHROCYTE [DISTWIDTH] IN BLOOD BY AUTOMATED COUNT: 14.7 % (ref 11.5–14.5)
GLUCOSE BLD-MCNC: 100 MG/DL (ref 70–99)
GLUCOSE BLD-MCNC: 103 MG/DL (ref 70–99)
GLUCOSE BLD-MCNC: 113 MG/DL (ref 70–99)
GLUCOSE BLD-MCNC: 113 MG/DL (ref 70–99)
GLUCOSE SERPL-MCNC: 99 MG/DL (ref 70–99)
HCT VFR BLD AUTO: 40.7 % (ref 37–47)
HGB BLD-MCNC: 12.9 G/DL (ref 12–16)
IMM GRANULOCYTES # BLD: 0.1 K/UL
LYMPHOCYTES # BLD: 1.3 K/UL (ref 1.1–4.5)
LYMPHOCYTES NFR BLD: 17.5 % (ref 20–40)
MCH RBC QN AUTO: 26.5 PG (ref 27–31)
MCHC RBC AUTO-ENTMCNC: 31.7 G/DL (ref 33–37)
MCV RBC AUTO: 83.6 FL (ref 81–99)
MONOCYTES # BLD: 1.1 K/UL (ref 0–0.9)
MONOCYTES NFR BLD: 14.8 % (ref 0–10)
NEUTROPHILS # BLD: 4.9 K/UL (ref 1.5–7.5)
NEUTS SEG NFR BLD: 64.9 % (ref 50–65)
PERFORMED ON: ABNORMAL
PLATELET # BLD AUTO: 273 K/UL (ref 130–400)
PMV BLD AUTO: 9.2 FL (ref 9.4–12.3)
POTASSIUM SERPL-SCNC: 3.9 MMOL/L (ref 3.5–5)
PREALB SERPL-MCNC: 13 MG/DL (ref 20–40)
PROT SERPL-MCNC: 5.7 G/DL (ref 6.4–8.3)
RBC # BLD AUTO: 4.87 M/UL (ref 4.2–5.4)
SODIUM SERPL-SCNC: 133 MMOL/L (ref 136–145)
T4 FREE SERPL-MCNC: 1.79 NG/DL (ref 0.93–1.7)
TSH SERPL DL<=0.005 MIU/L-ACNC: 9.71 UIU/ML (ref 0.27–4.2)
VIT B12 SERPL-MCNC: >4000 PG/ML (ref 232–1245)
WBC # BLD AUTO: 7.6 K/UL (ref 4.8–10.8)

## 2025-05-29 PROCEDURE — 97161 PT EVAL LOW COMPLEX 20 MIN: CPT

## 2025-05-29 PROCEDURE — 80053 COMPREHEN METABOLIC PANEL: CPT

## 2025-05-29 PROCEDURE — 84439 ASSAY OF FREE THYROXINE: CPT

## 2025-05-29 PROCEDURE — 97530 THERAPEUTIC ACTIVITIES: CPT

## 2025-05-29 PROCEDURE — 99222 1ST HOSP IP/OBS MODERATE 55: CPT | Performed by: PSYCHIATRY & NEUROLOGY

## 2025-05-29 PROCEDURE — 6370000000 HC RX 637 (ALT 250 FOR IP): Performed by: PSYCHIATRY & NEUROLOGY

## 2025-05-29 PROCEDURE — 97535 SELF CARE MNGMENT TRAINING: CPT

## 2025-05-29 PROCEDURE — 97165 OT EVAL LOW COMPLEX 30 MIN: CPT

## 2025-05-29 PROCEDURE — 85025 COMPLETE CBC W/AUTO DIFF WBC: CPT

## 2025-05-29 PROCEDURE — 94760 N-INVAS EAR/PLS OXIMETRY 1: CPT

## 2025-05-29 PROCEDURE — 84134 ASSAY OF PREALBUMIN: CPT

## 2025-05-29 PROCEDURE — 97110 THERAPEUTIC EXERCISES: CPT

## 2025-05-29 PROCEDURE — 97116 GAIT TRAINING THERAPY: CPT

## 2025-05-29 PROCEDURE — 94150 VITAL CAPACITY TEST: CPT

## 2025-05-29 PROCEDURE — 82962 GLUCOSE BLOOD TEST: CPT

## 2025-05-29 PROCEDURE — 84443 ASSAY THYROID STIM HORMONE: CPT

## 2025-05-29 PROCEDURE — 36415 COLL VENOUS BLD VENIPUNCTURE: CPT

## 2025-05-29 PROCEDURE — 82607 VITAMIN B-12: CPT

## 2025-05-29 PROCEDURE — 1180000000 HC REHAB R&B

## 2025-05-29 RX ORDER — LEVOTHYROXINE SODIUM 100 UG/1
100 TABLET ORAL
Status: DISCONTINUED | OUTPATIENT
Start: 2025-05-30 | End: 2025-06-06 | Stop reason: HOSPADM

## 2025-05-29 RX ADMIN — Medication 1 TABLET: at 21:17

## 2025-05-29 RX ADMIN — OXYCODONE 10 MG: 5 TABLET ORAL at 13:12

## 2025-05-29 RX ADMIN — TERBINAFINE 250 MG: 250 TABLET ORAL at 21:17

## 2025-05-29 RX ADMIN — OXYCODONE 10 MG: 5 TABLET ORAL at 09:10

## 2025-05-29 RX ADMIN — Medication 1 TABLET: at 08:21

## 2025-05-29 RX ADMIN — OXYCODONE 10 MG: 5 TABLET ORAL at 05:09

## 2025-05-29 RX ADMIN — CYANOCOBALAMIN TAB 500 MCG 1000 MCG: 500 TAB at 08:20

## 2025-05-29 RX ADMIN — Medication 1 TABLET: at 08:20

## 2025-05-29 RX ADMIN — METOPROLOL SUCCINATE 25 MG: 25 TABLET, EXTENDED RELEASE ORAL at 08:20

## 2025-05-29 RX ADMIN — OXYCODONE 10 MG: 5 TABLET ORAL at 18:02

## 2025-05-29 RX ADMIN — CYANOCOBALAMIN TAB 500 MCG 1000 MCG: 500 TAB at 21:17

## 2025-05-29 RX ADMIN — HYDROCORTISONE ACETATE: 1 CREAM TOPICAL at 21:18

## 2025-05-29 RX ADMIN — OXYCODONE 10 MG: 5 TABLET ORAL at 23:55

## 2025-05-29 RX ADMIN — PREDNISONE 5 MG: 5 TABLET ORAL at 08:20

## 2025-05-29 RX ADMIN — OXYCODONE 10 MG: 5 TABLET ORAL at 00:35

## 2025-05-29 RX ADMIN — METOPROLOL SUCCINATE 50 MG: 50 TABLET, EXTENDED RELEASE ORAL at 17:01

## 2025-05-29 RX ADMIN — PREDNISONE 5 MG: 5 TABLET ORAL at 21:18

## 2025-05-29 RX ADMIN — LEVOTHYROXINE SODIUM 112 MCG: 0.11 TABLET ORAL at 05:09

## 2025-05-29 ASSESSMENT — PAIN DESCRIPTION - ORIENTATION
ORIENTATION: LOWER
ORIENTATION: RIGHT

## 2025-05-29 ASSESSMENT — PAIN DESCRIPTION - DESCRIPTORS
DESCRIPTORS: ACHING

## 2025-05-29 ASSESSMENT — PAIN DESCRIPTION - LOCATION
LOCATION: HIP
LOCATION: BACK
LOCATION: HIP
LOCATION: BACK
LOCATION: HIP
LOCATION: SACRUM

## 2025-05-29 ASSESSMENT — PAIN - FUNCTIONAL ASSESSMENT
PAIN_FUNCTIONAL_ASSESSMENT: ACTIVITIES ARE NOT PREVENTED

## 2025-05-29 ASSESSMENT — PAIN SCALES - GENERAL
PAINLEVEL_OUTOF10: 7
PAINLEVEL_OUTOF10: 3
PAINLEVEL_OUTOF10: 4
PAINLEVEL_OUTOF10: 7
PAINLEVEL_OUTOF10: 3
PAINLEVEL_OUTOF10: 3
PAINLEVEL_OUTOF10: 6
PAINLEVEL_OUTOF10: 7
PAINLEVEL_OUTOF10: 3
PAINLEVEL_OUTOF10: 7
PAINLEVEL_OUTOF10: 6

## 2025-05-29 ASSESSMENT — PAIN DESCRIPTION - FREQUENCY: FREQUENCY: INTERMITTENT

## 2025-05-29 ASSESSMENT — PAIN DESCRIPTION - ONSET: ONSET: ON-GOING

## 2025-05-29 ASSESSMENT — PAIN DESCRIPTION - PAIN TYPE: TYPE: ACUTE PAIN

## 2025-05-29 NOTE — THERAPY DISCHARGE NOTE
Acute Care - Physical Therapy Discharge Summary  [facility]       Patient Name: Linda Spear  : 1935  MRN: [id number]    Today's Date: 2025                 Admit Date: 2025      PT Recommendation and Plan    Visit Dx:    ICD-10-CM ICD-9-CM   1. Impaired mobility [Z74.09]  Z74.09 799.89   2. Closed fracture of sacrum, unspecified portion of sacrum, initial encounter  S32.10XA 805.6   3. Sacral insufficiency fracture with routine healing, subsequent encounter  M84.48XD V54.27        Outcome Measures       Row Name 25 0908 25 0900 25 0805       How much help from another person do you currently need...    Turning from your back to your side while in flat bed without using bedrails? 3  -SOREN -- --    Moving from lying on back to sitting on the side of a flat bed without bedrails? 3  -SOREN -- --    Moving to and from a bed to a chair (including a wheelchair)? 3  -SOREN -- --    Standing up from a chair using your arms (e.g., wheelchair, bedside chair)? 3  -SOREN -- --    Climbing 3-5 steps with a railing? 3  -SOREN -- --    To walk in hospital room? 3  -SOREN -- --    AM-PAC 6 Clicks Score (PT) 18  -SOREN -- --       How much help from another is currently needed...    Putting on and taking off regular lower body clothing? -- -- 2  -AC (r) BH (t) AC (c)    Bathing (including washing, rinsing, and drying) -- -- 2  -AC (r) BH (t) AC (c)    Toileting (which includes using toilet bed pan or urinal) -- -- 3  -AC (r) BH (t) AC (c)    Putting on and taking off regular upper body clothing -- -- 3  -AC (r) BH (t) AC (c)    Taking care of personal grooming (such as brushing teeth) -- -- 4  -AC (r) BH (t) AC (c)    Eating meals -- -- 4  -AC (r) BH (t) AC (c)    AM-PAC 6 Clicks Score (OT) -- -- 18  -AC (r) TESSA (t)       Functional Assessment    Outcome Measure Options AM-PAC 6 Clicks Basic Mobility (PT)  -SOREN --  -REX (r) TESSA (t) REX (c) AM-PAC 6 Clicks Daily Activity (OT)  -REX (r) TESSA (t) REX decker)      Row Name 25 1000              How much help from another person do you currently need...    Turning from your back to your side while in flat bed without using bedrails? 3  -SOREN      Moving from lying on back to sitting on the side of a flat bed without bedrails? 3  -SOREN      Moving to and from a bed to a chair (including a wheelchair)? 3  -SOREN      Standing up from a chair using your arms (e.g., wheelchair, bedside chair)? 3  -SOREN      Climbing 3-5 steps with a railing? 3  -SOREN      To walk in hospital room? 3  -SOREN      AM-PAC 6 Clicks Score (PT) 18  -SOREN         Functional Assessment    Outcome Measure Options AM-PAC 6 Clicks Basic Mobility (PT)  -SOREN                User Key  (r) = Recorded By, (t) = Taken By, (c) = Cosigned By      Initials Name Provider Type    Leo Perez, OTR/L, CNT Occupational Therapist    SOREN Harlan Jackson, PTA Physical Therapist Assistant    BH Elías Brush, OT Student OT Student                         PT Rehab Goals       Row Name 05/29/25 1526             Bed Mobility Goal 1 (PT)    Activity/Assistive Device (Bed Mobility Goal 1, PT) bed mobility activities, all  -AH      [place] Level/Cues Needed (Bed Mobility Goal 1, PT) independent  -AH      Time Frame (Bed Mobility Goal 1, PT) long term goal (LTG);10 days  -AH      Strategies/Barriers (Bed Mobility Goal 1, PT) indep as she lives alone and wants to return home if possible  -AH      Progress/Outcomes (Bed Mobility Goal 1, PT) goal not met  -AH         Transfer Goal 1 (PT)    Activity/Assistive Device (Transfer Goal 1, PT) sit-to-stand/stand-to-sit;bed-to-chair/chair-to-bed;toilet  -AH      [place] Level/Cues Needed (Transfer Goal 1, PT) independent  -AH      Time Frame (Transfer Goal 1, PT) long term goal (LTG);10 days  -AH      Strategies/Barriers (Transfers Goal 1, PT) indep as she lives alone and wants to return home if possible  -AH      Progress/Outcome (Transfer Goal 1, PT) goal not met  -AH         Gait Training Goal 1 (PT)     Activity/Assistive Device (Gait Training Goal 1, PT) gait (walking locomotion);decrease asymmetrical patterns;decrease fall risk;diminish gait deviation;forward stepping;improve balance and speed;increase endurance/gait distance;increase energy conservation;assistive device use;walker, rolling  -      [place] Level (Gait Training Goal 1, PT) modified independence  -      Distance (Gait Training Goal 1, PT) 75' with pain 5/10 or less  -      Time Frame (Gait Training Goal 1, PT) long term goal (LTG);10 days  -      Progress/Outcome (Gait Training Goal 1, PT) goal not met  -         Balance Goal 1 (PT)    Activity/Assistive Device (Balance Goal) sit to stand dynamic balance;standing static balance;standing dynamic balance;unsupported;with functional activities/occupations;with functional mobility activities;with functional reaching activities  -      [place] Level/Cues Needed (Balance Goal 1, PT) independent  -      Time Frame (Balance Goal 1, PT) long-term goal (LTG);by discharge  -      Progress/Outcomes (Balance Goal 1, PT) goal not met  -                User Key  (r) = Recorded By, (t) = Taken By, (c) = Cosigned By      Initials Name Provider Type Discipline    Liliya Goel PTA Physical Therapist Assistant PT                        PT Discharge Summary  Reason for Discharge: Discharge from facility  Outcomes Achieved: Refer to plan of care for updates on goals achieved  Discharge Destination: Inpatient rehabilitation facility      Liliya Guzman PTA   5/29/2025

## 2025-05-29 NOTE — H&P
[facility]   History and Physical        Patient:   Chio Zamarripa  MR#:    [id number]  Account Number:                   [id number]      Room:    [facility]/814-02   YOB: 1935  Date of Progress Note: 5/29/2025  Time of Note                           10:27 AM  Attending Physician:  Juan M Fierro MD        Admitting diagnosis:Age-related osteoporosis with current pathological fracture, vertebra(e), initial encounter for fracture    Secondary diagnoses:Essential (primary) hypertension,Hypothyroidism, unspecified,Nonrheumatic aortic (valve) stenosis,Chronic kidney disease, stage 3 unspecified    CHIEF COMPLAINT: Status post sacral fracture s/p sacralplasty      HISTORY OF PRESENT ILLNESS:   This 89 y.o. female admitted from [facility] emergency room on 4/29/25.  The patient states that on April 9, 2025 that she was lifting a case of protein shakes that she got from [facility] and started having onset of back pain.  The patient says that she did have a back surgery by Dr. Molina in 2003 for back pain and right leg pain and did well from that surgery.  She said that she was doing well up until this lifting episode.  Currently pain in her back is constant but is worse with walking and standing and 80% better with laying or sitting down.  She has pain that radiates into her left buttocks that is constant as well with the same modifying factors.  She has not done any recent physical therapy or chiropractic care pain management injections.  She is right-hand dominant.  She is retired.  She is .  Denies any tobacco, alcohol, or illicit drug use.  Rates her pain on a scale 0-10 out of 10. MRI of the lumbar spine that was done on April 21, 2025 shows of the left sacral insufficiency fracture.  At L5-S1 moderate bilateral foraminal narrowing is noted.  Disc degeneration is noted of L4-5.  At L3-4 moderate lumbar stenosis with right sided foraminal narrowing.  At L2-3 left-sided foraminal narrowing.  Scoliosis deformity is

## 2025-05-29 NOTE — THERAPY EVALUATION
Physical Therapy EVALUATION Note  DATE:  2025  NAME:  Chio Zamarripa  :  1935  (89 y.o.,female)  MRN:  [id number]    HEIGHT:  Height: 160 cm (5' 2.99\")  WEIGHT:  Weight - Scale: 62.6 kg (138 lb 0.1 oz)    PATIENT DIAGNOSIS(ES):    Diagnosis: other orthopedic, osteoporosis with pathological fx s/p kyphoplasty and scaroplasty per Dr. Terrell  and     Additional Pertinent Hx: HTN, HYPOTHYROIDISM, NONRHEUMATIC  AORTIC STENOSISCHRONIC KIDNEY DISEASE STAGE 3  RESTRICTIONS/PRECAUTIONS:    Restrictions/Precautions  Restrictions/Precautions: Surgical protocol, Fall Risk  Position Activity Restriction  Spinal Precautions: No Bending/Lifting/Twisting (BLT)  OVERALL  ORIENTATION STATUS:     PAIN:  Pain Level: 6       Pain Location: Sacrum     Pain Orientation: Lower      GROSS ASSESSMENT        POSTURE/BALANCE  Balance  Sitting - Static: Good  Standing - Dynamic: Fair (equired rw for support)       ACTIVITY TOLERANCE         BED MOBILITY  Bed mobility  Rolling to Left: Stand by assistance  Rolling to Right: Stand by assistance  Supine to Sit: Stand by assistance  Sit to Supine: Stand by assistance  Scooting: Stand by assistance        TRANSFERS  Transfers  Sit to Stand: Stand by assistance, Contact guard assistance  Stand to Sit: Stand by assistance, Contact guard assistance  Bed to Chair: Stand by assistance, Contact guard assistance (with rw)  Car Transfer: Stand by assistance, Contact guard assistance (with rw)       AMBULATION 1  Ambulation  Surface: Level tile  Device: Rolling Walker  Assistance: Contact guard assistance  Quality of Gait: short reciprocal steps, no lob or balance crrection required  Distance: 50  Comments: 2 turns  STAIRS   NOT ASSESSED  WHEELCHAIR PROPULSION 1  Propulsion 1  Propulsion: Manual  Level: Level Tile  Method: TRU DURAN  Level of Assistance: Stand by assistance  Description/ Details: 2 turns  Distance: 50      GOALS:  Short Term Goals  Time Frame for Short Term Goals: 1 WK  Short

## 2025-05-30 LAB
GLUCOSE BLD-MCNC: 101 MG/DL (ref 70–99)
PERFORMED ON: ABNORMAL

## 2025-05-30 PROCEDURE — 1180000000 HC REHAB R&B

## 2025-05-30 PROCEDURE — 94760 N-INVAS EAR/PLS OXIMETRY 1: CPT

## 2025-05-30 PROCEDURE — 97530 THERAPEUTIC ACTIVITIES: CPT

## 2025-05-30 PROCEDURE — 97110 THERAPEUTIC EXERCISES: CPT

## 2025-05-30 PROCEDURE — 6370000000 HC RX 637 (ALT 250 FOR IP): Performed by: PSYCHIATRY & NEUROLOGY

## 2025-05-30 PROCEDURE — 82962 GLUCOSE BLOOD TEST: CPT

## 2025-05-30 PROCEDURE — 97535 SELF CARE MNGMENT TRAINING: CPT

## 2025-05-30 PROCEDURE — 97116 GAIT TRAINING THERAPY: CPT

## 2025-05-30 PROCEDURE — 99232 SBSQ HOSP IP/OBS MODERATE 35: CPT | Performed by: PSYCHIATRY & NEUROLOGY

## 2025-05-30 PROCEDURE — 94150 VITAL CAPACITY TEST: CPT

## 2025-05-30 RX ORDER — ONDANSETRON 4 MG/1
4 TABLET, ORALLY DISINTEGRATING ORAL EVERY 4 HOURS PRN
Status: DISCONTINUED | OUTPATIENT
Start: 2025-05-30 | End: 2025-06-06 | Stop reason: HOSPADM

## 2025-05-30 RX ORDER — LACTULOSE 10 G/15ML
20 SOLUTION ORAL
Status: DISCONTINUED | OUTPATIENT
Start: 2025-05-30 | End: 2025-05-30

## 2025-05-30 RX ADMIN — PREDNISONE 5 MG: 5 TABLET ORAL at 08:11

## 2025-05-30 RX ADMIN — HYDROCORTISONE ACETATE: 1 CREAM TOPICAL at 20:46

## 2025-05-30 RX ADMIN — OXYCODONE 10 MG: 5 TABLET ORAL at 05:54

## 2025-05-30 RX ADMIN — HYDROCORTISONE ACETATE: 1 CREAM TOPICAL at 08:11

## 2025-05-30 RX ADMIN — LEVOTHYROXINE SODIUM 100 MCG: 100 TABLET ORAL at 05:53

## 2025-05-30 RX ADMIN — Medication 1 TABLET: at 20:47

## 2025-05-30 RX ADMIN — TERBINAFINE 250 MG: 250 TABLET ORAL at 20:47

## 2025-05-30 RX ADMIN — METOPROLOL SUCCINATE 50 MG: 50 TABLET, EXTENDED RELEASE ORAL at 16:52

## 2025-05-30 RX ADMIN — METOPROLOL SUCCINATE 25 MG: 25 TABLET, EXTENDED RELEASE ORAL at 08:11

## 2025-05-30 RX ADMIN — CYANOCOBALAMIN TAB 500 MCG 1000 MCG: 500 TAB at 20:47

## 2025-05-30 RX ADMIN — ONDANSETRON 4 MG: 4 TABLET, ORALLY DISINTEGRATING ORAL at 10:48

## 2025-05-30 RX ADMIN — PREDNISONE 5 MG: 5 TABLET ORAL at 20:47

## 2025-05-30 RX ADMIN — Medication 1 TABLET: at 08:11

## 2025-05-30 RX ADMIN — CYANOCOBALAMIN TAB 500 MCG 1000 MCG: 500 TAB at 08:11

## 2025-05-30 RX ADMIN — OXYCODONE 10 MG: 5 TABLET ORAL at 16:28

## 2025-05-30 ASSESSMENT — PAIN SCALES - GENERAL
PAINLEVEL_OUTOF10: 0
PAINLEVEL_OUTOF10: 7
PAINLEVEL_OUTOF10: 0
PAINLEVEL_OUTOF10: 6
PAINLEVEL_OUTOF10: 4

## 2025-05-30 ASSESSMENT — PAIN DESCRIPTION - ORIENTATION: ORIENTATION: LOWER;MID

## 2025-05-30 ASSESSMENT — PAIN DESCRIPTION - DESCRIPTORS: DESCRIPTORS: ACHING

## 2025-05-30 ASSESSMENT — PAIN - FUNCTIONAL ASSESSMENT: PAIN_FUNCTIONAL_ASSESSMENT: ACTIVITIES ARE NOT PREVENTED

## 2025-05-30 ASSESSMENT — PAIN DESCRIPTION - LOCATION
LOCATION: HIP
LOCATION: BACK;SACRUM

## 2025-05-31 PROCEDURE — 97535 SELF CARE MNGMENT TRAINING: CPT

## 2025-05-31 PROCEDURE — 97116 GAIT TRAINING THERAPY: CPT

## 2025-05-31 PROCEDURE — 97110 THERAPEUTIC EXERCISES: CPT

## 2025-05-31 PROCEDURE — 1180000000 HC REHAB R&B

## 2025-05-31 PROCEDURE — 6370000000 HC RX 637 (ALT 250 FOR IP): Performed by: PSYCHIATRY & NEUROLOGY

## 2025-05-31 PROCEDURE — 97530 THERAPEUTIC ACTIVITIES: CPT

## 2025-05-31 PROCEDURE — 94150 VITAL CAPACITY TEST: CPT

## 2025-05-31 PROCEDURE — 94760 N-INVAS EAR/PLS OXIMETRY 1: CPT

## 2025-05-31 RX ADMIN — LEVOTHYROXINE SODIUM 100 MCG: 100 TABLET ORAL at 06:00

## 2025-05-31 RX ADMIN — Medication 1 TABLET: at 08:18

## 2025-05-31 RX ADMIN — Medication 1 TABLET: at 20:36

## 2025-05-31 RX ADMIN — CYANOCOBALAMIN TAB 500 MCG 1000 MCG: 500 TAB at 08:18

## 2025-05-31 RX ADMIN — PREDNISONE 5 MG: 5 TABLET ORAL at 20:36

## 2025-05-31 RX ADMIN — TERBINAFINE 250 MG: 250 TABLET ORAL at 20:36

## 2025-05-31 RX ADMIN — METOPROLOL SUCCINATE 25 MG: 25 TABLET, EXTENDED RELEASE ORAL at 08:18

## 2025-05-31 RX ADMIN — OXYCODONE 10 MG: 5 TABLET ORAL at 17:38

## 2025-05-31 RX ADMIN — OXYCODONE 10 MG: 5 TABLET ORAL at 06:00

## 2025-05-31 RX ADMIN — CYANOCOBALAMIN TAB 500 MCG 1000 MCG: 500 TAB at 20:36

## 2025-05-31 RX ADMIN — PREDNISONE 5 MG: 5 TABLET ORAL at 08:19

## 2025-05-31 RX ADMIN — METOPROLOL SUCCINATE 50 MG: 50 TABLET, EXTENDED RELEASE ORAL at 17:38

## 2025-05-31 RX ADMIN — HYDROCORTISONE ACETATE: 1 CREAM TOPICAL at 20:37

## 2025-05-31 RX ADMIN — HYDROCORTISONE ACETATE: 1 CREAM TOPICAL at 08:19

## 2025-05-31 ASSESSMENT — PAIN DESCRIPTION - DESCRIPTORS
DESCRIPTORS: ACHING
DESCRIPTORS: ACHING

## 2025-05-31 ASSESSMENT — PAIN SCALES - GENERAL
PAINLEVEL_OUTOF10: 0
PAINLEVEL_OUTOF10: 5
PAINLEVEL_OUTOF10: 6
PAINLEVEL_OUTOF10: 8

## 2025-05-31 ASSESSMENT — PAIN DESCRIPTION - LOCATION
LOCATION: BACK;SACRUM
LOCATION: LEG

## 2025-05-31 ASSESSMENT — PAIN - FUNCTIONAL ASSESSMENT
PAIN_FUNCTIONAL_ASSESSMENT: ACTIVITIES ARE NOT PREVENTED
PAIN_FUNCTIONAL_ASSESSMENT: ACTIVITIES ARE NOT PREVENTED

## 2025-05-31 ASSESSMENT — PAIN DESCRIPTION - FREQUENCY: FREQUENCY: INTERMITTENT

## 2025-05-31 ASSESSMENT — PAIN DESCRIPTION - ORIENTATION
ORIENTATION: RIGHT
ORIENTATION: LOWER;MID

## 2025-05-31 ASSESSMENT — PAIN DESCRIPTION - ONSET: ONSET: ON-GOING

## 2025-05-31 ASSESSMENT — PAIN DESCRIPTION - PAIN TYPE: TYPE: ACUTE PAIN

## 2025-06-01 PROCEDURE — 94760 N-INVAS EAR/PLS OXIMETRY 1: CPT

## 2025-06-01 PROCEDURE — 94150 VITAL CAPACITY TEST: CPT

## 2025-06-01 PROCEDURE — 6370000000 HC RX 637 (ALT 250 FOR IP): Performed by: PSYCHIATRY & NEUROLOGY

## 2025-06-01 PROCEDURE — 1180000000 HC REHAB R&B

## 2025-06-01 RX ADMIN — OXYCODONE 10 MG: 5 TABLET ORAL at 16:21

## 2025-06-01 RX ADMIN — METOPROLOL SUCCINATE 25 MG: 25 TABLET, EXTENDED RELEASE ORAL at 08:32

## 2025-06-01 RX ADMIN — PREDNISONE 5 MG: 5 TABLET ORAL at 20:18

## 2025-06-01 RX ADMIN — HYDROCORTISONE ACETATE: 1 CREAM TOPICAL at 20:19

## 2025-06-01 RX ADMIN — CYANOCOBALAMIN TAB 500 MCG 1000 MCG: 500 TAB at 20:18

## 2025-06-01 RX ADMIN — Medication 1 TABLET: at 08:32

## 2025-06-01 RX ADMIN — METOPROLOL SUCCINATE 50 MG: 50 TABLET, EXTENDED RELEASE ORAL at 17:07

## 2025-06-01 RX ADMIN — PREDNISONE 5 MG: 5 TABLET ORAL at 08:32

## 2025-06-01 RX ADMIN — CYANOCOBALAMIN TAB 500 MCG 1000 MCG: 500 TAB at 08:32

## 2025-06-01 RX ADMIN — TERBINAFINE 250 MG: 250 TABLET ORAL at 20:18

## 2025-06-01 RX ADMIN — Medication 1 TABLET: at 20:18

## 2025-06-01 RX ADMIN — OXYCODONE 10 MG: 5 TABLET ORAL at 00:54

## 2025-06-01 RX ADMIN — LEVOTHYROXINE SODIUM 100 MCG: 100 TABLET ORAL at 05:43

## 2025-06-01 RX ADMIN — OXYCODONE 10 MG: 5 TABLET ORAL at 08:31

## 2025-06-01 ASSESSMENT — PAIN DESCRIPTION - DESCRIPTORS
DESCRIPTORS: ACHING
DESCRIPTORS: ACHING;DISCOMFORT
DESCRIPTORS: ACHING

## 2025-06-01 ASSESSMENT — PAIN DESCRIPTION - ONSET: ONSET: ON-GOING

## 2025-06-01 ASSESSMENT — PAIN DESCRIPTION - LOCATION
LOCATION: BACK
LOCATION: BACK
LOCATION: LEG

## 2025-06-01 ASSESSMENT — PAIN DESCRIPTION - FREQUENCY: FREQUENCY: INTERMITTENT

## 2025-06-01 ASSESSMENT — PAIN DESCRIPTION - PAIN TYPE: TYPE: ACUTE PAIN

## 2025-06-01 ASSESSMENT — PAIN DESCRIPTION - ORIENTATION: ORIENTATION: RIGHT

## 2025-06-01 ASSESSMENT — PAIN SCALES - GENERAL
PAINLEVEL_OUTOF10: 0
PAINLEVEL_OUTOF10: 7
PAINLEVEL_OUTOF10: 6

## 2025-06-02 LAB
ALBUMIN SERPL-MCNC: 3.2 G/DL (ref 3.5–5.2)
ALP SERPL-CCNC: 188 U/L (ref 35–104)
ALT SERPL-CCNC: 42 U/L (ref 10–35)
ANION GAP SERPL CALCULATED.3IONS-SCNC: 8 MMOL/L (ref 8–16)
AST SERPL-CCNC: 36 U/L (ref 10–35)
BASOPHILS # BLD: 0.1 K/UL (ref 0–0.2)
BASOPHILS NFR BLD: 1 % (ref 0–1)
BILIRUB SERPL-MCNC: 0.3 MG/DL (ref 0.2–1.2)
BUN SERPL-MCNC: 9 MG/DL (ref 8–23)
CALCIUM SERPL-MCNC: 9.4 MG/DL (ref 8.8–10.2)
CHLORIDE SERPL-SCNC: 98 MMOL/L (ref 98–107)
CO2 SERPL-SCNC: 27 MMOL/L (ref 22–29)
CREAT SERPL-MCNC: 0.8 MG/DL (ref 0.5–0.9)
EOSINOPHIL # BLD: 0.2 K/UL (ref 0–0.6)
EOSINOPHIL NFR BLD: 1.9 % (ref 0–5)
ERYTHROCYTE [DISTWIDTH] IN BLOOD BY AUTOMATED COUNT: 14.6 % (ref 11.5–14.5)
GLUCOSE SERPL-MCNC: 102 MG/DL (ref 70–99)
HCT VFR BLD AUTO: 38 % (ref 37–47)
HGB BLD-MCNC: 11.9 G/DL (ref 12–16)
IMM GRANULOCYTES # BLD: 0.3 K/UL
LYMPHOCYTES # BLD: 2 K/UL (ref 1.1–4.5)
LYMPHOCYTES NFR BLD: 26.1 % (ref 20–40)
MCH RBC QN AUTO: 26.4 PG (ref 27–31)
MCHC RBC AUTO-ENTMCNC: 31.3 G/DL (ref 33–37)
MCV RBC AUTO: 84.4 FL (ref 81–99)
MONOCYTES # BLD: 1.2 K/UL (ref 0–0.9)
MONOCYTES NFR BLD: 15.9 % (ref 0–10)
NEUTROPHILS # BLD: 4 K/UL (ref 1.5–7.5)
NEUTS SEG NFR BLD: 51.6 % (ref 50–65)
PLATELET # BLD AUTO: 358 K/UL (ref 130–400)
PMV BLD AUTO: 9 FL (ref 9.4–12.3)
POTASSIUM SERPL-SCNC: 4.2 MMOL/L (ref 3.5–5)
PROT SERPL-MCNC: 5.3 G/DL (ref 6.4–8.3)
RBC # BLD AUTO: 4.5 M/UL (ref 4.2–5.4)
SODIUM SERPL-SCNC: 133 MMOL/L (ref 136–145)
WBC # BLD AUTO: 7.8 K/UL (ref 4.8–10.8)

## 2025-06-02 PROCEDURE — 85025 COMPLETE CBC W/AUTO DIFF WBC: CPT

## 2025-06-02 PROCEDURE — 97530 THERAPEUTIC ACTIVITIES: CPT

## 2025-06-02 PROCEDURE — 97110 THERAPEUTIC EXERCISES: CPT

## 2025-06-02 PROCEDURE — 94150 VITAL CAPACITY TEST: CPT

## 2025-06-02 PROCEDURE — 97116 GAIT TRAINING THERAPY: CPT

## 2025-06-02 PROCEDURE — 36415 COLL VENOUS BLD VENIPUNCTURE: CPT

## 2025-06-02 PROCEDURE — 1180000000 HC REHAB R&B

## 2025-06-02 PROCEDURE — 80053 COMPREHEN METABOLIC PANEL: CPT

## 2025-06-02 PROCEDURE — 6370000000 HC RX 637 (ALT 250 FOR IP): Performed by: PSYCHIATRY & NEUROLOGY

## 2025-06-02 PROCEDURE — 99232 SBSQ HOSP IP/OBS MODERATE 35: CPT | Performed by: PSYCHIATRY & NEUROLOGY

## 2025-06-02 PROCEDURE — 94760 N-INVAS EAR/PLS OXIMETRY 1: CPT

## 2025-06-02 RX ORDER — HYDROCORTISONE 25 MG/G
CREAM TOPICAL 2 TIMES DAILY
Status: DISCONTINUED | OUTPATIENT
Start: 2025-06-02 | End: 2025-06-06 | Stop reason: HOSPADM

## 2025-06-02 RX ADMIN — OXYCODONE 10 MG: 5 TABLET ORAL at 10:07

## 2025-06-02 RX ADMIN — TERBINAFINE 250 MG: 250 TABLET ORAL at 21:31

## 2025-06-02 RX ADMIN — LEVOTHYROXINE SODIUM 100 MCG: 100 TABLET ORAL at 05:54

## 2025-06-02 RX ADMIN — CYANOCOBALAMIN TAB 500 MCG 1000 MCG: 500 TAB at 21:31

## 2025-06-02 RX ADMIN — Medication 1 TABLET: at 08:26

## 2025-06-02 RX ADMIN — PREDNISONE 5 MG: 5 TABLET ORAL at 21:31

## 2025-06-02 RX ADMIN — METOPROLOL SUCCINATE 50 MG: 50 TABLET, EXTENDED RELEASE ORAL at 17:05

## 2025-06-02 RX ADMIN — METOPROLOL SUCCINATE 25 MG: 25 TABLET, EXTENDED RELEASE ORAL at 08:26

## 2025-06-02 RX ADMIN — CYANOCOBALAMIN TAB 500 MCG 1000 MCG: 500 TAB at 08:26

## 2025-06-02 RX ADMIN — HYDROCORTISONE ACETATE: 1 CREAM TOPICAL at 08:27

## 2025-06-02 RX ADMIN — Medication 1 TABLET: at 21:31

## 2025-06-02 RX ADMIN — PREDNISONE 5 MG: 5 TABLET ORAL at 08:26

## 2025-06-02 RX ADMIN — OXYCODONE 10 MG: 5 TABLET ORAL at 03:43

## 2025-06-02 RX ADMIN — HYDROCORTISONE: 25 CREAM TOPICAL at 21:31

## 2025-06-02 RX ADMIN — OXYCODONE 10 MG: 5 TABLET ORAL at 17:08

## 2025-06-02 ASSESSMENT — PAIN DESCRIPTION - PAIN TYPE: TYPE: ACUTE PAIN

## 2025-06-02 ASSESSMENT — PAIN DESCRIPTION - LOCATION
LOCATION: HIP
LOCATION: BACK
LOCATION: BACK;HIP

## 2025-06-02 ASSESSMENT — PAIN - FUNCTIONAL ASSESSMENT
PAIN_FUNCTIONAL_ASSESSMENT: ACTIVITIES ARE NOT PREVENTED

## 2025-06-02 ASSESSMENT — PAIN DESCRIPTION - FREQUENCY: FREQUENCY: INTERMITTENT

## 2025-06-02 ASSESSMENT — PAIN SCALES - GENERAL
PAINLEVEL_OUTOF10: 7
PAINLEVEL_OUTOF10: 8
PAINLEVEL_OUTOF10: 4
PAINLEVEL_OUTOF10: 5
PAINLEVEL_OUTOF10: 7
PAINLEVEL_OUTOF10: 0

## 2025-06-02 ASSESSMENT — PAIN DESCRIPTION - DESCRIPTORS
DESCRIPTORS: ACHING

## 2025-06-02 ASSESSMENT — PAIN DESCRIPTION - ORIENTATION
ORIENTATION: RIGHT;LEFT;LOWER
ORIENTATION: RIGHT
ORIENTATION: RIGHT

## 2025-06-02 ASSESSMENT — PAIN DESCRIPTION - ONSET: ONSET: ON-GOING

## 2025-06-02 NOTE — PATIENT CARE CONFERENCE
[facility] ACUTE INPATIENT REHABILITATION  TEAM CONFERENCE NOTE    Date: 6/3/2025  Patient Name: Chio Zamarripa        MRN: [id number]    : 1935  (89 y.o.)  Gender: female      Diagnosis: other orthopedic, osteoporosis with pathological fx s/p kyphoplasty and scaroplasty per Dr. Terrell  and       PHYSICAL THERAPY  GROSS ASSESSMENT       BED MOBILITY  Bed mobility  Rolling to Left: Modified independent  Rolling to Right: Modified independent  Supine to Sit: Modified independent  Sit to Supine: Modified independent  Scooting: Modified independent  Bed Mobility Comments: sitting eob, not tested       TRANSFERS  Transfers  Sit to Stand: Modified independent  Stand to Sit: Modified independent  Bed to Chair: Modified independent  Car Transfer: Stand by assistance, Contact guard assistance (with rw)  WHEELCHAIR PROPULSION  Propulsion 1  Propulsion: Manual  Level: Level Tile  Method: TRU DURAN  Level of Assistance: Stand by assistance  Description/ Details: 2 turns  Distance: 50  AMBULATION  Ambulation  Surface: Level tile  Device: Rolling Walker  Other Apparatus: Wheelchair follow (For some- not necessary)  Assistance: Supervision  Quality of Gait: short reciprocal steps, no lob or balance correction required  Gait Deviations: Decreased step length, Slow Ayse  Distance: 200'  Comments: pt prefers traditional RW to rollator  More Ambulation?: Yes  STAIRS  Stairs  # Steps : 7  Stairs Height: 6\"  Rails: Bilateral  Device: No Device  Assistance: Contact guard assistance  GOALS:  Short Term Goals  Time Frame for Short Term Goals: 1 WK  Short Term Goal 1: INDEP ON BED MOBILITY  Short Term Goal 2: SBA TRANSFERS  Short Term Goal 3: SBA AMB 50 FEET WITH AD  Short Term Goal 4: INDEP W/C PROPEL 50 FEET  Short Term Goal 5: SBA CAR TRANSFER    Long Term Goals  Time Frame for Long Term Goals : 2W  Long Term Goal 1: INDEP TRANSFERS  Long Term Goal 2: INDEP AMB 50 FEET WITH AD  Long Term Goal 3: INDEP CAR TRANSFERS  Long

## 2025-06-03 PROCEDURE — 99232 SBSQ HOSP IP/OBS MODERATE 35: CPT | Performed by: PSYCHIATRY & NEUROLOGY

## 2025-06-03 PROCEDURE — 1180000000 HC REHAB R&B

## 2025-06-03 PROCEDURE — 97116 GAIT TRAINING THERAPY: CPT

## 2025-06-03 PROCEDURE — 2700000000 HC OXYGEN THERAPY PER DAY

## 2025-06-03 PROCEDURE — 94760 N-INVAS EAR/PLS OXIMETRY 1: CPT

## 2025-06-03 PROCEDURE — 94150 VITAL CAPACITY TEST: CPT

## 2025-06-03 PROCEDURE — 97530 THERAPEUTIC ACTIVITIES: CPT

## 2025-06-03 PROCEDURE — 97110 THERAPEUTIC EXERCISES: CPT

## 2025-06-03 PROCEDURE — 6370000000 HC RX 637 (ALT 250 FOR IP): Performed by: PSYCHIATRY & NEUROLOGY

## 2025-06-03 RX ORDER — PREDNISONE 5 MG/1
5 TABLET ORAL 2 TIMES DAILY
Qty: 60 TABLET | Refills: 0 | Status: SHIPPED | OUTPATIENT
Start: 2025-06-03 | End: 2025-07-03

## 2025-06-03 RX ORDER — M-VIT,TX,IRON,MINS/CALC/FOLIC 27MG-0.4MG
1 TABLET ORAL DAILY
Qty: 30 TABLET | Refills: 0 | Status: SHIPPED | OUTPATIENT
Start: 2025-06-03 | End: 2025-06-03

## 2025-06-03 RX ORDER — OXYCODONE HYDROCHLORIDE 10 MG/1
10 TABLET ORAL EVERY 8 HOURS PRN
Qty: 90 TABLET | Refills: 0 | Status: SHIPPED | OUTPATIENT
Start: 2025-06-03 | End: 2025-07-03

## 2025-06-03 RX ORDER — METOPROLOL SUCCINATE 25 MG/1
25 TABLET, EXTENDED RELEASE ORAL DAILY
Qty: 30 TABLET | Refills: 0 | Status: SHIPPED | OUTPATIENT
Start: 2025-06-03

## 2025-06-03 RX ORDER — LEVOTHYROXINE SODIUM 100 UG/1
100 TABLET ORAL
Qty: 30 TABLET | Refills: 0 | Status: SHIPPED | OUTPATIENT
Start: 2025-06-04

## 2025-06-03 RX ORDER — POLYETHYLENE GLYCOL 3350 17 G/17G
17 POWDER, FOR SOLUTION ORAL DAILY PRN
Qty: 527 G | Refills: 0 | Status: SHIPPED | OUTPATIENT
Start: 2025-06-03 | End: 2025-07-03

## 2025-06-03 RX ORDER — PREDNISONE 5 MG/1
5 TABLET ORAL 2 TIMES DAILY
Qty: 60 TABLET | Refills: 0 | Status: SHIPPED | OUTPATIENT
Start: 2025-06-03 | End: 2025-06-03

## 2025-06-03 RX ORDER — LEVOTHYROXINE SODIUM 100 UG/1
100 TABLET ORAL
Qty: 30 TABLET | Refills: 0 | Status: SHIPPED | OUTPATIENT
Start: 2025-06-04 | End: 2025-06-03

## 2025-06-03 RX ORDER — TERBINAFINE HYDROCHLORIDE 250 MG/1
250 TABLET ORAL NIGHTLY
Qty: 30 TABLET | Refills: 0 | Status: SHIPPED | OUTPATIENT
Start: 2025-06-03 | End: 2025-06-03

## 2025-06-03 RX ORDER — POLYETHYLENE GLYCOL 3350 17 G/17G
17 POWDER, FOR SOLUTION ORAL DAILY PRN
Qty: 527 G | Refills: 0 | Status: SHIPPED | OUTPATIENT
Start: 2025-06-03 | End: 2025-06-03

## 2025-06-03 RX ORDER — HYDROCORTISONE 25 MG/G
CREAM TOPICAL
Qty: 1 EACH | Refills: 0 | Status: SHIPPED | OUTPATIENT
Start: 2025-06-03

## 2025-06-03 RX ORDER — TERBINAFINE HYDROCHLORIDE 250 MG/1
250 TABLET ORAL NIGHTLY
Qty: 30 TABLET | Refills: 0 | Status: SHIPPED | OUTPATIENT
Start: 2025-06-03

## 2025-06-03 RX ORDER — METOPROLOL SUCCINATE 50 MG/1
50 TABLET, EXTENDED RELEASE ORAL EVERY EVENING
Qty: 30 TABLET | Refills: 0 | Status: SHIPPED | OUTPATIENT
Start: 2025-06-03

## 2025-06-03 RX ORDER — LANOLIN ALCOHOL/MO/W.PET/CERES
1000 CREAM (GRAM) TOPICAL 2 TIMES DAILY
Qty: 60 TABLET | Refills: 0 | Status: SHIPPED | OUTPATIENT
Start: 2025-06-03 | End: 2025-06-03

## 2025-06-03 RX ORDER — OXYCODONE HYDROCHLORIDE 10 MG/1
10 TABLET ORAL EVERY 8 HOURS PRN
Qty: 90 TABLET | Refills: 0 | Status: SHIPPED | OUTPATIENT
Start: 2025-06-03 | End: 2025-06-03

## 2025-06-03 RX ORDER — M-VIT,TX,IRON,MINS/CALC/FOLIC 27MG-0.4MG
1 TABLET ORAL DAILY
Qty: 30 TABLET | Refills: 0 | Status: SHIPPED | OUTPATIENT
Start: 2025-06-03

## 2025-06-03 RX ORDER — METOPROLOL SUCCINATE 25 MG/1
25 TABLET, EXTENDED RELEASE ORAL DAILY
Qty: 30 TABLET | Refills: 0 | Status: SHIPPED | OUTPATIENT
Start: 2025-06-03 | End: 2025-06-03

## 2025-06-03 RX ORDER — HYDROCORTISONE 25 MG/G
CREAM TOPICAL
Qty: 1 EACH | Refills: 0 | Status: SHIPPED | OUTPATIENT
Start: 2025-06-03 | End: 2025-06-03

## 2025-06-03 RX ORDER — METOPROLOL SUCCINATE 50 MG/1
50 TABLET, EXTENDED RELEASE ORAL EVERY EVENING
Qty: 30 TABLET | Refills: 0 | Status: SHIPPED | OUTPATIENT
Start: 2025-06-03 | End: 2025-06-03

## 2025-06-03 RX ORDER — LANOLIN ALCOHOL/MO/W.PET/CERES
1000 CREAM (GRAM) TOPICAL 2 TIMES DAILY
Qty: 60 TABLET | Refills: 0 | Status: SHIPPED | OUTPATIENT
Start: 2025-06-03

## 2025-06-03 RX ADMIN — TERBINAFINE 250 MG: 250 TABLET ORAL at 20:51

## 2025-06-03 RX ADMIN — CYANOCOBALAMIN TAB 500 MCG 1000 MCG: 500 TAB at 20:51

## 2025-06-03 RX ADMIN — OXYCODONE 10 MG: 5 TABLET ORAL at 17:03

## 2025-06-03 RX ADMIN — OXYCODONE 10 MG: 5 TABLET ORAL at 12:15

## 2025-06-03 RX ADMIN — PREDNISONE 5 MG: 5 TABLET ORAL at 08:47

## 2025-06-03 RX ADMIN — HYDROCORTISONE: 25 CREAM TOPICAL at 20:53

## 2025-06-03 RX ADMIN — LEVOTHYROXINE SODIUM 100 MCG: 100 TABLET ORAL at 05:52

## 2025-06-03 RX ADMIN — Medication 1 TABLET: at 08:47

## 2025-06-03 RX ADMIN — Medication 1 TABLET: at 20:51

## 2025-06-03 RX ADMIN — METOPROLOL SUCCINATE 25 MG: 25 TABLET, EXTENDED RELEASE ORAL at 08:47

## 2025-06-03 RX ADMIN — METOPROLOL SUCCINATE 50 MG: 50 TABLET, EXTENDED RELEASE ORAL at 17:03

## 2025-06-03 RX ADMIN — PREDNISONE 5 MG: 5 TABLET ORAL at 20:51

## 2025-06-03 RX ADMIN — HYDROCORTISONE: 25 CREAM TOPICAL at 08:48

## 2025-06-03 RX ADMIN — OXYCODONE 10 MG: 5 TABLET ORAL at 05:52

## 2025-06-03 RX ADMIN — CYANOCOBALAMIN TAB 500 MCG 1000 MCG: 500 TAB at 08:47

## 2025-06-03 ASSESSMENT — PAIN SCALES - GENERAL
PAINLEVEL_OUTOF10: 7
PAINLEVEL_OUTOF10: 3
PAINLEVEL_OUTOF10: 3
PAINLEVEL_OUTOF10: 0
PAINLEVEL_OUTOF10: 6
PAINLEVEL_OUTOF10: 6

## 2025-06-03 ASSESSMENT — PAIN DESCRIPTION - DESCRIPTORS
DESCRIPTORS: ACHING
DESCRIPTORS: ACHING;BURNING
DESCRIPTORS: ACHING

## 2025-06-03 ASSESSMENT — PAIN DESCRIPTION - LOCATION
LOCATION: SHOULDER
LOCATION: BACK;HIP
LOCATION: BUTTOCKS;BACK

## 2025-06-03 ASSESSMENT — PAIN DESCRIPTION - ORIENTATION
ORIENTATION: RIGHT;LEFT;LOWER
ORIENTATION: LOWER
ORIENTATION: RIGHT;LEFT

## 2025-06-03 ASSESSMENT — PAIN DESCRIPTION - PAIN TYPE: TYPE: ACUTE PAIN

## 2025-06-03 ASSESSMENT — PAIN DESCRIPTION - ONSET: ONSET: ON-GOING

## 2025-06-03 ASSESSMENT — PAIN DESCRIPTION - FREQUENCY: FREQUENCY: INTERMITTENT

## 2025-06-03 ASSESSMENT — PAIN - FUNCTIONAL ASSESSMENT
PAIN_FUNCTIONAL_ASSESSMENT: ACTIVITIES ARE NOT PREVENTED

## 2025-06-03 NOTE — DISCHARGE SUMMARY
Neurology Discharge Summary     Patient Identification:  Chio Zamarripa is a 89 y.o. female.  :  1935  Admit Date:  2025  Discharge date : 2025   Attending Provider: Juan M Fierro MD     Account Number: [id number]                                   Admission Diagnoses:   Closed fracture of sacrum, sequela [S32.10XS]    Discharge Diagnoses:  Principal Problem:    Sacral fracture (HCC)  Active Problems:    Closed fracture of sacrum, sequela    Moderate malnutrition    Acquired hypothyroidism    Essential hypertension    Polymyalgia rheumatica  Resolved Problems:    * No resolved hospital problems. *      Discharge Medications:    Current Discharge Medication List             Details   hydrocortisone (ANUSOL-HC) 2.5 % CREA rectal cream Daily prn as needed for hemorrhoids  Qty: 1 each, Refills: 0      polyethylene glycol (GLYCOLAX) 17 g packet Take 1 packet by mouth daily as needed for Constipation  Qty: 527 g, Refills: 0      psyllium husk-aspartame (METAMUCIL FIBER) 51.7 % PACK packet Take 1 packet by mouth at bedtime  Qty: 30 each, Refills: 0      Calcium Carb-Cholecalciferol (OYSTER SHELL CALCIUM W/D) 500-5 MG-MCG TABS tablet Take 1 tablet by mouth 2 times daily  Qty: 60 tablet, Refills: 0                Details   oxyCODONE HCl (OXY-IR) 10 MG immediate release tablet Take 1 tablet by mouth every 8 hours as needed for Pain for up to 30 days. Max Daily Amount: 30 mg  Qty: 90 tablet, Refills: 0    Comments: Reduce doses taken as pain becomes manageable  Associated Diagnoses: Polymyalgia rheumatica; Closed fracture of sacrum, sequela      terbinafine (LAMISIL) 250 MG tablet Take 1 tablet by mouth at bedtime  Qty: 30 tablet, Refills: 0      !! metoprolol succinate (TOPROL XL) 25 MG extended release tablet Take 1 tablet by mouth daily  Qty: 30 tablet, Refills: 0      !! metoprolol succinate (TOPROL XL) 50 MG extended release tablet Take 1 tablet by mouth every evening  Qty: 30 tablet, Refills: 0

## 2025-06-04 PROCEDURE — 94760 N-INVAS EAR/PLS OXIMETRY 1: CPT

## 2025-06-04 PROCEDURE — 2700000000 HC OXYGEN THERAPY PER DAY

## 2025-06-04 PROCEDURE — 97530 THERAPEUTIC ACTIVITIES: CPT

## 2025-06-04 PROCEDURE — 97535 SELF CARE MNGMENT TRAINING: CPT

## 2025-06-04 PROCEDURE — 97116 GAIT TRAINING THERAPY: CPT

## 2025-06-04 PROCEDURE — 6370000000 HC RX 637 (ALT 250 FOR IP): Performed by: PSYCHIATRY & NEUROLOGY

## 2025-06-04 PROCEDURE — 1180000000 HC REHAB R&B

## 2025-06-04 PROCEDURE — 99232 SBSQ HOSP IP/OBS MODERATE 35: CPT | Performed by: PSYCHIATRY & NEUROLOGY

## 2025-06-04 PROCEDURE — 97110 THERAPEUTIC EXERCISES: CPT

## 2025-06-04 PROCEDURE — 94150 VITAL CAPACITY TEST: CPT

## 2025-06-04 RX ORDER — DOCUSATE SODIUM 100 MG/1
100 CAPSULE, LIQUID FILLED ORAL 2 TIMES DAILY
Status: DISCONTINUED | OUTPATIENT
Start: 2025-06-04 | End: 2025-06-06 | Stop reason: HOSPADM

## 2025-06-04 RX ADMIN — CYANOCOBALAMIN TAB 500 MCG 1000 MCG: 500 TAB at 07:52

## 2025-06-04 RX ADMIN — Medication 1 TABLET: at 07:52

## 2025-06-04 RX ADMIN — METOPROLOL SUCCINATE 25 MG: 25 TABLET, EXTENDED RELEASE ORAL at 07:52

## 2025-06-04 RX ADMIN — OXYCODONE 10 MG: 5 TABLET ORAL at 20:49

## 2025-06-04 RX ADMIN — CYANOCOBALAMIN TAB 500 MCG 1000 MCG: 500 TAB at 20:48

## 2025-06-04 RX ADMIN — TERBINAFINE 250 MG: 250 TABLET ORAL at 20:48

## 2025-06-04 RX ADMIN — METOPROLOL SUCCINATE 50 MG: 50 TABLET, EXTENDED RELEASE ORAL at 17:09

## 2025-06-04 RX ADMIN — OXYCODONE 10 MG: 5 TABLET ORAL at 12:18

## 2025-06-04 RX ADMIN — HYDROCORTISONE: 25 CREAM TOPICAL at 07:54

## 2025-06-04 RX ADMIN — PREDNISONE 5 MG: 5 TABLET ORAL at 20:49

## 2025-06-04 RX ADMIN — LEVOTHYROXINE SODIUM 100 MCG: 100 TABLET ORAL at 05:54

## 2025-06-04 RX ADMIN — Medication 1 TABLET: at 20:48

## 2025-06-04 RX ADMIN — DOCUSATE SODIUM 100 MG: 100 CAPSULE, LIQUID FILLED ORAL at 20:48

## 2025-06-04 RX ADMIN — PREDNISONE 5 MG: 5 TABLET ORAL at 07:52

## 2025-06-04 RX ADMIN — OXYCODONE 10 MG: 5 TABLET ORAL at 02:39

## 2025-06-04 RX ADMIN — DOCUSATE SODIUM 100 MG: 100 CAPSULE, LIQUID FILLED ORAL at 12:18

## 2025-06-04 ASSESSMENT — PAIN DESCRIPTION - LOCATION
LOCATION: BUTTOCKS;BACK
LOCATION: BACK
LOCATION: BACK

## 2025-06-04 ASSESSMENT — PAIN SCALES - GENERAL
PAINLEVEL_OUTOF10: 3
PAINLEVEL_OUTOF10: 7
PAINLEVEL_OUTOF10: 0
PAINLEVEL_OUTOF10: 7
PAINLEVEL_OUTOF10: 7

## 2025-06-04 ASSESSMENT — PAIN DESCRIPTION - ONSET: ONSET: ON-GOING

## 2025-06-04 ASSESSMENT — PAIN DESCRIPTION - DESCRIPTORS
DESCRIPTORS: ACHING
DESCRIPTORS: ACHING;DISCOMFORT
DESCRIPTORS: ACHING

## 2025-06-04 ASSESSMENT — PAIN DESCRIPTION - ORIENTATION
ORIENTATION: LOWER
ORIENTATION: MID

## 2025-06-04 ASSESSMENT — PAIN DESCRIPTION - PAIN TYPE: TYPE: ACUTE PAIN

## 2025-06-04 ASSESSMENT — PAIN DESCRIPTION - FREQUENCY: FREQUENCY: INTERMITTENT

## 2025-06-05 LAB
ALBUMIN SERPL-MCNC: 3.2 G/DL (ref 3.5–5.2)
ALP SERPL-CCNC: 185 U/L (ref 35–104)
ALT SERPL-CCNC: 38 U/L (ref 10–35)
ANION GAP SERPL CALCULATED.3IONS-SCNC: 8 MMOL/L (ref 8–16)
AST SERPL-CCNC: 20 U/L (ref 10–35)
BASOPHILS # BLD: 0.1 K/UL (ref 0–0.2)
BASOPHILS NFR BLD: 0.8 % (ref 0–1)
BILIRUB SERPL-MCNC: 0.3 MG/DL (ref 0.2–1.2)
BUN SERPL-MCNC: 7 MG/DL (ref 8–23)
CALCIUM SERPL-MCNC: 9.2 MG/DL (ref 8.8–10.2)
CHLORIDE SERPL-SCNC: 98 MMOL/L (ref 98–107)
CO2 SERPL-SCNC: 28 MMOL/L (ref 22–29)
CREAT SERPL-MCNC: 0.8 MG/DL (ref 0.5–0.9)
EOSINOPHIL # BLD: 0.2 K/UL (ref 0–0.6)
EOSINOPHIL NFR BLD: 2 % (ref 0–5)
ERYTHROCYTE [DISTWIDTH] IN BLOOD BY AUTOMATED COUNT: 14.8 % (ref 11.5–14.5)
GLUCOSE SERPL-MCNC: 88 MG/DL (ref 70–99)
HCT VFR BLD AUTO: 37.1 % (ref 37–47)
HGB BLD-MCNC: 11.8 G/DL (ref 12–16)
IMM GRANULOCYTES # BLD: 0.2 K/UL
LYMPHOCYTES # BLD: 2.6 K/UL (ref 1.1–4.5)
LYMPHOCYTES NFR BLD: 33.9 % (ref 20–40)
MCH RBC QN AUTO: 26.5 PG (ref 27–31)
MCHC RBC AUTO-ENTMCNC: 31.8 G/DL (ref 33–37)
MCV RBC AUTO: 83.4 FL (ref 81–99)
MONOCYTES # BLD: 1.3 K/UL (ref 0–0.9)
MONOCYTES NFR BLD: 17.5 % (ref 0–10)
NEUTROPHILS # BLD: 3.3 K/UL (ref 1.5–7.5)
NEUTS SEG NFR BLD: 43.3 % (ref 50–65)
PLATELET # BLD AUTO: 330 K/UL (ref 130–400)
PMV BLD AUTO: 9 FL (ref 9.4–12.3)
POTASSIUM SERPL-SCNC: 4.2 MMOL/L (ref 3.5–5)
PROT SERPL-MCNC: 5.1 G/DL (ref 6.4–8.3)
RBC # BLD AUTO: 4.45 M/UL (ref 4.2–5.4)
SODIUM SERPL-SCNC: 134 MMOL/L (ref 136–145)
WBC # BLD AUTO: 7.7 K/UL (ref 4.8–10.8)

## 2025-06-05 PROCEDURE — 97116 GAIT TRAINING THERAPY: CPT

## 2025-06-05 PROCEDURE — 1180000000 HC REHAB R&B

## 2025-06-05 PROCEDURE — 6370000000 HC RX 637 (ALT 250 FOR IP): Performed by: PSYCHIATRY & NEUROLOGY

## 2025-06-05 PROCEDURE — 80053 COMPREHEN METABOLIC PANEL: CPT

## 2025-06-05 PROCEDURE — 94150 VITAL CAPACITY TEST: CPT

## 2025-06-05 PROCEDURE — 99232 SBSQ HOSP IP/OBS MODERATE 35: CPT | Performed by: PSYCHIATRY & NEUROLOGY

## 2025-06-05 PROCEDURE — 94760 N-INVAS EAR/PLS OXIMETRY 1: CPT

## 2025-06-05 PROCEDURE — 85025 COMPLETE CBC W/AUTO DIFF WBC: CPT

## 2025-06-05 PROCEDURE — 36415 COLL VENOUS BLD VENIPUNCTURE: CPT

## 2025-06-05 PROCEDURE — 97530 THERAPEUTIC ACTIVITIES: CPT

## 2025-06-05 PROCEDURE — 97110 THERAPEUTIC EXERCISES: CPT

## 2025-06-05 RX ADMIN — CYANOCOBALAMIN TAB 500 MCG 1000 MCG: 500 TAB at 21:00

## 2025-06-05 RX ADMIN — LEVOTHYROXINE SODIUM 100 MCG: 100 TABLET ORAL at 05:36

## 2025-06-05 RX ADMIN — Medication 1 TABLET: at 21:00

## 2025-06-05 RX ADMIN — METOPROLOL SUCCINATE 50 MG: 50 TABLET, EXTENDED RELEASE ORAL at 17:24

## 2025-06-05 RX ADMIN — OXYCODONE 10 MG: 5 TABLET ORAL at 20:59

## 2025-06-05 RX ADMIN — OXYCODONE 10 MG: 5 TABLET ORAL at 10:55

## 2025-06-05 RX ADMIN — PREDNISONE 5 MG: 5 TABLET ORAL at 21:00

## 2025-06-05 RX ADMIN — Medication 1 TABLET: at 09:12

## 2025-06-05 RX ADMIN — OXYCODONE 10 MG: 5 TABLET ORAL at 05:36

## 2025-06-05 RX ADMIN — DOCUSATE SODIUM 100 MG: 100 CAPSULE, LIQUID FILLED ORAL at 09:12

## 2025-06-05 RX ADMIN — CYANOCOBALAMIN TAB 500 MCG 1000 MCG: 500 TAB at 09:12

## 2025-06-05 RX ADMIN — PREDNISONE 5 MG: 5 TABLET ORAL at 09:12

## 2025-06-05 RX ADMIN — HYDROCORTISONE: 25 CREAM TOPICAL at 09:18

## 2025-06-05 RX ADMIN — TERBINAFINE 250 MG: 250 TABLET ORAL at 21:00

## 2025-06-05 RX ADMIN — METOPROLOL SUCCINATE 25 MG: 25 TABLET, EXTENDED RELEASE ORAL at 09:12

## 2025-06-05 ASSESSMENT — PAIN DESCRIPTION - ORIENTATION
ORIENTATION: MID;LOWER
ORIENTATION: MID;LOWER

## 2025-06-05 ASSESSMENT — PAIN - FUNCTIONAL ASSESSMENT
PAIN_FUNCTIONAL_ASSESSMENT: ACTIVITIES ARE NOT PREVENTED

## 2025-06-05 ASSESSMENT — PAIN SCALES - GENERAL
PAINLEVEL_OUTOF10: 3
PAINLEVEL_OUTOF10: 7
PAINLEVEL_OUTOF10: 7
PAINLEVEL_OUTOF10: 4
PAINLEVEL_OUTOF10: 7

## 2025-06-05 ASSESSMENT — PAIN DESCRIPTION - LOCATION
LOCATION: BACK
LOCATION: GENERALIZED
LOCATION: BACK

## 2025-06-05 ASSESSMENT — PAIN DESCRIPTION - DESCRIPTORS
DESCRIPTORS: ACHING;DISCOMFORT
DESCRIPTORS: ACHING
DESCRIPTORS: ACHING

## 2025-06-06 VITALS
TEMPERATURE: 97.5 F | BODY MASS INDEX: 26.56 KG/M2 | DIASTOLIC BLOOD PRESSURE: 63 MMHG | HEART RATE: 62 BPM | RESPIRATION RATE: 20 BRPM | HEIGHT: 63 IN | OXYGEN SATURATION: 92 % | SYSTOLIC BLOOD PRESSURE: 141 MMHG | WEIGHT: 149.9 LBS

## 2025-06-06 PROCEDURE — 94150 VITAL CAPACITY TEST: CPT

## 2025-06-06 PROCEDURE — 99239 HOSP IP/OBS DSCHRG MGMT >30: CPT | Performed by: PSYCHIATRY & NEUROLOGY

## 2025-06-06 PROCEDURE — 6370000000 HC RX 637 (ALT 250 FOR IP): Performed by: PSYCHIATRY & NEUROLOGY

## 2025-06-06 PROCEDURE — 94762 N-INVAS EAR/PLS OXIMTRY CONT: CPT

## 2025-06-06 RX ADMIN — OXYCODONE 10 MG: 5 TABLET ORAL at 02:07

## 2025-06-06 RX ADMIN — CYANOCOBALAMIN TAB 500 MCG 1000 MCG: 500 TAB at 09:03

## 2025-06-06 RX ADMIN — METOPROLOL SUCCINATE 25 MG: 25 TABLET, EXTENDED RELEASE ORAL at 09:03

## 2025-06-06 RX ADMIN — DOCUSATE SODIUM 100 MG: 100 CAPSULE, LIQUID FILLED ORAL at 09:03

## 2025-06-06 RX ADMIN — OXYCODONE 10 MG: 5 TABLET ORAL at 09:18

## 2025-06-06 RX ADMIN — LEVOTHYROXINE SODIUM 100 MCG: 100 TABLET ORAL at 05:50

## 2025-06-06 RX ADMIN — PREDNISONE 5 MG: 5 TABLET ORAL at 09:03

## 2025-06-06 RX ADMIN — Medication 1 TABLET: at 09:03

## 2025-06-06 ASSESSMENT — PAIN DESCRIPTION - LOCATION
LOCATION: BACK
LOCATION: BACK

## 2025-06-06 ASSESSMENT — PAIN DESCRIPTION - DESCRIPTORS
DESCRIPTORS: ACHING
DESCRIPTORS: ACHING

## 2025-06-06 ASSESSMENT — PAIN SCALES - WONG BAKER: WONGBAKER_NUMERICALRESPONSE: NO HURT

## 2025-06-06 ASSESSMENT — PAIN SCALES - GENERAL
PAINLEVEL_OUTOF10: 7
PAINLEVEL_OUTOF10: 0
PAINLEVEL_OUTOF10: 4
PAINLEVEL_OUTOF10: 7

## 2025-06-06 ASSESSMENT — PAIN - FUNCTIONAL ASSESSMENT: PAIN_FUNCTIONAL_ASSESSMENT: ACTIVITIES ARE NOT PREVENTED

## 2025-06-06 ASSESSMENT — PAIN DESCRIPTION - ORIENTATION
ORIENTATION: LOWER
ORIENTATION: MID

## 2025-06-06 NOTE — DISCHARGE SUMMARY
Physical Therapy DISCHARGE Note  DATE:  2025  NAME:  Chio Zamarripa  :  1935  (89 y.o.,female)  MRN:  [id number]    HEIGHT:  Height: 160 cm (5' 2.99\")  WEIGHT:  Weight - Scale: 68 kg (149 lb 14.4 oz)    PATIENT DIAGNOSIS(ES):    Diagnosis: other orthopedic, osteoporosis with pathological fx s/p kyphoplasty and scaroplasty per Dr. Terrell  and     Additional Pertinent Hx: HTN, HYPOTHYROIDISM, NONRHEUMATIC  AORTIC STENOSISCHRONIC KIDNEY DISEASE STAGE 3  RESTRICTIONS/PRECAUTIONS:    Restrictions/Precautions  Restrictions/Precautions: Surgical protocol  Activity Level: Up Ad Paula  Position Activity Restriction  Spinal Precautions: No Bending/Lifting/Twisting (BLT)  OVERALL  ORIENTATION STATUS:  Overall Orientation Status: Within Normal Limits  PAIN:  Pain Level: 0  Pain Type: Acute pain    Pain Location: Back     Pain Orientation: Mid      GROSS ASSESSMENT        POSTURE/BALANCE  Balance  Sitting - Static: Good  Standing - Dynamic: Fair (equired rw for support)       ACTIVITY TOLERANCE  Activity Tolerance  Activity Tolerance: Patient tolerated treatment well      BED MOBILITY  Bed mobility  Rolling to Left: Modified independent  Rolling to Right: Modified independent  Supine to Sit: Modified independent  Sit to Supine: Modified independent  Scooting: Modified independent  Bed Mobility Comments: mat table to simulate standard bed- no bedrail        TRANSFERS  Transfers  Sit to Stand: Modified independent, Independent  Stand to Sit: Modified independent, Independent  Bed to Chair: Modified independent (W/ RW)  Car Transfer: Supervision (SUV height)       AMBULATION 1  Ambulation  Surface: Level tile  Device: Rolling Walker  Other Apparatus: Wheelchair follow (For some- not necessary)  Assistance: Modified Independent  Quality of Gait: short reciprocal steps, no lob or balance correction required  Gait Deviations: Decreased step height  Distance: 175 FT  Comments: Multiple L/R turns, w/ tennis shoes  Detail Level: Simple Price (Do Not Change): 0.00 Instructions: This plan will send the code FBSE to the PM system.  DO NOT or CHANGE the price.

## 2025-06-06 NOTE — CARE COORDINATION
Referral previously completed to [facility]- verified discharge.  Order for noctrunal oxygen with overnight oxymetry submitted to [facility] and confirmed receipt-they will deliver today.

## 2025-06-06 NOTE — PLAN OF CARE
Problem: Discharge Planning  Goal: Discharge to home or other facility with appropriate resources  6/5/2025 1147 by Catherine Wolf RN  Outcome: Progressing  6/4/2025 2156 by Nicci Moscoso RN  Outcome: Progressing     Problem: Pain  Goal: Verbalizes/displays adequate comfort level or baseline comfort level  6/5/2025 1147 by Catherine Wolf RN  Outcome: Progressing  6/4/2025 2156 by Nicci Moscoso RN  Outcome: Progressing     Problem: Safety - Adult  Goal: Free from fall injury  6/5/2025 1147 by Catherine Wolf RN  Outcome: Progressing  6/4/2025 2156 by Nicci Moscoso RN  Outcome: Progressing     Problem: ABCDS Injury Assessment  Goal: Absence of physical injury  6/5/2025 1147 by Catherine Wolf RN  Outcome: Progressing  6/4/2025 2156 by Nicci Moscoso RN  Outcome: Progressing     Problem: Nutrition Deficit:  Goal: Optimize nutritional status  6/5/2025 1147 by Catherine Wolf RN  Outcome: Progressing  6/4/2025 2156 by Nicci Moscoso RN  Outcome: Progressing     Problem: Skin/Tissue Integrity  Goal: Skin integrity remains intact  Description: 1.  Monitor for areas of redness and/or skin breakdown2.  Assess vascular access sites hourly3.  Every 4-6 hours minimum:  Change oxygen saturation probe site4.  Every 4-6 hours:  If on nasal continuous positive airway pressure, respiratory therapy assess nares and determine need for appliance change or resting period  6/5/2025 1147 by Catherine Wolf RN  Outcome: Progressing  Flowsheets (Taken 6/5/2025 1146)  Skin Integrity Remains Intact: Monitor for areas of redness and/or skin breakdown  6/4/2025 2156 by Nicci Moscoso RN  Outcome: Progressing     Problem: Neurosensory - Adult  Goal: Achieves stable or improved neurological status  6/5/2025 1147 by Catherine Wolf RN  Outcome: Progressing  6/4/2025 2156 by Nicci Moscoso RN  Outcome: Progressing  Goal: Achieves maximal functionality and self care  6/5/2025 1147 by 
  Problem: Discharge Planning  Goal: Discharge to home or other facility with appropriate resources  6/5/2025 2249 by Nicci Moscoso, RN  Outcome: Progressing  6/5/2025 1147 by Catherine Wolf RN  Outcome: Progressing     Problem: Pain  Goal: Verbalizes/displays adequate comfort level or baseline comfort level  6/5/2025 2249 by Nicci Moscoso, RN  Outcome: Progressing  6/5/2025 1147 by Catherine Wolf, RN  Outcome: Progressing     Problem: Safety - Adult  Goal: Free from fall injury  6/5/2025 2249 by Nicci Moscoso, RN  Outcome: Progressing  6/5/2025 1147 by Catherine Wolf, RN  Outcome: Progressing     Problem: ABCDS Injury Assessment  Goal: Absence of physical injury  6/5/2025 2249 by Nicci Moscoso, RN  Outcome: Progressing  6/5/2025 1147 by Catherine Wolf, RN  Outcome: Progressing     Problem: Nutrition Deficit:  Goal: Optimize nutritional status  6/5/2025 2249 by Nicci Moscoso, RN  Outcome: Progressing  6/5/2025 1147 by Catherine Wolf, RN  Outcome: Progressing     
  Problem: Discharge Planning  Goal: Discharge to home or other facility with appropriate resources  6/6/2025 0908 by Jinny Dunlap RN  Outcome: Progressing  Flowsheets (Taken 6/6/2025 0906)  Discharge to home or other facility with appropriate resources: Refer to discharge planning if patient needs post-hospital services based on physician order or complex needs related to functional status, cognitive ability or social support system  6/5/2025 2249 by Nicci Moscoso RN  Outcome: Progressing     Problem: Pain  Goal: Verbalizes/displays adequate comfort level or baseline comfort level  6/6/2025 0908 by Jinny Dunlap RN  Outcome: Progressing  6/5/2025 2249 by Nicci Moscoso RN  Outcome: Progressing     Problem: Safety - Adult  Goal: Free from fall injury  6/6/2025 0908 by Jinny Dunlap RN  Outcome: Progressing  6/5/2025 2249 by Nicci Moscoso RN  Outcome: Progressing     Problem: ABCDS Injury Assessment  Goal: Absence of physical injury  6/6/2025 0908 by Jinny Dunlap RN  Outcome: Progressing  6/5/2025 2249 by Nicci Moscoso RN  Outcome: Progressing     Problem: Nutrition Deficit:  Goal: Optimize nutritional status  6/6/2025 0908 by Jinny Dunlap RN  Outcome: Progressing  6/5/2025 2249 by Nicci Moscoso RN  Outcome: Progressing     Problem: Skin/Tissue Integrity  Goal: Skin integrity remains intact  Description: 1.  Monitor for areas of redness and/or skin breakdown2.  Assess vascular access sites hourly3.  Every 4-6 hours minimum:  Change oxygen saturation probe site4.  Every 4-6 hours:  If on nasal continuous positive airway pressure, respiratory therapy assess nares and determine need for appliance change or resting period  6/6/2025 0908 by Jinny Dunlap RN  Outcome: Progressing  Flowsheets (Taken 6/6/2025 0906)  Skin Integrity Remains Intact: Monitor for areas of redness and/or skin breakdown  6/5/2025 2249 by Nicci Moscoso RN  Outcome: Progressing     Problem: 
  Problem: Discharge Planning  Goal: Discharge to home or other facility with appropriate resources  Outcome: Progressing     Problem: Pain  Goal: Verbalizes/displays adequate comfort level or baseline comfort level  Outcome: Progressing     Problem: Safety - Adult  Goal: Free from fall injury  Outcome: Progressing     Problem: ABCDS Injury Assessment  Goal: Absence of physical injury  Outcome: Progressing     Problem: Nutrition Deficit:  Goal: Optimize nutritional status  Outcome: Progressing     Problem: Skin/Tissue Integrity  Goal: Skin integrity remains intact  Description: 1.  Monitor for areas of redness and/or skin breakdown2.  Assess vascular access sites hourly3.  Every 4-6 hours minimum:  Change oxygen saturation probe site4.  Every 4-6 hours:  If on nasal continuous positive airway pressure, respiratory therapy assess nares and determine need for appliance change or resting period  Outcome: Progressing     Problem: Neurosensory - Adult  Goal: Achieves stable or improved neurological status  Outcome: Progressing  Goal: Achieves maximal functionality and self care  Outcome: Progressing     Problem: Skin/Tissue Integrity - Adult  Goal: Skin integrity remains intact  Description: 1.  Monitor for areas of redness and/or skin breakdown2.  Assess vascular access sites hourly3.  Every 4-6 hours minimum:  Change oxygen saturation probe site4.  Every 4-6 hours:  If on nasal continuous positive airway pressure, respiratory therapy assess nares and determine need for appliance change or resting period  Outcome: Progressing  Goal: Incisions, wounds, or drain sites healing without S/S of infection  Outcome: Progressing     
  Problem: Discharge Planning  Goal: Discharge to home or other facility with appropriate resources  Outcome: Progressing     Problem: Pain  Goal: Verbalizes/displays adequate comfort level or baseline comfort level  Outcome: Progressing     Problem: Safety - Adult  Goal: Free from fall injury  Outcome: Progressing     Problem: ABCDS Injury Assessment  Goal: Absence of physical injury  Outcome: Progressing     Problem: Nutrition Deficit:  Goal: Optimize nutritional status  Outcome: Progressing     Problem: Skin/Tissue Integrity  Goal: Skin integrity remains intact  Description: 1.  Monitor for areas of redness and/or skin breakdown2.  Assess vascular access sites hourly3.  Every 4-6 hours minimum:  Change oxygen saturation probe site4.  Every 4-6 hours:  If on nasal continuous positive airway pressure, respiratory therapy assess nares and determine need for appliance change or resting period  Outcome: Progressing  Flowsheets (Taken 6/4/2025 1203)  Skin Integrity Remains Intact: Monitor for areas of redness and/or skin breakdown     Problem: Neurosensory - Adult  Goal: Achieves stable or improved neurological status  Outcome: Progressing  Goal: Achieves maximal functionality and self care  Outcome: Progressing     Problem: Skin/Tissue Integrity - Adult  Goal: Skin integrity remains intact  Description: 1.  Monitor for areas of redness and/or skin breakdown2.  Assess vascular access sites hourly3.  Every 4-6 hours minimum:  Change oxygen saturation probe site4.  Every 4-6 hours:  If on nasal continuous positive airway pressure, respiratory therapy assess nares and determine need for appliance change or resting period  Outcome: Progressing  Flowsheets (Taken 6/4/2025 1203)  Skin Integrity Remains Intact: Monitor for areas of redness and/or skin breakdown  Goal: Incisions, wounds, or drain sites healing without S/S of infection  Outcome: Progressing  Flowsheets (Taken 6/4/2025 1203)  Incisions, Wounds, or Drain Sites 
  Problem: Discharge Planning  Goal: Discharge to home or other facility with appropriate resources  Outcome: Progressing     Problem: Pain  Goal: Verbalizes/displays adequate comfort level or baseline comfort level  Outcome: Progressing     Problem: Safety - Adult  Goal: Free from fall injury  Outcome: Progressing     Problem: ABCDS Injury Assessment  Goal: Absence of physical injury  Outcome: Progressing     Problem: Nutrition Deficit:  Goal: Optimize nutritional status  Outcome: Progressing  Flowsheets (Taken 6/2/2025 0905 by Natalia Jacobson RD)  Nutrient intake appropriate for improving, restoring, or maintaining nutritional needs: Monitor oral intake, labs, and treatment plans     Problem: Skin/Tissue Integrity  Goal: Skin integrity remains intact  Description: 1.  Monitor for areas of redness and/or skin breakdown2.  Assess vascular access sites hourly3.  Every 4-6 hours minimum:  Change oxygen saturation probe site4.  Every 4-6 hours:  If on nasal continuous positive airway pressure, respiratory therapy assess nares and determine need for appliance change or resting period  Outcome: Progressing     Problem: Neurosensory - Adult  Goal: Achieves stable or improved neurological status  Outcome: Progressing  Goal: Achieves maximal functionality and self care  Outcome: Progressing     Problem: Skin/Tissue Integrity - Adult  Goal: Skin integrity remains intact  Description: 1.  Monitor for areas of redness and/or skin breakdown2.  Assess vascular access sites hourly3.  Every 4-6 hours minimum:  Change oxygen saturation probe site4.  Every 4-6 hours:  If on nasal continuous positive airway pressure, respiratory therapy assess nares and determine need for appliance change or resting period  Outcome: Progressing  Goal: Incisions, wounds, or drain sites healing without S/S of infection  Outcome: Progressing     
  Problem: Discharge Planning  Goal: Discharge to home or other facility with appropriate resources  Outcome: Progressing  Flowsheets (Taken 5/29/2025 0826)  Discharge to home or other facility with appropriate resources: Refer to discharge planning if patient needs post-hospital services based on physician order or complex needs related to functional status, cognitive ability or social support system     Problem: Pain  Goal: Verbalizes/displays adequate comfort level or baseline comfort level  Outcome: Progressing     Problem: Safety - Adult  Goal: Free from fall injury  Outcome: Progressing     Problem: ABCDS Injury Assessment  Goal: Absence of physical injury  Outcome: Progressing     Problem: Skin/Tissue Integrity  Goal: Skin integrity remains intact  Description: 1.  Monitor for areas of redness and/or skin breakdown2.  Assess vascular access sites hourly3.  Every 4-6 hours minimum:  Change oxygen saturation probe site4.  Every 4-6 hours:  If on nasal continuous positive airway pressure, respiratory therapy assess nares and determine need for appliance change or resting period  Outcome: Progressing  Flowsheets (Taken 5/29/2025 0826)  Skin Integrity Remains Intact: Monitor for areas of redness and/or skin breakdown     Problem: Neurosensory - Adult  Goal: Achieves stable or improved neurological status  Outcome: Progressing  Flowsheets (Taken 5/29/2025 0826)  Achieves stable or improved neurological status: Assess for and report changes in neurological status  Goal: Achieves maximal functionality and self care  Outcome: Progressing  Flowsheets (Taken 5/29/2025 0826)  Achieves maximal functionality and self care: Encourage and assist patient to increase activity and self care with guidance from physical therapy/occupational therapy     Problem: Skin/Tissue Integrity - Adult  Goal: Skin integrity remains intact  Description: 1.  Monitor for areas of redness and/or skin breakdown2.  Assess vascular access sites 
  Problem: Safety - Adult  Goal: Free from fall injury  5/29/2025 1933 by Fuad Dias RN  Outcome: Progressing  5/29/2025 1209 by Koki Crespo LPN  Outcome: Progressing     
  Problem: Safety - Adult  Goal: Free from fall injury  5/30/2025 1922 by Fuad Dias, RN  Outcome: Progressing  5/30/2025 1254 by Alexandrea Cunha, RN  Outcome: Progressing     
  Problem: Safety - Adult  Goal: Free from fall injury  5/31/2025 1913 by Fuad Dias, RN  Outcome: Progressing  5/31/2025 1354 by Alexandrea Cunha, RN  Outcome: Progressing     
  Problem: Safety - Adult  Goal: Free from fall injury  6/1/2025 1902 by Fuad Dias, RN  Outcome: Progressing  6/1/2025 1302 by Catherine Wolf, RN  Outcome: Progressing     
  Problem: Safety - Adult  Goal: Free from fall injury  6/2/2025 1921 by Fuad Dias, RN  Outcome: Progressing  6/2/2025 1349 by Alexandrea Cunha, RN  Outcome: Progressing     
  Problem: Safety - Adult  Goal: Free from fall injury  6/3/2025 1904 by Fuad Dias, RN  Outcome: Progressing  6/3/2025 1142 by Alexandrea Cunha, RN  Outcome: Progressing     
[facility]      Chio Zamarripa    : 1935  [id number]  MRN: [id number]   PHYSICIAN:  Juan M Fierro MD  Primary Problem    Patient Active Problem List   Diagnosis    Osteoarthritis    Thyroid disorder    VV (varicose veins)    Chronic venous insufficiency    Sacral fracture (HCC)    Closed fracture of sacrum, sequela    Moderate malnutrition    Acquired hypothyroidism    Essential hypertension    Polymyalgia rheumatica       Rehabilitation Diagnosis:     Closed fracture of sacrum, sequela [S32.10XS]       ADMIT DATE:2025   CARE PLAN     NURSING:  Chio Zamarripa while on this unit will:     [] Be continent of bowel and bladder      [x] Have an adequate number of bowel movements   [x] Urinate with no urinary retention >300ml in bladder   [] Complete bladder protocol with kang removal   [x] Maintain O2 SATs at ___%   [x] Have pain managed while on ARU        [] Be pain free by discharge    [x] Have no skin breakdown while on ARU   [] Have improved skin integrity via wound measurements   [] Have no signs/symptoms of infection at the wound site   [x] Be free from injury during hospitalization    [] Complete education with patient/family with understanding demonstrated for:   [] Adjustment    [] Other:   Nursing interventions may include bowel/bladder training, education for medical assistive devices, medication education, O2 saturation management, energy conservation, stress management techniques, fall prevention, alarms protocol, seating and positioning, skin/wound care, pressure relief instruction,dressing changes,  infection protection, DVT prophylaxis, and/or assistance with in room safety with transfers to bed, toilet, wheelchair, shower as well as bathroom activities and hygiene.     Patient/caregiver education for:   [x] Disease/sustained injury/management      [x] Medication Use   [] Surgical intervention   [x] Safety   [] Body mechanics and or joint protection   [] Health 
Healing Without Sign and Symptoms of Infection: TWICE DAILY: Assess and document skin integrity

## 2025-06-06 NOTE — PROGRESS NOTES
05/29/25 1345   Restrictions/Precautions   Restrictions/Precautions Surgical protocol;Fall Risk   Lower Extremity Weight Bearing Restrictions   Right Lower Extremity Weight Bearing Weight Bearing As Tolerated   Left Lower Extremity Weight Bearing Weight Bearing As Tolerated   Position Activity Restriction   Spinal Precautions No Bending/Lifting/Twisting (BLT)   General   Additional Pertinent Hx HTN, HYPOTHYROIDISM, NONRHEUMATIC  AORTIC STENOSISCHRONIC KIDNEY DISEASE STAGE 3   Diagnosis other orthopedic, osteoporosis with pathological fx s/p kyphoplasty and scaroplasty per Dr. Terrell 4/30 and 5/23   Bed mobility   Rolling to Left Stand by assistance   Rolling to Right Stand by assistance   Supine to Sit Stand by assistance   Sit to Supine Stand by assistance   Scooting Stand by assistance   Transfers   Sit to Stand Stand by assistance;Contact guard assistance   Stand to Sit Stand by assistance;Contact guard assistance   Bed to Chair Stand by assistance;Contact guard assistance  (with rw)   Ambulation   Surface Level tile   Device Rolling Walker   Assistance Stand by assistance;Contact guard assistance   Quality of Gait short reciprocal steps, no lob or balance crrection required   Distance 50, 50   Comments 2 turns   Assessment   Assessment pt reports fatigue after shower this am but agreed to therapy.   PT Individual Minutes   Time In 1345   Time Out 1415   Minutes 30       
  Name: Chio Zamarripa  MRN: [id number]  Date of Service:  6/4/2025 06/04/25 1300   Restrictions/Precautions   Restrictions/Precautions Surgical protocol   Lower Extremity Weight Bearing Restrictions   Right Lower Extremity Weight Bearing Weight Bearing As Tolerated   Left Lower Extremity Weight Bearing Weight Bearing As Tolerated   Position Activity Restriction   Spinal Precautions No Bending/Lifting/Twisting (BLT)   General   Chart Reviewed Yes   Patient assessed for rehabilitation services? Yes   Additional Pertinent Hx HTN, HYPOTHYROIDISM, NONRHEUMATIC  AORTIC STENOSISCHRONIC KIDNEY DISEASE STAGE 3   Diagnosis other orthopedic, osteoporosis with pathological fx s/p kyphoplasty and scaroplasty per Dr. Terrell 4/30 and 5/23   General   General Comments Pt reports she had big BM ~12:30   Subjective   Subjective Pt laying in bed, agrees to participate in therapy.   Pain   Pre-Pain 5   Post-Pain 5   Pain Location Right;Hip;Foot   Bed mobility   Supine to Sit Modified independent   Scooting Modified independent   Bed Mobility Comments On L side of the bed- intermittent use of bedrail   Transfers   Sit to Stand Modified independent   Stand to Sit Modified independent   Ambulation   Surface Level tile   Device Rolling Walker   Assistance Modified Independent   Quality of Gait short reciprocal steps, no lob or balance correction required   Gait Deviations Slow Ayse;Decreased step height   Distance 175', 80'   Comments Multiple L/R turns, w/ non-slip socks  (Pt prefers non-slip socks vs shoes (pt amb. w/ shoes 1X before w/o any issue and says she can independently put on). Plan to have pt put on tomorrow and wear during tx if possible)   PT Exercises   Exercise Treatment Sitting in chair BLE ther-ex: DF/PF X 20, LAQ X 10   Activity Tolerance   Activity Tolerance Patient tolerated treatment well   Assessment   Assessment Pt amb. longer distances w/ RW w/ MI (w/ non-slip socks) and has no c/o of increased pain w/ BLE ther-ex 
  Name: Chio Zamarripa  MRN: [id number]   06/03/25 1300   Restrictions/Precautions   Activity Level Up Ad Paula   Lower Extremity Weight Bearing Restrictions   Right Lower Extremity Weight Bearing Weight Bearing As Tolerated   Left Lower Extremity Weight Bearing Weight Bearing As Tolerated   Position Activity Restriction   Spinal Precautions No Bending/Lifting/Twisting (BLT)   General   Chart Reviewed Yes   Patient assessed for rehabilitation services? Yes   Additional Pertinent Hx HTN, HYPOTHYROIDISM, NONRHEUMATIC  AORTIC STENOSISCHRONIC KIDNEY DISEASE STAGE 3   Diagnosis other orthopedic, osteoporosis with pathological fx s/p kyphoplasty and scaroplasty per Dr. Terrell 4/30 and 5/23   General   General Comments Pt does report B knee arthritis (no surgeries)   Subjective   Subjective Pt laying in bed asleep, awakes and agrees to participate in therapy.   Pain   Pre-Pain 4   Post-Pain 5   Pain Location Right;Hip;Back;Foot  (Low back/R hip)   Bed mobility   Supine to Sit Modified independent   Scooting Modified independent   Bed Mobility Comments On L side of the bed- intermittent use of bedrail   Transfers   Sit to Stand Modified independent   Stand to Sit Modified independent   Ambulation   Surface Level tile   Device Rolling Walker   Assistance Modified Independent;Supervision   Quality of Gait short reciprocal steps, no lob or balance correction required   Distance 175', 10', 80'   Comments Multiple L/R turns, w/ non-slip socks   Ambulation 2   Surface - 2 level tile   Device 2 Single point cane  (R)   Assistance 2 Stand by assistance   Quality of Gait 2 3-2 point w/ LLE leading   Gait Deviations Slow Ayse;Decreased step height   Distance 20'   Activity Tolerance   Activity Tolerance Patient tolerated treatment well   Assessment   Assessment Pt able to amb. up to 175' at a time w/ a RW w/ MI/Supervision. Pt introduced to SPC as per her request (she has SPC at home and used some recently but mostly used RW: amb. w/o an AD 
  Name: Chio Zamarripa  MRN: [id number]  Date of Service:  6/5/2025 06/05/25 1100   Restrictions/Precautions   Activity Level Up Ad Paula   Lower Extremity Weight Bearing Restrictions   Right Lower Extremity Weight Bearing Weight Bearing As Tolerated   Left Lower Extremity Weight Bearing Weight Bearing As Tolerated   Position Activity Restriction   Spinal Precautions No Bending/Lifting/Twisting (BLT)   General   Chart Reviewed Yes   Patient assessed for rehabilitation services? Yes   Additional Pertinent Hx HTN, HYPOTHYROIDISM, NONRHEUMATIC  AORTIC STENOSISCHRONIC KIDNEY DISEASE STAGE 3   Diagnosis other orthopedic, osteoporosis with pathological fx s/p kyphoplasty and scaroplasty per Dr. Terrell 4/30 and 5/23   General   General Comments Pt able to independently perform all aspects of personal hygiene in BR and manage clothes   Subjective   Subjective Pt laying in bed reports she is hurting some, agrees to participate in therapy and do as much as she can: reports she needs to use BR first.   Pain   Pre-Pain 7   Post-Pain 7   Pain Location Right;Buttocks;Hip;Foot   Transfers   Sit to Stand Modified independent;Independent   Stand to Sit Modified independent;Independent   Car Transfer Supervision  (SUV height)   Ambulation   Surface Level tile   Device Rolling Walker   Assistance Modified Independent   Quality of Gait short reciprocal steps, no lob or balance correction required   Gait Deviations Decreased step height   Distance 10' X 2, 15', 10' X 2   Comments Multiple L/R turns, w/ tennis shoes donned   Stairs/Curb   Stairs? Yes   Stairs   # Steps  14   Stairs Height 6\"  (Also 4\")   Rails Bilateral   Curbs 6\"  (2X)   Device Rolling walker   Assistance Stand by assistance;Contact guard assistance  (SBA during asc on stairs, and CGA for desc on stairs anf for all of asc/desc of curb)   Comment mostly leading w/ RLE during asc/mix of leading w/ LLE or RLE during desc, some reciprocal during asc on stairs- always step to 
  Name: Chio Zamarripa  MRN: [id number]  Date of Service:  6/3/2025     06/03/25 0900   Restrictions/Precautions   Restrictions/Precautions Surgical protocol   Activity Level Up Ad Paula   Lower Extremity Weight Bearing Restrictions   Right Lower Extremity Weight Bearing Weight Bearing As Tolerated   Left Lower Extremity Weight Bearing Weight Bearing As Tolerated   Position Activity Restriction   Spinal Precautions No Bending/Lifting/Twisting (BLT)   General   Chart Reviewed Yes   Patient assessed for rehabilitation services? Yes   Additional Pertinent Hx HTN, HYPOTHYROIDISM, NONRHEUMATIC  AORTIC STENOSISCHRONIC KIDNEY DISEASE STAGE 3   Diagnosis other orthopedic, osteoporosis with pathological fx s/p kyphoplasty and scaroplasty per Dr. Terrell 4/30 and 5/23   General   General Comments Pt used BR after talking on phone(until 9:45 am), independently performed personal hygiene and managed clothes   Subjective   Subjective Pt laying in bed, talking on the phone w/ various places: [facility], [facility], and [facility]: to discuss prescriptions (until 930:am) -reports \"We are not going to do much today, I am very sore and I didn't sleep well last night.\"   Vitals   Pulse 62   SpO2 97 %   O2 Device None (Room air)   Pain   Pre-Pain 4   Post-Pain 4   Pain Location Right;Hip;Left  (R hip and B shoulders: reports B knees have been swollen intermittently)   Bed mobility   Supine to Sit Modified independent   Scooting Modified independent   Bed Mobility Comments On R side of the bed- intermittent use of bedrail   Transfers   Sit to Stand Modified independent   Stand to Sit Modified independent   Ambulation   Surface Level tile   Device Rolling Walker   Assistance Supervision   Quality of Gait short reciprocal steps, no lob or balance correction required   Gait Deviations Slow Ayse;Decreased step height   Distance 10', 15', 20'   Comments pt prefers traditional RW to rollator, Multiple L/R turns: w/ socks donned (pt prefers socks 
  Name: Chio Zamarripa  MRN: [id number]  Date of Service:  5/31/2025 05/31/25 1300   Restrictions/Precautions   Restrictions/Precautions Surgical protocol;Fall Risk   Lower Extremity Weight Bearing Restrictions   Right Lower Extremity Weight Bearing Weight Bearing As Tolerated   Left Lower Extremity Weight Bearing Weight Bearing As Tolerated   Position Activity Restriction   Spinal Precautions No Bending/Lifting/Twisting (BLT)   General   Chart Reviewed Yes   Patient assessed for rehabilitation services? Yes   Additional Pertinent Hx HTN, HYPOTHYROIDISM, NONRHEUMATIC  AORTIC STENOSISCHRONIC KIDNEY DISEASE STAGE 3   Diagnosis other orthopedic, osteoporosis with pathological fx s/p kyphoplasty and scaroplasty per Dr. Terrell 4/30 and 5/23   General   General Comments Pt reports her L shoulder is more sore than R shoulder, her son is bringing her Voltaren. Pt required assistance donning socks, able to slide into shoes independently.   Subjective   Subjective Pt laying in bed, agrees to participate in therapy.   Vitals   SpO2 95 %   O2 Device None (Room air)   Pain   Pre-Pain 4   Post-Pain 5   Pain Location Right;Back;Buttocks  (B shoulders (L>R), R leg/buttock (worse when sitting and amb.))   Bed mobility   Supine to Sit Modified independent   Sit to Supine Modified independent   Scooting Modified independent   Bed Mobility Comments On L side of bed- use of bedrail   Transfers   Sit to Stand Modified independent;Supervision   Stand to Sit Modified independent;Supervision   Ambulation   Surface Level tile   Device Rolling Walker   Assistance Supervision   Quality of Gait short reciprocal steps, no lob or balance correction required   Gait Deviations Slow Ayse;Decreased step length;Decreased step height   Distance 200'+   Comments Multiple L/R turns, w/ shoes donned   Assessment   Assessment Pt reports she has RW at home in which she amb. with, also has just one small curb into house in which she plans to use RW for: 
  Name: Chio Zamarripa  MRN: [id number]  Date of Service:  6/3/2025       06/03/25 1300   Restrictions/Precautions   Activity Level Up Ad Paula   Lower Extremity Weight Bearing Restrictions   Right Lower Extremity Weight Bearing Weight Bearing As Tolerated   Left Lower Extremity Weight Bearing Weight Bearing As Tolerated   Position Activity Restriction   Spinal Precautions No Bending/Lifting/Twisting (BLT)   General   Chart Reviewed Yes   Patient assessed for rehabilitation services? Yes   Additional Pertinent Hx HTN, HYPOTHYROIDISM, NONRHEUMATIC  AORTIC STENOSISCHRONIC KIDNEY DISEASE STAGE 3   Diagnosis other orthopedic, osteoporosis with pathological fx s/p kyphoplasty and scaroplasty per Dr. Terrell 4/30 and 5/23   Subjective   Subjective Pt laying in bed asleep, awakes and agrees to participate in therapy.   Pain   Pre-Pain 4   Post-Pain 5   Pain Location Right;Hip;Back;Foot  (Low back/R hip)   Bed mobility   Supine to Sit Modified independent   Scooting Modified independent   Bed Mobility Comments On L side of the bed- intermittent use of bedrail   Transfers   Sit to Stand Modified independent   Stand to Sit Modified independent   Ambulation   Surface Level tile   Device Rolling Walker   Assistance Modified Independent;Supervision   Quality of Gait short reciprocal steps, no lob or balance correction required   Distance 175', 10', 80'   Comments Multiple L/R turns, w/ non-slip socks   Ambulation 2   Surface - 2 level tile   Device 2 Single point cane  (R)   Assistance 2 Stand by assistance   Quality of Gait 2 3-2 point w/ LLE leading   Gait Deviations Slow Ayse;Decreased step height   Distance 20'   Activity Tolerance   Activity Tolerance Patient tolerated treatment well   Assessment   Assessment Pt able to amb. up to 175' at a time w/ a RW w/ MI/Supervision. Pt introduced to SPC as per her request (she has SPC at home and used some recently but mostly used RW: amb. w/o an AD prior to hospitalizations). Pt able to amb. 
  Name: Chio Zamarripa  MRN: [id number]  Date of Service:  6/5/2025 06/05/25 0815   Restrictions/Precautions   Restrictions/Precautions Surgical protocol   Activity Level Up Ad Paula   Lower Extremity Weight Bearing Restrictions   Right Lower Extremity Weight Bearing Weight Bearing As Tolerated   Left Lower Extremity Weight Bearing Weight Bearing As Tolerated   Position Activity Restriction   Spinal Precautions No Bending/Lifting/Twisting (BLT)   General   Chart Reviewed Yes   Patient assessed for rehabilitation services? Yes   Additional Pertinent Hx HTN, HYPOTHYROIDISM, NONRHEUMATIC  AORTIC STENOSISCHRONIC KIDNEY DISEASE STAGE 3   Diagnosis other orthopedic, osteoporosis with pathological fx s/p kyphoplasty and scaroplasty per Dr. Terrell 4/30 and 5/23   General   General Comments Pt able to independently perform all aspects of personal hygiene in BR and manage clothes, pt also able to stand at sink and independently perform ADLs (flossing, brushing teet, brushing hair, etc.) + set up   Subjective   Subjective Pt in BR, agrees to participate in therapy once she is finished.   Pain   Pre-Pain 4   Post-Pain 4   Pain Location Right;Buttocks;Hip;Foot   Transfers   Sit to Stand Modified independent   Stand to Sit Modified independent   Bed to Chair Modified independent  (W/ RW)   Ambulation   Surface Level tile   Device Rolling Walker   Assistance Modified Independent   Quality of Gait short reciprocal steps, no lob or balance correction required   Gait Deviations Decreased step height   Distance 100'  (In room)   Comments Multiple L/R turns, w/ non-slip socks   Wheelchair Activities   Propulsion Yes   Propulsion 1   Propulsion Manual   Level Level Tile   Method RUE;LUE   Level of Assistance Supervision;Modified independent   Description/ Details Multiple L/R turns, very slow, small shoulder/arm movements (to limit shoulder pain)   Distance 80'  (2X short rests)   PT Exercises   Exercise Treatment Sitting in chair BLE 
  Name: Chio Zamarripa  MRN: [id number]  Date of Service:  6/4/2025 06/04/25 1100   Restrictions/Precautions   Restrictions/Precautions Surgical protocol   Lower Extremity Weight Bearing Restrictions   Right Lower Extremity Weight Bearing Weight Bearing As Tolerated   Left Lower Extremity Weight Bearing Weight Bearing As Tolerated   Position Activity Restriction   Spinal Precautions No Bending/Lifting/Twisting (BLT)   General   Chart Reviewed Yes   Patient assessed for rehabilitation services? Yes   Additional Pertinent Hx HTN, HYPOTHYROIDISM, NONRHEUMATIC  AORTIC STENOSISCHRONIC KIDNEY DISEASE STAGE 3   General   General Comments Pt reports continued difficulty having a full BM, asked RN for stool softener   Subjective   Subjective Pt laying in bed, requests to stay in room: reports lunch will be here soon and she wants to eat it while it is hot.   Vitals   Pulse 95   SpO2 94 %   O2 Device None (Room air)   Pain   Pre-Pain 6   Post-Pain 7   Pain Location Right;Hip;Foot  (Pain in R hip, intermittent pins/needles in R foot)   Bed mobility   Supine to Sit Modified independent   Scooting Modified independent   Bed Mobility Comments On L side of the bed- intermittent use of bedrail   Transfers   Sit to Stand Modified independent   Stand to Sit Modified independent   Ambulation   Surface Level tile   Device Rolling Walker   Assistance Modified Independent   Quality of Gait short reciprocal steps, no lob or balance correction required   Gait Deviations Decreased step height   Distance 10' X 3   Comments Multiple L/R turns, w/ non-slip socks   Ambulation 2   Surface - 2 level tile   Device 2 Single point cane  (R)   Assistance 2 Stand by assistance   Quality of Gait 2 3-2 point w/ LLE leading   Gait Deviations Slow Ayse;Decreased step height   Distance 15'   Comments Multiple L/R turns, w/ non-slip socks   Ambulation 3   Surface - 3 level tile   Device 3 No device   Assistance 3 Contact guard assistance   Quality of 
4 Eyes Skin Assessment     NAME:  Chio Zamarripa  YOB: 1935  MEDICAL RECORD NUMBER:  [id number]    The patient is being assessed for  Admission    I agree that at least one RN has performed a thorough Head to Toe Skin Assessment on the patient. ALL assessment sites listed below have been assessed.      Areas assessed by both nurses:    Head, Face, Ears, Shoulders, Back, Chest, Arms, Elbows, Hands, Sacrum. Buttock, Coccyx, Ischium, and Legs. Feet and Heels        Does the Patient have a Wound? Yes wound(s) were present on assessment. LDA wound assessment was Initiated and completed by RN       Rodney Prevention initiated by RN: No  Wound Care Orders initiated by RN: No    Pressure Injury (Stage 3,4, Unstageable, DTI, NWPT, and Complex wounds) if present, place Wound referral order by RN under : No    New Ostomies, if present place, Ostomy referral order under : No     Nurse 1 eSignature: Electronically signed by Nicci Mosocso RN on 5/28/25 at 10:24 PM CDT    **SHARE this note so that the co-signing nurse can place an eSignature**    Nurse 2 eSignature:   Electronically signed by Jina Lugo RN on 5/28/25 at 10:24 PM CDT    
Chio Zamarripa arrived to room # 814.   Presented with: pathological sacral fx  Mental Status: Patient is oriented and alert.   Vitals:    05/28/25 1703   BP: (!) 156/81   Pulse: (!) 111   Resp: 18   Temp: 98.6 °F (37 °C)   SpO2: 95%     Patient safety contract and falls prevention contract reviewed with patient Yes.  Oriented Patient and Family to room.  Call light within reach. Yes.  Needs, issues or concerns expressed at this time: no.      Electronically signed by Ciara Boland RN on 5/28/2025 at 5:11 PM  
Comprehensive Nutrition Assessment    Type and Reason for Visit:  Initial    Nutrition Recommendations/Plan:   Ensure Plus High Protein at breakfast  Add moderate malnutrition to problem list     Malnutrition Assessment:  Malnutrition Status:  Moderate malnutrition (05/29/25 0911)    Context:  Acute Illness     Findings of the 6 clinical characteristics of malnutrition:  Energy Intake:  Mild decrease in energy intake  Weight Loss:  Greater than 5% over 1 month     Body Fat Loss:  Mild body fat loss Orbital   Muscle Mass Loss:  Mild muscle mass loss Temples (temporalis), Hand (interosseous)  Fluid Accumulation:  No fluid accumulation     Strength:  Not Performed    Nutrition Assessment:    Pt evaluated for admission to rehab. Pt reports her appetite is improving. C/O recent constipation contributing to decreased appetite and she reports that is resolving. Pt noted to have significant weight loss of 12.4% in past 30 days. Pt denies problems with chewing or swallowing. Pt is agreeable to one ONS daily at this time.    Nutrition Related Findings:    Na 133, TSH 9.71 Wound Type: Surgical Incision       Current Nutrition Intake & Therapies:    Average Meal Intake: Unable to assess  Average Supplements Intake: None Ordered  ADULT DIET; Regular    Anthropometric Measures:  Height: 160 cm (5' 2.99\")  Ideal Body Weight (IBW): 115 lbs (52 kg)    Admission Body Weight: 62.6 kg (138 lb 0.1 oz)  Current Body Weight: 62.6 kg (138 lb 0.1 oz), 120 % IBW. Weight Source: Bed scale  Current BMI (kg/m2): 24.5  Usual Body Weight: 71.5 kg (157 lb 10.1 oz) ([facility] 4-29-25)   % Weight Change (Calculated): -12.4  Weight Adjustment For: No Adjustment   BMI Categories: Normal Weight (BMI 22.0 to 24.9) age over 65    Estimated Daily Nutrient Needs:  Energy Requirements Based On: Kcal/kg  Weight Used for Energy Requirements: Current  Energy (kcal/day): 6766-0194 (25-30/kg)  Weight Used for Protein Requirements: Current  Protein (g/day): 
Discharge planned for Friday, 6/6. IMM letter signed.   [facility] has been arranged and will need to be notified at time of discharge: [phone].   Patient's sons are aware of dc date and plan.     Patient to have overnight oximetry tomorrow night. Testing report will need to be added to referral to [facility] so that nocturnal o2 can be arranged.     Electronically signed by SELENA Root on 6/4/2025 at 12:49 PM     
Durable Medical Equipment   Physician Order     Patient Name Chio Zamarripa  Patient Phone: [phone] (Home)     Patient Address: [address] [place] [address]     Patient Height Height: 160 cm (5' 2.99\")  Patient Weight 68 kg (149 lb 14.4 oz)   1935     1. MEDICARE/MEDICARE PART A AND B    F/O Payor/Plan Precert #   MEDICARE/MEDICARE PART A AND B    Subscriber Subscriber #   Chio Zamarripa [id number]   Address [place]   [address]   [place] [address] [phone]     2. CIGNA/CIGNA MEDICARE SUPP    F/O Payor/Plan Precert #   CIGNA/CIGNA MEDICARE SUPP    Subscriber Subscriber #   Chio Zamarripa [id number]     Address Phone   [address] [address]  [place] [address]      [place]    [facility] [place]      DIAGNOSIS:  Sacral fracture (HCC) S32.10XA   [facility]   History and Physical           Patient:                                    Chio Zamarripa  MR#:                                        [id number]  Account Number:                   [id number]              Room:                                      [address] [place] [address]      YOB: 1935  Date of Progress Note:           2025  Time of Note                           10:27 AM  Attending Physician:               Juan M Fierro MD           Admitting diagnosis:Age-related osteoporosis with current pathological fracture, vertebra(e), initial encounter for fracture     Secondary diagnoses:Essential (primary) hypertension,Hypothyroidism, unspecified,Nonrheumatic aortic (valve) stenosis,Chronic kidney disease, stage 3 unspecified     CHIEF COMPLAINT: Status post sacral fracture s/p sacralplasty        HISTORY OF PRESENT ILLNESS:   This 89 y.o. female admitted from [facility] emergency room on 25.  The patient states that on 2025 that she was lifting a case of protein shakes that she got from [facility] and started having onset of back pain.  The patient says that she did have a back surgery by Dr. Molina in  for back pain and right 
[facility] Inpatient Rehabilitation Unit  Test for Patient [place] in the Areas of Transfers/Ambulation    Ambulation/Transfers   Independent ambulation in room with assistive device:  Yes     -Device Type:  RW  Independent transfers from wheelchair to surface in room:  No     Bathroom Transfers  Independent transfers to toilet: Yes   Independent transfers for shower:  No     Ambulation in hallway  Patient able to ambulate in hallway with device:   No          Inpatient Rehabilitation Nursing and Therapies feel as though the patient qualifies for an acute rehabilitation test for independence in the areas of transfers and ambulation prior to discharging from Inpatient Rehabilitation Unit at [facility].  This test for independence in the areas of transfers and ambulation must be agreed upon by the patient's physician, nurse, and therapists.        Nurse Approval:  Electronically signed by Alexandrea Cunha RN on 5/31/25 at 1:11 PM CDT     Physical Therapist Approval:  Electronically signed by Keisha Rocha PTA on 5/31/2025 at 10:12 AM     Occupational Therapist Approval: Electronically signed by SILVIA Martinez on 5/31/2025 at 12:10 PM      
Nutrition Assessment     Type and Reason for Visit: Reassess    Nutrition Recommendations/Plan:   Continue current interventions     Malnutrition Assessment:  Malnutrition Status: Moderate malnutrition    Nutrition Assessment:  Pt reports her appetite is fair and she forces herself to eat at least half of meals. Intakes noted to vary widely and usually averages 25-75% of meals. C/O constipaton previously but reports it has improved. She is taking ONS well and prefers to start receiving in the evening, will adjust order. Weight has trended up.    Estimated Daily Nutrient Needs:  Energy (kcal):  8636-7519 (25-30/kg) Weight Used for Energy Requirements: Current     Protein (g):   (1-2/kg) Weight Used for Protein Requirements: Current        Fluid (ml/day):  0817-2833 (25-30/kg) Method Used for Fluid Requirements: 1 ml/kcal    Nutrition Related Findings:   Na 133, GFR 70, Glu 100-113, BM 5-30 Wound Type: Surgical Incision    Current Nutrition Therapies:    ADULT DIET; Regular  ADULT ORAL NUTRITION SUPPLEMENT; Breakfast; Standard High Calorie/High Protein Oral Supplement    Anthropometric Measures:  Height: 160 cm (5' 2.99\")  Current Body Wt: 68 kg (149 lb 14.6 oz)   BMI: 26.6      Nutrition Diagnosis:   Moderate malnutrition related to inadequate protein-energy intake as evidenced by criteria as identified in malnutrition assessment    Nutrition Interventions:   Food and/or Nutrient Delivery: Continue Current Diet, Continue Oral Nutrition Supplement  Nutrition Education/Counseling: No recommendation at this time  Coordination of Nutrition Care: Continue to monitor while inpatient  Plan of Care discussed with: Pt    Goals:  Goals: PO intake 50% or greater  Type of Goal: Continue current goal  Previous Goal Met: Progressing toward Goal(s)    Nutrition Monitoring and Evaluation:   Behavioral-Environmental Outcomes: None Identified  Food/Nutrient Intake Outcomes: Food and Nutrient Intake, Supplement Intake  Physical 
Occupational Therapy  Facility/Department: [facility] 8 REHAB UNIT  Rehabilitation Occupational Therapy Daily Treatment Note    Date: 25  Patient Name: Chio Zamarripa       Room: 0814/814-02  MRN: [id number]  Account: [id number]   : 1935  (89 y.o.) Gender: female        Diagnosis: sacralplasty , recent kyphoplasty with biopsy of left sacroplasty on   Additional Pertinent Hx: Essential (primary) hypertension,Hypothyroidism, unspecified,Nonrheumatic aortic (valve) stenosis,Chronic kidney disease, stage 3 unspecified        Past Medical History:  has a past medical history of Chronic kidney disease, Chronic venous insufficiency, Osteoarthritis, Thyroid disorder, and VV (varicose veins).  Past Surgical History:   has a past surgical history that includes back surgery (); Hysterectomy (); and Tonsillectomy ().    Restrictions  Restrictions/Precautions: Surgical protocol  Right Lower Extremity Weight Bearing: Weight Bearing As Tolerated  Left Lower Extremity Weight Bearing: Weight Bearing As Tolerated     25 1345   General   Family / Caregiver Present No   Balance   Sitting Balance Independent   Standing Balance Independent   Standing Balance   Time at least 5 mins   Activity simple kitchen task   Comment countertop for UE support   Functional Mobility   Functional - Mobility Device Wheelchair   Activity Retrieve items;Transport items   Assist Level Modified independent    Transfers   Stand Step Transfers Modified independent   Sit to stand Modified independent   Stand to sit Modified independent   Assessment   Performance deficits / Impairments Decreased functional mobility ;Decreased strength;Decreased endurance;Decreased high-level IADLs   Activity Tolerance   Activity Tolerance Patient Tolerated treatment well      25 1345   Instrumental ADL's   Instrumental ADLs Yes   Meal Prep   Meal Prep Level Walker   Meal Prep Level of Assistance Modified independent   Meal Preparation simple coffee 
Occupational Therapy  Facility/Department: [facility] 8 REHAB UNIT  Rehabilitation Occupational Therapy Daily Treatment Note    Date: 25  Patient Name: Chio Zamarripa       Room: 0814/814-02  MRN: [id number]  Account: [id number]   : 1935  (89 y.o.) Gender: female         Past Medical History:  has a past medical history of Chronic kidney disease, Chronic venous insufficiency, Osteoarthritis, Thyroid disorder, and VV (varicose veins).  Past Surgical History:   has a past surgical history that includes back surgery (); Hysterectomy (); and Tonsillectomy ().    Restrictions  Restrictions/Precautions: Surgical protocol, Fall Risk  Right Lower Extremity Weight Bearing: Weight Bearing As Tolerated  Left Lower Extremity Weight Bearing: Weight Bearing As Tolerated     25 0900   Pain   Pre-Pain 0   Post-Pain 0   Transfers   Sit to stand Contact guard assistance   Stand to sit Contact guard assistance   Toilet Transfers   Toilet - Technique Ambulating   Equipment Used Raised toilet seat with rails   Toilet Transfer Contact guard assistance   Activity Tolerance   Activity Tolerance Patient Tolerated treatment well   Assessment   Performance deficits / Impairments Decreased functional mobility ;Decreased ADL status;Decreased strength;Decreased safe awareness;Decreased endurance;Decreased balance;Decreased high-level IADLs   Occupational Therapy Plan   Current Treatment Recommendations Strengthening;Balance training;Functional mobility training;Endurance training;Safety education & training;Patient/Caregiver education & training;Equipment evaluation, education, & procurement;Self-Care / ADL   Safety Devices   Type of Devices Left in bed;Bed alarm in place;Call light within reach   Time Code Minutes    Timed Code Treatment Minutes 45 Minutes   OT Concurrent Minutes   Time In 0900   Time Out 0945   Minutes 45      25 0900   Functional Mobility   Functional - Mobility Device Rolling Walker   Activity To/from 
Occupational Therapy  Facility/Department: [facility] 8 REHAB UNIT  Rehabilitation Occupational Therapy Daily Treatment Note    Date: 25  Patient Name: Chio Zamarripa       Room: 0814/814-02  MRN: [id number]  Account: [id number]   : 1935  (89 y.o.) Gender: female        Diagnosis: sacralplasty , recent kyphoplasty with biopsy of left sacroplasty on   Additional Pertinent Hx: Essential (primary) hypertension,Hypothyroidism, unspecified,Nonrheumatic aortic (valve) stenosis,Chronic kidney disease, stage 3 unspecified    Treatment Diagnosis: sacralplasty , recent kyphoplasty with biopsy of left sacroplasty on    Past Medical History:  has a past medical history of Chronic kidney disease, Chronic venous insufficiency, Osteoarthritis, Thyroid disorder, and VV (varicose veins).  Past Surgical History:   has a past surgical history that includes back surgery (); Hysterectomy (); and Tonsillectomy ().     25 0815   Eating   Assistance Needed Independent   CARE Score 6   Shower/Bathe Self   Assistance Needed Independent   Comment shower   CARE Score 6   Upper Body Dressing   Assistance Needed Independent   CARE Score 6   Lower Body Dressing   Assistance Needed Independent   CARE Score 6   Putting On/Taking Off Footwear   Assistance Needed Independent   Comment now able to don with AE, knee over knee technique   CARE Score 6   Toileting Hygiene   Assistance needed Independent   Comment .   CARE Score 6   Toilet Transfer   Assistance needed Independent   Comment .   CARE Score 6   Picking Up Object   Assistance Needed Independent   Comment with LHR d/t back precautions   CARE Score 6       
Occupational Therapy  Facility/Department: [facility] 8 REHAB UNIT  Rehabilitation Occupational Therapy Daily Treatment Note    Date: 25  Patient Name: Chio Zamarripa       Room: 0814/814-02  MRN: [id number]  Account: [id number]   : 1935  (89 y.o.) Gender: female                    Past Medical History:  has a past medical history of Chronic kidney disease, Chronic venous insufficiency, Osteoarthritis, Thyroid disorder, and VV (varicose veins).  Past Surgical History:   has a past surgical history that includes back surgery (); Hysterectomy (); and Tonsillectomy ().    Restrictions  Restrictions/Precautions: Surgical protocol, Fall Risk  Right Lower Extremity Weight Bearing: Weight Bearing As Tolerated  Left Lower Extremity Weight Bearing: Weight Bearing As Tolerated     25 0845   Pain   Pre-Pain 0   Post-Pain 0        25 0845   General   Family / Caregiver Present No   General Comment   Comments complained of nausea this session   Balance   Sitting Balance Independent   Standing Balance Independent   Standing Balance   Time 2 mins   Activity adl task   Functional Mobility   Functional - Mobility Device Rolling Walker   Activity To/from bathroom   Assist Level Contact guard assistance   Bed mobility   Supine to Sit Stand by assistance  (with bedrail)   Sit to Supine Stand by assistance   Transfers   Sit to stand Contact guard assistance   Stand to sit Contact guard assistance   Toilet Transfers   Toilet - Technique Ambulating   Equipment Used Grab bars   Toilet Transfer Contact guard assistance   Toilet Transfers Comments RW   Assessment   Performance deficits / Impairments Decreased functional mobility ;Decreased ADL status;Decreased ROM;Decreased strength;Decreased safe awareness;Decreased endurance;Decreased high-level IADLs;Decreased balance   Activity Tolerance   Activity Tolerance Comments limited by nausea      25 0845   Oral Hygiene   Assistance Needed Setup or clean-up 
Occupational Therapy  Facility/Department: [facility] 8 REHAB UNIT  Daily Treatment Note  NAME: Chio Zamarripa  : 1935  MRN: [id number]    Date of Service: 2025    Discharge Recommendations:  Home with Home health OT, Home with assist PRN       Assessment   Performance deficits / Impairments: Decreased functional mobility ;Decreased ADL status;Decreased strength;Decreased safe awareness;Decreased endurance;Decreased balance;Decreased high-level IADLs  Assessment: Pt assessed for up ad lamberto in room and to go to toilet.  Pt tolerated tx well. Pt benefits from continued skilled therapy in order to optimize functional indepedence prior to discharge home.  Treatment Diagnosis: sacralplasty , recent kyphoplasty with biopsy of left sacroplasty on   Prognosis: Good  History: Essential (primary) hypertension,Hypothyroidism, unspecified,Nonrheumatic aortic (valve) stenosis,Chronic kidney disease, stage 3 unspecified  Activity Tolerance  Activity Tolerance: Patient Tolerated treatment well         Patient Diagnosis(es):   has a past medical history of Chronic kidney disease, Chronic venous insufficiency, Osteoarthritis, Thyroid disorder, and VV (varicose veins).   has a past surgical history that includes back surgery (); Hysterectomy (); and Tonsillectomy ().    Restrictions  Restrictions/Precautions  Restrictions/Precautions: Surgical protocol, Fall Risk  Lower Extremity Weight Bearing Restrictions  Right Lower Extremity Weight Bearing: Weight Bearing As Tolerated  Left Lower Extremity Weight Bearing: Weight Bearing As Tolerated  Position Activity Restriction  Spinal Precautions: No Bending/Lifting/Twisting (BLT)  Subjective   General  Chart Reviewed: Yes  Patient assessed for rehabilitation services?: Yes  Additional Pertinent Hx: Essential (primary) hypertension,Hypothyroidism, unspecified,Nonrheumatic aortic (valve) stenosis,Chronic kidney disease, stage 3 unspecified  Response to previous treatment: 
Occupational Therapy  Facility/Department: [facility] 8 REHAB UNIT  Daily Treatment Note  NAME: Chio Zamarripa  : 1935  MRN: [id number]    Date of Service: 25 1345   Restrictions/Precautions   Restrictions/Precautions Surgical protocol   Activity Level Up Ad Paula   Lower Extremity Weight Bearing Restrictions   Right Lower Extremity Weight Bearing Weight Bearing As Tolerated   Left Lower Extremity Weight Bearing Weight Bearing As Tolerated   Position Activity Restriction   Spinal Precautions No Bending/Lifting/Twisting (BLT)   General   Family / Caregiver Present No   Diagnosis sacralplasty , recent kyphoplasty with biopsy of left sacroplasty on    Subjective   Subjective Pt agreeable to participate in therapy   Balance   Sitting Balance Independent   Standing Balance Independent   Functional Mobility   Functional - Mobility Device Rolling Walker   Activity To/From therapy gym;To/from bathroom   Assist Level Modified independent    Functional Mobility Comments Trialed short distances without device x4 trials. Initially required CGA but was able to go a short distance with SBA. Improved balance with arm swing.   Transfers   Stand Step Transfers Modified independent   Sit to stand Modified independent   Stand to sit Modified independent   Transfer Comments with RW   Toilet Transfers   Toilet - Technique Ambulating   Equipment Used Grab bars   Toilet Transfer Modified independent   Toilet Transfers Comments RW   OT Exercises   Dynamic Standing Balance Exercises Pt able to stand ~1 minute for dynamic reaching task while following spinal precautions   Activity Tolerance   Activity Tolerance Patient Tolerated treatment well   Assessment   Performance deficits / Impairments Decreased functional mobility ;Decreased ADL status;Decreased strength;Decreased safe awareness;Decreased endurance;Decreased balance;Decreased high-level IADLs   Treatment Diagnosis sacralplasty , recent kyphoplasty with biopsy 
Occupational Therapy  Facility/Department: [facility] 8 REHAB UNIT  Occupational Therapy Initial Assessment    Name: Chio Zamarripa  : 1935  MRN: [id number]  Date of Service: 2025      Patient Diagnosis(es): There were no encounter diagnoses.  Past Medical History:  has a past medical history of Chronic kidney disease, Chronic venous insufficiency, Osteoarthritis, Thyroid disorder, and VV (varicose veins).  Past Surgical History:  has a past surgical history that includes back surgery (); Hysterectomy (); and Tonsillectomy ().    Treatment Diagnosis: sacralplasty , recent kyphoplasty with biopsy of left sacroplasty on 25 1100   Vision   Vision WFL   Hearing   Hearing WFL   Vitals   Pulse 85   SpO2 94 %   BP (!) 179/74   MAP (Calculated) 109   BP Location Right upper arm   Pain   Pre-Pain 5   Post-Pain 7   Pain Location Buttocks   Pain Interventions Repositioning   Orientation   Overall Orientation Status WNL   Orientation Level Oriented X4   Cognition   Overall Cognitive Status WNL   Cognition Comment alert, oriented, and follows simple commands   Balance   Sitting Balance Independent   Standing Balance Contact guard assistance   Functional Mobility   Functional - Mobility Device Rolling Walker   Activity To/from bathroom   Assist Level Contact guard assistance   Transfers   Sit to stand Contact guard assistance   Stand to sit Contact guard assistance   Toilet Transfers   Toilet - Technique Ambulating   Equipment Used Raised toilet seat with rails   Toilet Transfer Contact guard assistance   Shower Transfers   Shower - Transfer From Walker   Shower - Transfer Type To and From   Shower - Transfer To Shower seat with back   Shower - Technique Ambulating   Shower Transfers Contact Guard   Short Term Goals   Time Frame for Short Term Goals 1 week   Short Term Goal 1 patient will perform overall LB dressing with supervision   Short Term Goal 2 patient will perform static standing activities 
Occupational Therapy  Facility/Department: [facility] 8 REHAB UNIT  Daily Treatment Note  NAME: Chio Zamarripa  : 1935  MRN: [id number]    Date of Service: 25 1000   Restrictions/Precautions   Restrictions/Precautions Surgical protocol;Fall Risk   Activity Level Up Ad Paula   Lower Extremity Weight Bearing Restrictions   Right Lower Extremity Weight Bearing Weight Bearing As Tolerated   Left Lower Extremity Weight Bearing Weight Bearing As Tolerated   Position Activity Restriction   Spinal Precautions No Bending/Lifting/Twisting (BLT)   General   Additional Pertinent Hx Essential (primary) hypertension,Hypothyroidism, unspecified,Nonrheumatic aortic (valve) stenosis,Chronic kidney disease, stage 3 unspecified   Family / Caregiver Present No   Diagnosis sacralplasty , recent kyphoplasty with biopsy of left sacroplasty on    Social/Functional History   Lives With Alone   Type of Home House  (duplex)   Home Layout One level   Home Access Level entry  (threshold at doorway)   Bathroom Shower/Tub Walk-in shower;Shower chair with back  (small lip)   Bathroom Equipment Grab bars in shower;Hand-held shower   Home Equipment Walker - Rolling;Cane   Prior Level of Assist for ADLs Independent   Prior Level of Assist for Homemaking Independent   Prior Level of Assist for Ambulation Independent community ambulator, with or without device   Prior Level of Assist for Transfers Independent   Active  Yes   Mode of Transportation Car   Occupation Retired   Balance   Sitting Balance Independent   Standing Balance Independent   Functional Mobility   Functional - Mobility Device Rolling Walker   Activity To/From therapy gym   Assist Level Modified independent    Transfers   Stand Step Transfers Modified independent   Sit to stand Modified independent   Stand to sit Modified independent   Transfer Comments with RW   Toilet Transfers   Toilet - Technique Ambulating   Equipment Used Grab bars   Toilet Transfer 
Occupational Therapy  Facility/Department: [facility] 8 REHAB UNIT  Rehabilitation Occupational Therapy Daily Treatment Note    Date: 25  Patient Name: Chio Zamarripa       Room: 0814/814-02  MRN: [id number]  Account: [id number]   : 1935  (89 y.o.) Gender: female        Diagnosis: sacralplasty , recent kyphoplasty with biopsy of left sacroplasty on   Additional Pertinent Hx: Essential (primary) hypertension,Hypothyroidism, unspecified,Nonrheumatic aortic (valve) stenosis,Chronic kidney disease, stage 3 unspecified    Treatment Diagnosis: sacralplasty , recent kyphoplasty with biopsy of left sacroplasty on    Past Medical History:  has a past medical history of Chronic kidney disease, Chronic venous insufficiency, Osteoarthritis, Thyroid disorder, and VV (varicose veins).  Past Surgical History:   has a past surgical history that includes back surgery (); Hysterectomy (); and Tonsillectomy ().    Restrictions  Restrictions/Precautions: Surgical protocol, Fall Risk  Right Lower Extremity Weight Bearing: Weight Bearing As Tolerated  Left Lower Extremity Weight Bearing: Weight Bearing As Tolerated       25 1410   Pain   Pre-Pain 0   Post-Pain 0      25 1410   Activity Tolerance   Activity Tolerance Patient Tolerated treatment well      25 1410   Assessment   Performance deficits / Impairments Decreased functional mobility ;Decreased ADL status;Decreased ROM;Decreased strength;Decreased safe awareness;Decreased endurance;Decreased high-level IADLs      25 141   Occupational Therapy Plan   Current Treatment Recommendations Strengthening;ROM;Endurance training;Self-Care / ADL;Home management training;Equipment evaluation, education, & procurement;Patient/Caregiver education & training;Functional mobility training;Safety education & training        25 1410   Safety Devices   Type of Devices Left in bed;Bed alarm in place      25 1410   Putting On/Taking Off 
Occupational Therapy  Facility/Department: [facility] 8 REHAB UNIT  Rehabilitation Occupational Therapy Daily Treatment Note    Date: 6/3/25  Patient Name: Chio Zamarripa       Room: 0814/814-02  MRN: [id number]  Account: [id number]   : 1935  (89 y.o.) Gender: female        Diagnosis: (P) sacralplasty , recent kyphoplasty with biopsy of left sacroplasty on        Treatment Diagnosis: (P) sacralplasty , recent kyphoplasty with biopsy of left sacroplasty on    Past Medical History:  has a past medical history of Chronic kidney disease, Chronic venous insufficiency, Osteoarthritis, Thyroid disorder, and VV (varicose veins).  Past Surgical History:   has a past surgical history that includes back surgery (); Hysterectomy (); and Tonsillectomy ().     25 1345   ADL   Toileting Modified independent    Balance   Standing Balance Independent   Functional Mobility   Functional - Mobility Device Rolling Walker   Activity To/From therapy gym;To/from bathroom   Assist Level Modified independent    Transfers   Sit to stand Modified independent   Stand to sit Modified independent   Toilet Transfers   Toilet - Technique Ambulating   Toilet Transfer Modified independent   OT Exercises   Exercise Treatment 1# FW   Short Term Goals   Short Term Goal 1 MET   Short Term Goal 2 MET   Short Term Goal 3 MET   Short Term Goal 4 MET   Long Term Goals   Long Term Goal 5 MET   Activity Tolerance   Activity Tolerance Patient Tolerated treatment well   Assessment   Performance deficits / Impairments Decreased functional mobility ;Decreased strength;Decreased endurance;Decreased high-level IADLs   Assessment Pt requested a shower on Thursday- prior to d/c   Treatment Diagnosis sacralplasty , recent kyphoplasty with biopsy of left sacroplasty on    Time Code Minutes    Timed Code Treatment Minutes 45 Minutes   OT Individual Minutes   Time In 1345   Time Out 1430   Minutes 45       
Occupational Therapy  Facility/Department: [facility] 8 REHAB UNIT  Rehabilitation Occupational Therapy Daily Treatment Note    Date: 25  Patient Name: Chio Zamarripa       Room: 0814/814-02  MRN: [id number]  Account: [id number]   : 1935  (89 y.o.) Gender: female                    Past Medical History:  has a past medical history of Chronic kidney disease, Chronic venous insufficiency, Osteoarthritis, Thyroid disorder, and VV (varicose veins).  Past Surgical History:   has a past surgical history that includes back surgery (); Hysterectomy (); and Tonsillectomy ().    Restrictions  Restrictions/Precautions: Surgical protocol, Fall Risk  Right Lower Extremity Weight Bearing: Weight Bearing As Tolerated  Left Lower Extremity Weight Bearing: Weight Bearing As Tolerated      Patient Education       Plan  Occupational Therapy Plan  Current Treatment Recommendations: Strengthening;ROM;Endurance training;Self-Care / ADL;Home management training;Equipment evaluation, education, & procurement;Patient/Caregiver education & training;Functional mobility training;Safety education & training       25 1405   General   Family / Caregiver Present No   Balance   Sitting Balance Independent   Bed mobility   Supine to Sit Modified independent  (with bedrail)   Transfers   Sit to stand Contact guard assistance   Stand to sit Contact guard assistance      25 1405   Upper Body Dressing   Assistance Needed Setup or clean-up assistance   CARE Score 5   Lower Body Dressing   Assistance Needed Supervision or touching assistance   Comment CGA for balance   CARE Score 4      25 1405   Safety Devices   Type of Devices Left in bed;Call light within reach;Bed alarm in place       Therapy Time   Individual Concurrent Group Co-treatment   Time In 1405        Time Out 1435        Minutes 30        Timed Code Treatment Minutes: 30 Minutes       Cora Guillen OT     
Occupational Therapy  Facility/Department: [facility] 8 REHAB UNIT  Rehabilitation Occupational Therapy Daily Treatment Note    Date: 6/3/25  Patient Name: Chio Zamarripa       Room: 0814/814-02  MRN: [id number]  Account: [id number]   : 1935  (89 y.o.) Gender: female        Diagnosis: (P) sacralplasty , recent kyphoplasty with biopsy of left sacroplasty on        Treatment Diagnosis: (P) sacralplasty , recent kyphoplasty with biopsy of left sacroplasty on    Past Medical History:  has a past medical history of Chronic kidney disease, Chronic venous insufficiency, Osteoarthritis, Thyroid disorder, and VV (varicose veins).  Past Surgical History:   has a past surgical history that includes back surgery (); Hysterectomy (); and Tonsillectomy ().       25 1000   Restrictions/Precautions   Restrictions/Precautions Surgical protocol   Activity Level Up Ad Paula   Position Activity Restriction   Spinal Precautions No Bending/Lifting/Twisting (BLT)   General   Diagnosis sacralplasty , recent kyphoplasty with biopsy of left sacroplasty on    Balance   Standing Balance Independent   Standing Balance   Time 20 mins   Activity 1 hand act   Functional Mobility   Functional - Mobility Device Rolling Walker   Activity Other;To/from bathroom  (in OT gym, apartment, kitchen)   Assist Level Modified independent    Functional Mobility Comments short distances in room without device SBA   Transfers   Sit to stand Modified independent   Stand to sit Modified independent   Transfer Comments from w/c, armchair, bed   Toilet Transfers   Toilet Transfer Modified independent   OT Exercises   Dynamic Standing Balance Exercises Wiihab activities   Activity Tolerance   Activity Tolerance Patient Tolerated treatment well   Assessment   Performance deficits / Impairments Decreased functional mobility ;Decreased ADL status;Decreased strength;Decreased endurance;Decreased high-level IADLs   Treatment Diagnosis 
Occupational Therapy  Facility/Department: [facility] 8 REHAB UNIT  Daily Treatment Note  NAME: Chio Zamarripa  : 1935  MRN: [id number]    Date of Service: 25 0900   Restrictions/Precautions   Restrictions/Precautions Surgical protocol   Activity Level Up Ad Paula   Lower Extremity Weight Bearing Restrictions   Right Lower Extremity Weight Bearing Weight Bearing As Tolerated   Left Lower Extremity Weight Bearing Weight Bearing As Tolerated   Position Activity Restriction   Spinal Precautions No Bending/Lifting/Twisting (BLT)   General   Additional Pertinent Hx Essential (primary) hypertension,Hypothyroidism, unspecified,Nonrheumatic aortic (valve) stenosis,Chronic kidney disease, stage 3 unspecified   Response to previous treatment Patient with no complaints from previous session   Family / Caregiver Present No   Diagnosis sacralplasty , recent kyphoplasty with biopsy of left sacroplasty on    Subjective   Subjective Pt agreeable to participate in therapy   Pain   Pre-Pain 5   Pain Location Right;Buttocks;Foot   Pain Descriptor Numbness   Pain Interventions Repositioning;Rest   Balance   Sitting Balance Independent   Standing Balance Independent   Standing Balance   Time 8 minutes   Activity Dynamic reaching task   Comment SBA, no device   Functional Mobility   Functional - Mobility Device Rolling Walker   Activity Retrieve items;Transport items;To/From therapy gym   Assist Level Modified independent    Transfers   Stand Step Transfers Modified independent   Sit to stand Modified independent   Stand to sit Modified independent   Transfer Comments with RW   Toilet Transfers   Toilet Transfers Comments with RW   OT Exercises   Dynamic Standing Balance Exercises BITS visual scanning/reaching activities   Activity Tolerance   Activity Tolerance Patient Tolerated treatment well   Assessment   Performance deficits / Impairments Decreased functional mobility ;Decreased strength;Decreased 
Occupational Therapy  Facility/Department: [facility] 8 REHAB UNIT  Rehabilitation Occupational Therapy Daily Treatment Note    Date: 25  Patient Name: Chio Zamarripa       Room: 0814/814-02  MRN: [id number]  Account: [id number]   : 1935  (89 y.o.) Gender: female        Diagnosis: (P) sacralplasty , recent kyphoplasty with biopsy of left sacroplasty on   Additional Pertinent Hx: (P) Essential (primary) hypertension,Hypothyroidism, unspecified,Nonrheumatic aortic (valve) stenosis,Chronic kidney disease, stage 3 unspecified    Treatment Diagnosis: (P) sacralplasty , recent kyphoplasty with biopsy of left sacroplasty on    Past Medical History:  has a past medical history of Chronic kidney disease, Chronic venous insufficiency, Osteoarthritis, Thyroid disorder, and VV (varicose veins).  Past Surgical History:   has a past surgical history that includes back surgery (); Hysterectomy (); and Tonsillectomy ().     25 0815   Restrictions/Precautions   Restrictions/Precautions Surgical protocol   Activity Level Up Ad Paula   Position Activity Restriction   Spinal Precautions No Bending/Lifting/Twisting (BLT)   General   Additional Pertinent Hx Essential (primary) hypertension,Hypothyroidism, unspecified,Nonrheumatic aortic (valve) stenosis,Chronic kidney disease, stage 3 unspecified   Diagnosis sacralplasty , recent kyphoplasty with biopsy of left sacroplasty on    ADL   Additional Comments shower, see CARE scores   Balance   Sitting Balance Independent   Standing Balance Independent   Standing Balance   Activity ADL   Functional Mobility   Functional - Mobility Device Rolling Walker   Activity To/from bathroom   Assist Level Modified independent    Transfers   Sit to stand Modified independent   Stand to sit Modified independent   Toilet Transfers   Toilet - Technique Ambulating   Toilet Transfer Modified independent   Shower Transfers   Shower - Transfer From Other  (GBs)   Shower - 
Occupational Therapy  Facility/Department: [facility] 8 REHAB UNIT  Daily Treatment Note  NAME: Chio Zamarripa  : 1935  MRN: [id number]    Date of Service: 2025    Discharge Recommendations:  Home with Home health OT, Home with assist PRN       Assessment   Performance deficits / Impairments: Decreased functional mobility ;Decreased ADL status;Decreased strength;Decreased safe awareness;Decreased endurance;Decreased balance;Decreased high-level IADLs  Assessment: Pt tolerated tx well. Pt benefits from continued skilled therapy in order to optimize functional indepedence prior to discharge home.  Treatment Diagnosis: sacralplasty , recent kyphoplasty with biopsy of left sacroplasty on   Prognosis: Good  History: Essential (primary) hypertension,Hypothyroidism, unspecified,Nonrheumatic aortic (valve) stenosis,Chronic kidney disease, stage 3 unspecified  Activity Tolerance  Activity Tolerance: Patient Tolerated treatment well         Patient Diagnosis(es):    has a past medical history of Chronic kidney disease, Chronic venous insufficiency, Osteoarthritis, Thyroid disorder, and VV (varicose veins).   has a past surgical history that includes back surgery (); Hysterectomy (); and Tonsillectomy ().    Restrictions  Restrictions/Precautions  Restrictions/Precautions: Surgical protocol, Fall Risk  Lower Extremity Weight Bearing Restrictions  Right Lower Extremity Weight Bearing: Weight Bearing As Tolerated  Left Lower Extremity Weight Bearing: Weight Bearing As Tolerated  Position Activity Restriction  Spinal Precautions: No Bending/Lifting/Twisting (BLT)  Subjective   General  Chart Reviewed: Yes  Patient assessed for rehabilitation services?: Yes  Additional Pertinent Hx: Essential (primary) hypertension,Hypothyroidism, unspecified,Nonrheumatic aortic (valve) stenosis,Chronic kidney disease, stage 3 unspecified  Response to previous treatment: Patient with no complaints from previous session  Family / 
Orders are in for overnight pulse ox. This needs to be done overnight on 6/5 and order reflects this. If patient qualifies, will send nocturnal o2 referral to [facility]. Marcelina Sanon at [facility] is aware of upcoming referral (unless patient does not qualify).      HH order, discharge summary, and signed SOC all complete as well. MSW sent these to Becki Rodriges with [facility]. [facility] will begin HH services on Monday, 6/9.     Electronically signed by SELENA Root on 6/3/2025 at 9:00 AM   
Patient:   Chio Zamarripa  MR#:    [id number]   Room:    0814/814-02   YOB: 1935  Date of Progress Note: 5/30/2025  Time of Note                           7:28 AM  Consulting Physician:   Juan M Fierro M.D.  Attending Physician:  Juan M Fierro MD     Chief complaint Status post sacral fracture s/p sacralplasty     S:This 89 y.o. female  admitted from [facility] emergency room on 4/29/25.  The patient states that on April 9, 2025 that she was lifting a case of protein shakes that she got from [facility] and started having onset of back pain.  The patient says that she did have a back surgery by Dr. Molina in 2003 for back pain and right leg pain and did well from that surgery.  She said that she was doing well up until this lifting episode.  Currently pain in her back is constant but is worse with walking and standing and 80% better with laying or sitting down.  She has pain that radiates into her left buttocks that is constant as well with the same modifying factors.  She has not done any recent physical therapy or chiropractic care pain management injections.  She is right-hand dominant.  She is retired.  She is .  Denies any tobacco, alcohol, or illicit drug use.  Rates her pain on a scale 0-10 out of 10. MRI of the lumbar spine that was done on April 21, 2025 shows of the left sacral insufficiency fracture.  At L5-S1 moderate bilateral foraminal narrowing is noted.  Disc degeneration is noted of L4-5.  At L3-4 moderate lumbar stenosis with right sided foraminal narrowing.  At L2-3 left-sided foraminal narrowing.  Scoliosis deformity is noted. On  04/30/25 She underwent a KYPHOPLASTY WITH BIOPSY LEFT SACRALPLASTY with O-ARM with   Jhoan Terrell MDShe was disharged on 5/1/2025. Pt continued to have pain. On 05/23/25 She underwent a Sacralplasty Right by Jhoan Terrell MD. She tolerated procedure well.  On 5/27 she was sitting on the BSC when she appeared to have a vaso-vagal response as she 
Patient:   Chio Zamarripa  MR#:    [id number]   Room:    0814/814-02   YOB: 1935  Date of Progress Note: 6/4/2025  Time of Note                           7:03 AM  Consulting Physician:   Juan M Fierro M.D.  Attending Physician:  Juan M Fierro MD     Chief complaint Status post sacral fracture s/p sacralplasty     S:This 89 y.o. female  admitted from [facility] emergency room on 4/29/25.  The patient states that on April 9, 2025 that she was lifting a case of protein shakes that she got from [facility] and started having onset of back pain.  The patient says that she did have a back surgery by Dr. Molina in 2003 for back pain and right leg pain and did well from that surgery.  She said that she was doing well up until this lifting episode.  Currently pain in her back is constant but is worse with walking and standing and 80% better with laying or sitting down.  She has pain that radiates into her left buttocks that is constant as well with the same modifying factors.  She has not done any recent physical therapy or chiropractic care pain management injections.  She is right-hand dominant.  She is retired.  She is .  Denies any tobacco, alcohol, or illicit drug use.  Rates her pain on a scale 0-10 out of 10. MRI of the lumbar spine that was done on April 21, 2025 shows of the left sacral insufficiency fracture.  At L5-S1 moderate bilateral foraminal narrowing is noted.  Disc degeneration is noted of L4-5.  At L3-4 moderate lumbar stenosis with right sided foraminal narrowing.  At L2-3 left-sided foraminal narrowing.  Scoliosis deformity is noted. On  04/30/25 She underwent a KYPHOPLASTY WITH BIOPSY LEFT SACRALPLASTY with O-ARM with   Jhoan Terrell MDShe was disharged on 5/1/2025. Pt continued to have pain. On 05/23/25 She underwent a Sacralplasty Right by Jhoan Terrell MD. She tolerated procedure well.  On 5/27 she was sitting on the BSC when she appeared to have a vaso-vagal response as she 
Patient:   Chio Zamarripa  MR#:    [id number]   Room:    0814/814-02   YOB: 1935  Date of Progress Note: 6/6/2025  Time of Note                           6:51 AM  Consulting Physician:   Juan M Fierro M.D.  Attending Physician:  Juan M Fierro MD     Chief complaint Status post sacral fracture s/p sacralplasty     S:This 89 y.o. female  admitted from [facility] emergency room on 4/29/25.  The patient states that on April 9, 2025 that she was lifting a case of protein shakes that she got from [facility] and started having onset of back pain.  The patient says that she did have a back surgery by Dr. Molina in 2003 for back pain and right leg pain and did well from that surgery.  She said that she was doing well up until this lifting episode.  Currently pain in her back is constant but is worse with walking and standing and 80% better with laying or sitting down.  She has pain that radiates into her left buttocks that is constant as well with the same modifying factors.  She has not done any recent physical therapy or chiropractic care pain management injections.  She is right-hand dominant.  She is retired.  She is .  Denies any tobacco, alcohol, or illicit drug use.  Rates her pain on a scale 0-10 out of 10. MRI of the lumbar spine that was done on April 21, 2025 shows of the left sacral insufficiency fracture.  At L5-S1 moderate bilateral foraminal narrowing is noted.  Disc degeneration is noted of L4-5.  At L3-4 moderate lumbar stenosis with right sided foraminal narrowing.  At L2-3 left-sided foraminal narrowing.  Scoliosis deformity is noted. On  04/30/25 She underwent a KYPHOPLASTY WITH BIOPSY LEFT SACRALPLASTY with O-ARM with   Jhoan Terrell MDShe was disharged on 5/1/2025. Pt continued to have pain. On 05/23/25 She underwent a Sacralplasty Right by Jhoan Terrell MD. She tolerated procedure well.  On 5/27 she was sitting on the BSC when she appeared to have a vaso-vagal response as she 
Physical Therapy  Name: Chio Zamarripa  MRN:  [id number]  Date of service:  5/30/2025 05/30/25 1014   Restrictions/Precautions   Restrictions/Precautions Surgical protocol;Fall Risk   Lower Extremity Weight Bearing Restrictions   Right Lower Extremity Weight Bearing Weight Bearing As Tolerated   Left Lower Extremity Weight Bearing Weight Bearing As Tolerated   Position Activity Restriction   Spinal Precautions No Bending/Lifting/Twisting (BLT)   General   Additional Pertinent Hx HTN, HYPOTHYROIDISM, NONRHEUMATIC  AORTIC STENOSISCHRONIC KIDNEY DISEASE STAGE 3   Subjective   Subjective I am nauseated. I just don't think I can walk now   Oxygen Therapy   O2 Device Nasal cannula   Exercises   Straight Leg Raise 20   Quad Sets 20   Heelslides 20   Gluteal Sets 20   Hip Abduction 20   Knee Short Arc Quad 20   Ankle Pumps 20   Short Term Goals   Time Frame for Short Term Goals 1 WK   Short Term Goal 1 INDEP ON BED MOBILITY   Short Term Goal 2 SBA TRANSFERS   Short Term Goal 3 SBA AMB 50 FEET WITH AD   Short Term Goal 4 INDEP W/C PROPEL 50 FEET   Short Term Goal 5 SBA CAR TRANSFER   Long Term Goals   Time Frame for Long Term Goals  2W   Long Term Goal 1 INDEP TRANSFERS   Long Term Goal 2 INDEP AMB 50 FEET WITH AD   Long Term Goal 3 INDEP CAR TRANSFERS   Long Term Goal 4 SBA 4 STEPS 2 RAILS   Long Term Goal 5 SBA  FEET WITH AD   Conditions Requiring Skilled Therapeutic Intervention   Assessment patient reports slight naseated at beginning of session.  She was able to complete supine bed exericses and mobility this am.   Treatment Diagnosis INTERFERENCE WITH ACTIVITY   History other orthopedic, osteoporosis with pathological fx s/p kyphoplasty and scaroplasty per Dr. Terrell 4/30 and 5/23, HTN, HYPOTHYROIDISM, NONRHEUMATIC AORTIC STENOSISCHRONIC KIDNEY DISEASE STAGE 3   Physical Therapy Plan   General Plan  minutes of therapy at least 5 out of 7 days a week   Therapy Duration 2 Weeks   Current Treatment 
Physical Therapy  Name: Chio Zamarripa  MRN:  [id number]  Date of service:  6/2/2025 06/02/25 1105   Restrictions/Precautions   Restrictions/Precautions Surgical protocol   Activity Level Up Ad Paula   Lower Extremity Weight Bearing Restrictions   Right Lower Extremity Weight Bearing Weight Bearing As Tolerated   Left Lower Extremity Weight Bearing Weight Bearing As Tolerated   Position Activity Restriction   Spinal Precautions No Bending/Lifting/Twisting (BLT)   General   Additional Pertinent Hx HTN, HYPOTHYROIDISM, NONRHEUMATIC  AORTIC STENOSISCHRONIC KIDNEY DISEASE STAGE 3   Subjective   Subjective I think I might be 75% better.   Oxygen Therapy   O2 Device None (Room air)   Transfers   Sit to Stand Modified independent;Supervision   Stand to Sit Modified independent;Supervision   Bed to Chair Supervision;Stand by assistance   Ambulation   Surface Level tile   Device Rolling Walker   Assistance Supervision   Quality of Gait short reciprocal steps, no lob or balance correction required   Distance 250 feet   Stairs/Curb   Stairs? Yes   Stairs   # Steps  7   Stairs Height 6\"   Rails Bilateral   Device No Device   Assistance Contact guard assistance   Exercises   Gluteal Sets 20   Hip Flexion seated 20   Hip Abduction seated 20   Knee Long Arc Quad 20   Ankle Pumps 20   Patient Goals    Patient Goals  INDEPENDENT HOUSEHOLD AMBULATIO WITH ASSISTIVE DEVICE   Short Term Goals   Time Frame for Short Term Goals 1 WK   Short Term Goal 1 INDEP ON BED MOBILITY   Short Term Goal 2 SBA TRANSFERS   Short Term Goal 3 SBA AMB 50 FEET WITH AD   Short Term Goal 4 INDEP W/C PROPEL 50 FEET   Short Term Goal 5 SBA CAR TRANSFER   Long Term Goals   Time Frame for Long Term Goals  2W   Long Term Goal 1 INDEP TRANSFERS   Long Term Goal 2 INDEP AMB 50 FEET WITH AD   Long Term Goal 3 INDEP CAR TRANSFERS   Long Term Goal 4 SBA 4 STEPS 2 RAILS   Long Term Goal 5 SBA  FEET WITH AD   Conditions Requiring Skilled Therapeutic Intervention 
Physical Therapy  Name: Chio Zamarripa  MRN:  [id number]  Date of service:  5/30/2025 05/30/25 1430   Restrictions/Precautions   Restrictions/Precautions Surgical protocol;Fall Risk   Lower Extremity Weight Bearing Restrictions   Right Lower Extremity Weight Bearing Weight Bearing As Tolerated   Left Lower Extremity Weight Bearing Weight Bearing As Tolerated   Position Activity Restriction   Spinal Precautions No Bending/Lifting/Twisting (BLT)   General   Additional Pertinent Hx HTN, HYPOTHYROIDISM, NONRHEUMATIC  AORTIC STENOSISCHRONIC KIDNEY DISEASE STAGE 3   Subjective   Subjective I am still nauseated but I will do whatever you want me too   Oxygen Therapy   O2 Device Nasal cannula   Bed Mobility   Supine to Sit Supervision   Sit to Supine Supervision   Transfers   Sit to Stand Stand by assistance   Stand to Sit Stand by assistance   Ambulation   Surface Level tile   Device Rolling Walker   Assistance Stand by assistance   Distance 75 feet   Exercises   Hip Flexion seated marching x 20   Hip Abduction 20   Knee Long Arc Quad 20   Ankle Pumps 20   Other Activities   Comment Assisted to and from bathroom   Short Term Goals   Time Frame for Short Term Goals 1 WK   Short Term Goal 1 INDEP ON BED MOBILITY   Short Term Goal 2 SBA TRANSFERS   Short Term Goal 3 SBA AMB 50 FEET WITH AD   Short Term Goal 4 INDEP W/C PROPEL 50 FEET   Short Term Goal 5 SBA CAR TRANSFER   Long Term Goals   Time Frame for Long Term Goals  2W   Long Term Goal 1 INDEP TRANSFERS   Long Term Goal 2 INDEP AMB 50 FEET WITH AD   Long Term Goal 3 INDEP CAR TRANSFERS   Long Term Goal 4 SBA 4 STEPS 2 RAILS   Long Term Goal 5 SBA  FEET WITH AD   Conditions Requiring Skilled Therapeutic Intervention   Assessment Patient reports feeling some better after ambulation.   Treatment Diagnosis INTERFERENCE WITH ACTIVITY   History other orthopedic, osteoporosis with pathological fx s/p kyphoplasty and scaroplasty per Dr. Terrell 4/30 and 5/23, HTN, 
Physical Therapy  Name: Chio Zamarripa  MRN:  [id number]  Date of service:  6/2/2025 06/02/25 1300   Restrictions/Precautions   Restrictions/Precautions Surgical protocol   Activity Level Up Ad Paula   Lower Extremity Weight Bearing Restrictions   Right Lower Extremity Weight Bearing Weight Bearing As Tolerated   Left Lower Extremity Weight Bearing Weight Bearing As Tolerated   Position Activity Restriction   Spinal Precautions No Bending/Lifting/Twisting (BLT)   General   Additional Pertinent Hx HTN, HYPOTHYROIDISM, NONRHEUMATIC  AORTIC STENOSISCHRONIC KIDNEY DISEASE STAGE 3   General   General Comments sitting eob post lunch   Subjective   Subjective Pt agreeable. No new to report.   Pain   Pre-Pain 6   Pain Location Right;Back;Leg;Foot;Buttocks   Pain Descriptor Sore;Numbness  (numbness R foot)   Pain Interventions Repositioning   Bed mobility   Bed Mobility Comments sitting eob, not tested   Transfers   Sit to Stand Modified independent   Stand to Sit Modified independent   Bed to Chair Modified independent   Ambulation   Surface Level tile   Device Rolling Walker   Assistance Supervision   Quality of Gait short reciprocal steps, no lob or balance correction required   Gait Deviations Decreased step length;Slow Ayse   Distance 200'   Comments pt prefers traditional RW to rollator   More Ambulation? Yes   Ambulation 2   Surface - 2 level tile   Device 2 Rolling Walker   Assistance 2 Supervision   Quality of Gait 2 as above   Gait Deviations Decreased step length;Slow Ayse   Distance 100'   PT Exercises   Dynamic Standing Balance Exercises // bars: retro amb 12' x 2, sidestepping 12' L and R  (short steps to reduce strain)   Standing Open/Closed Kinetic Chain Exercises // bars x 15: heel raises, hip ext, hip abd, marching  (rom limited to tolerance with good quality motion and posture)   Assessment   Assessment Pt doing well with mobility given RW for support. Cont dec/altered sensation R LE. Pt was able to 
Spoke to patient in room re CMG date and discharge planning. Patient would like to discharge on [facility]G date, Friday 6/6. Patient's son, George, was included in conversation via speaker phone and confirmed he will be available to take patient home. Patient would like to have [facility] at discharge. MSW left a message for Becki with [facility] to check availability.     Patient will also need an overnight oximetry test Thursday night to see if she qualifies for nocturnal o2. Patient says that she previously qualified when at [facility]. For MSW to arrange, will need updated testing done. MSW notified MD.     Will update chart re HH arrangements and will plan for dc on Friday, 6/6.     Electronically signed by SELENA Root on 6/2/2025 at 3:03 PM   
[facility] to begin services on Monday, 6/9/2025.     Electronically signed by SELENA Root on 6/3/2025 at 8:54 AM   
  Activity Tolerance   Activity Tolerance Patient limited by endurance;Patient tolerated treatment well   Assessment   Assessment Pt expressed interest in being up AD lamberto in room w/ RW. Pt cleared to be up AD lamberto in room w/ RW from PT standpoint, OT notified for further clearance.   Discharge Recommendations Continue to assess pending progress;Home with Home health PT;Home with assist PRN   Education   Education Given To Patient   Education Provided Safety   Education Provided Comments General safety   Education Method Verbal   Barriers to Learning None   Education Outcome Verbalized understanding;Demonstrated understanding   Safety Devices   Type of Devices Gait belt;Left in bed;Bed alarm in place;Call light within reach       Electronically signed by Keisha Rocha PTA on 5/31/2025 at 12:30 PM   
Strengthening;Balance training;Functional mobility training;Endurance training;Safety education & training;Patient/Caregiver education & training;Equipment evaluation, education, & procurement;Self-Care / ADL   Safety Devices   Type of Devices Left in chair;Call light within reach   Time Code Minutes    Timed Code Treatment Minutes 30 Minutes   OT Individual Minutes   Time In 1330   Time Out 1400   Minutes 30      06/05/25 1330   Cognitive Patterns   Cognitive Pattern Assessment Used BIMS   Repetition of Three Words (First Attempt) 3   Temporal Orientation: Year Correct   Temporal Orientation: Month Accurate within 5 days   Temporal Orientation: Day Correct   Able to recall \"sock” Yes, no cue required   Able to recall \"blue\" Yes, no cue required   Able to recall \"bed\" Yes, no cue required   BIMS Summary Score 15   Picking Up Object   Assistance Needed Independent   Comment LHR   CARE Score 6       Rosalina Quintanilla OT  Electronically signed by Rosalina Quintanilla OT on 6/5/2025 at 2:19 PM      
extended release tablet 50 mg, 50 mg, Oral, QPM, Juan M Fierro MD, 50 mg at 06/02/25 1705    nitroGLYCERIN (NITROSTAT) SL tablet 0.4 mg, 0.4 mg, SubLINGual, Q5 Min PRN, Juan M Fierro MD    therapeutic multivitamin-minerals 1 tablet, 1 tablet, Oral, Daily, Juan M Fierro MD, 1 tablet at 06/02/25 0826    predniSONE (DELTASONE) tablet 5 mg, 5 mg, Oral, BID, Juan M Fierro MD, 5 mg at 06/02/25 2131    terbinafine (LAMISIL) tablet 250 mg, 250 mg, Oral, Nightly, Juan M Fierro MD, 250 mg at 06/02/25 2131    vitamin B-12 (CYANOCOBALAMIN) tablet 1,000 mcg, 1,000 mcg, Oral, BID, Juan M Fierro MD, 1,000 mcg at 06/02/25 2131    acetaminophen (TYLENOL) tablet 650 mg, 650 mg, Oral, Q4H PRN, Juan M Fierro MD    polyethylene glycol (GLYCOLAX) packet 17 g, 17 g, Oral, Daily PRN, Juan M Fierro MD    oxyCODONE (ROXICODONE) immediate release tablet 10 mg, 10 mg, Oral, Q4H PRN, Juan M Fierro MD, 10 mg at 06/03/25 0552    psyllium husk-aspartame (METAMUCIL FIBER) packet 1 packet, 1 packet, Oral, Nightly, Juan M Fierro MD    Allergies:  Aleve [naproxen], Amlodipine, Codeine, Hydralazine, Ibuprofen, Isosorbide nitrate, Levofloxacin, Lisinopril, Losartan, and Other/food    Social History:   TOBACCO:   reports that she has never smoked. She has never used smokeless tobacco.  ETOH:   reports current alcohol use of about 1.0 standard drink of alcohol per week.    Family History:       Problem Relation Age of Onset    High Blood Pressure Father     High Blood Pressure Sister     High Cholesterol Sister     Heart Disease Sister     Stroke Sister          PHYSICAL EXAM:  BP (!) 153/60   Pulse 76   Temp 97.5 °F (36.4 °C) (Temporal)   Resp 16   Ht 1.6 m (5' 2.99\")   Wt 68 kg (149 lb 14.4 oz)   SpO2 97%   BMI 26.56 kg/m²     Constitutional - well developed, well nourished.   Eyes - conjunctiva normal.   Ear, nose, throat - No scars, masses, or lesions over external nose or ears, no atrophy of tongue  Neck-symmetric, no 
noted, no jugular vein distension  Respiration- chest wall appears symmetric, good expansion,   normal effort without use of accessory muscles  Musculoskeletal - no significant wasting of muscles noted, no bony deformities  Extremities-no clubbing, cyanosis or edema  Skin - warm, dry, and intact. No rash, erythema, or pallor.  Psychiatric - mood, affect, and behavior appear normal.      Neurological exam  Awake, alert, fluent oriented appropriate affect  Attention and concentration appear appropriate  Recent and remote memory appears unremarkable  Speech normal without dysarthria  No clear issues with language of fund of knowledge     Cranial Nerve Exam     CN III, IV,VI-EOMI, No nystagmus, conjugate eye movements, no ptosis    CN VII-no facial assymetry       Motor Exam  antigravity throughout upper extremities bilaterally      Tremors- no tremors in hands or head noted     Gait  Not tested     Nursing/pcp notes, imaging,labs and vitals reviewed.     PT,OT and/or speech notes reviewed    Lab Results   Component Value Date    WBC 7.8 06/02/2025    HGB 11.9 (L) 06/02/2025    HCT 38.0 06/02/2025    MCV 84.4 06/02/2025     06/02/2025     Lab Results   Component Value Date     (L) 06/02/2025    K 4.2 06/02/2025    CL 98 06/02/2025    CO2 27 06/02/2025    BUN 9 06/02/2025    CREATININE 0.8 06/02/2025    GLUCOSE 102 (H) 06/02/2025    CALCIUM 9.4 06/02/2025    BILITOT 0.3 06/02/2025    ALKPHOS 188 (H) 06/02/2025    AST 36 (H) 06/02/2025    ALT 42 (H) 06/02/2025    LABGLOM 70 06/02/2025   No results found for: \"INR\", \"PROTIME\"      Keisha Rocha PTA  Physical Therapist Assistant  Physical Therapy     Progress Notes      Signed     Date of Service: 5/31/2025  3:03 PM     Signed            Name: Chio Zamarripa  MRN: [id number]  Date of Service:  5/31/2025 05/31/25 1300   Restrictions/Precautions   Restrictions/Precautions Surgical protocol;Fall Risk   Lower Extremity Weight Bearing Restrictions   Right 
  Education Given To Patient   Education Provided Comments Introduction and correct amb. technique w/ R SPC: pt reports she plans to use RW for awhile and hopefully progress to SPC- pt instructed to allow HH to progress w/ SPC since she will be initially d/c from here w/ RW   Education Method Verbal;Demonstration   Barriers to Learning None   Education Outcome Verbalized understanding;Demonstrated understanding   Safety Devices   Type of Devices Gait belt;Left in chair  (W/ OT)         Date of Service:  6/3/2025              Electronically signed by Keisha Rocha PTA on 6/3/2025 at 2:32 PM                    RECORD REVIEW: Previous medical records, medications were reviewed at today's visit    IMPRESSION:   1.  Status post sacroplasty-pain control/mobilization  2.  Hypothyroidism-Synthroid-reduced dose of Synthroid due to elevated T4  3.  Hypertension-on meds monitor  4.  Polymyalgia rheumatica-on prednisone  5.  GI-bowel regimen  6.  Pain control-Roxicodone as needed  7.  DVT prophylaxis-SCDs  8.  PT/OT    Continue current care as noted    ELOS Friday     Expected duration and frequency therapy: 180 minutes per day, 5 days per week    CALL WITH ANY QUESTIONS  [phone] CELL  Dr Juan M Fierro

## 2025-06-08 ENCOUNTER — NURSE TRIAGE (OUTPATIENT)
Dept: CALL CENTER | Facility: HOSPITAL | Age: 89
End: 2025-06-08
Payer: MEDICARE

## 2025-06-08 NOTE — TELEPHONE ENCOUNTER
Left sacral operation on 4/28, right sacrum surgery on 5/23.  Patient went to rehab after hospital stay and is now back home.      Right foot numb from foot to mid calf.  Also numb bottom of right buttock down back of leg on side.  Numbness set in after surgery.  Rehab doctor was aware of numbness and had PT working with patient.     Pain is much better after surgery.      Patient states she has noticed leaking from knees down.  Unsure if one or both legs. No edema, no color to fluid leaking. Happened a few times in rehab and now has happened at home the past 2 nights.  No difficulty breathing or noticed retained fluid throughout body. Legs are not painful.    Heart history:  Calcified aortic valve.  Dr. Gallagher prescribed Ranolazine er 500 mg, and baby aspirin.  Has not started taking.  Is waiting until she sees Dr. Weaver on Monday.       Encouraged patient to call Monday morning and get in with Dr. Weaver's office ASAP.  Patient to continue monitoring legs and body.  Call back with any concerns.  Look for increase in swelling or fluid leaking from legs, swelling elsewhere in body, chest pain, difficulty breathing, or change in LOC.  Patient states understanding.       Reason for Disposition   [1] MILD swelling of both ankles (i.e., pedal edema) AND [2] new-onset or worsening    Additional Information   Negative: SEVERE difficulty breathing (e.g., struggling for each breath, speaks in single words)   Negative: Looks like a broken bone or dislocated joint (e.g., crooked or deformed)   Negative: Sounds like a life-threatening emergency to the triager   Negative: Chest pain   Negative: Followed a leg injury   Negative: [1] Followed an insect bite AND [2] localized swelling (e.g., small area of puffy or swollen skin)   Negative: Swelling of one ankle joint   Negative: Swelling of knee is main symptom   Negative: Pregnant   Negative: Postpartum (from 0 to 6 weeks after delivery)   Negative: [1] Heart failure suspected  "(e.g., ankle swelling, shortness of breath, weight gain) AND [2] recently in hospital for heart failure   Negative: Difficulty breathing at rest   Negative: Entire foot is cool or blue in comparison to other side   Negative: [1] Can't walk or can barely walk AND [2] new-onset   Negative: [1] Difficulty breathing with exertion (e.g., walking) AND [2] new-onset or worsening   Negative: [1] Red area or streak AND [2] fever   Negative: [1] Swelling is painful to touch AND [2] fever   Negative: [1] Cast on leg or ankle AND [2] now increased pain   Negative: Patient sounds very sick or weak to the triager   Negative: SEVERE leg swelling (e.g., swelling extends above knee, entire leg is swollen, weeping fluid)   Negative: [1] Red area or streak [2] large (> 2 in. or 5 cm)   Negative: [1] Thigh or calf pain AND [2] only 1 side AND [3] present > 1 hour   Negative: [1] Thigh, calf, or ankle swelling AND [2] only 1 side   Negative: [1] Thigh, calf, or ankle swelling AND [2] bilateral AND [3] 1 side is more swollen   Negative: [1] MODERATE leg swelling (e.g., swelling extends up to knees) AND [2] new-onset or worsening   Negative: Swelling of face, arm or hands  (Exception: Slight puffiness of fingers occurring during hot weather.)   Negative: Looks like a boil, infected sore, deep ulcer or other infected rash (spreading redness, pus)    Answer Assessment - Initial Assessment Questions  1. ONSET: \"When did the swelling start?\" (e.g., minutes, hours, days)      Fluid leaking from legs but don't really appear to be swollen, maybe the slightest bit.  Started noticing leaking from legs while in rehab several days ago.  2. LOCATION: \"What part of the leg is swollen?\"  \"Are both legs swollen or just one leg?\"      Unsure  3. SEVERITY: \"How bad is the swelling?\" (e.g., localized; mild, moderate, severe)    - Localized: Small area of swelling localized to one leg.    - MILD pedal edema: Swelling limited to foot and ankle, pitting edema " "< 1/4 inch (6 mm) deep, rest and elevation eliminate most or all swelling.    - MODERATE edema: Swelling of lower leg to knee, pitting edema > 1/4 inch (6 mm) deep, rest and elevation only partially reduce swelling.    - SEVERE edema: Swelling extends above knee, facial or hand swelling present.       mild  4. REDNESS: \"Does the swelling look red or infected?\"      no  5. PAIN: \"Is the swelling painful to touch?\" If Yes, ask: \"How painful is it?\"   (Scale 1-10; mild, moderate or severe)      0  6. FEVER: \"Do you have a fever?\" If Yes, ask: \"What is it, how was it measured, and when did it start?\"       no  7. CAUSE: \"What do you think is causing the leg swelling?\"      No idea  8. MEDICAL HISTORY: \"Do you have a history of blood clots (e.g., DVT), cancer, heart failure, kidney disease, or liver failure?\"      Calcified aortic valve.  Dr. Gallagher prescribed Ranolazine er 500 mg, and baby aspirin.  Has not started taking.  Is waiting until she sees Dr. Weaver on Monday.   9. RECURRENT SYMPTOM: \"Have you had leg swelling before?\" If Yes, ask: \"When was the last time?\" \"What happened that time?\"      Not like this.  10. OTHER SYMPTOMS: \"Do you have any other symptoms?\" (e.g., chest pain, difficulty breathing)        No.   11. PREGNANCY: \"Is there any chance you are pregnant?\" \"When was your last menstrual period?\"        No.    Protocols used: Leg Swelling and Edema-ADULT-    "

## 2025-06-09 ENCOUNTER — TELEPHONE (OUTPATIENT)
Dept: NEUROSURGERY | Facility: CLINIC | Age: 89
End: 2025-06-09
Payer: MEDICARE

## 2025-06-09 NOTE — TELEPHONE ENCOUNTER
Pt sent a message through the neurosurgical pool that states   Left sacral operation on 4/28, right sacrum surgery on 5/23.  Patient went to rehab after hospital stay and is now back home.       Right foot numb from foot to mid calf.  Also numb bottom of right buttock down back of leg on side.  Numbness set in after surgery.  Rehab doctor was aware of numbness and had PT working with patient.      Pain is much better after surgery.       Patient states she has noticed leaking from knees down.  Unsure if one or both legs. No edema, no color to fluid leaking. Happened a few times in rehab and now has happened at home the past 2 nights.  No difficulty breathing or noticed retained fluid throughout body. Legs are not painful.        Pt was asked if she had any drainage from her incisions. Pt stated no she didn't have any only from her knees. Informed pt she needs to see her PCP. Per pt she has an appt with Dr Weaver today at 1pm and she wanted to inform our office that the therapy place over at [facility] that she has went to helped her a lot and also she is 505 better she has not had to take any oxycodone. Per pt if anything comes up she will give our office a call. Ended call with pt understanding and acknowledgement.

## 2025-06-18 ENCOUNTER — OFFICE VISIT (OUTPATIENT)
Dept: NEUROSURGERY | Facility: CLINIC | Age: 89
End: 2025-06-18
Payer: MEDICARE

## 2025-06-18 VITALS — HEIGHT: 64 IN | WEIGHT: 146 LBS | BODY MASS INDEX: 24.92 KG/M2

## 2025-06-18 DIAGNOSIS — E66.3 OVERWEIGHT WITH BODY MASS INDEX (BMI) OF 25 TO 25.9 IN ADULT: ICD-10-CM

## 2025-06-18 DIAGNOSIS — M54.16 LUMBAR RADICULOPATHY: ICD-10-CM

## 2025-06-18 DIAGNOSIS — S32.130G: Primary | ICD-10-CM

## 2025-06-18 DIAGNOSIS — R20.0 RIGHT LEG NUMBNESS: ICD-10-CM

## 2025-06-18 RX ORDER — LEVOTHYROXINE SODIUM 100 UG/1
TABLET ORAL
COMMUNITY

## 2025-06-18 NOTE — PROGRESS NOTES
"  Chief complaint:   Chief Complaint   Patient presents with    Back Pain     Pt is here for 3wk p/o f/u. Pt states since surgery she has been having pain. Pt is in wheel chair today.        Subjective     HPI: This is a 89 y.o. female patient who went to the operating room on 5/23/2025 for a Sacralplasty Right - Right.  She also underwent a left sacroplasty on April 30, 2025.  The patient is here in follow up today for postoperative visit.  The patient was able to come home from rehab on June 6.  The patient says that she is doing better than what she was before the surgery but she is still having quite a bit of pain in her sacral region as well as pain radiating down into her right lower extremity.  She did have difficulty with lower extremity swelling and did follow-up with her primary care doctor.  She is managing her symptoms with Tylenol at this time.        Review of Systems   Musculoskeletal:  Positive for arthralgias, back pain and myalgias.         Objective      Vital Signs  Ht 161.5 cm (63.58\")   Wt 66.2 kg (146 lb)   LMP  (LMP Unknown)   BMI 25.39 kg/m²     Physical Exam  Constitutional:       General: She is awake.      Appearance: Normal appearance. She is well-developed.   HENT:      Head: Normocephalic.   Eyes:      General: Lids are normal.      Extraocular Movements: Extraocular movements intact.      Conjunctiva/sclera: Conjunctivae normal.      Pupils: Pupils are equal, round, and reactive to light.   Pulmonary:      Effort: Pulmonary effort is normal.      Breath sounds: Normal breath sounds.   Musculoskeletal:         General: Normal range of motion.      Cervical back: Normal range of motion.   Skin:     General: Skin is warm.   Neurological:      Mental Status: She is alert and oriented to person, place, and time.      GCS: GCS eye subscore is 4. GCS verbal subscore is 5. GCS motor subscore is 6.      Cranial Nerves: No cranial nerve deficit.      Sensory: No sensory deficit.      Motor: " Motor strength is normal.     Deep Tendon Reflexes: Reflexes are normal and symmetric. Reflexes normal.   Psychiatric:         Speech: Speech normal.         Behavior: Behavior normal.         Thought Content: Thought content normal.       Incisions clean dry and intact    Neurological Exam  Mental Status  Awake and alert. Oriented to person, place and time. Oriented to person, place, and time. Speech is normal. Language is fluent with no aphasia. Attention and concentration are normal.    Cranial Nerves  CN I: Sense of smell is normal.  CN II: Right normal visual field. Left normal visual field.  CN III, IV, VI: Extraocular movements intact bilaterally. Normal lids and orbits bilaterally. Pupils equal round and reactive to light bilaterally.  CN V: Facial sensation is normal.  CN VII:  Right: There is no facial weakness.  Left: There is no facial weakness.  CN XI: Shoulder shrug strength is normal.  CN XII: Tongue midline without atrophy or fasciculations.    Motor  Normal muscle bulk throughout. Normal muscle tone. Strength is 5/5 throughout all four extremities.    Sensory  Sensation is intact to light touch, pinprick, vibration and proprioception in all four extremities.    Reflexes  Deep tendon reflexes are 2+ and symmetric in all four extremities.    Gait  Normal casual, toe, heel and tandem gait.      Imaging review: No new imaging          Assessment/Plan: Patient is continue to complain of pain in her buttocks and sacral region with pain radiating over into her right lower extremity with numbness.  I am going to have her go for an MRI of the lumbar spine as well as an MRI of the pelvis to further evaluate her pain complaints.  We will have her keep her appoint with Dr. Solitario.  She was told to call us if any further problems or concerns    Patient is a nonsmoker  The patient's Body mass index is 25.39 kg/m².. BMI is above normal parameters. Recommendations include: educational material and nutrition  counseling    Diagnoses and all orders for this visit:    1. Closed nondisplaced zone III fracture of sacrum with delayed healing, subsequent encounter (Primary)  -     MRI pelvis wo contrast; Future    2. Lumbar radiculopathy  -     MRI Lumbar Spine Without Contrast; Future    3. Right leg numbness  -     MRI Lumbar Spine Without Contrast; Future    4. Overweight with body mass index (BMI) of 25 to 25.9 in adult        I discussed the patients findings and my recommendations with patient  Kendall Pena, APRN  06/18/25  13:36 CDT

## 2025-06-18 NOTE — PATIENT INSTRUCTIONS
"DASH Eating Plan  DASH stands for Dietary Approaches to Stop Hypertension. The DASH eating plan is a healthy eating plan that has been shown to:  Lower high blood pressure (hypertension).  Reduce your risk for type 2 diabetes, heart disease, and stroke.  Help with weight loss.  What are tips for following this plan?  Reading food labels  Check food labels for the amount of salt (sodium) per serving. Choose foods with less than 5 percent of the Daily Value (DV) of sodium. In general, foods with less than 300 milligrams (mg) of sodium per serving fit into this eating plan.  To find whole grains, look for the word \"whole\" as the first word in the ingredient list.  Shopping  Buy products labeled as \"low-sodium\" or \"no salt added.\"  Buy fresh foods. Avoid canned foods and pre-made or frozen meals.  Cooking  Try not to add salt when you cook. Use salt-free seasonings or herbs instead of table salt or sea salt. Check with your health care provider or pharmacist before using salt substitutes.  Do not coelho foods. Cook foods in healthy ways, such as baking, boiling, grilling, roasting, or broiling.  Cook using oils that are good for your heart. These include olive, canola, avocado, soybean, and sunflower oil.  Meal planning    Eat a balanced diet. This should include:  4 or more servings of fruits and 4 or more servings of vegetables each day. Try to fill half of your plate with fruits and vegetables.  6-8 servings of whole grains each day.  6 or less servings of lean meat, poultry, or fish each day. 1 oz is 1 serving. A 3 oz (85 g) serving of meat is about the same size as the palm of your hand. One egg is 1 oz (28 g).  2-3 servings of low-fat dairy each day. One serving is 1 cup (237 mL).  1 serving of nuts, seeds, or beans 5 times each week.  2-3 servings of heart-healthy fats. Healthy fats called omega-3 fatty acids are found in foods such as walnuts, flaxseeds, fortified milks, and eggs. These fats are also found in " cold-water fish, such as sardines, salmon, and mackerel.  Limit how much you eat of:  Canned or prepackaged foods.  Food that is high in trans fat, such as fried foods.  Food that is high in saturated fat, such as fatty meat.  Desserts and other sweets, sugary drinks, and other foods with added sugar.  Full-fat dairy products.  Do not salt foods before eating.  Do not eat more than 4 egg yolks a week.  Try to eat at least 2 vegetarian meals a week.  Eat more home-cooked food and less restaurant, buffet, and fast food.  Lifestyle  When eating at a restaurant, ask if your food can be made with less salt or no salt.  If you drink alcohol:  Limit how much you have to:  0-1 drink a day if you are female.  0-2 drinks a day if you are male.  Know how much alcohol is in your drink. In the [place] one drink is one 12 oz bottle of beer (355 mL), one 5 oz glass of wine (148 mL), or one 1½ oz glass of hard liquor (44 mL).  General information  Avoid eating more than 2,300 mg of salt a day. If you have hypertension, you may need to reduce your sodium intake to 1,500 mg a day.  Work with your provider to stay at a healthy body weight or lose weight. Ask what the best weight range is for you.  On most days of the week, get at least 30 minutes of exercise that causes your heart to beat faster. This may include walking, swimming, or biking.  Work with your provider or dietitian to adjust your eating plan to meet your specific calorie needs.  What foods should I eat?  Fruits  All fresh, dried, or frozen fruit. Canned fruits that are in their natural juice and do not have sugar added to them.  Vegetables  Fresh or frozen vegetables that are raw, steamed, roasted, or grilled. Low-sodium or reduced-sodium tomato and vegetable juice. Low-sodium or reduced-sodium tomato sauce and tomato paste. Low-sodium or reduced-sodium canned vegetables.  Grains  Whole-grain or whole-wheat bread. Whole-grain or whole-wheat pasta. Brown rice. Oatmeal.  Quinoa. Bulgur. Whole-grain and low-sodium cereals. Skylar bread. Low-fat, low-sodium crackers. Whole-wheat flour tortillas.  Meats and other proteins  Skinless chicken or turkey. Ground chicken or turkey. Pork with fat trimmed off. Fish and seafood. Egg whites. Dried beans, peas, or lentils. Unsalted nuts, nut butters, and seeds. Unsalted canned beans. Lean cuts of beef with fat trimmed off. Low-sodium, lean precooked or cured meat, such as sausages or meat loaves.  Dairy  Low-fat (1%) or fat-free (skim) milk. Reduced-fat, low-fat, or fat-free cheeses. Nonfat, low-sodium ricotta or cottage cheese. Low-fat or nonfat yogurt. Low-fat, low-sodium cheese.  Fats and oils  Soft margarine without trans fats. Vegetable oil. Reduced-fat, low-fat, or light mayonnaise and salad dressings (reduced-sodium). Canola, safflower, olive, avocado, soybean, and sunflower oils. Avocado.  Seasonings and condiments  Herbs. Spices. Seasoning mixes without salt.  Other foods  Unsalted popcorn and pretzels. Fat-free sweets.  The items listed above may not be all the foods and drinks you can have. Talk to a dietitian to learn more.  What foods should I avoid?  Fruits  Canned fruit in a light or heavy syrup. Fried fruit. Fruit in cream or butter sauce.  Vegetables  Creamed or fried vegetables. Vegetables in a cheese sauce. Regular canned vegetables that are not marked as low-sodium or reduced-sodium. Regular canned tomato sauce and paste that are not marked as low-sodium or reduced-sodium. Regular tomato and vegetable juices that are not marked as low-sodium or reduced-sodium. Pickles. Olives.  Grains  Baked goods made with fat, such as croissants, muffins, or some breads. Dry pasta or rice meal packs.  Meats and other proteins  Fatty cuts of meat. Ribs. Fried meat. [place]. Bologna, salami, and other precooked or cured meats, such as sausages or meat loaves, that are not lean and low in sodium. Fat from the back of a pig (fatback). [place].  Salted nuts and seeds. Canned beans with added salt. Canned or smoked fish. Whole eggs or egg yolks. Chicken or turkey with skin.  Dairy  Whole or 2% milk, cream, and half-and-half. Whole or full-fat cream cheese. Whole-fat or sweetened yogurt. Full-fat cheese. Nondairy creamers. Whipped toppings. Processed cheese and cheese spreads.  Fats and oils  Butter. Stick margarine. Lard. Shortening. Ghee. [place]de fat. Tropical oils, such as coconut, palm kernel, or palm oil.  Seasonings and condiments  Onion salt, garlic salt, seasoned salt, table salt, and sea salt. Worcestershire sauce. Tartar sauce. Barbecue sauce. Teriyaki sauce. Soy sauce, including reduced-sodium soy sauce. Steak sauce. Canned and packaged gravies. Fish sauce. Oyster sauce. Cocktail sauce. Store-bought horseradish. Ketchup. Mustard. Meat flavorings and tenderizers. Bouillon cubes. Hot sauces. Pre-made or packaged marinades. Pre-made or packaged taco seasonings. Relishes. Regular salad dressings.  Other foods  Salted popcorn and pretzels.  The items listed above may not be all the foods and drinks you should avoid. Talk to a dietitian to learn more.  Where to find more information  National Heart, Lung, and Blood [place] (NHLBI): nhlbi.nih.gov  American Heart Association (AHA): heart.org  Academy of Nutrition and Dietetics: eatright.org  National Kidney Foundation (NKF): kidney.org  This information is not intended to replace advice given to you by your health care provider. Make sure you discuss any questions you have with your health care provider.  Document Revised: 01/04/2024 Document Reviewed: 01/04/2024  Elsevier Patient Education © 2024 [employer]BMI for Adults  Body mass index (BMI) is a number found using a person's weight and height. BMI can help tell how much of a person's weight is made up of fat. BMI does not measure body fat directly. It is used instead of tests that directly measure body fat, which can be difficult and  "expensive.  What are BMI measurements used for?  BMI is useful to:  Find out if your weight puts you at higher risk for medical problems.  Help recommend changes, such as in diet and exercise. This can help you reach a healthy weight. BMI screening can be done again to see if these changes are working.  How is BMI calculated?  Your height and weight are measured. The BMI is found from those numbers. This can be done with [place] or metric measurements. Note that charts and online BMI calculators are available to help you find your BMI quickly and easily without doing these calculations.  To calculate your BMI in [place] measurements:  Measure your weight in pounds (lb).  Multiply the number of pounds by 703.  So, for an adult who weighs 150 lb, multiply that number by 703: 150 x 703, which equals 105,450.  Measure your height in inches. Then multiply that number by itself to get a measurement called \"inches squared.\"  So, for an adult who is 70 inches tall, the \"inches squared\" measurement is 70 inches x 70 inches, which equals 4,900 inches squared.  Divide the total from step 2 (number of lb x 703) by the total from step 3 (inches squared): 105,450 ÷ 4,900 = 21.5. This is your BMI.  To calculate your BMI in metric measurements:    Measure your weight in kilograms (kg).  For this example, the weight is 70 kg.  Measure your height in meters (m). Then multiply that number by itself to get a measurement called \"meters squared.\"  So, for an adult who is 1.75 m tall, the \"meters squared\" measurement is 1.75 m x 1.75 m, which equals 3.1 meters squared.  Divide the number of kilograms (your weight) by the meters squared number. In this example: 70 ÷ 3.1 = 22.6. This is your BMI.  What do the results mean?  BMI charts are used to see if you are underweight, normal weight, overweight, or obese. The following guidelines will be used:  Underweight: BMI less than 18.5.  Normal weight: BMI between 18.5 and 24.9.  Overweight: BMI " between 25 and 29.9.  Obese: BMI of 30 or above.  BMI is a tool and cannot diagnose a condition. Talk with your health care provider about what your BMI means for you. Keep these notes in mind:  Weight includes fat and muscle. Someone with a muscular build, such as an athlete, may have a BMI that is higher than 24.9. In cases like these, BMI is not a correct measure of body fat.  If you have a BMI of 25 or higher, your provider may need to do more testing to find out if excess body fat is the cause.  BMI is measured the same way for males and females. Females usually have more body fat than males of the same height and weight.  Where to find more information  For more information about BMI, including tools to quickly find your BMI, go to:  Centers for Disease Control and Prevention: cdc.gov  American Heart Association: heart.org  National Heart, Lung, and Blood [place]: nhlbi.nih.gov  This information is not intended to replace advice given to you by your health care provider. Make sure you discuss any questions you have with your health care provider.  Document Revised: 09/07/2023 Document Reviewed: 08/31/2023  Elsevier Patient Education © 2024 Elsevier Inc.

## 2025-06-24 ENCOUNTER — OFFICE VISIT (OUTPATIENT)
Dept: CARDIOLOGY | Facility: CLINIC | Age: 89
End: 2025-06-24
Payer: MEDICARE

## 2025-06-24 ENCOUNTER — HOSPITAL ENCOUNTER (OUTPATIENT)
Dept: MRI IMAGING | Facility: HOSPITAL | Age: 89
Discharge: HOME | End: 2025-06-24
Payer: MEDICARE

## 2025-06-24 VITALS
WEIGHT: 139 LBS | BODY MASS INDEX: 23.73 KG/M2 | HEART RATE: 65 BPM | SYSTOLIC BLOOD PRESSURE: 152 MMHG | HEIGHT: 64 IN | OXYGEN SATURATION: 98 % | DIASTOLIC BLOOD PRESSURE: 66 MMHG

## 2025-06-24 DIAGNOSIS — E78.49 OTHER HYPERLIPIDEMIA: ICD-10-CM

## 2025-06-24 DIAGNOSIS — Z78.9 STATIN INTOLERANCE: ICD-10-CM

## 2025-06-24 DIAGNOSIS — S32.130G: ICD-10-CM

## 2025-06-24 DIAGNOSIS — I25.10 CORONARY ARTERY CALCIFICATION SEEN ON CT SCAN: Primary | ICD-10-CM

## 2025-06-24 DIAGNOSIS — M54.16 LUMBAR RADICULOPATHY: ICD-10-CM

## 2025-06-24 DIAGNOSIS — I10 PRIMARY HYPERTENSION: ICD-10-CM

## 2025-06-24 DIAGNOSIS — R20.0 RIGHT LEG NUMBNESS: ICD-10-CM

## 2025-06-24 PROCEDURE — 72148 MRI LUMBAR SPINE W/O DYE: CPT

## 2025-06-24 PROCEDURE — 72195 MRI PELVIS W/O DYE: CPT

## 2025-06-24 PROCEDURE — G2211 COMPLEX E/M VISIT ADD ON: HCPCS | Performed by: HOSPITALIST

## 2025-06-24 PROCEDURE — 99214 OFFICE O/P EST MOD 30 MIN: CPT | Performed by: HOSPITALIST

## 2025-06-24 RX ORDER — PREDNISONE 5 MG/1
5 TABLET ORAL 2 TIMES DAILY
COMMUNITY

## 2025-06-24 NOTE — PROGRESS NOTES
Reason For Visit:  Hypertension, hyperlipidemia, coronary artery calcification, Hospital follow-up    Subjective        Linda Spear is a 89 y.o. female with the below pertinent PMH who presents for follow-up of the above issues.    Linda Spear was seen by me in the hospital in late May while admitted for a sacral plasty.  She had an episode of vasovagal syncope while going to the bathroom and subsequently had chest pain and a troponin elevation for which cardiology was consulted.  She did not have recurrent chest pain.  Stress testing was low-intermediate risk with a small area of possible ischemia but less than 5% total perfusion defect.  She was started on Ranexa 500 mg twice daily and aspirin 81 mg daily.    Today, the patient reports that she is slowly recovering from her hospitalization and surgery.  She did spend some time in rehab but is now back at home.  From a cardiac standpoint she feels like she has been doing well.  She has not had any issues with chest discomfort.  She has a little bit of shortness of breath that does not seem abnormal or concerning to her as she is trying to build back up her strength.  She has an elevated BP in clinic today but states that her BP has actually been running lower at home when she has checked it in the 110/100 20s to the point that she had asked her PCP about potentially lowering her antihypertensive medications.  She has tolerated aspirin well.  She does mention having some issues with leg swelling that have improved with some medication adjustments by her PCP; she discussed this with neurosurgery who told her that some of this is to be expected postoperatively and they are not concerned.    ROS: Pertinent findings are included above.    Cardiac Studies  Echo 3/24/2023: LVEF 66 to 70%, indeterminate diastolic function, normal RV size/function, LA mildly dilated, moderate AV calcification.  Stress echo 11/15/2023: Intermediate risk.  Sensitivity/specificity reduced by  short exercise/stress duration.  Lack of appropriate augmentation of the basal and mid lateral segments.  In some views subtle hypokinesis of the apex favored to represent benign diverticulum although subtle ischemia cannot be excluded.  Exercise capacity severely impaired.  BP demonstrates hypertensive response to stress.  HR demonstrated exaggerated response to stress.  LVEF 61 to 65%.  Calcified AV with borderline mild stenosis.  Lexiscan myocardial perfusion SPECT 1/20/2024: No evidence of ischemia.  LVEF 65%.  Breast attenuation artifact present.  Cardiac monitor 2/26/2024: Baseline sinus rhythm with no significant ectopy.  5 runs of SVT lasting up to 9 beats.  Stress cardiac PET 5/28/2025: Low-intermediate risk study.  Small, mild area of possible ischemia less than 5% in the basal and mid anterior segments.  LVEF 75% at rest and 77% with stress.  Reduced CFR in the setting of elevated peak MBF likely affected by known AV stenosis.  Severe coronary artery calcifications.    Pertinent PMH  Hypertension  Hyperlipidemia with statin intolerance  Coronary artery disease-heavy coronary artery calcifications on CT chest  CKD 3  Hypothyroidism    Pertinent past medical, surgical, family, and social history were reviewed.      Current Outpatient Medications:     aspirin 81 MG EC tablet, Take 1 tablet by mouth Daily., Disp: 30 tablet, Rfl: 5    Calcium Citrate-Vitamin D3 (CITRACAL) 315-6.25 MG-MCG tablet tablet, Take 1 tablet by mouth 2 (Two) Times a Day., Disp: , Rfl:     Hydrocortisone, Perianal, (Proctozone-HC) 2.5 % rectal cream, Insert 1 Application into the rectum 2 (Two) Times a Day., Disp: , Rfl:     levothyroxine (SYNTHROID, LEVOTHROID) 100 MCG tablet, TAKE 1 TABLET BY MOUTH EVERY MORNING (BEFORE BREAKFAST), Disp: , Rfl:     levothyroxine (SYNTHROID, LEVOTHROID) 112 MCG tablet, Take 1 tablet by mouth Every Morning., Disp: , Rfl:     metoprolol succinate XL (TOPROL-XL) 25 MG 24 hr tablet, Take 2 tablets by mouth  "Every Evening. Take 1tab (25) in the am and take 2tabs (50) in the evening., Disp: , Rfl:     Multiple Vitamins-Minerals (CENTRUM SILVER PO), Take 1 tablet by mouth Daily., Disp: , Rfl:     nitroglycerin (NITROSTAT) 0.4 MG SL tablet, Place 1 tablet under the tongue Every 5 (Five) Minutes As Needed for Chest Pain. Take no more than 3 doses in 15 minutes., Disp: , Rfl:     Omega-3 Fatty Acids (OMEGA-3 PO), Take 2 tablets by mouth Daily., Disp: , Rfl:     Psyllium (METAMUCIL MULTIHEALTH FIBER) 51.7 % packet, Take 1 packet by mouth., Disp: , Rfl:     ranolazine (RANEXA) 500 MG 12 hr tablet, Take 1 tablet by mouth Every 12 (Twelve) Hours., Disp: 60 tablet, Rfl: 5    terbinafine (lamiSIL) 250 MG tablet, Take 1 tablet by mouth Every Night., Disp: , Rfl:     TURMERIC PO, Take 1 tablet by mouth 2 (Two) Times a Day., Disp: , Rfl:     vitamin B-12 (CYANOCOBALAMIN) 1000 MCG tablet, Take 1 tablet by mouth 2 (Two) Times a Day., Disp: , Rfl:      Objective   Vital Signs:  /66   Pulse 65   Ht 161.3 cm (63.5\")   Wt 63 kg (139 lb)   SpO2 98%   BMI 24.24 kg/m²   Estimated body mass index is 24.24 kg/m² as calculated from the following:    Height as of this encounter: 161.3 cm (63.5\").    Weight as of this encounter: 63 kg (139 lb).      Constitutional:       Appearance: Healthy appearance. Not in distress.   Neck:      Vascular: JVD normal.   Pulmonary:      Effort: Pulmonary effort is normal.      Breath sounds: Normal breath sounds.   Cardiovascular:      Normal rate. Regular rhythm.      Murmurs: There is a grade 2/6 systolic murmur at the URSB.      No gallop.  No click. No rub.   Edema:     Comments: Mild bilateral lower leg swelling slightly worse on the right with only pitting edema being trace in the right ankle  Abdominal:      General: There is no distension.      Palpations: Abdomen is soft.      Tenderness: There is no abdominal tenderness.   Skin:     General: Skin is warm and dry.   Neurological:      Mental " Status: Alert and oriented to person, place and time.        Result Review :  The following data was reviewed by: Paul Lieberman MD on 06/24/2025:  CMP   CMP          5/24/2025    05:00 5/27/2025    14:15 5/28/2025    09:00   CMP   Glucose 98  110  97    BUN 6  7.5  8.2    Creatinine 0.69  0.74  0.80    EGFR 83.6  77.9  71.0    Sodium 134  132  131    Potassium 4.1  4.0  3.8    Chloride 94  93  91    Calcium 8.4  8.7  8.9    Total Protein  5.5     Albumin  3.0     Globulin  2.5     Total Bilirubin  0.3     Alkaline Phosphatase  235     AST (SGOT)  22     ALT (SGPT)  25     Albumin/Globulin Ratio  1.2     BUN/Creatinine Ratio 8.7  10.1  10.3    Anion Gap 10.0  11.0  12.0      Lipid Panel   Lipid Panel          5/28/2025    09:00   Lipid Panel   Total Cholesterol 199    Triglycerides 127    HDL Cholesterol 50    VLDL Cholesterol 23    LDL Cholesterol  126    LDL/HDL Ratio 2.47             ECG 12 Lead    Date/Time: 6/24/2025 11:33 AM  Performed by: Paul Lieberman MD    Authorized by: Paul Lieberman MD  Comparison: compared with previous ECG from 5/27/2025  Similar to previous ECG  Rhythm: sinus rhythm  Rate: normal  BPM: 65  Conduction: conduction normal  Q waves: V1 and V2    QRS axis: normal    Clinical impression: abnormal EKG            Assessment and Plan   Diagnoses and all orders for this visit:    1. Coronary artery calcification seen on CT scan (Primary)    2. Other hyperlipidemia    3. Primary hypertension    4. Statin intolerance    Other orders  -     ECG 12 Lead      -Now doing well and not having any chest pain.  She has not been taking Ranexa, so I will not have her resume this.  BP elevated but reportedly better controlled at home; we will not adjust her medications for now but she was encouraged to keep a good home BP log and bring her BP log along with her BP machine to her next visit.  - Continue aspirin 81 mg daily, which she is tolerating well.  - Plan to initiate Leqvio for cholesterol control  given her hyperlipidemia, coronary calcifications, and statin intolerance.  - Continue Toprol-XL 50 mg daily  - Plan for repeat echo in the future to reassess aortic valve especially if persistent leg swelling.    Follow Up   Return in about 4 months (around 10/24/2025).  Patient was given instructions and counseling regarding her condition or for health maintenance advice. Please see specific information pulled into the AVS if appropriate.       Part of this note may be an electronic transcription/translation of spoken language to printed text using the Dragon Dictation System.

## 2025-07-02 ENCOUNTER — OFFICE VISIT (OUTPATIENT)
Dept: NEUROSURGERY | Facility: CLINIC | Age: 89
End: 2025-07-02
Payer: MEDICARE

## 2025-07-02 VITALS — BODY MASS INDEX: 23.73 KG/M2 | HEIGHT: 64 IN | WEIGHT: 139 LBS

## 2025-07-02 DIAGNOSIS — S32.130G: Primary | ICD-10-CM

## 2025-07-02 DIAGNOSIS — M54.16 LUMBAR RADICULOPATHY: ICD-10-CM

## 2025-07-02 RX ORDER — FUROSEMIDE 20 MG/1
TABLET ORAL
COMMUNITY
Start: 2025-06-27

## 2025-07-02 NOTE — PROGRESS NOTES
"    Chief complaint:   Chief Complaint   Patient presents with    Back Pain     Pt is here for f/u/e to go over MRI results. Pt states since her last office visit her symptoms have improved. Pt is in wheelchair today.         Subjective     HPI: This is an 89-year-old female patient went to the operating room on May 23, 2025 for a right sacroplasty.  She also underwent a left sacroplasty on April 30, 2025.  The patient did go to rehab postoperatively.  The patient was last seen on June 18, 2025 and at that time she did feel that she was doing better than what she was prior to the surgery but was still complaining of pain in the sacral region and pain radiating into her right lower extremity.  We did send her for an MRI of the lumbar spine as well as an MRI of the pelvis.  She is here in follow-up today..  The patient says that she is doing better.  She does feel like her symptoms are improving.  She does still have some pain in her buttocks bilaterally and occasionally have pain rating into her legs.  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    Review of Systems   Cardiovascular:  Positive for leg swelling.   Musculoskeletal:  Positive for arthralgias, back pain and myalgias.         Objective      Vital Signs  Ht 161.3 cm (63.5\")   Wt 63 kg (139 lb)   LMP  (LMP Unknown)   BMI 24.24 kg/m²     Physical Exam  Constitutional:       General: She is awake.      Appearance: She is well-developed.   HENT:      Head: Normocephalic.   Eyes:      Extraocular Movements: Extraocular movements intact.      Pupils: Pupils are equal, round, and reactive to light.   Pulmonary:      Effort: Pulmonary effort is normal.   Musculoskeletal:         General: Normal range of motion.      Cervical back: Normal range of motion.   Skin:     General: Skin is warm.   Neurological:      Mental Status: She is alert and oriented to person, place, and time.      GCS: GCS eye " subscore is 4. GCS verbal subscore is 5. GCS motor subscore is 6.      Cranial Nerves: No cranial nerve deficit.      Sensory: No sensory deficit.      Motor: Motor strength is normal.     Gait: Gait normal.      Deep Tendon Reflexes: Reflexes are normal and symmetric.   Psychiatric:         Speech: Speech normal.         Behavior: Behavior normal.         Thought Content: Thought content normal.         Neurological Exam  Mental Status  Awake and alert. Oriented to person, place and time. Oriented to person, place, and time. Speech is normal. Language is fluent with no aphasia. Attention and concentration are normal.    Cranial Nerves  CN I: Sense of smell is normal.  CN II: Right normal visual field. Left normal visual field.  CN III, IV, VI: Extraocular movements intact bilaterally. Pupils equal round and reactive to light bilaterally.  CN V: Facial sensation is normal.  CN VII:  Right: There is no facial weakness.  Left: There is no facial weakness.  CN XI: Shoulder shrug strength is normal.  CN XII: Tongue midline without atrophy or fasciculations.    Motor  Normal muscle bulk throughout. Normal muscle tone. Strength is 5/5 throughout all four extremities.    Sensory  Sensation is intact to light touch, pinprick, vibration and proprioception in all four extremities.    Reflexes  Deep tendon reflexes are 2+ and symmetric in all four extremities.    Gait  Normal casual, toe, heel and tandem gait. Normal gait.      Imaging review: MRI of the pelvis shows persistent edema in the sacrum bilaterally with the right being more prominent than the left.  There is also edema noted in the right piriformis syndrome that was felt to be related to the sacral fracture as well as in the gluteus musculature on the right.  Degeneration felt to be prominent in the left SI joint.  There is also an acute fracture in the right pubis adjacent to the pubic symphysis.    MRI of the lumbar spine that was done on June 24, 2025 does not  show any acute fracture in the lumbar spine.  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  At L 5-S1 there is bilateral foraminal narrowing.  At L4-5 disc degeneration with prominent left-sided foraminal narrowing.  L3-4 lumbar stenosis with right sided foraminal narrowing.  At L2-3 left-sided foraminal narrowing.  No fracture visualized.  No cord signal change.  The conus is terminating at L1.    Assessment/Plan: Patient is doing well and does feel like her symptoms are improving.  I think she is still dealing with the fractures healing at this time.  She would like to do outpatient physical therapy.  I will give her an order for the therapy.  We will have her keep her appoint with Dr. Solitario to make sure that she does continue to make improvements.  She was told to call us if any further problems or concerns    Patient is a nonsmoker  The patient's Body mass index is 24.24 kg/m².. BMI is within normal parameters. No follow-up required.  Advance Care Planning   ACP discussion was held with the patient during this visit. Patient has an advance directive in EMR which is still valid.    [facility] Fall Risk Assessment was completed, and patient is at MODERATE risk for falls. Assessment completed on:4/29/2025     Diagnoses and all orders for this visit:    1. Closed nondisplaced zone III fracture of sacrum with delayed healing, subsequent encounter (Primary)  -     Ambulatory Referral to Physical Therapy for Evaluation & Treatment    2. Lumbar radiculopathy        I discussed the patients findings and my recommendations with patient  Kendall Pena, MARGARETTE  07/02/25  12:02 CDT

## 2025-07-15 ENCOUNTER — TELEPHONE (OUTPATIENT)
Dept: NEUROSURGERY | Facility: CLINIC | Age: 89
End: 2025-07-15
Payer: MEDICARE

## 2025-07-15 NOTE — TELEPHONE ENCOUNTER
Pt corriem stating she is now going to [facility] and she is needing her MRI's sent over to [facility] and make sure they have what they need.

## 2025-08-05 ENCOUNTER — OFFICE VISIT (OUTPATIENT)
Dept: NEUROSURGERY | Facility: CLINIC | Age: 89
End: 2025-08-05
Payer: MEDICARE

## 2025-08-05 VITALS — WEIGHT: 138 LBS | HEIGHT: 64 IN | BODY MASS INDEX: 23.56 KG/M2

## 2025-08-05 DIAGNOSIS — S32.130G: Primary | ICD-10-CM

## 2025-08-05 DIAGNOSIS — R20.0 RIGHT LEG NUMBNESS: ICD-10-CM

## 2025-08-05 DIAGNOSIS — M54.16 LUMBAR RADICULOPATHY: ICD-10-CM

## 2025-08-05 DIAGNOSIS — Z78.9 NON-SMOKER: ICD-10-CM

## 2025-08-05 RX ORDER — LEVOTHYROXINE SODIUM 112 UG/1
112 TABLET ORAL DAILY
COMMUNITY
Start: 2025-07-02

## 2025-08-12 ENCOUNTER — TELEPHONE (OUTPATIENT)
Dept: NEUROSURGERY | Facility: CLINIC | Age: 89
End: 2025-08-12
Payer: MEDICARE

## 2025-08-14 ENCOUNTER — OFFICE VISIT (OUTPATIENT)
Age: 89
End: 2025-08-14
Payer: MEDICARE

## 2025-08-14 VITALS
BODY MASS INDEX: 23.56 KG/M2 | OXYGEN SATURATION: 96 % | HEART RATE: 66 BPM | DIASTOLIC BLOOD PRESSURE: 86 MMHG | SYSTOLIC BLOOD PRESSURE: 124 MMHG | WEIGHT: 138 LBS | HEIGHT: 64 IN

## 2025-08-14 DIAGNOSIS — M54.16 LUMBAR RADICULOPATHY: ICD-10-CM

## 2025-08-14 DIAGNOSIS — M79.671 RIGHT FOOT PAIN: ICD-10-CM

## 2025-08-14 DIAGNOSIS — M72.2 PLANTAR FASCIITIS OF RIGHT FOOT: Primary | ICD-10-CM

## 2025-08-14 PROBLEM — E07.9 THYROID DISORDER: Status: ACTIVE | Noted: 2025-08-14

## 2025-08-14 PROBLEM — R94.39 CARDIOVASCULAR STRESS TEST ABNORMAL: Status: ACTIVE | Noted: 2024-02-19

## 2025-08-14 PROBLEM — M19.90 OSTEOARTHRITIS: Status: ACTIVE | Noted: 2023-11-07

## 2025-08-14 PROBLEM — M76.60 ACHILLES TENDINITIS: Status: ACTIVE | Noted: 2024-05-01

## 2025-08-14 PROBLEM — I12.9 HYPERTENSIVE RENAL DISEASE: Status: ACTIVE | Noted: 2023-11-07

## 2025-08-14 PROBLEM — K64.9 BLEEDING HEMORRHOIDS: Status: ACTIVE | Noted: 2025-05-05

## 2025-08-14 PROBLEM — E44.0 MODERATE MALNUTRITION: Status: ACTIVE | Noted: 2025-05-29

## 2025-08-14 PROBLEM — I50.9 CONGESTIVE HEART FAILURE: Status: ACTIVE | Noted: 2023-11-08

## 2025-08-14 PROBLEM — R91.1 LUNG NODULE: Status: ACTIVE | Noted: 2023-11-29

## 2025-08-14 PROBLEM — I25.119 ANGINA CONCURRENT WITH AND DUE TO ARTERIOSCLEROSIS OF CORONARY ARTERY: Status: ACTIVE | Noted: 2025-02-26

## 2025-08-14 PROBLEM — T46.6X5A ADVERSE EFFECT OF HMG-COA REDUCTASE INHIBITOR: Status: ACTIVE | Noted: 2025-02-26

## 2025-08-14 PROBLEM — M35.3 POLYMYALGIA RHEUMATICA: Status: ACTIVE | Noted: 2023-11-07

## 2025-08-14 PROBLEM — S33.5XXA LUMBAR SPRAIN: Status: ACTIVE | Noted: 2025-04-14

## (undated) DEVICE — TOWEL,OR,DSP,ST,BLUE,STD,4/PK,20PK/CS: Brand: MEDLINE

## (undated) DEVICE — CONN FLX BREATHE CIRCT

## (undated) DEVICE — BONE TAMP KIT KEX152EB FF E2 15/2 OI: Brand: KYPHON EXPRESS II KYPHOPAK TRAY

## (undated) DEVICE — THE STERILE THE STERIS STERILE CAMERA HANDLE COVERS ARE DESIGNED FOR HARMONYAIR 4K CAMERA MODULE, AND PROVIDE STERILE CONTROL THAT ALLOW FOR INCREASING AND DECREASING ILLUMINATION THROUGH SEVEN INTENSITY LEVELS.

## (undated) DEVICE — THE STERILE LIGHT HANDLE COVER IS USED WITH STERIS SURGICAL LIGHTING AND VISUALIZATION SYSTEMS.

## (undated) DEVICE — PAD,NON-ADHERENT,3X8,STERILE,LF,1/PK: Brand: MEDLINE

## (undated) DEVICE — UTILITY MARKER W/MED LABELS: Brand: MEDLINE

## (undated) DEVICE — CONTAINER,SPECIMEN,OR STERILE,4OZ: Brand: MEDLINE

## (undated) DEVICE — PK KYPHOPLASTY 30

## (undated) DEVICE — GLV SURG BIOGEL LTX PF 8

## (undated) DEVICE — SUT MNCRYL 4/0 PS2 27IN UD MCP426H

## (undated) DEVICE — DRP C/ARM W/BAND W/CLIPS 41X74IN

## (undated) DEVICE — SPK10277 JACKSON/PRO-AXIS KIT: Brand: SPK10277 JACKSON/PRO-AXIS KIT

## (undated) DEVICE — VAGINAL PREP TRAY: Brand: MEDLINE INDUSTRIES, INC.

## (undated) DEVICE — INTRODUCER T34A KYPHON EX OI DIA AND BEV: Brand: KYPHON® EXPRESS™ OSTEO INTRODUCER® SYSTEM

## (undated) DEVICE — BONE TAMP KIT KEX152NB AF E2 15/2: Brand: KYPHON EXPRESS II KYPHOPAK TRAY